# Patient Record
Sex: MALE | Race: WHITE | NOT HISPANIC OR LATINO | Employment: FULL TIME | URBAN - METROPOLITAN AREA
[De-identification: names, ages, dates, MRNs, and addresses within clinical notes are randomized per-mention and may not be internally consistent; named-entity substitution may affect disease eponyms.]

---

## 2017-11-09 ENCOUNTER — ALLSCRIPTS OFFICE VISIT (OUTPATIENT)
Dept: OTHER | Facility: OTHER | Age: 47
End: 2017-11-09

## 2017-11-09 DIAGNOSIS — E55.9 VITAMIN D DEFICIENCY: ICD-10-CM

## 2017-11-09 DIAGNOSIS — I10 ESSENTIAL (PRIMARY) HYPERTENSION: ICD-10-CM

## 2017-11-09 DIAGNOSIS — M79.672 PAIN OF LEFT FOOT: ICD-10-CM

## 2017-11-09 DIAGNOSIS — R53.83 OTHER FATIGUE: ICD-10-CM

## 2017-11-09 DIAGNOSIS — Z13.1 ENCOUNTER FOR SCREENING FOR DIABETES MELLITUS: ICD-10-CM

## 2017-11-09 DIAGNOSIS — M79.89 OTHER SPECIFIED SOFT TISSUE DISORDERS (CODE): ICD-10-CM

## 2017-11-09 DIAGNOSIS — Z13.220 ENCOUNTER FOR SCREENING FOR LIPOID DISORDERS: ICD-10-CM
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
## 2017-11-14 ENCOUNTER — HOSPITAL ENCOUNTER (OUTPATIENT)
Dept: ULTRASOUND IMAGING | Facility: HOSPITAL | Age: 47
Discharge: HOME | End: 2017-11-14
Attending: FAMILY MEDICINE
Payer: COMMERCIAL

## 2017-11-14 ENCOUNTER — HOSPITAL ENCOUNTER (OUTPATIENT)
Dept: RADIOLOGY | Facility: HOSPITAL | Age: 47
Discharge: HOME | End: 2017-11-14
Attending: FAMILY MEDICINE
Payer: COMMERCIAL

## 2017-11-14 DIAGNOSIS — E55.9 VITAMIN D DEFICIENCY: ICD-10-CM

## 2017-11-14 DIAGNOSIS — M79.89 OTHER SPECIFIED SOFT TISSUE DISORDERS (CODE): ICD-10-CM

## 2017-11-14 DIAGNOSIS — M79.672 PAIN OF LEFT FOOT: ICD-10-CM

## 2017-11-14 PROCEDURE — 73630 X-RAY EXAM OF FOOT: CPT

## 2017-11-14 PROCEDURE — 93971 EXTREMITY STUDY: CPT

## 2017-11-14 PROCEDURE — 73590 X-RAY EXAM OF LOWER LEG: CPT

## 2017-11-22 ENCOUNTER — APPOINTMENT (OUTPATIENT)
Dept: LAB | Facility: OTHER | Age: 47
End: 2017-11-22
Payer: COMMERCIAL

## 2017-11-22 ENCOUNTER — TRANSCRIBE ORDERS (OUTPATIENT)
Dept: LAB | Facility: OTHER | Age: 47
End: 2017-11-22

## 2017-11-22 DIAGNOSIS — Z13.220 ENCOUNTER FOR SCREENING FOR LIPOID DISORDERS: ICD-10-CM

## 2017-11-22 DIAGNOSIS — R53.83 OTHER FATIGUE: ICD-10-CM

## 2017-11-22 DIAGNOSIS — I10 ESSENTIAL (PRIMARY) HYPERTENSION: ICD-10-CM

## 2017-11-22 DIAGNOSIS — Z13.1 ENCOUNTER FOR SCREENING FOR DIABETES MELLITUS: ICD-10-CM

## 2017-11-22 DIAGNOSIS — E55.9 VITAMIN D DEFICIENCY: ICD-10-CM

## 2017-11-22 LAB
25(OH)D3 SERPL-MCNC: 14.3 NG/ML (ref 30–100)
ALBUMIN SERPL BCP-MCNC: 4.3 G/DL (ref 3.5–5)
ALP SERPL-CCNC: 103 U/L (ref 46–116)
ALT SERPL W P-5'-P-CCNC: 53 U/L (ref 12–78)
ANION GAP SERPL CALCULATED.3IONS-SCNC: 6 MMOL/L (ref 4–13)
AST SERPL W P-5'-P-CCNC: 26 U/L (ref 5–45)
BASOPHILS # BLD AUTO: 0.06 THOUSANDS/ΜL (ref 0–0.1)
BASOPHILS NFR BLD AUTO: 1 % (ref 0–1)
BILIRUB SERPL-MCNC: 0.7 MG/DL (ref 0.2–1)
BUN SERPL-MCNC: 13 MG/DL (ref 5–25)
CALCIUM SERPL-MCNC: 9.1 MG/DL (ref 8.3–10.1)
CHLORIDE SERPL-SCNC: 100 MMOL/L (ref 100–108)
CHOLEST SERPL-MCNC: 222 MG/DL (ref 50–200)
CO2 SERPL-SCNC: 29 MMOL/L (ref 21–32)
CREAT SERPL-MCNC: 0.84 MG/DL (ref 0.6–1.3)
EOSINOPHIL # BLD AUTO: 0.33 THOUSAND/ΜL (ref 0–0.61)
EOSINOPHIL NFR BLD AUTO: 5 % (ref 0–6)
ERYTHROCYTE [DISTWIDTH] IN BLOOD BY AUTOMATED COUNT: 12.9 % (ref 11.6–15.1)
GFR SERPL CREATININE-BSD FRML MDRD: 104 ML/MIN/1.73SQ M
GLUCOSE P FAST SERPL-MCNC: 96 MG/DL (ref 65–99)
HCT VFR BLD AUTO: 45 % (ref 36.5–49.3)
HDLC SERPL-MCNC: 41 MG/DL (ref 40–60)
HGB BLD-MCNC: 16 G/DL (ref 12–17)
LDLC SERPL CALC-MCNC: 152 MG/DL (ref 0–100)
LYMPHOCYTES # BLD AUTO: 2.63 THOUSANDS/ΜL (ref 0.6–4.47)
LYMPHOCYTES NFR BLD AUTO: 41 % (ref 14–44)
MCH RBC QN AUTO: 32.5 PG (ref 26.8–34.3)
MCHC RBC AUTO-ENTMCNC: 35.6 G/DL (ref 31.4–37.4)
MCV RBC AUTO: 91 FL (ref 82–98)
MONOCYTES # BLD AUTO: 0.69 THOUSAND/ΜL (ref 0.17–1.22)
MONOCYTES NFR BLD AUTO: 11 % (ref 4–12)
NEUTROPHILS # BLD AUTO: 2.71 THOUSANDS/ΜL (ref 1.85–7.62)
NEUTS SEG NFR BLD AUTO: 42 % (ref 43–75)
NRBC BLD AUTO-RTO: 0 /100 WBCS
PLATELET # BLD AUTO: 317 THOUSANDS/UL (ref 149–390)
PMV BLD AUTO: 10.5 FL (ref 8.9–12.7)
POTASSIUM SERPL-SCNC: 4.7 MMOL/L (ref 3.5–5.3)
PROT SERPL-MCNC: 8.2 G/DL (ref 6.4–8.2)
RBC # BLD AUTO: 4.93 MILLION/UL (ref 3.88–5.62)
SODIUM SERPL-SCNC: 135 MMOL/L (ref 136–145)
TRIGL SERPL-MCNC: 145 MG/DL
TSH SERPL DL<=0.05 MIU/L-ACNC: 3.22 UIU/ML (ref 0.36–3.74)
WBC # BLD AUTO: 6.44 THOUSAND/UL (ref 4.31–10.16)

## 2017-11-22 PROCEDURE — 80053 COMPREHEN METABOLIC PANEL: CPT

## 2017-11-22 PROCEDURE — 84443 ASSAY THYROID STIM HORMONE: CPT

## 2017-11-22 PROCEDURE — 80061 LIPID PANEL: CPT

## 2017-11-22 PROCEDURE — 85025 COMPLETE CBC W/AUTO DIFF WBC: CPT

## 2017-11-22 PROCEDURE — 82306 VITAMIN D 25 HYDROXY: CPT

## 2017-11-30 ENCOUNTER — GENERIC CONVERSION - ENCOUNTER (OUTPATIENT)
Dept: OTHER | Facility: OTHER | Age: 47
End: 2017-11-30

## 2017-11-30 ENCOUNTER — GENERIC CONVERSION - ENCOUNTER (OUTPATIENT)
Dept: FAMILY MEDICINE CLINIC | Facility: CLINIC | Age: 47
End: 2017-11-30

## 2017-11-30 PROCEDURE — 88304 TISSUE EXAM BY PATHOLOGIST: CPT | Performed by: FAMILY MEDICINE

## 2017-12-01 ENCOUNTER — LAB REQUISITION (OUTPATIENT)
Dept: LAB | Facility: HOSPITAL | Age: 47
End: 2017-12-01
Payer: COMMERCIAL

## 2017-12-01 DIAGNOSIS — L91.8 OTHER HYPERTROPHIC DISORDERS OF THE SKIN: ICD-10-CM

## 2018-01-13 VITALS
OXYGEN SATURATION: 98 % | BODY MASS INDEX: 31.16 KG/M2 | HEART RATE: 66 BPM | SYSTOLIC BLOOD PRESSURE: 150 MMHG | DIASTOLIC BLOOD PRESSURE: 94 MMHG | HEIGHT: 69 IN | WEIGHT: 210.38 LBS | TEMPERATURE: 97.3 F

## 2018-01-22 VITALS — SYSTOLIC BLOOD PRESSURE: 134 MMHG | DIASTOLIC BLOOD PRESSURE: 90 MMHG

## 2018-01-22 VITALS
WEIGHT: 209.8 LBS | DIASTOLIC BLOOD PRESSURE: 94 MMHG | TEMPERATURE: 97.5 F | HEIGHT: 69 IN | BODY MASS INDEX: 31.07 KG/M2 | HEART RATE: 68 BPM | SYSTOLIC BLOOD PRESSURE: 156 MMHG | OXYGEN SATURATION: 96 %

## 2018-03-21 DIAGNOSIS — I10 ESSENTIAL HYPERTENSION: Primary | ICD-10-CM

## 2018-03-21 RX ORDER — HYDROCHLOROTHIAZIDE 12.5 MG/1
TABLET ORAL
Qty: 30 TABLET | Refills: 3 | Status: SHIPPED | OUTPATIENT
Start: 2018-03-21 | End: 2018-09-14 | Stop reason: SDUPTHER

## 2018-06-22 DIAGNOSIS — I10 ESSENTIAL HYPERTENSION: ICD-10-CM

## 2018-09-14 ENCOUNTER — OFFICE VISIT (OUTPATIENT)
Dept: FAMILY MEDICINE CLINIC | Facility: CLINIC | Age: 48
End: 2018-09-14
Payer: COMMERCIAL

## 2018-09-14 VITALS
HEIGHT: 70 IN | HEART RATE: 72 BPM | OXYGEN SATURATION: 98 % | DIASTOLIC BLOOD PRESSURE: 82 MMHG | BODY MASS INDEX: 29.81 KG/M2 | SYSTOLIC BLOOD PRESSURE: 124 MMHG | WEIGHT: 208.2 LBS | TEMPERATURE: 98.1 F

## 2018-09-14 DIAGNOSIS — I10 ESSENTIAL HYPERTENSION: Primary | ICD-10-CM

## 2018-09-14 DIAGNOSIS — R68.82 LOW LIBIDO: ICD-10-CM

## 2018-09-14 DIAGNOSIS — E78.2 MIXED HYPERLIPIDEMIA: ICD-10-CM

## 2018-09-14 DIAGNOSIS — E55.9 VITAMIN D DEFICIENCY: ICD-10-CM

## 2018-09-14 DIAGNOSIS — Z13.1 DIABETES MELLITUS SCREENING: ICD-10-CM

## 2018-09-14 PROCEDURE — 99214 OFFICE O/P EST MOD 30 MIN: CPT | Performed by: FAMILY MEDICINE

## 2018-09-14 PROCEDURE — 3008F BODY MASS INDEX DOCD: CPT | Performed by: FAMILY MEDICINE

## 2018-09-14 PROCEDURE — 3074F SYST BP LT 130 MM HG: CPT | Performed by: FAMILY MEDICINE

## 2018-09-14 PROCEDURE — 3079F DIAST BP 80-89 MM HG: CPT | Performed by: FAMILY MEDICINE

## 2018-09-14 RX ORDER — HYDROCHLOROTHIAZIDE 12.5 MG/1
12.5 TABLET ORAL DAILY
Qty: 90 TABLET | Refills: 2 | Status: SHIPPED | OUTPATIENT
Start: 2018-09-14 | End: 2018-10-05 | Stop reason: SDUPTHER

## 2018-09-14 NOTE — PROGRESS NOTES
Assessment/Plan:    No problem-specific Assessment & Plan notes found for this encounter  Diagnoses and all orders for this visit:    Essential hypertension  Stable continue same medications and dose at this time  Diabetes mellitus screening  -     Comprehensive metabolic panel; Future  -     Hemoglobin A1C; Future    Mixed hyperlipidemia  -     Lipid panel; Future    Vitamin D deficiency  Improved advise to continue Vit D daily will repeat levels  -     Vitamin D 25 hydroxy; Future    Low libido  The recommended medications for erectile dysfunction at this time however he is not interested in them and would like to try something over-the-counter for such as and supplements  Follow up in 6 months or as needed    Subjective:      Patient ID: Magdaleno Medina is a 50 y o  male  Patient is here to follow-up for hypertension  He says that his blood pressure has been controlled at home  He is currently taking his medications daily  He denies any side effects from his medications  He has a history of vitamin-D deficiency used to feel tired  However he has been feeling better since taking his vitamin D supplements  He also has a history of hypercholesteremia has found out per last blood work done a year ago  He is not currently taking any statin medications for this  Also his interested in increasing his libido he says has some problems sometimes with erectile dysfunction  He is interested in getting testosterone supplements such as new Neugenix over-the-counter  The following portions of the patient's history were reviewed and updated as appropriate:   He  has a past medical history of Known health problems: none    He   Patient Active Problem List    Diagnosis Date Noted    Essential hypertension 09/14/2018    Diabetes mellitus screening 09/14/2018    Mixed hyperlipidemia 09/14/2018    Vitamin D deficiency 09/14/2018    Low libido 09/14/2018     He  has a past surgical history that includes [place] tooth extraction  His family history includes Breast cancer in his sister; Hypertension in his father  He  reports that he has quit smoking  He has never used smokeless tobacco  He reports that he drinks alcohol  His drug history is not on file  Current Outpatient Prescriptions   Medication Sig Dispense Refill    hydrochlorothiazide (HYDRODIURIL) 12 5 mg tablet TAKE ONE TABLET BY MOUTH EVERY DAY 30 tablet 3    metoprolol tartrate (LOPRESSOR) 25 mg tablet TAKE ONE TABLET BY MOUTH TWICE A DAY 60 tablet 1     No current facility-administered medications for this visit  Current Outpatient Prescriptions on File Prior to Visit   Medication Sig    hydrochlorothiazide (HYDRODIURIL) 12 5 mg tablet TAKE ONE TABLET BY MOUTH EVERY DAY    metoprolol tartrate (LOPRESSOR) 25 mg tablet TAKE ONE TABLET BY MOUTH TWICE A DAY     No current facility-administered medications on file prior to visit  He has No Known Allergies       Review of Systems   Constitutional: Positive for fatigue  Negative for activity change, appetite change and fever  HENT: Negative for congestion and ear discharge  Respiratory: Negative for cough and shortness of breath  Cardiovascular: Negative for chest pain and palpitations  Gastrointestinal: Negative for diarrhea and nausea  Musculoskeletal: Negative for arthralgias and back pain  Skin: Negative for color change and rash  Neurological: Negative for dizziness and headaches  Psychiatric/Behavioral: Negative for agitation and behavioral problems  Objective:      /82 (Cuff Size: Large)   Pulse 72   Temp 98 1 °F (36 7 °C) (Tympanic)   Ht 5' 10" (1 778 m)   Wt 94 4 kg (208 lb 3 2 oz)   SpO2 98%   BMI 29 87 kg/m²          Physical Exam   Constitutional: He is oriented to person, place, and time  He appears well-developed and well-nourished  No distress  Eyes: Pupils are equal, round, and reactive to light  No scleral icterus     Cardiovascular: Normal rate, regular rhythm and normal heart sounds  No murmur heard  Pulmonary/Chest: Effort normal and breath sounds normal  No respiratory distress  He has no wheezes  Abdominal: Soft  Bowel sounds are normal  He exhibits no distension  There is no tenderness  Neurological: He is alert and oriented to person, place, and time  Skin: Skin is warm and dry  No rash noted  He is not diaphoretic  Psychiatric: He has a normal mood and affect

## 2018-10-04 ENCOUNTER — TELEPHONE (OUTPATIENT)
Dept: FAMILY MEDICINE CLINIC | Facility: CLINIC | Age: 48
End: 2018-10-04

## 2018-10-04 NOTE — TELEPHONE ENCOUNTER
David Nguyen got a phone call stating that his hydrochlorothiazide has been recalled  So he needs another script called into [facility]

## 2018-10-05 DIAGNOSIS — I10 ESSENTIAL HYPERTENSION: ICD-10-CM

## 2018-10-05 RX ORDER — HYDROCHLOROTHIAZIDE 12.5 MG/1
12.5 TABLET ORAL DAILY
Qty: 90 TABLET | Refills: 1 | Status: SHIPPED | OUTPATIENT
Start: 2018-10-05 | End: 2019-05-19 | Stop reason: SDUPTHER

## 2019-04-12 DIAGNOSIS — I10 ESSENTIAL HYPERTENSION: ICD-10-CM

## 2019-05-03 ENCOUNTER — OFFICE VISIT (OUTPATIENT)
Dept: FAMILY MEDICINE CLINIC | Facility: CLINIC | Age: 49
End: 2019-05-03
Payer: COMMERCIAL

## 2019-05-03 VITALS
WEIGHT: 206.8 LBS | HEART RATE: 74 BPM | DIASTOLIC BLOOD PRESSURE: 74 MMHG | OXYGEN SATURATION: 98 % | BODY MASS INDEX: 29.6 KG/M2 | SYSTOLIC BLOOD PRESSURE: 122 MMHG | HEIGHT: 70 IN | TEMPERATURE: 98.1 F

## 2019-05-03 DIAGNOSIS — H00.014 HORDEOLUM EXTERNUM OF LEFT UPPER EYELID: Primary | ICD-10-CM

## 2019-05-03 PROCEDURE — 3008F BODY MASS INDEX DOCD: CPT | Performed by: NURSE PRACTITIONER

## 2019-05-03 PROCEDURE — 99213 OFFICE O/P EST LOW 20 MIN: CPT | Performed by: NURSE PRACTITIONER

## 2019-05-03 RX ORDER — METHYLPREDNISOLONE 4 MG/1
TABLET ORAL
Qty: 21 EACH | Refills: 0 | Status: SHIPPED | OUTPATIENT
Start: 2019-05-03 | End: 2019-08-13 | Stop reason: ALTCHOICE

## 2019-05-03 RX ORDER — CEPHALEXIN 500 MG/1
500 CAPSULE ORAL EVERY 8 HOURS SCHEDULED
Qty: 21 CAPSULE | Refills: 0 | Status: SHIPPED | OUTPATIENT
Start: 2019-05-03 | End: 2019-05-10

## 2019-05-19 DIAGNOSIS — I10 ESSENTIAL HYPERTENSION: ICD-10-CM

## 2019-05-19 RX ORDER — HYDROCHLOROTHIAZIDE 12.5 MG/1
TABLET ORAL
Qty: 90 TABLET | Refills: 1 | Status: SHIPPED | OUTPATIENT
Start: 2019-05-19 | End: 2019-12-30 | Stop reason: SDUPTHER

## 2019-08-13 ENCOUNTER — OFFICE VISIT (OUTPATIENT)
Dept: FAMILY MEDICINE CLINIC | Facility: CLINIC | Age: 49
End: 2019-08-13
Payer: COMMERCIAL

## 2019-08-13 VITALS
SYSTOLIC BLOOD PRESSURE: 142 MMHG | OXYGEN SATURATION: 97 % | TEMPERATURE: 98.7 F | HEIGHT: 70 IN | DIASTOLIC BLOOD PRESSURE: 84 MMHG | HEART RATE: 61 BPM | BODY MASS INDEX: 29.67 KG/M2

## 2019-08-13 DIAGNOSIS — I10 ESSENTIAL HYPERTENSION: Primary | ICD-10-CM

## 2019-08-13 DIAGNOSIS — E78.2 MIXED HYPERLIPIDEMIA: ICD-10-CM

## 2019-08-13 DIAGNOSIS — M79.661 RIGHT CALF PAIN: ICD-10-CM

## 2019-08-13 DIAGNOSIS — M67.88 ACHILLES TENDINOSIS OF RIGHT LOWER EXTREMITY: Primary | ICD-10-CM

## 2019-08-13 DIAGNOSIS — E55.9 VITAMIN D DEFICIENCY: ICD-10-CM

## 2019-08-13 DIAGNOSIS — L91.8 CUTANEOUS SKIN TAGS: ICD-10-CM

## 2019-08-13 PROBLEM — H00.014 HORDEOLUM EXTERNUM OF LEFT UPPER EYELID: Status: RESOLVED | Noted: 2019-05-03 | Resolved: 2019-08-13

## 2019-08-13 PROBLEM — Z13.1 DIABETES MELLITUS SCREENING: Status: RESOLVED | Noted: 2018-09-14 | Resolved: 2019-08-13

## 2019-08-13 PROCEDURE — 99213 OFFICE O/P EST LOW 20 MIN: CPT | Performed by: NURSE PRACTITIONER
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
## 2019-08-22 ENCOUNTER — APPOINTMENT (OUTPATIENT)
Dept: LAB | Facility: CLINIC | Age: 49
End: 2019-08-22
Payer: COMMERCIAL

## 2019-08-22 DIAGNOSIS — I10 ESSENTIAL HYPERTENSION: ICD-10-CM

## 2019-08-22 DIAGNOSIS — E78.2 MIXED HYPERLIPIDEMIA: ICD-10-CM

## 2019-08-22 DIAGNOSIS — E55.9 VITAMIN D DEFICIENCY: ICD-10-CM

## 2019-08-22 LAB
25(OH)D3 SERPL-MCNC: 25 NG/ML (ref 30–100)
ALBUMIN SERPL BCP-MCNC: 4.5 G/DL (ref 3.5–5)
ALP SERPL-CCNC: 98 U/L (ref 46–116)
ALT SERPL W P-5'-P-CCNC: 45 U/L (ref 12–78)
ANION GAP SERPL CALCULATED.3IONS-SCNC: 9 MMOL/L (ref 4–13)
AST SERPL W P-5'-P-CCNC: 28 U/L (ref 5–45)
BASOPHILS # BLD AUTO: 0.06 THOUSANDS/ΜL (ref 0–0.1)
BASOPHILS NFR BLD AUTO: 1 % (ref 0–1)
BILIRUB SERPL-MCNC: 0.76 MG/DL (ref 0.2–1)
BUN SERPL-MCNC: 12 MG/DL (ref 5–25)
CALCIUM SERPL-MCNC: 9 MG/DL (ref 8.3–10.1)
CHLORIDE SERPL-SCNC: 106 MMOL/L (ref 100–108)
CHOLEST SERPL-MCNC: 204 MG/DL (ref 50–200)
CO2 SERPL-SCNC: 26 MMOL/L (ref 21–32)
CREAT SERPL-MCNC: 0.68 MG/DL (ref 0.6–1.3)
EOSINOPHIL # BLD AUTO: 0.25 THOUSAND/ΜL (ref 0–0.61)
EOSINOPHIL NFR BLD AUTO: 4 % (ref 0–6)
ERYTHROCYTE [DISTWIDTH] IN BLOOD BY AUTOMATED COUNT: 12.6 % (ref 11.6–15.1)
GFR SERPL CREATININE-BSD FRML MDRD: 113 ML/MIN/1.73SQ M
GLUCOSE P FAST SERPL-MCNC: 90 MG/DL (ref 65–99)
HCT VFR BLD AUTO: 43.8 % (ref 36.5–49.3)
HDLC SERPL-MCNC: 37 MG/DL (ref 40–60)
HGB BLD-MCNC: 14.8 G/DL (ref 12–17)
IMM GRANULOCYTES # BLD AUTO: 0.02 THOUSAND/UL (ref 0–0.2)
IMM GRANULOCYTES NFR BLD AUTO: 0 % (ref 0–2)
LDLC SERPL CALC-MCNC: 137 MG/DL (ref 0–100)
LYMPHOCYTES # BLD AUTO: 1.87 THOUSANDS/ΜL (ref 0.6–4.47)
LYMPHOCYTES NFR BLD AUTO: 33 % (ref 14–44)
MCH RBC QN AUTO: 31.6 PG (ref 26.8–34.3)
MCHC RBC AUTO-ENTMCNC: 33.8 G/DL (ref 31.4–37.4)
MCV RBC AUTO: 93 FL (ref 82–98)
MONOCYTES # BLD AUTO: 0.58 THOUSAND/ΜL (ref 0.17–1.22)
MONOCYTES NFR BLD AUTO: 10 % (ref 4–12)
NEUTROPHILS # BLD AUTO: 2.85 THOUSANDS/ΜL (ref 1.85–7.62)
NEUTS SEG NFR BLD AUTO: 52 % (ref 43–75)
NRBC BLD AUTO-RTO: 0 /100 WBCS
PLATELET # BLD AUTO: 258 THOUSANDS/UL (ref 149–390)
PMV BLD AUTO: 10.9 FL (ref 8.9–12.7)
POTASSIUM SERPL-SCNC: 3.7 MMOL/L (ref 3.5–5.3)
PROT SERPL-MCNC: 8 G/DL (ref 6.4–8.2)
RBC # BLD AUTO: 4.69 MILLION/UL (ref 3.88–5.62)
SODIUM SERPL-SCNC: 141 MMOL/L (ref 136–145)
TRIGL SERPL-MCNC: 151 MG/DL
WBC # BLD AUTO: 5.63 THOUSAND/UL (ref 4.31–10.16)

## 2019-08-22 PROCEDURE — 82306 VITAMIN D 25 HYDROXY: CPT

## 2019-08-22 PROCEDURE — 85025 COMPLETE CBC W/AUTO DIFF WBC: CPT

## 2019-08-22 PROCEDURE — 36415 COLL VENOUS BLD VENIPUNCTURE: CPT

## 2019-08-22 PROCEDURE — 80053 COMPREHEN METABOLIC PANEL: CPT

## 2019-08-22 PROCEDURE — 80061 LIPID PANEL: CPT

## 2019-08-29 ENCOUNTER — EVALUATION (OUTPATIENT)
Dept: PHYSICAL THERAPY | Facility: CLINIC | Age: 49
End: 2019-08-29
Payer: COMMERCIAL

## 2019-08-29 DIAGNOSIS — M79.661 RIGHT CALF PAIN: Primary | ICD-10-CM

## 2019-08-29 DIAGNOSIS — M67.88 ACHILLES TENDINOSIS OF RIGHT LOWER EXTREMITY: ICD-10-CM

## 2019-08-29 PROCEDURE — 97161 PT EVAL LOW COMPLEX 20 MIN: CPT | Performed by: PHYSICAL THERAPIST

## 2019-08-29 PROCEDURE — 97110 THERAPEUTIC EXERCISES: CPT | Performed by: PHYSICAL THERAPIST
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
## 2019-09-04 ENCOUNTER — OFFICE VISIT (OUTPATIENT)
Dept: PHYSICAL THERAPY | Facility: CLINIC | Age: 49
End: 2019-09-04
Payer: COMMERCIAL

## 2019-09-04 DIAGNOSIS — M67.88 ACHILLES TENDINOSIS OF RIGHT LOWER EXTREMITY: ICD-10-CM

## 2019-09-04 DIAGNOSIS — M79.661 RIGHT CALF PAIN: Primary | ICD-10-CM

## 2019-09-04 PROCEDURE — 97110 THERAPEUTIC EXERCISES: CPT

## 2019-09-04 PROCEDURE — 97530 THERAPEUTIC ACTIVITIES: CPT

## 2019-09-04 PROCEDURE — 97112 NEUROMUSCULAR REEDUCATION: CPT

## 2019-09-04 NOTE — PROGRESS NOTES
Daily Note     Today's date: 2019  Patient name: Kary Soares  : 1970  MRN: [id number]  Referring provider: FANNY Hunt  Dx:   Encounter Diagnosis     ICD-10-CM    1  Right calf pain M79 661    2  Achilles tendinosis of right lower extremity M76 61        Start Time: 1744  Stop Time: 08  Total time in clinic (min): 46 minutes    Subjective: patient reported that after his evaluation he has been performing the stretches and feeling great no pain at all in R calf, "which is amazing since I have not felt this good in 3 years"  Patient reported the last 3 days at work having no pain, "just wear and tear"  Objective: See treatment diary below      Assessment:  Initiated treatment plan to increase ROM in R ankle along with increase balance/ coordination and strength  Overall patient challenged with rockerboard exercises, no ankle pain throughout  Reviewed therapy goals with patient,  Tolerated treatment well  Patient exhibited good technique with therapeutic exercises and would benefit from continued PT, patient educated to continued performing HEP at home, educated with muscle soreness is normal post treatment sessions  Plan: Continue per plan of care  , patient reported he will call tomorrow to add more appointments, he does not know his work schedule at this point in time  Precautions: none      Manual   (IE)            Green massage stick to gastroc muscle belly on R NV NV           Consider graston to medial gastroc restrictions NV NV                                                      Exercise Diary   (IE)            Upright bike 5 mins NV 8 min L1                         Standing:             - Rockerboard gastroc stre 4 x 30" NV 4x 30"            - Rockerboard soleus str 4 x 30" NV 4x 30"            - HS str  2 x 30" Ea NV 2x 30" ea  - SLS 2 x 30" ea NV NV           - Rockerboard A/P, M/L 20 taps, 30" balance NV 20x ea  , 30" balance  Seated:             - BAPs (CW/CCW, A/P, M/L) 20 taps NV 20 taps all            - ankle 4 ways 2 x 10 GTB NV 2x 10 with 2" hold GTB                                                                                                                        Modalities  8-29 (IE) 9/4           Cryo as needed  declined

## 2019-10-02 NOTE — PROGRESS NOTES
Addendum: Resolved episode of care due to inactivity  Pt never returned phone calls to schedule out and has not attended a session since 9-4-19

## 2019-10-15 NOTE — PROGRESS NOTES
Assessment/Plan:       Diagnoses and all orders for this visit:    Overweight (BMI 25 0-29 9)  -     Lipid panel; Future  -     Vitamin D 25 hydroxy; Future  -     Comprehensive metabolic panel; Future    Disseminated herpes zoster  -     famciclovir (FAMVIR) 500 mg tablet; Take 1 tablet (500 mg total) by mouth 3 (three) times a day for 10 days    Hyperlipidemia, mixed  -     Lipid panel; Future  -     Comprehensive metabolic panel; Future    Vitamin D deficiency  -     ergocalciferol (VITAMIN D2) 50,000 units; Take 1 capsule (50,000 Units total) by mouth once a week  -     Vitamin D 25 hydroxy; Future        No problem-specific Assessment & Plan notes found for this encounter  Subjective:      Patient ID: Nichol Vargas is a 52 y o  male  Patient is here to report a rash on his back and skin sensitivity  He had some skin sensitivity right arm for at least a week  He developed a rash about two days ago  The rash is under the right arm and on the back  He described sharp pains intermittently throughout the day        Results for Alfred London (MRN [id number]) as of 10/16/2019 11:26    2019 08:28  Sodium: 141  Potassium: 3 7  Chloride: 106  CO2: 26  Anion Gap: 9  BUN: 12  Creatinine: 0 68  GLUCOSE FASTIN  Calcium: 9 0  AST: 28  ALT: 45  Alkaline Phosphatase: 98  Total Protein: 8 0  Albumin: 4 5  TOTAL BILIRUBIN: 0 76  eGFR: 113  Cholesterol: 204 (H)  Triglycerides: 151 (H)  HDL: 37 (L)  LDL Direct: 137 (H)  Vit D, 25-Hydroxy: 25 0 (L)  WBC: 5 63  Red Blood Cell Count: 4 69  Hemoglobin: 14 8  HCT: 43 8  MCV: 93  MCH: 31 6  MCHC: 33 8  RDW: 12 6  Platelet Count: 414  MPV: 10 9  nRBC: 0  Neutrophils %: 52  Immat GRANS %: 0  Lymphocytes Relative: 33  Monocytes Relative: 10  Eosinophils: 4  Basophils Relative: 1  Immature Grans Absolute: 0 02  Absolute Neutrophils: 2 85  Lymphocytes Absolute: 1 87  Absolute Monocytes: 0 58  Absolute Eosinophils: 0 25  Basophils Absolute: 0 06        The following portions of the patient's history were reviewed and updated as appropriate:   He has a past medical history of Hypertension and Known health problems: none  ,  does not have any pertinent problems on file  ,   has a past surgical history that includes [place] tooth extraction  ,  family history includes Breast cancer in his sister; Hypertension in his father  ,   reports that he has quit smoking  He has never used smokeless tobacco  He reports that he drinks alcohol  His drug history is not on file  ,  is allergic to codeine     Current Outpatient Medications   Medication Sig Dispense Refill    ergocalciferol (VITAMIN D2) 50,000 units Take 1 capsule (50,000 Units total) by mouth once a week 17 capsule 3    famciclovir (FAMVIR) 500 mg tablet Take 1 tablet (500 mg total) by mouth 3 (three) times a day for 10 days 30 tablet 0    hydrochlorothiazide (HYDRODIURIL) 12 5 mg tablet TAKE ONE TABLET BY MOUTH EVERY DAY 90 tablet 1    metoprolol tartrate (LOPRESSOR) 25 mg tablet TAKE ONE TABLET BY MOUTH TWICE A  tablet 1     No current facility-administered medications for this visit  Review of Systems   Constitutional: Negative for fatigue and fever  HENT: Negative for congestion  Eyes: Negative for visual disturbance  Respiratory: Negative for cough and shortness of breath  Cardiovascular: Negative for chest pain, palpitations and leg swelling  Gastrointestinal: Negative for abdominal distention and abdominal pain  Endocrine: Negative for cold intolerance, polydipsia and polyuria  Genitourinary: Negative for difficulty urinating  Musculoskeletal: Negative for back pain and joint swelling  Skin: Positive for rash  Negative for color change  Allergic/Immunologic: Negative for immunocompromised state  Neurological: Negative for dizziness and headaches  Hematological: Negative for adenopathy  Psychiatric/Behavioral: Negative for behavioral problems and sleep disturbance     All other systems reviewed and are negative  Objective:  Vitals:    10/16/19 1113   BP: 134/88   BP Location: Left arm   Patient Position: Sitting   Pulse: 71   Resp: 18   Temp: 98 3 °F (36 8 °C)   SpO2: 98%   Weight: 94 3 kg (208 lb)   Height: 5' 10" (1 778 m)     Body mass index is 29 84 kg/m²  Physical Exam   Constitutional: He is oriented to person, place, and time  He appears well-developed and well-nourished  No distress  HENT:   Head: Normocephalic and atraumatic  Right Ear: External ear normal    Left Ear: External ear normal    Nose: Nose normal    Mouth/Throat: Oropharynx is clear and moist  No oropharyngeal exudate  Eyes: Pupils are equal, round, and reactive to light  Conjunctivae and EOM are normal  Right eye exhibits no discharge  Left eye exhibits no discharge  No scleral icterus  Neck: Normal range of motion  Neck supple  No JVD present  No thyromegaly present  Cardiovascular: Normal rate, regular rhythm, normal heart sounds and intact distal pulses  Exam reveals no gallop and no friction rub  No murmur heard  Pulmonary/Chest: Effort normal and breath sounds normal  No respiratory distress  Abdominal: Soft  Bowel sounds are normal  He exhibits no distension  There is no tenderness  Musculoskeletal: Normal range of motion  He exhibits no edema or tenderness  Lymphadenopathy:     He has no cervical adenopathy  Neurological: He is alert and oriented to person, place, and time  He has normal reflexes  No cranial nerve deficit  Coordination normal    Skin: Skin is warm and dry  Rash noted  He is not diaphoretic  Raised erythmatous vesicular rash on posterior scapula region and under left axilla  Skin sensitivity throughout area   Psychiatric: He has a normal mood and affect  His behavior is normal  Judgment and thought content normal    Nursing note and vitals reviewed  BMI Counseling: Body mass index is 29 84 kg/m²   The BMI is above normal  Nutrition recommendations include reducing portion sizes, decreasing overall calorie intake, 3-5 servings of fruits/vegetables daily, reducing fast food intake, consuming healthier snacks, decreasing soda and/or juice intake, moderation in carbohydrate intake, increasing intake of lean protein, reducing intake of saturated fat and trans fat and reducing intake of cholesterol  Exercise recommendations include exercising 3-5 times per week and strength training exercises

## 2019-10-16 ENCOUNTER — OFFICE VISIT (OUTPATIENT)
Dept: FAMILY MEDICINE CLINIC | Facility: CLINIC | Age: 49
End: 2019-10-16
Payer: COMMERCIAL

## 2019-10-16 VITALS
HEART RATE: 71 BPM | RESPIRATION RATE: 18 BRPM | TEMPERATURE: 98.3 F | HEIGHT: 70 IN | BODY MASS INDEX: 29.78 KG/M2 | WEIGHT: 208 LBS | OXYGEN SATURATION: 98 % | SYSTOLIC BLOOD PRESSURE: 134 MMHG | DIASTOLIC BLOOD PRESSURE: 88 MMHG

## 2019-10-16 DIAGNOSIS — E66.3 OVERWEIGHT (BMI 25.0-29.9): Primary | ICD-10-CM

## 2019-10-16 DIAGNOSIS — E78.2 HYPERLIPIDEMIA, MIXED: ICD-10-CM

## 2019-10-16 DIAGNOSIS — B02.7 DISSEMINATED HERPES ZOSTER: ICD-10-CM

## 2019-10-16 DIAGNOSIS — E55.9 VITAMIN D DEFICIENCY: ICD-10-CM

## 2019-10-16 PROCEDURE — 99214 OFFICE O/P EST MOD 30 MIN: CPT | Performed by: NURSE PRACTITIONER

## 2019-10-16 PROCEDURE — 3008F BODY MASS INDEX DOCD: CPT | Performed by: NURSE PRACTITIONER

## 2019-10-16 RX ORDER — ERGOCALCIFEROL 1.25 MG/1
50000 CAPSULE ORAL WEEKLY
Qty: 17 CAPSULE | Refills: 3 | Status: SHIPPED | OUTPATIENT
Start: 2019-10-16 | End: 2021-09-29 | Stop reason: ALTCHOICE

## 2019-10-16 RX ORDER — FAMCICLOVIR 500 MG/1
500 TABLET, FILM COATED ORAL 3 TIMES DAILY
Qty: 30 TABLET | Refills: 0 | Status: SHIPPED | OUTPATIENT
Start: 2019-10-16 | End: 2021-09-29 | Stop reason: ALTCHOICE

## 2019-10-16 NOTE — PATIENT INSTRUCTIONS
Shingles- start antiviral medication  Contagious until lesions crust over  Contact precautions  Vit d def- start D2  Take capsule once a week with the largest meal of the day  Hyperlipidemia- strive for 5 servings of fruits and veggies daily  Obesity- goal is for weight loss through diet and exercise  Decrease portion sizes and carbohydrates  Follow up in February as scheduled  Obtain labs one week prior to visit    Shingles   WHAT YOU NEED TO KNOW:   Shingles is a painful infection caused by the same virus that causes chickenpox (varicella-zoster virus)  After you get chickenpox, the virus stays in your body for several years without causing any symptoms  Shingles occurs when the virus becomes active again  Once active, the virus will travel along a nerve to your skin and cause a rash  DISCHARGE INSTRUCTIONS:   Return to the emergency department if:   · You have painful, red, warm skin around the blisters, or the blisters drain pus  · Your neck is stiff or you have trouble moving it  · You have trouble moving your arms, legs, or face  · You have a seizure  · You have weakness in an arm or leg  · You become confused, or have difficulty speaking  · You have dizziness, a severe headache, hearing or vision loss  Contact your healthcare provider if:   · You feel weak or have a headache  · You have a cough, chills, or a fever  · You have abdominal pain, nausea, or vomiting  · Your rash becomes more itchy or painful  · Your rash spreads to other parts of your body  · Your pain worsens and does not go away even after taking medicine  · You have questions or concerns about your condition or care  Medicines:   · Antiviral medicine  helps decrease symptoms and healing time  They may also decrease your risk of developing nerve pain  You will need to start taking them within 3 days of the start of symptoms to prevent nerve pain      · Pain medicine  may be prescribed or suggested by your healthcare provider  You may need NSAIDs, acetaminophen, or opioid medicine depending on how much pain you are in  Do not wait until the pain is severe before you take more pain medicine  · Topical anesthetics  are used to numb the skin and decrease pain  They can be a cream, gel, spray, or patch  · Anticonvulsants  decrease nerve pain and may help you sleep at night  · Antidepressants  may be used to decrease nerve pain  · Take your medicine as directed  Contact your healthcare provider if you think your medicine is not helping or if you have side effects  Tell him of her if you are allergic to any medicine  Keep a list of the medicines, vitamins, and herbs you take  Include the amounts, and when and why you take them  Bring the list or the pill bottles to follow-up visits  Carry your medicine list with you in case of an emergency  Follow up with your healthcare provider as directed:  Write down your questions so you remember to ask them during your visits  Self-care:  Keep your rash clean and dry  Cover your rash with a bandage or clothing  Do not use bandages that stick to your skin  The sticky part may irritate your skin and make your rash last longer  Prevent the spread of shingles: The virus can be passed to a person who has never had chickenpox  This person may get chickenpox, but not shingles  You may pass the virus to others as long as you have a rash  The virus is spread by direct contact with the fluid from the blisters  Usually, you cannot spread the virus once the blisters dry up  Prevent shingles or another shingles outbreak:  A vaccine may be given to help prevent shingles  Ask for more information about this vaccine  © 2017 [employer] [employer] Information is for End User's use only and may not be sold, redistributed or otherwise used for commercial purposes   All illustrations and images included in CareNotes® are the copyrighted property of [employer]  or Garry Leiva  The above information is an  only  It is not intended as medical advice for individual conditions or treatments  Talk to your doctor, nurse or pharmacist before following any medical regimen to see if it is safe and effective for you  Results for Nancy Souza (MRN [id number]) as of 10/16/2019 11:26   Ref   Range 8/22/2019 08:28   Sodium Latest Ref Range: 136 - 145 mmol/L 141   Potassium Latest Ref Range: 3 5 - 5 3 mmol/L 3 7   Chloride Latest Ref Range: 100 - 108 mmol/L 106   CO2 Latest Ref Range: 21 - 32 mmol/L 26   Anion Gap Latest Ref Range: 4 - 13 mmol/L 9   BUN Latest Ref Range: 5 - 25 mg/dL 12   Creatinine Latest Ref Range: 0 60 - 1 30 mg/dL 0 68   GLUCOSE FASTING Latest Ref Range: 65 - 99 mg/dL 90   Calcium Latest Ref Range: 8 3 - 10 1 mg/dL 9 0   AST Latest Ref Range: 5 - 45 U/L 28   ALT Latest Ref Range: 12 - 78 U/L 45   Alkaline Phosphatase Latest Ref Range: 46 - 116 U/L 98   Total Protein Latest Ref Range: 6 4 - 8 2 g/dL 8 0   Albumin Latest Ref Range: 3 5 - 5 0 g/dL 4 5   TOTAL BILIRUBIN Latest Ref Range: 0 20 - 1 00 mg/dL 0 76   eGFR Latest Units: ml/min/1 73sq m 113   Cholesterol Latest Ref Range: 50 - 200 mg/dL 204 (H)   Triglycerides Latest Ref Range: <=150 mg/dL 151 (H)   HDL Latest Ref Range: 40 - 60 mg/dL 37 (L)   LDL Direct Latest Ref Range: 0 - 100 mg/dL 137 (H)   Vit D, 25-Hydroxy Latest Ref Range: 30 0 - 100 0 ng/mL 25 0 (L)   WBC Latest Ref Range: 4 31 - 10 16 Thousand/uL 5 63   Red Blood Cell Count Latest Ref Range: 3 88 - 5 62 Million/uL 4 69   Hemoglobin Latest Ref Range: 12 0 - 17 0 g/dL 14 8   HCT Latest Ref Range: 36 5 - 49 3 % 43 8   MCV Latest Ref Range: 82 - 98 fL 93   MCH Latest Ref Range: 26 8 - 34 3 pg 31 6   MCHC Latest Ref Range: 31 4 - 37 4 g/dL 33 8   RDW Latest Ref Range: 11 6 - 15 1 % 12 6   Platelet Count Latest Ref Range: 149 - 390 Thousands/uL 258   MPV Latest Ref Range: 8 9 - 12 7 fL 10 9   nRBC Latest Units: /100 WBCs 0   Neutrophils % Latest Ref Range: 43 - 75 % 52   Immat GRANS % Latest Ref Range: 0 - 2 % 0   Lymphocytes Relative Latest Ref Range: 14 - 44 % 33   Monocytes Relative Latest Ref Range: 4 - 12 % 10   Eosinophils Latest Ref Range: 0 - 6 % 4   Basophils Relative Latest Ref Range: 0 - 1 % 1   Immature Grans Absolute Latest Ref Range: 0 00 - 0 20 Thousand/uL 0 02   Absolute Neutrophils Latest Ref Range: 1 85 - 7 62 Thousands/µL 2 85   Lymphocytes Absolute Latest Ref Range: 0 60 - 4 47 Thousands/µL 1 87   Absolute Monocytes Latest Ref Range: 0 17 - 1 22 Thousand/µL 0 58   Absolute Eosinophils Latest Ref Range: 0 00 - 0 61 Thousand/µL 0 25   Basophils Absolute Latest Ref Range: 0 00 - 0 10 Thousands/µL 0 06     Weight Management   AMBULATORY CARE:   Why it is important to manage your weight:  Being overweight increases your risk of health conditions such as heart disease, high blood pressure, type 2 diabetes, and certain types of cancer  It can also increase your risk for osteoarthritis, sleep apnea, and other respiratory problems  Aim for a slow, steady weight loss  Even a small amount of weight loss can lower your risk of health problems  How to lose weight safely:  A safe and healthy way to lose weight is to eat fewer calories and get regular exercise  You can lose up about 1 pound a week by decreasing the number of calories you eat by 500 calories each day  You can decrease calories by eating smaller portion sizes or by cutting out high-calorie foods  Read labels to find out how many calories are in the foods you eat  You can also burn calories with exercise such as walking, swimming, or biking  You will be more likely to keep weight off if you make these changes part of your lifestyle  Healthy meal plan for weight management:  A healthy meal plan includes a variety of foods, contains fewer calories, and helps you stay healthy   A healthy meal plan includes the following:  · Eat whole-grain foods more often  A healthy meal plan should contain fiber  Fiber is the part of grains, fruits, and vegetables that is not broken down by your body  Whole-grain foods are healthy and provide extra fiber in your diet  Some examples of whole-grain foods are whole-wheat breads and pastas, oatmeal, brown rice, and bulgur  · Eat a variety of vegetables every day  Include dark, leafy greens such as spinach, kale, gab greens, and mustard greens  Eat yellow and orange vegetables such as carrots, sweet potatoes, and winter squash  · Eat a variety of fruits every day  Choose fresh or canned fruit (canned in its own juice or light syrup) instead of juice  Fruit juice has very little or no fiber  · Eat low-fat dairy foods  Drink fat-free (skim) milk or 1% milk  Eat fat-free yogurt and low-fat cottage cheese  Try low-fat cheeses such as mozzarella and other reduced-fat cheeses  · Choose meat and other protein foods that are low in fat  Choose beans or other legumes such as split peas or lentils  Choose fish, skinless poultry (chicken or turkey), or lean cuts of red meat (beef or pork)  Before you cook meat or poultry, cut off any visible fat  · Use less fat and oil  Try baking foods instead of frying them  Add less fat, such as margarine, sour cream, regular salad dressing and mayonnaise to foods  Eat fewer high-fat foods  Some examples of high-fat foods include french fries, doughnuts, ice cream, and cakes  · Eat fewer sweets  Limit foods and drinks that are high in sugar  This includes candy, cookies, regular soda, and sweetened drinks  Ways to decrease calories:   · Eat smaller portions  ¨ Use a small plate with smaller servings  ¨ Do not eat second helpings  ¨ When you eat at a restaurant, ask for a box and place half of your meal in the box before you eat  ¨ Share an entrée with someone else  · Replace high-calorie snacks with healthy, low-calorie snacks       ¨ Choose fresh fruit, vegetables, fat-free rice cakes, or air-popped popcorn instead of potato chips, nuts, or chocolate  ¨ Choose water or calorie-free drinks instead of soda or sweetened drinks  · Eat regular meals  Skipping meals can lead to overeating later in the day  Eat a healthy snack in place of a meal if you do not have time to eat a regular meal      · Do not shop for groceries when you are hungry  You may be more likely to make unhealthy food choices  Take a grocery list of healthy foods and shop after you have eaten  Exercise:  Exercise at least 30 minutes per day on most days of the week  Some examples of exercise include walking, biking, dancing, and swimming  You can also fit in more physical activity by taking the stairs instead of the elevator or parking farther away from stores  Ask your healthcare provider about the best exercise plan for you  Other things to consider as you try to lose weight:   · Be aware of situations that may give you the urge to overeat, such as eating while watching television  Find ways to avoid these situations  For example, read a book, go for a walk, or do crafts  · Meet with a weight loss support group or friends who are also trying to lose weight  This may help you stay motivated to continue working on your weight loss goals  © 2017 [address] [employer] Information is for End User's use only and may not be sold, redistributed or otherwise used for commercial purposes  All illustrations and images included in CareNotes® are the copyrighted property of [employer]  or Garry Leiva  The above information is an  only  It is not intended as medical advice for individual conditions or treatments  Talk to your doctor, nurse or pharmacist before following any medical regimen to see if it is safe and effective for you  Low Fat Diet   AMBULATORY CARE:   A low-fat diet  is an eating plan that is low in total fat, unhealthy fat, and cholesterol   You may need to follow a low-fat diet if you have trouble digesting or absorbing fat  You may also need to follow this diet if you have high cholesterol  You can also lower your cholesterol by increasing the amount of fiber in your diet  Soluble fiber is a type of fiber that helps to decrease cholesterol levels  Different types of fat in food:   · Limit unhealthy fats  A diet that is high in cholesterol, saturated fat, and trans fat may cause unhealthy cholesterol levels  Unhealthy cholesterol levels increase your risk of heart disease  ¨ Cholesterol:  Limit intake of cholesterol to less than 200 mg per day  Cholesterol is found in meat, eggs, and dairy  ¨ Saturated fat:  Limit saturated fat to less than 7% of your total daily calories  Ask your dietitian how many calories you need each day  Saturated fat is found in butter, cheese, ice cream, whole milk, and palm oil  Saturated fat is also found in meat, such as beef, pork, chicken skin, and processed meats  Processed meats include sausage, hot dogs, and bologna  ¨ Trans fat:  Avoid trans fat as much as possible  Trans fat is used in fried and baked foods  Foods that say trans fat free on the label may still have up to 0 5 grams of trans fat per serving  · Include healthy fats  Replace foods that are high in saturated and trans fat with foods high in healthy fats  This may help to decrease high cholesterol levels  ¨ Monounsaturated fats: These are found in avocados, nuts, and vegetable oils, such as olive, canola, and sunflower oil  ¨ Polyunsaturated fats: These can be found in vegetable oils, such as soybean or corn oil  Omega-3 fats can help to decrease the risk of heart disease  Omega-3 fats are found in fish, such as salmon, herring, trout, and tuna  Omega-3 fats can also be found in plant foods, such as walnuts, flaxseed, soybeans, and canola oil    Foods to limit or avoid:   · Grains:      ¨ Snacks that are made with partially hydrogenated oils, such as chips, regular crackers, and butter-flavored popcorn    ¨ High-fat baked goods, such as biscuits, croissants, doughnuts, pies, cookies, and pastries    · Dairy:      ¨ Whole milk, 2% milk, and yogurt and ice cream made with whole milk    ¨ Half and half creamer, heavy cream, and whipping cream    ¨ Cheese, cream cheese, and sour cream    · Meats and proteins:      ¨ High-fat cuts of meat (T-bone steak, regular hamburger, and ribs)    ¨ Fried meat, poultry (turkey and chicken), and fish    ¨ Poultry (chicken and turkey) with skin    ¨ Cold cuts (salami or bologna), hot dogs, williamson, and sausage    ¨ Whole eggs and egg yolks    · Vegetables and fruits with added fat:      ¨ Fried vegetables or vegetables in butter or high-fat sauces, such as cream or cheese sauces    ¨ Fried fruit or fruit served with butter or cream    · Fats:      ¨ Butter, stick margarine, and shortening    ¨ Coconut, palm oil, and palm kernel oil  Foods to include:   · Grains:      ¨ Whole-grain breads, cereals, pasta, and brown rice    ¨ Low-fat crackers and pretzels    · Vegetables and fruits:      ¨ Fresh, frozen, or canned vegetables (no salt or low-sodium)    ¨ Fresh, frozen, dried, or canned fruit (canned in light syrup or fruit juice)    ¨ Avocado    · Low-fat dairy products:      ¨ Nonfat (skim) or 1% milk    ¨ Nonfat or low-fat cheese, yogurt, and cottage cheese    · Meats and proteins:      ¨ Chicken or turkey with no skin    ¨ Baked or broiled fish    ¨ Lean beef and pork (loin, round, extra lean hamburger)    ¨ Beans and peas, unsalted nuts, soy products    ¨ Egg whites and substitutes    ¨ Seeds and nuts    · Fats:      ¨ Unsaturated oil, such as canola, olive, peanut, soybean, or sunflower oil    ¨ Soft or liquid margarine and vegetable oil spread    ¨ Low-fat salad dressing  Other ways to decrease fat:   · Read food labels before you buy foods  Choose foods that have less than 30% of calories from fat  Choose low-fat or fat-free dairy products  Remember that fat free does not mean calorie free  These foods still contain calories, and too many calories can lead to weight gain  · Trim fat from meat and avoid fried food  Trim all visible fat from meat before you cook it  Remove the skin from poultry  Do not hansen meat, fish, or poultry  Bake, roast, boil, or broil these foods instead  Avoid fried foods  Eat a baked potato instead of [place] fries  Steam vegetables instead of sautéing them in butter  · Add less fat to foods  Use imitation williamson bits on salads and baked potatoes instead of regular williamson bits  Use fat-free or low-fat salad dressings instead of regular dressings  Use low-fat or nonfat butter-flavored topping instead of regular butter or margarine on popcorn and other foods  Ways to decrease fat in recipes:  Replace high-fat ingredients with low-fat or nonfat ones  This may cause baked goods to be drier than usual  You may need to use nonfat cooking spray on pans to prevent food from sticking  You also may need to change the amount of other ingredients, such as water, in the recipe  Try the following:  · Use low-fat or light margarine instead of regular margarine or shortening  · Use lean ground turkey breast or chicken, or lean ground beef (less than 5% fat) instead of hamburger  · Add 1 teaspoon of canola oil to 8 ounces of skim milk instead of using cream or half and half  · Use grated zucchini, carrots, or apples in breads instead of coconut  · Use blenderized, low-fat cottage cheese, plain tofu, or low-fat ricotta cheese instead of cream cheese  · Use 1 egg white and 1 teaspoon of canola oil, or use ¼ cup (2 ounces) of fat-free egg substitute instead of a whole egg  · Replace half of the oil that is called for in a recipe with applesauce when you bake  Use 3 tablespoons of cocoa powder and 1 tablespoon of canola oil instead of a square of baking chocolate    How to increase fiber:  Eat enough high-fiber foods to get 20 to 30 grams of fiber every day  Slowly increase your fiber intake to avoid stomach cramps, gas, and other problems  · Eat 3 ounces of whole-grain foods each day  An ounce is about 1 slice of bread  Eat whole-grain breads, such as whole-wheat bread  Whole wheat, whole-wheat flour, or other whole grains should be listed as the first ingredient on the food label  Replace white flour with whole-grain flour or use half of each in recipes  Whole-grain flour is heavier than white flour, so you may have to add more yeast or baking powder  · Eat a high-fiber cereal for breakfast   Oatmeal is a good source of soluble fiber  Look for cereals that have bran or fiber in the name  Choose whole-grain products, such as brown rice, barley, and whole-wheat pasta  · Eat more beans, peas, and lentils  For example, add beans to soups or salads  Eat at least 5 cups of fruits and vegetables each day  Eat fruits and vegetables with the peel because the peel is high in fiber  © 2017 [address] [facility] Information is for End User's use only and may not be sold, redistributed or otherwise used for commercial purposes  All illustrations and images included in CareNotes® are the copyrighted property of [employer]  or Garry Leiva  The above information is an  only  It is not intended as medical advice for individual conditions or treatments  Talk to your doctor, nurse or pharmacist before following any medical regimen to see if it is safe and effective for you  Heart Healthy Diet   AMBULATORY CARE:   A heart healthy diet  is an eating plan low in total fat, unhealthy fats, and sodium (salt)  A heart healthy diet helps decrease your risk for heart disease and stroke  Limit the amount of fat you eat to 25% to 35% of your total daily calories  Limit sodium to less than 2,300 mg each day     Healthy fats:  Healthy fats can help improve cholesterol levels  The risk for heart disease is decreased when cholesterol levels are normal  Choose healthy fats, such as the following:  · Unsaturated fat  is found in foods such as soybean, canola, olive, corn, and safflower oils  It is also found in soft tub margarine that is made with liquid vegetable oil  · Omega-3 fat  is found in certain fish, such as salmon, tuna, and trout, and in walnuts and flaxseed  Unhealthy fats:  Unhealthy fats can cause unhealthy cholesterol levels in your blood and increase your risk of heart disease  Limit unhealthy fats, such as the following:  · Cholesterol  is found in animal foods, such as eggs and lobster, and in dairy products made from whole milk  Limit cholesterol to less than 300 milligrams (mg) each day  You may need to limit cholesterol to 200 mg each day if you have heart disease  · Saturated fat  is found in meats, such as williamson and hamburger  It is also found in chicken or turkey skin, whole milk, and butter  Limit saturated fat to less than 7% of your total daily calories  Limit saturated fat to less than 6% if you have heart disease or are at increased risk for it  · Trans fat  is found in packaged foods, such as potato chips and cookies  It is also in hard margarine, some fried foods, and shortening  Avoid trans fats as much as possible    Heart healthy foods and drinks to include:  Ask your dietitian or healthcare provider how many servings to have from each of the following food groups:  · Grains:      ¨ Whole-wheat breads, cereals, and pastas, and brown rice    ¨ Low-fat, low-sodium crackers and chips    · Vegetables:      ¨ Broccoli, green beans, green peas, and spinach    ¨ Collards, kale, and lima beans    ¨ Carrots, sweet potatoes, tomatoes, and peppers    ¨ Canned vegetables with no salt added    · Fruits:      ¨ Bananas, peaches, pears, and pineapple    ¨ Grapes, raisins, and dates    ¨ Oranges, tangerines, grapefruit, orange juice, and grapefruit juice    ¨ Apricots, mangoes, melons, and papaya    ¨ Raspberries and strawberries    ¨ Canned fruit with no added sugar    · Low-fat dairy products:      ¨ Nonfat (skim) milk, 1% milk, and low-fat almond, cashew, or soy milks fortified with calcium    ¨ Low-fat cheese, regular or frozen yogurt, and cottage cheese    · Meats and proteins , such as lean cuts of beef and pork (loin, leg, round), skinless chicken and turkey, legumes, soy products, egg whites, and nuts  Foods and drinks to limit or avoid:  Ask your dietitian or healthcare provider about these and other foods that are high in unhealthy fat, sodium, and sugar:  · Snack or packaged foods , such as frozen dinners, cookies, macaroni and cheese, and cereals with more than 300 mg of sodium per serving    · Canned or dry mixes  for cakes, soups, sauces, or gravies    · Vegetables with added sodium , such as instant potatoes, vegetables with added sauces, or regular canned vegetables    · Other foods high in sodium , such as ketchup, barbecue sauce, salad dressing, pickles, olives, soy sauce, and miso    · High-fat dairy foods  such as whole or 2% milk, cream cheese, or sour cream, and cheeses     · High-fat protein foods  such as high-fat cuts of beef (T-bone steaks, ribs), chicken or turkey with skin, and organ meats, such as liver    · Cured or smoked meats , such as hot dogs, williamson, and sausage    · Unhealthy fats and oils , such as butter, stick margarine, shortening, and cooking oils such as coconut or palm oil    · Food and drinks high in sugar , such as soft drinks (soda), sports drinks, sweetened tea, candy, cake, cookies, pies, and doughnuts  Other diet guidelines to follow:   · Eat more foods containing omega-3 fats  Eat fish high in omega-3 fats at least 2 times a week  · Limit alcohol  Too much alcohol can damage your heart and raise your blood pressure  Women should limit alcohol to 1 drink a day   Men should limit alcohol to 2 drinks a day  A drink of alcohol is 12 ounces of beer, 5 ounces of wine, or 1½ ounces of liquor  · Choose low-sodium foods  High-sodium foods can lead to high blood pressure  Add little or no salt to food you prepare  Use herbs and spices in place of salt  · Eat more fiber  to help lower cholesterol levels  Eat at least 5 servings of fruits and vegetables each day  Eat 3 ounces of whole-grain foods each day  Legumes (beans) are also a good source of fiber  Lifestyle guidelines:   · Do not smoke  Nicotine and other chemicals in cigarettes and cigars can cause lung and heart damage  Ask your healthcare provider for information if you currently smoke and need help to quit  E-cigarettes or smokeless tobacco still contain nicotine  Talk to your healthcare provider before you use these products  · Exercise regularly  to help you maintain a healthy weight and improve your blood pressure and cholesterol levels  Ask your healthcare provider about the best exercise plan for you  Do not start an exercise program without asking your healthcare provider  Follow up with your healthcare provider as directed:  Write down your questions so you remember to ask them during your visits  © 2017 [facility] Information is for End User's use only and may not be sold, redistributed or otherwise used for commercial purposes  All illustrations and images included in CareNotes® are the copyrighted property of A D A M [employer]  or Garry Leiva  The above information is an  only  It is not intended as medical advice for individual conditions or treatments  Talk to your doctor, nurse or pharmacist before following any medical regimen to see if it is safe and effective for you  Calorie Counting Diet   WHAT YOU NEED TO KNOW:   What is a calorie counting diet? It is a meal plan based on counting calories each day to reach a healthy body weight   You will need to eat fewer calories if you are trying to lose weight  Weight loss may decrease your risk for certain health problems or improve your health if you have health problems  Some of these health problems include heart disease, high blood pressure, and diabetes  What foods should I avoid? Your dietitian will tell you if you need to avoid certain foods based on your body weight and health condition  You may need to avoid high-fat foods if you are at risk for or have heart disease  You may need to eat fewer foods from the breads and starches food group if you have diabetes  How many calories are in foods? The following is a list of foods and drinks with the approximate number of calories in each  Check the food label to find the exact number of calories  A dietitian can tell you how many calories you should have from each food group each day    · Carbohydrate:      ¨ ½ of a 3-inch bagel, 1 slice of bread, or ½ of a hamburger bun or hot dog bun (80)    ¨ 1 (8-inch) flour tortilla or ½ cup of cooked rice (100)    ¨ 1 (6-inch) corn tortilla (80)    ¨ 1 (6-inch) pancake or 1 cup of bran flakes cereal (110)    ¨ ½ cup of cooked cereal (80)    ¨ ½ cup of cooked pasta (85)    ¨ 1 ounce of pretzels (100)    ¨ 3 cups of air-popped popcorn without butter or oil (80)    · Dairy:      ¨ 1 cup of skim or 1% milk (90)    ¨ 1 cup of 2% milk (120)    ¨ 1 cup of whole milk (160)    ¨ 1 cup of 2% chocolate milk (220)    ¨ 1 ounce of low-fat cheese with 3 grams of fat per ounce (70)    ¨ 1 ounce of cheddar cheese (114)    ¨ ½ cup of 1% fat cottage cheese (80)    ¨ 1 cup of plain or sugar-free, fat-free yogurt (90)    · Protein foods:      ¨ 3 ounces of fish (not breaded or fried) (95)    ¨ 3 ounces of breaded, fried fish (195)    ¨ ¾ cup of tuna canned in water (105)    ¨ 3 ounces of chicken breast without skin (105)    ¨ 1 fried chicken breast with skin (350)    ¨ ¼ cup of fat free egg substitute (40)    ¨ 1 large egg (75)    ¨ 3 ounces of lean beef or pork (165)    ¨ 3 ounces of fried pork chop or ham (185)    ¨ ½ cup of cooked dried beans, such as kidney, adair, lentils, or navy (115)    ¨ 3 ounces of bologna or lunch meat (225)    ¨ 2 links of breakfast sausage (140)    · Vegetables:      ¨ ½ cup of sliced mushrooms (10)    ¨ 1 cup of salad greens, such as lettuce, spinach, or mann (15)    ¨ ½ cup of steamed asparagus (20)    ¨ ½ cup of cooked summer squash, zucchini squash, or green or wax beans (25)    ¨ 1 cup of broccoli or cauliflower florets, or 1 medium tomato (25)    ¨ 1 large raw carrot or ½ cup of cooked carrots (40)    ¨ ? of a medium cucumber or 1 stalk of celery (5)    ¨ 1 small baked potato (160)    ¨ 1 cup of breaded, fried vegetables (230)    · Fruit:      ¨ 1 (6-inch) banana (55)     ¨ ½ of a 4-inch grapefruit (55)    ¨ 15 grapes (60)    ¨ 1 medium orange or apple (70)    ¨ 1 large peach (65)    ¨ 1 cup of fresh pineapple chunks (75)    ¨ 1 cup of melon cubes (50)    ¨ 1¼ cups of whole strawberries (45)    ¨ ½ cup of fruit canned in juice (55)    ¨ ½ cup of fruit canned in heavy syrup (110)    ¨ ?  cup of raisins (130)    ¨ ½ cup of unsweetened fruit juice (60)    ¨ ½ cup of grape, cranberry, or prune juice (90)    · Fat:      ¨ 10 peanuts or 2 teaspoons of peanut butter (55)    ¨ 2 tablespoons of avocado or 1 tablespoon of regular salad dressing (45)    ¨ 2 slices of williamson (90)    ¨ 1 teaspoon of oil, such as safflower, canola, corn, or olive oil (45)    ¨ 2 teaspoons of low-fat margarine, or 1 tablespoon of low-fat mayonnaise (50)    ¨ 1 teaspoon of regular margarine (40)    ¨ 1 tablespoon of regular mayonnaise (135)    ¨ 1 tablespoon of cream cheese or 2 tablespoons of low-fat cream cheese (45)    ¨ 2 tablespoons of vegetable shortening (215)    · Dessert and sweets:      ¨ 8 animal crackers or 5 vanilla wafers (80)    ¨ 1 frozen fruit juice bar (80)    ¨ ½ cup of ice milk or low-fat frozen yogurt (90)    ¨ ½ cup of sherbet or sorbet (125)    ¨ ½ cup of sugar-free pudding or custard (60)    ¨ ½ cup of ice cream (140)    ¨ ½ cup of pudding or custard (175)    ¨ 1 (2-inch) square chocolate brownie (185)    · Combination foods:      ¨ Bean burrito made with an 8-inch tortilla, without cheese (275)    ¨ Chicken breast sandwich with lettuce and tomato (325)    ¨ 1 cup of chicken noodle soup (60)    ¨ 1 beef taco (175)    ¨ Regular hamburger with lettuce and tomato (310)    ¨ Regular cheeseburger with lettuce and tomato (410)     ¨ ¼ of a 12-inch cheese pizza (280)    ¨ Fried fish sandwich with lettuce and tomato (425)    ¨ Hot dog and bun (275)    ¨ 1½ cups of macaroni and cheese (310)    ¨ Taco salad with a fried tortilla shell (870)    · Low-calorie foods:      ¨ 1 tablespoon of ketchup or 1 tablespoon of fat free sour cream (15)    ¨ 1 teaspoon of mustard (5)    ¨ ¼ cup of salsa (20)    ¨ 1 large dill pickle (15)    ¨ 1 tablespoon of fat free salad dressing (10)    ¨ 2 teaspoons of low-sugar, light jam or jelly, or 1 tablespoon of sugar-free syrup (15)    ¨ 1 sugar-free popsicle (15)    ¨ 1 cup of club soda, seltzer water, or diet soda (0)  CARE AGREEMENT:   You have the right to help plan your care  Discuss treatment options with your caregivers to decide what care you want to receive  You always have the right to refuse treatment  The above information is an  only  It is not intended as medical advice for individual conditions or treatments  Talk to your doctor, nurse or pharmacist before following any medical regimen to see if it is safe and effective for you  © 2017 [address] Information is for End User's use only and may not be sold, redistributed or otherwise used for commercial purposes  All illustrations and images included in CareNotes® are the copyrighted property of [employer]  or Garry Leiva

## 2019-12-30 DIAGNOSIS — I10 ESSENTIAL HYPERTENSION: ICD-10-CM

## 2019-12-30 RX ORDER — HYDROCHLOROTHIAZIDE 12.5 MG/1
TABLET ORAL
Qty: 90 TABLET | Refills: 1 | Status: SHIPPED | OUTPATIENT
Start: 2019-12-30 | End: 2020-07-27

## 2020-06-02 ENCOUNTER — TELEMEDICINE (OUTPATIENT)
Dept: FAMILY MEDICINE CLINIC | Facility: CLINIC | Age: 50
End: 2020-06-02
Payer: COMMERCIAL

## 2020-06-02 VITALS — HEIGHT: 70 IN | BODY MASS INDEX: 29.78 KG/M2 | TEMPERATURE: 99.9 F | WEIGHT: 208 LBS

## 2020-06-02 DIAGNOSIS — Z20.828 EXPOSURE TO SARS-ASSOCIATED CORONAVIRUS: Primary | ICD-10-CM

## 2020-06-02 DIAGNOSIS — Z20.828 EXPOSURE TO SARS-ASSOCIATED CORONAVIRUS: ICD-10-CM

## 2020-06-02 PROCEDURE — 99214 OFFICE O/P EST MOD 30 MIN: CPT | Performed by: PHYSICIAN ASSISTANT

## 2020-06-02 PROCEDURE — U0003 INFECTIOUS AGENT DETECTION BY NUCLEIC ACID (DNA OR RNA); SEVERE ACUTE RESPIRATORY SYNDROME CORONAVIRUS 2 (SARS-COV-2) (CORONAVIRUS DISEASE [COVID-19]), AMPLIFIED PROBE TECHNIQUE, MAKING USE OF HIGH THROUGHPUT TECHNOLOGIES AS DESCRIBED BY CMS-2020-01-R: HCPCS | Performed by: NURSE PRACTITIONER

## 2020-06-02 RX ORDER — MULTIVITAMIN
1 CAPSULE ORAL DAILY
COMMUNITY

## 2020-06-02 RX ORDER — ASCORBIC ACID 500 MG
500 TABLET ORAL DAILY
COMMUNITY

## 2020-06-04 LAB — SARS-COV-2 RNA SPEC QL NAA+PROBE: NOT DETECTED

## 2020-06-05 ENCOUNTER — OFFICE VISIT (OUTPATIENT)
Dept: FAMILY MEDICINE CLINIC | Facility: CLINIC | Age: 50
End: 2020-06-05
Payer: COMMERCIAL

## 2020-06-05 VITALS
WEIGHT: 202 LBS | HEART RATE: 74 BPM | BODY MASS INDEX: 28.92 KG/M2 | SYSTOLIC BLOOD PRESSURE: 142 MMHG | DIASTOLIC BLOOD PRESSURE: 92 MMHG | OXYGEN SATURATION: 98 % | TEMPERATURE: 97.1 F | HEIGHT: 70 IN

## 2020-06-05 DIAGNOSIS — E55.9 VITAMIN D DEFICIENCY: ICD-10-CM

## 2020-06-05 DIAGNOSIS — I10 ESSENTIAL HYPERTENSION: ICD-10-CM

## 2020-06-05 DIAGNOSIS — I73.9 CLAUDICATION OF BOTH LOWER EXTREMITIES (HCC): Primary | ICD-10-CM

## 2020-06-05 DIAGNOSIS — M79.605 BILATERAL LEG PAIN: ICD-10-CM

## 2020-06-05 DIAGNOSIS — E78.2 HYPERLIPIDEMIA, MIXED: ICD-10-CM

## 2020-06-05 DIAGNOSIS — G47.19 EXCESSIVE DAYTIME SLEEPINESS: ICD-10-CM

## 2020-06-05 DIAGNOSIS — M79.10 MYALGIA: ICD-10-CM

## 2020-06-05 DIAGNOSIS — R20.0 BILATERAL HAND NUMBNESS: ICD-10-CM

## 2020-06-05 DIAGNOSIS — Z13.1 DIABETES MELLITUS SCREENING: ICD-10-CM

## 2020-06-05 DIAGNOSIS — M79.604 BILATERAL LEG PAIN: ICD-10-CM

## 2020-06-05 DIAGNOSIS — I83.813 VARICOSE VEINS OF BOTH LOWER EXTREMITIES WITH PAIN: ICD-10-CM

## 2020-06-05 PROCEDURE — 3008F BODY MASS INDEX DOCD: CPT | Performed by: FAMILY MEDICINE

## 2020-06-05 PROCEDURE — 99214 OFFICE O/P EST MOD 30 MIN: CPT | Performed by: FAMILY MEDICINE

## 2020-06-05 PROCEDURE — 1036F TOBACCO NON-USER: CPT | Performed by: FAMILY MEDICINE

## 2020-06-05 PROCEDURE — 3080F DIAST BP >= 90 MM HG: CPT | Performed by: FAMILY MEDICINE

## 2020-06-05 PROCEDURE — 3077F SYST BP >= 140 MM HG: CPT | Performed by: FAMILY MEDICINE

## 2020-07-25 DIAGNOSIS — I10 ESSENTIAL HYPERTENSION: ICD-10-CM

## 2020-07-27 RX ORDER — HYDROCHLOROTHIAZIDE 12.5 MG/1
TABLET ORAL
Qty: 90 TABLET | Refills: 1 | Status: SHIPPED | OUTPATIENT
Start: 2020-07-27 | End: 2020-11-12 | Stop reason: SDUPTHER

## 2020-08-21 DIAGNOSIS — I10 ESSENTIAL HYPERTENSION: ICD-10-CM

## 2020-11-12 DIAGNOSIS — I10 ESSENTIAL HYPERTENSION: ICD-10-CM

## 2020-11-12 RX ORDER — HYDROCHLOROTHIAZIDE 12.5 MG/1
12.5 TABLET ORAL DAILY
Qty: 7 TABLET | Refills: 0 | Status: SHIPPED | OUTPATIENT
Start: 2020-11-12 | End: 2021-02-26

## 2020-11-18 ENCOUNTER — TELEMEDICINE (OUTPATIENT)
Dept: FAMILY MEDICINE CLINIC | Facility: CLINIC | Age: 50
End: 2020-11-18
Payer: COMMERCIAL

## 2020-11-18 VITALS — BODY MASS INDEX: 28.92 KG/M2 | HEIGHT: 70 IN | TEMPERATURE: 96 F | WEIGHT: 202 LBS

## 2020-11-18 DIAGNOSIS — Z11.9 ENCOUNTER FOR SCREENING FOR INFECTIOUS AND PARASITIC DISEASES, UNSPECIFIED: ICD-10-CM

## 2020-11-18 DIAGNOSIS — Z03.818 ENCOUNTER FOR OBSERVATION FOR SUSPECTED EXPOSURE TO OTHER BIOLOGICAL AGENTS RULED OUT: ICD-10-CM

## 2020-11-18 PROCEDURE — 99213 OFFICE O/P EST LOW 20 MIN: CPT | Performed by: PHYSICIAN ASSISTANT

## 2020-11-18 PROCEDURE — U0003 INFECTIOUS AGENT DETECTION BY NUCLEIC ACID (DNA OR RNA); SEVERE ACUTE RESPIRATORY SYNDROME CORONAVIRUS 2 (SARS-COV-2) (CORONAVIRUS DISEASE [COVID-19]), AMPLIFIED PROBE TECHNIQUE, MAKING USE OF HIGH THROUGHPUT TECHNOLOGIES AS DESCRIBED BY CMS-2020-01-R: HCPCS | Performed by: PHYSICIAN ASSISTANT

## 2020-11-20 LAB — SARS-COV-2 RNA SPEC QL NAA+PROBE: NOT DETECTED

## 2020-12-04 ENCOUNTER — TELEMEDICINE (OUTPATIENT)
Dept: FAMILY MEDICINE CLINIC | Facility: CLINIC | Age: 50
End: 2020-12-04
Payer: COMMERCIAL

## 2020-12-04 DIAGNOSIS — B34.9 VIRAL INFECTION, UNSPECIFIED: Primary | ICD-10-CM

## 2020-12-04 PROBLEM — Z13.1 DIABETES MELLITUS SCREENING: Status: RESOLVED | Noted: 2020-06-05 | Resolved: 2020-12-04

## 2020-12-04 PROBLEM — M79.661 RIGHT CALF PAIN: Status: RESOLVED | Noted: 2019-08-13 | Resolved: 2020-12-04

## 2020-12-04 PROBLEM — B02.7 DISSEMINATED HERPES ZOSTER: Status: RESOLVED | Noted: 2019-10-16 | Resolved: 2020-12-04

## 2020-12-04 PROBLEM — M79.10 MYALGIA: Status: RESOLVED | Noted: 2020-06-05 | Resolved: 2020-12-04

## 2020-12-04 PROCEDURE — 99214 OFFICE O/P EST MOD 30 MIN: CPT | Performed by: FAMILY MEDICINE

## 2020-12-05 DIAGNOSIS — B34.9 VIRAL INFECTION, UNSPECIFIED: ICD-10-CM

## 2020-12-05 PROCEDURE — U0003 INFECTIOUS AGENT DETECTION BY NUCLEIC ACID (DNA OR RNA); SEVERE ACUTE RESPIRATORY SYNDROME CORONAVIRUS 2 (SARS-COV-2) (CORONAVIRUS DISEASE [COVID-19]), AMPLIFIED PROBE TECHNIQUE, MAKING USE OF HIGH THROUGHPUT TECHNOLOGIES AS DESCRIBED BY CMS-2020-01-R: HCPCS | Performed by: FAMILY MEDICINE

## 2020-12-07 ENCOUNTER — TELEMEDICINE (OUTPATIENT)
Dept: FAMILY MEDICINE CLINIC | Facility: CLINIC | Age: 50
End: 2020-12-07
Payer: COMMERCIAL

## 2020-12-07 VITALS — TEMPERATURE: 96.8 F

## 2020-12-07 DIAGNOSIS — U07.1 COVID-19: Primary | ICD-10-CM

## 2020-12-07 LAB — SARS-COV-2 RNA SPEC QL NAA+PROBE: DETECTED

## 2020-12-07 PROCEDURE — 99213 OFFICE O/P EST LOW 20 MIN: CPT | Performed by: FAMILY MEDICINE

## 2020-12-09 ENCOUNTER — TELEMEDICINE (OUTPATIENT)
Dept: FAMILY MEDICINE CLINIC | Facility: CLINIC | Age: 50
End: 2020-12-09
Payer: COMMERCIAL

## 2020-12-09 VITALS — BODY MASS INDEX: 28.92 KG/M2 | HEIGHT: 70 IN | WEIGHT: 202 LBS

## 2020-12-09 DIAGNOSIS — U07.1 COVID-19: Primary | ICD-10-CM

## 2020-12-09 PROCEDURE — 99213 OFFICE O/P EST LOW 20 MIN: CPT | Performed by: FAMILY MEDICINE

## 2020-12-11 ENCOUNTER — TELEMEDICINE (OUTPATIENT)
Dept: FAMILY MEDICINE CLINIC | Facility: CLINIC | Age: 50
End: 2020-12-11
Payer: COMMERCIAL

## 2020-12-11 DIAGNOSIS — U07.1 COVID-19: Primary | ICD-10-CM

## 2020-12-11 PROCEDURE — 99213 OFFICE O/P EST LOW 20 MIN: CPT | Performed by: FAMILY MEDICINE

## 2021-02-26 DIAGNOSIS — I10 ESSENTIAL HYPERTENSION: ICD-10-CM

## 2021-02-26 RX ORDER — HYDROCHLOROTHIAZIDE 12.5 MG/1
TABLET ORAL
Qty: 90 TABLET | Refills: 1 | Status: SHIPPED | OUTPATIENT
Start: 2021-02-26 | End: 2021-07-22

## 2021-07-22 ENCOUNTER — OFFICE VISIT (OUTPATIENT)
Dept: FAMILY MEDICINE CLINIC | Facility: CLINIC | Age: 51
End: 2021-07-22
Payer: COMMERCIAL

## 2021-07-22 VITALS
DIASTOLIC BLOOD PRESSURE: 100 MMHG | HEIGHT: 70 IN | WEIGHT: 203 LBS | BODY MASS INDEX: 29.06 KG/M2 | HEART RATE: 63 BPM | TEMPERATURE: 97.9 F | SYSTOLIC BLOOD PRESSURE: 151 MMHG | OXYGEN SATURATION: 97 %

## 2021-07-22 DIAGNOSIS — Z12.11 SCREENING FOR COLON CANCER: Primary | ICD-10-CM

## 2021-07-22 DIAGNOSIS — Z13.1 SCREENING FOR DIABETES MELLITUS (DM): ICD-10-CM

## 2021-07-22 DIAGNOSIS — I73.9 CLAUDICATION OF BOTH LOWER EXTREMITIES (HCC): ICD-10-CM

## 2021-07-22 DIAGNOSIS — M25.552 LEFT HIP PAIN: ICD-10-CM

## 2021-07-22 DIAGNOSIS — Z13.220 SCREENING FOR LIPID DISORDERS: ICD-10-CM

## 2021-07-22 DIAGNOSIS — I10 ESSENTIAL HYPERTENSION: ICD-10-CM

## 2021-07-22 DIAGNOSIS — R22.1 MASS OF LEFT SIDE OF NECK: ICD-10-CM

## 2021-07-22 DIAGNOSIS — E55.9 VITAMIN D DEFICIENCY: ICD-10-CM

## 2021-07-22 DIAGNOSIS — Z12.5 SCREENING FOR PROSTATE CANCER: ICD-10-CM

## 2021-07-22 PROCEDURE — 99214 OFFICE O/P EST MOD 30 MIN: CPT | Performed by: PHYSICIAN ASSISTANT

## 2021-07-22 RX ORDER — HYDROCHLOROTHIAZIDE 25 MG/1
25 TABLET ORAL DAILY
Qty: 30 TABLET | Refills: 2 | Status: SHIPPED | OUTPATIENT
Start: 2021-07-22 | End: 2021-09-07 | Stop reason: SDUPTHER

## 2021-07-22 NOTE — PROGRESS NOTES
Assessment/Plan:    No problem-specific Assessment & Plan notes found for this encounter  Problem List Items Addressed This Visit        Cardiovascular and Mediastinum    Essential hypertension    Relevant Medications    hydrochlorothiazide (HYDRODIURIL) 25 mg tablet       Other    Vitamin D deficiency    Relevant Orders    Vitamin D 25 hydroxy    Claudication of both lower extremities ([address] )    Relevant Orders    Ambulatory referral to Vascular Surgery      Other Visit Diagnoses     Screening for colon cancer    -  Primary    Relevant Orders    Cologuard    Left hip pain        Relevant Orders    Ambulatory referral to Sports Medicine    XR hip/pelv 2-3 vws left if performed    Mass of left side of neck        Relevant Orders    TSH, 3rd generation with Free T4 reflex    US head neck soft tissue    Screening for lipid disorders        Relevant Orders    Lipid Panel with Direct LDL reflex    Screening for prostate cancer        Relevant Orders    PSA, total and free    CBC and differential    Screening for diabetes mellitus (DM)        Relevant Orders    Comprehensive metabolic panel          Increase HCTZ from 12 5mg to 25mg daily - BP here 151/100    Subjective:      Patient ID: Sangita Rush is a 48 y o  male  70-year-old male presents for follow-up  Patient on metoprolol and HCTZ for blood pressure  Does seem a controlled as he has elevated BP today of 151/100  States that he does often forgets to take his medication  Patient complaining of right leg discoloration and swelling of varicose veins has worsened over time  He did have a ultrasound of the right lower extremity that was negative  Previous smoker about 30 years ago  Patient also complains of a squishy like mass on the left side of his neck has been there for several years  Complains of left hip pain worse at night that has been increasing in intensity  Describes the pain as deep and sharp at times  No injury        The following portions of the patient's history were reviewed and updated as appropriate: allergies, past family history, past medical history, past social history, past surgical history and problem list     Review of Systems   Constitutional: Negative for chills, fatigue and fever  HENT: Negative for congestion, ear pain, sinus pain, sore throat and trouble swallowing  Eyes: Negative for pain, discharge and redness  Respiratory: Negative for cough, chest tightness, shortness of breath and wheezing  Cardiovascular: Negative for chest pain, palpitations and leg swelling  Gastrointestinal: Negative for abdominal pain, diarrhea, nausea and vomiting  Musculoskeletal: Negative for arthralgias, joint swelling and myalgias  L hip pain   Skin: Negative for rash  Mass on L neck  R lower leg varicosities   Neurological: Negative for dizziness, weakness, numbness and headaches  Objective:      /100   Pulse 63   Temp 97 9 °F (36 6 °C)   Ht 5' 10" (1 778 m)   Wt 92 1 kg (203 lb)   SpO2 97%   BMI 29 13 kg/m²          Physical Exam  Vitals and nursing note reviewed  Constitutional:       General: He is not in acute distress  Appearance: Normal appearance  He is well-developed  Cardiovascular:      Rate and Rhythm: Normal rate and regular rhythm  Pulmonary:      Effort: Pulmonary effort is normal       Breath sounds: Normal breath sounds  Abdominal:      General: Bowel sounds are normal       Palpations: Abdomen is soft  Abdomen is not rigid  Tenderness: There is no abdominal tenderness  There is no guarding or rebound  Negative signs include Kong's sign and McBurney's sign  Hernia: No hernia is present  Musculoskeletal:      Right hip: Normal       Left hip: Normal    Skin:     General: Skin is warm and dry

## 2021-07-26 ENCOUNTER — APPOINTMENT (OUTPATIENT)
Dept: RADIOLOGY | Facility: CLINIC | Age: 51
End: 2021-07-26
Payer: COMMERCIAL

## 2021-07-26 ENCOUNTER — APPOINTMENT (OUTPATIENT)
Dept: LAB | Facility: CLINIC | Age: 51
End: 2021-07-26
Payer: COMMERCIAL

## 2021-07-26 DIAGNOSIS — R22.1 MASS OF LEFT SIDE OF NECK: ICD-10-CM

## 2021-07-26 DIAGNOSIS — Z13.1 SCREENING FOR DIABETES MELLITUS (DM): ICD-10-CM

## 2021-07-26 DIAGNOSIS — Z12.5 SCREENING FOR PROSTATE CANCER: ICD-10-CM

## 2021-07-26 DIAGNOSIS — E55.9 VITAMIN D DEFICIENCY: ICD-10-CM

## 2021-07-26 DIAGNOSIS — Z13.220 SCREENING FOR LIPID DISORDERS: ICD-10-CM

## 2021-07-26 LAB
25(OH)D3 SERPL-MCNC: 25.3 NG/ML (ref 30–100)
ALBUMIN SERPL BCP-MCNC: 4.2 G/DL (ref 3.5–5)
ALP SERPL-CCNC: 99 U/L (ref 46–116)
ALT SERPL W P-5'-P-CCNC: 42 U/L (ref 12–78)
ANION GAP SERPL CALCULATED.3IONS-SCNC: 4 MMOL/L (ref 4–13)
AST SERPL W P-5'-P-CCNC: 24 U/L (ref 5–45)
BASOPHILS # BLD AUTO: 0.06 THOUSANDS/ΜL (ref 0–0.1)
BASOPHILS NFR BLD AUTO: 1 % (ref 0–1)
BILIRUB SERPL-MCNC: 0.83 MG/DL (ref 0.2–1)
BUN SERPL-MCNC: 14 MG/DL (ref 5–25)
CALCIUM SERPL-MCNC: 9 MG/DL (ref 8.3–10.1)
CHLORIDE SERPL-SCNC: 103 MMOL/L (ref 100–108)
CHOLEST SERPL-MCNC: 203 MG/DL (ref 50–200)
CO2 SERPL-SCNC: 29 MMOL/L (ref 21–32)
CREAT SERPL-MCNC: 0.81 MG/DL (ref 0.6–1.3)
EOSINOPHIL # BLD AUTO: 0.22 THOUSAND/ΜL (ref 0–0.61)
EOSINOPHIL NFR BLD AUTO: 4 % (ref 0–6)
ERYTHROCYTE [DISTWIDTH] IN BLOOD BY AUTOMATED COUNT: 11.9 % (ref 11.6–15.1)
GFR SERPL CREATININE-BSD FRML MDRD: 104 ML/MIN/1.73SQ M
GLUCOSE P FAST SERPL-MCNC: 107 MG/DL (ref 65–99)
HCT VFR BLD AUTO: 45.1 % (ref 36.5–49.3)
HDLC SERPL-MCNC: 41 MG/DL
HGB BLD-MCNC: 15.4 G/DL (ref 12–17)
IMM GRANULOCYTES # BLD AUTO: 0.03 THOUSAND/UL (ref 0–0.2)
IMM GRANULOCYTES NFR BLD AUTO: 1 % (ref 0–2)
LDLC SERPL CALC-MCNC: 133 MG/DL (ref 0–100)
LYMPHOCYTES # BLD AUTO: 1.61 THOUSANDS/ΜL (ref 0.6–4.47)
LYMPHOCYTES NFR BLD AUTO: 30 % (ref 14–44)
MCH RBC QN AUTO: 32.2 PG (ref 26.8–34.3)
MCHC RBC AUTO-ENTMCNC: 34.1 G/DL (ref 31.4–37.4)
MCV RBC AUTO: 94 FL (ref 82–98)
MONOCYTES # BLD AUTO: 0.6 THOUSAND/ΜL (ref 0.17–1.22)
MONOCYTES NFR BLD AUTO: 11 % (ref 4–12)
NEUTROPHILS # BLD AUTO: 2.78 THOUSANDS/ΜL (ref 1.85–7.62)
NEUTS SEG NFR BLD AUTO: 53 % (ref 43–75)
NRBC BLD AUTO-RTO: 0 /100 WBCS
PLATELET # BLD AUTO: 271 THOUSANDS/UL (ref 149–390)
PMV BLD AUTO: 10.2 FL (ref 8.9–12.7)
POTASSIUM SERPL-SCNC: 4.4 MMOL/L (ref 3.5–5.3)
PROT SERPL-MCNC: 7.9 G/DL (ref 6.4–8.2)
RBC # BLD AUTO: 4.78 MILLION/UL (ref 3.88–5.62)
SODIUM SERPL-SCNC: 136 MMOL/L (ref 136–145)
TRIGL SERPL-MCNC: 143 MG/DL
TSH SERPL DL<=0.05 MIU/L-ACNC: 3.3 UIU/ML (ref 0.36–3.74)
WBC # BLD AUTO: 5.3 THOUSAND/UL (ref 4.31–10.16)

## 2021-07-26 PROCEDURE — 80061 LIPID PANEL: CPT

## 2021-07-26 PROCEDURE — 80053 COMPREHEN METABOLIC PANEL: CPT

## 2021-07-26 PROCEDURE — 84153 ASSAY OF PSA TOTAL: CPT

## 2021-07-26 PROCEDURE — 84443 ASSAY THYROID STIM HORMONE: CPT

## 2021-07-26 PROCEDURE — 82306 VITAMIN D 25 HYDROXY: CPT

## 2021-07-26 PROCEDURE — 36415 COLL VENOUS BLD VENIPUNCTURE: CPT

## 2021-07-26 PROCEDURE — 84154 ASSAY OF PSA FREE: CPT

## 2021-07-26 PROCEDURE — 85025 COMPLETE CBC W/AUTO DIFF WBC: CPT

## 2021-07-26 PROCEDURE — 73502 X-RAY EXAM HIP UNI 2-3 VIEWS: CPT

## 2021-07-28 LAB
PSA FREE MFR SERPL: 35 %
PSA FREE SERPL-MCNC: 0.21 NG/ML
PSA SERPL-MCNC: 0.6 NG/ML (ref 0–4)

## 2021-08-04 ENCOUNTER — CONSULT (OUTPATIENT)
Dept: OBGYN CLINIC | Facility: CLINIC | Age: 51
End: 2021-08-04
Payer: COMMERCIAL

## 2021-08-04 VITALS
HEART RATE: 50 BPM | WEIGHT: 200 LBS | BODY MASS INDEX: 28.63 KG/M2 | HEIGHT: 70 IN | SYSTOLIC BLOOD PRESSURE: 143 MMHG | DIASTOLIC BLOOD PRESSURE: 90 MMHG | RESPIRATION RATE: 18 BRPM

## 2021-08-04 DIAGNOSIS — M62.9 HAMSTRING TIGHTNESS OF BOTH LOWER EXTREMITIES: ICD-10-CM

## 2021-08-04 DIAGNOSIS — R29.898 WEAKNESS OF BOTH HIPS: ICD-10-CM

## 2021-08-04 DIAGNOSIS — M16.0 ARTHRITIS OF BOTH HIPS: Primary | ICD-10-CM

## 2021-08-04 DIAGNOSIS — M25.852 FEMOROACETABULAR IMPINGEMENT OF LEFT HIP: ICD-10-CM

## 2021-08-04 PROCEDURE — 99244 OFF/OP CNSLTJ NEW/EST MOD 40: CPT | Performed by: FAMILY MEDICINE

## 2021-08-04 RX ORDER — MELOXICAM 15 MG/1
15 TABLET ORAL DAILY
Qty: 30 TABLET | Refills: 1 | Status: SHIPPED | OUTPATIENT
Start: 2021-08-04 | End: 2022-07-27

## 2021-08-04 NOTE — LETTER
August 4, 2021     [address] [place] [address]    Patient: Magdaleno Medina   YOB: 1970   Date of Visit: 8/4/2021       Dear Dr Marcial Ley: Thank you for referring Magdaleno Medina to me for evaluation  Below are my notes for this consultation  If you have questions, please do not hesitate to call me  I look forward to following your patient along with you  Sincerely,        [employer], DO        CC: No Recipients  [employer], DO  8/4/2021 10:09 AM  Sign when Signing Visit  Assessment/Plan:  Assessment/Plan   Diagnoses and all orders for this visit:    Arthritis of both hips  -     Ambulatory referral to Sports Medicine  -     meloxicam (MOBIC) 15 mg tablet; Take 1 tablet (15 mg total) by mouth daily    Femoroacetabular impingement of left hip  -     meloxicam (MOBIC) 15 mg tablet; Take 1 tablet (15 mg total) by mouth daily    Hamstring tightness of both lower extremities    Weakness of both hips    Other orders  -     Ergocalciferol (VITAMIN D2 PO); Take by mouth      51-year-old active male with left hip pain many years duration  Discussed with patient physical exam, radiographs, impression and plan  X-rays of the hip/ pelvis noted for moderate degenerative changes of both hips, with left superior acetabular rim protrusion suggestive of pincer type femoroacetabular impingement  Physical exam of lumbar spine is unremarkable for midline or paraspinal tenderness  He has intact range of motion lumbar spine  He has mild tenderness of the greater trochanter of the left hip  There is reproduction of groin pain on the left with FADDIR maneuver as well as limited range of motion with internal rotation  He has normal sensation in both lower extremities  He has weakness both hips with abduction  He has decreased patellar reflex bilaterally  He has hamstring tightness both lower extremities    Clinical impression is that he is symptomatic from combination of joint degenerative changes and femoroacetabular impingement  I discussed treatment regimen of supplements, anti-inflammatory, and corticosteroid injection  He is to start taking tumeric 500 mg twice daily, tart cherry at least 1000 mg daily, and glucosamine-chondroitin 2 to 3 times a day  He is to start taking meloxicam 15 mg once daily with food consistently for 30 days and during that time not to take any ibuprofen or Aleve, but may take Tylenol  I will refer him to my sports medicine colleague for ultrasound-guided left hip intra-articular corticosteroid injection  He will follow up with me afterward  Subjective:   Patient ID: Araceli Collins is a 48 y o  male  Chief Complaint   Patient presents with    Left Hip - Pain, Clicking       [id number] active male presents for evaluation of left hip pain of many years duration  He denies particular trauma or inciting event  He has been experiencing pain described as localized to the left groin area, achy and sore and sometimes sharp, non radiating, occurring intermittently and sometimes severe in intensity, not related to activity or time of day  He experiences pain that starts gradually at the left hip as achy and sore and over the course of 2 days becomes sharp and severe intensity  He rests takes ibuprofen, and the next day pain subsides  After pain subsides he can go for any range of time from days to weeks to months before they recur  He states that typically he would have worsening pain and symptoms when he gets up in the morning, and throughout the day as he ambulates the hip feels more loose  He states that during his most recent flare of left hip/groin pain he had difficulty at work with ambulating  He denies any numbness in lower extremities, or any bowel or bladder incontinence  Hip Pain  This is a chronic problem  The current episode started more than 1 year ago   The problem occurs intermittently  The problem has been waxing and waning  Associated symptoms include arthralgias  Pertinent negatives include no abdominal pain, chest pain, chills, fever, joint swelling, numbness, rash, sore throat or weakness  He has tried rest, NSAIDs and position changes for the symptoms  The treatment provided mild relief  The following portions of the patient's history were reviewed and updated as appropriate: He  has a past medical history of Disseminated herpes zoster (10/16/2019), Hypertension, and Known health problems: none  He  has a past surgical history that includes [place] tooth extraction  His family history includes Breast cancer in his sister; Hypertension in his father  He  reports that he has quit smoking  He has a 1 50 pack-year smoking history  He has never used smokeless tobacco  He reports current alcohol use  He reports previous drug use  He is allergic to codeine       Review of Systems   Constitutional: Negative for chills and fever  HENT: Negative for sore throat  Eyes: Negative for visual disturbance  Respiratory: Negative for shortness of breath  Cardiovascular: Negative for chest pain  Gastrointestinal: Negative for abdominal pain  Genitourinary: Negative for flank pain  Musculoskeletal: Positive for arthralgias  Negative for joint swelling  Skin: Negative for rash and wound  Neurological: Negative for weakness and numbness  Hematological: Does not bruise/bleed easily  Psychiatric/Behavioral: Negative for self-injury         Objective:  Vitals:    08/04/21 0918   BP: 143/90   Pulse: (!) 50   Resp: 18   Weight: 90 7 kg (200 lb)   Height: 5' 10" (1 778 m)     Right Ankle Exam     Muscle Strength   Dorsiflexion:  5/5  Plantar flexion:  5/5      Left Ankle Exam     Muscle Strength   Dorsiflexion:  5/5   Plantar flexion:  5/5       Right Hip Exam     Range of Motion   Internal rotation: 10     Muscle Strength   Abduction: 4/5   Flexion: 5/5     Tests RAMSES: negative    Comments:  Negative FADDIR  Hamstring tightness      Left Hip Exam     Tenderness   The patient is experiencing tenderness in the greater trochanter  Range of Motion   Internal rotation: 5     Muscle Strength   Abduction: 4/5   Flexion: 5/5     Tests   RAMSES: negative    Comments:  Positive FADDIR  Hamstring tightness      Back Exam     Tenderness   The patient is experiencing no tenderness  Range of Motion   The patient has normal back ROM  Muscle Strength   Right Quadriceps:  5/5   Left Quadriceps:  5/5     Tests   Straight leg raise right: negative  Straight leg raise left: negative    Reflexes   Patellar: Hyporeflexic    Other   Sensation: normal  Gait: normal           Strength/Myotome Testing     Left Ankle/Foot   Dorsiflexion: 5  Plantar flexion: 5    Right Ankle/Foot   Dorsiflexion: 5  Plantar flexion: 5      Physical Exam  Vitals and nursing note reviewed  Constitutional:       General: He is not in acute distress  Appearance: He is well-developed  He is not ill-appearing or diaphoretic  HENT:      Head: Normocephalic and atraumatic  Right Ear: External ear normal       Left Ear: External ear normal    Eyes:      Conjunctiva/sclera: Conjunctivae normal    Neck:      Trachea: No tracheal deviation  Cardiovascular:      Comments: Bradycardic  Pulmonary:      Effort: Pulmonary effort is normal  No respiratory distress  Abdominal:      General: There is no distension  Musculoskeletal:         General: Swelling and tenderness present  No deformity or signs of injury  Lumbar back: Negative right straight leg raise test and negative left straight leg raise test    Skin:     General: Skin is warm and dry  Coloration: Skin is not jaundiced or pale  Neurological:      Mental Status: He is alert and oriented to person, place, and time  Psychiatric:         Mood and Affect: Mood normal          Behavior: Behavior normal          Thought Content:  Thought content normal          Judgment: Judgment normal          I have personally reviewed pertinent films in PACS and my interpretation is Osteoarthritis of both hips

## 2021-08-04 NOTE — PROGRESS NOTES
Assessment/Plan:  Assessment/Plan   Diagnoses and all orders for this visit:    Arthritis of both hips  -     Ambulatory referral to Sports Medicine  -     meloxicam (MOBIC) 15 mg tablet; Take 1 tablet (15 mg total) by mouth daily    Femoroacetabular impingement of left hip  -     meloxicam (MOBIC) 15 mg tablet; Take 1 tablet (15 mg total) by mouth daily    Hamstring tightness of both lower extremities    Weakness of both hips    Other orders  -     Ergocalciferol (VITAMIN D2 PO); Take by mouth      59-year-old active male with left hip pain many years duration  Discussed with patient physical exam, radiographs, impression and plan  X-rays of the hip/ pelvis noted for moderate degenerative changes of both hips, with left superior acetabular rim protrusion suggestive of pincer type femoroacetabular impingement  Physical exam of lumbar spine is unremarkable for midline or paraspinal tenderness  He has intact range of motion lumbar spine  He has mild tenderness of the greater trochanter of the left hip  There is reproduction of groin pain on the left with FADDIR maneuver as well as limited range of motion with internal rotation  He has normal sensation in both lower extremities  He has weakness both hips with abduction  He has decreased patellar reflex bilaterally  He has hamstring tightness both lower extremities  Clinical impression is that he is symptomatic from combination of joint degenerative changes and femoroacetabular impingement  I discussed treatment regimen of supplements, anti-inflammatory, and corticosteroid injection  He is to start taking tumeric 500 mg twice daily, tart cherry at least 1000 mg daily, and glucosamine-chondroitin 2 to 3 times a day  He is to start taking meloxicam 15 mg once daily with food consistently for 30 days and during that time not to take any ibuprofen or Aleve, but may take Tylenol    I will refer him to my sports medicine colleague for ultrasound-guided left hip intra-articular corticosteroid injection  He will follow up with me afterward  Subjective:   Patient ID: Jefferson Hussein is a 48 y o  male  Chief Complaint   Patient presents with    Left Hip - Pain, Clicking       [id number] active male presents for evaluation of left hip pain of many years duration  He denies particular trauma or inciting event  He has been experiencing pain described as localized to the left groin area, achy and sore and sometimes sharp, non radiating, occurring intermittently and sometimes severe in intensity, not related to activity or time of day  He experiences pain that starts gradually at the left hip as achy and sore and over the course of 2 days becomes sharp and severe intensity  He rests takes ibuprofen, and the next day pain subsides  After pain subsides he can go for any range of time from days to weeks to months before they recur  He states that typically he would have worsening pain and symptoms when he gets up in the morning, and throughout the day as he ambulates the hip feels more loose  He states that during his most recent flare of left hip/groin pain he had difficulty at work with ambulating  He denies any numbness in lower extremities, or any bowel or bladder incontinence  Hip Pain  This is a chronic problem  The current episode started more than 1 year ago  The problem occurs intermittently  The problem has been waxing and waning  Associated symptoms include arthralgias  Pertinent negatives include no abdominal pain, chest pain, chills, fever, joint swelling, numbness, rash, sore throat or weakness  He has tried rest, NSAIDs and position changes for the symptoms  The treatment provided mild relief  The following portions of the patient's history were reviewed and updated as appropriate: He  has a past medical history of Disseminated herpes zoster (10/16/2019), Hypertension, and Known health problems: none    He  has a past surgical history that includes Chestnut tooth extraction  His family history includes Breast cancer in his sister; Hypertension in his father  He  reports that he has quit smoking  He has a 1 50 pack-year smoking history  He has never used smokeless tobacco  He reports current alcohol use  He reports previous drug use  He is allergic to codeine       Review of Systems   Constitutional: Negative for chills and fever  HENT: Negative for sore throat  Eyes: Negative for visual disturbance  Respiratory: Negative for shortness of breath  Cardiovascular: Negative for chest pain  Gastrointestinal: Negative for abdominal pain  Genitourinary: Negative for flank pain  Musculoskeletal: Positive for arthralgias  Negative for joint swelling  Skin: Negative for rash and wound  Neurological: Negative for weakness and numbness  Hematological: Does not bruise/bleed easily  Psychiatric/Behavioral: Negative for self-injury  Objective:  Vitals:    08/04/21 0918   BP: 143/90   Pulse: (!) 50   Resp: 18   Weight: 90 7 kg (200 lb)   Height: 5' 10" (1 778 m)     Right Ankle Exam     Muscle Strength   Dorsiflexion:  5/5  Plantar flexion:  5/5      Left Ankle Exam     Muscle Strength   Dorsiflexion:  5/5   Plantar flexion:  5/5       Right Hip Exam     Range of Motion   Internal rotation: 10     Muscle Strength   Abduction: 4/5   Flexion: 5/5     Tests   RAMSES: negative    Comments:  Negative FADDIR  Hamstring tightness      Left Hip Exam     Tenderness   The patient is experiencing tenderness in the greater trochanter  Range of Motion   Internal rotation: 5     Muscle Strength   Abduction: 4/5   Flexion: 5/5     Tests   RAMSES: negative    Comments:  Positive FADDIR  Hamstring tightness      Back Exam     Tenderness   The patient is experiencing no tenderness  Range of Motion   The patient has normal back ROM      Muscle Strength   Right Quadriceps:  5/5   Left Quadriceps:  5/5     Tests   Straight leg raise right: negative  Straight leg raise left: negative    Reflexes   Patellar: Hyporeflexic    Other   Sensation: normal  Gait: normal           Strength/Myotome Testing     Left Ankle/Foot   Dorsiflexion: 5  Plantar flexion: 5    Right Ankle/Foot   Dorsiflexion: 5  Plantar flexion: 5      Physical Exam  Vitals and nursing note reviewed  Constitutional:       General: He is not in acute distress  Appearance: He is well-developed  He is not ill-appearing or diaphoretic  HENT:      Head: Normocephalic and atraumatic  Right Ear: External ear normal       Left Ear: External ear normal    Eyes:      Conjunctiva/sclera: Conjunctivae normal    Neck:      Trachea: No tracheal deviation  Cardiovascular:      Comments: Bradycardic  Pulmonary:      Effort: Pulmonary effort is normal  No respiratory distress  Abdominal:      General: There is no distension  Musculoskeletal:         General: Swelling and tenderness present  No deformity or signs of injury  Lumbar back: Negative right straight leg raise test and negative left straight leg raise test    Skin:     General: Skin is warm and dry  Coloration: Skin is not jaundiced or pale  Neurological:      Mental Status: He is alert and oriented to person, place, and time  Psychiatric:         Mood and Affect: Mood normal          Behavior: Behavior normal          Thought Content: Thought content normal          Judgment: Judgment normal          I have personally reviewed pertinent films in PACS and my interpretation is Osteoarthritis of both hips

## 2021-08-05 ENCOUNTER — TELEPHONE (OUTPATIENT)
Dept: OBGYN CLINIC | Facility: CLINIC | Age: 51
End: 2021-08-05

## 2021-08-24 ENCOUNTER — CONSULT (OUTPATIENT)
Dept: VASCULAR SURGERY | Facility: CLINIC | Age: 51
End: 2021-08-24
Payer: COMMERCIAL

## 2021-08-24 VITALS
SYSTOLIC BLOOD PRESSURE: 152 MMHG | HEART RATE: 52 BPM | BODY MASS INDEX: 28.63 KG/M2 | WEIGHT: 200 LBS | TEMPERATURE: 98.7 F | HEIGHT: 70 IN | DIASTOLIC BLOOD PRESSURE: 90 MMHG

## 2021-08-24 DIAGNOSIS — I73.9 CLAUDICATION OF BOTH LOWER EXTREMITIES (HCC): ICD-10-CM

## 2021-08-24 DIAGNOSIS — I83.813 VARICOSE VEINS OF BOTH LOWER EXTREMITIES WITH PAIN: Primary | ICD-10-CM

## 2021-08-24 PROCEDURE — 99244 OFF/OP CNSLTJ NEW/EST MOD 40: CPT | Performed by: PHYSICIAN ASSISTANT

## 2021-08-24 RX ORDER — PROPRANOLOL/HYDROCHLOROTHIAZID 40 MG-25MG
TABLET ORAL
COMMUNITY
End: 2022-07-27

## 2021-08-24 NOTE — PATIENT INSTRUCTIONS
Varicose veins of both lower extremities with pain  Venous insufficiency    -right lower extremity bulging veins with a mild lower extremity discoloration  -works in [employer] and on feet 15-16 hours a day develops tired and heavy legs  -no edema    -right lower extremity with mild venous stasis changes and a few bulging varicosities  Skin warm and intact  -no history of deep vein thrombosis, phlebitis or bleeding veins      Venous Insufficiency    We had a detailed discussion regarding varicose veins and the treatment of venous disease  We discussed the role of compression and other conservative measures for relief of symptoms related to varicose veins      - Order for prescription graded compression stockings of 20-30 mm Hg  - Wear stocking EVERY day to help control swelling in legs and remove at night  - Elevate legs while at rest  - Continue healthy life-style changes; heart-healthy, low sodium diet; regular exercise for weight loss  - Patient education for venous insufficiency  - Follow up as needed for any worsening of symptoms - swelling, pain, fatigue, aching, heaviness  Varicose Veins, Ambulatory Care   GENERAL INFORMATION:   Varicose veins  are veins that become large, twisted, and swollen  They are common on the back of your calves, knees, and thighs     Common symptoms include the following:   · Blue, purple, or bulging veins in your legs     · Pain, swelling, or muscle cramps in your legs    · Feeling of heaviness in your legs  Seek immediate care for the following symptoms:   · A wound that does not heal or is infected    · An injury that has broken your skin and caused your varicose veins to bleed    · Swollen and hard leg    · Pain in your leg that does not go away or gets worse    · Legs or feet turn blue or black    · Warmth, tenderness, and pain in your leg (may also look swollen and red)  Treatment of varicose veins  aims to decrease symptoms, improve appearance, and prevent further problems  Treatment will depend on which veins are affected and how severe your condition is  Prescription pain medicine may be given  Ask how to take this medicine safely  Procedures may be done to remove your varicose veins  Your healthcare provider may inject a solution or use a laser to close the varicose veins  Surgery to remove long veins may also be done  Ask your healthcare provider for more information about procedures used in treating varicose veins  Manage varicose veins:   · Wear pressure stockings  The stockings are tight and put pressure on your legs  They improve blood flow and help prevent clots  · Elevate  your legs above the level of your heart for 15 to 30 minutes several times a day  This will help blood to flow back to your heart  · Avoid sitting or standing for long periods of time  This can cause the blood to collect in your legs and make your symptoms worse  Walk around for a few minutes every hour to get blood moving in your legs  · Avoid wearing tight clothing and shoes  Avoid wearing high-heeled shoes  Do not wear clothes that are tight around the waist     · Get plenty of exercise  Talk to your healthcare provider about the best exercise plan for you  Exercise can decrease your blood pressure and improve your health  Bend or rotate your ankles several times every hour  This will help blood to flow back to the heart  · Maintain a healthy weight  Your heart works harder when you are overweight and this can make varicose vein worse  Ask how much you should weigh  Ask him to help you create a weight loss plan if you are overweight  · Do not smoke  If you smoke, it is never too late to quit  Ask for information if you need help quitting  Follow up with your healthcare provider as directed:  Write down your questions so you remember to ask them during your visits  CARE AGREEMENT:   You have the right to help plan your care   Learn about your health condition and how it may be treated  Discuss treatment options with your caregivers to decide what care you want to receive  You always have the right to refuse treatment  The above information is an  only  It is not intended as medical advice for individual conditions or treatments  Talk to your doctor, nurse or pharmacist before following any medical regimen to see if it is safe and effective for you  © 2014 [address] is for End User's use only and may not be sold, redistributed or otherwise used for commercial purposes  All illustrations and images included in CareNotes® are the copyrighted property of A D [employer] M [employer]  or Garry Levia
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
## 2021-09-07 DIAGNOSIS — I10 ESSENTIAL HYPERTENSION: ICD-10-CM

## 2021-09-07 RX ORDER — HYDROCHLOROTHIAZIDE 25 MG/1
25 TABLET ORAL DAILY
Qty: 30 TABLET | Refills: 0 | Status: SHIPPED | OUTPATIENT
Start: 2021-09-07 | End: 2021-10-13 | Stop reason: SDUPTHER

## 2021-09-20 ENCOUNTER — HOSPITAL ENCOUNTER (OUTPATIENT)
Dept: RADIOLOGY | Facility: MEDICAL CENTER | Age: 51
Discharge: HOME | End: 2021-09-20
Payer: COMMERCIAL

## 2021-09-20 DIAGNOSIS — R22.1 MASS OF LEFT SIDE OF NECK: ICD-10-CM

## 2021-09-20 PROCEDURE — 76536 US EXAM OF HEAD AND NECK: CPT

## 2021-09-29 ENCOUNTER — OFFICE VISIT (OUTPATIENT)
Dept: OBGYN CLINIC | Facility: CLINIC | Age: 51
End: 2021-09-29
Payer: COMMERCIAL

## 2021-09-29 VITALS
BODY MASS INDEX: 28.92 KG/M2 | WEIGHT: 202 LBS | HEIGHT: 70 IN | RESPIRATION RATE: 18 BRPM | SYSTOLIC BLOOD PRESSURE: 170 MMHG | DIASTOLIC BLOOD PRESSURE: 103 MMHG

## 2021-09-29 DIAGNOSIS — M62.9 HAMSTRING TIGHTNESS OF BOTH LOWER EXTREMITIES: ICD-10-CM

## 2021-09-29 DIAGNOSIS — R29.898 WEAKNESS OF BOTH HIPS: ICD-10-CM

## 2021-09-29 DIAGNOSIS — M16.0 ARTHRITIS OF BOTH HIPS: Primary | ICD-10-CM

## 2021-09-29 DIAGNOSIS — M25.852 FEMOROACETABULAR IMPINGEMENT OF LEFT HIP: ICD-10-CM

## 2021-09-29 PROCEDURE — 99213 OFFICE O/P EST LOW 20 MIN: CPT | Performed by: FAMILY MEDICINE

## 2021-09-29 NOTE — PROGRESS NOTES
Assessment/Plan:  Assessment/Plan   Diagnoses and all orders for this visit:    Arthritis of both hips  -     Ambulatory referral to Physical Therapy; Future    Femoroacetabular impingement of left hip  -     Ambulatory referral to Physical Therapy; Future    Hamstring tightness of both lower extremities  -     Ambulatory referral to Physical Therapy; Future    Weakness of both hips  -     Ambulatory referral to Physical Therapy; Future          55-year-old active male with left hip arthritis and left hip pain many years duration  Discussed with patient physical exam, impression and plan  On physical exam there is mild groin pain of left hip with FADDIR maneuver  He has normal strength with flexion both hips  Clinical impression is that he is symptomatic from arthritis and impingement in the hip  I discussed regimen of prescription NSAID and formal therapy  Since he has not given meloxicam a trial recommend he take meloxicam 15 mg once daily with food consistently for 30 days  I will also refer him to physical therapy which he is to start as soon as possible and do home exercises as directed  He will follow up as needed  Subjective:   Patient ID: Owen Johnson is a 46 y o  male  Chief Complaint   Patient presents with    Left Hip - Follow-up, Clicking, Pain       [id number] male with history arthritis left hip following up for left hip pain of many years duration  He was last seen by me more than 8 weeks ago which point he was prescribed meloxicam 15 mg once daily advised on taking supplements  He was referred for ultrasound-guided left hip corticosteroid injection  Patient states that about 2 weeks into the regiment of taking supplements his symptoms dramatically improved  He filled the prescription for meloxicam however has not yet taken it  He was able to sleep through the night without any pain    He also went down to [place] and states that while he was down there he was also feeling pretty good  After returning back to [place] with the change in temperature left hip pain returned  He has been having pain described as localized to the left hip/groin area, nonradiating, worse at nighttime when trying to sleep, and improved with changing position  He states that he has tried sleep with pillow between the legs and also tried sleeping on the recliner  He does not have much issue during the day when he is up and about being physically active  Hip Pain  This is a chronic problem  The current episode started more than 1 year ago  The problem occurs daily  The problem has been waxing and waning  Associated symptoms include arthralgias  Pertinent negatives include no joint swelling, numbness or weakness  Exacerbated by: Recumbent position  He has tried rest and position changes (Supplements) for the symptoms  The treatment provided moderate relief  Review of Systems   Musculoskeletal: Positive for arthralgias  Negative for joint swelling  Neurological: Negative for weakness and numbness  Objective:  Vitals:    09/29/21 0810   BP: (!) 170/103   Resp: 18   Weight: 91 6 kg (202 lb)   Height: 5' 10" (1 778 m)     Right Hip Exam     Muscle Strength   Flexion: 5/5     Comments:  Negative FADDIR      Left Hip Exam     Muscle Strength   Flexion: 5/5     Comments:  Positive FADDIR            Physical Exam  Vitals and nursing note reviewed  Constitutional:       General: He is not in acute distress  Appearance: He is well-developed  He is not ill-appearing or diaphoretic  HENT:      Head: Normocephalic and atraumatic  Right Ear: External ear normal       Left Ear: External ear normal    Eyes:      Conjunctiva/sclera: Conjunctivae normal    Neck:      Trachea: No tracheal deviation  Cardiovascular:      Rate and Rhythm: Normal rate  Pulmonary:      Effort: Pulmonary effort is normal  No respiratory distress  Abdominal:      General: There is no distension  Skin:     General: Skin is warm and dry  Coloration: Skin is not jaundiced or pale  Neurological:      Mental Status: He is alert and oriented to person, place, and time  Psychiatric:         Mood and Affect: Mood normal          Behavior: Behavior normal          Thought Content:  Thought content normal          Judgment: Judgment normal

## 2021-10-11 ENCOUNTER — EVALUATION (OUTPATIENT)
Dept: PHYSICAL THERAPY | Facility: CLINIC | Age: 51
End: 2021-10-11
Payer: COMMERCIAL

## 2021-10-11 DIAGNOSIS — M62.9 HAMSTRING TIGHTNESS OF BOTH LOWER EXTREMITIES: ICD-10-CM

## 2021-10-11 DIAGNOSIS — M16.0 ARTHRITIS OF BOTH HIPS: Primary | ICD-10-CM

## 2021-10-11 DIAGNOSIS — M25.852 FEMOROACETABULAR IMPINGEMENT OF LEFT HIP: ICD-10-CM

## 2021-10-11 DIAGNOSIS — R29.898 WEAKNESS OF BOTH HIPS: ICD-10-CM

## 2021-10-11 PROCEDURE — 97162 PT EVAL MOD COMPLEX 30 MIN: CPT

## 2021-10-11 PROCEDURE — 97110 THERAPEUTIC EXERCISES: CPT

## 2021-10-13 ENCOUNTER — OFFICE VISIT (OUTPATIENT)
Dept: PHYSICAL THERAPY | Facility: CLINIC | Age: 51
End: 2021-10-13
Payer: COMMERCIAL

## 2021-10-13 DIAGNOSIS — R29.898 WEAKNESS OF BOTH HIPS: ICD-10-CM

## 2021-10-13 DIAGNOSIS — M25.852 FEMOROACETABULAR IMPINGEMENT OF LEFT HIP: ICD-10-CM

## 2021-10-13 DIAGNOSIS — M62.9 HAMSTRING TIGHTNESS OF BOTH LOWER EXTREMITIES: ICD-10-CM

## 2021-10-13 DIAGNOSIS — M16.0 ARTHRITIS OF BOTH HIPS: Primary | ICD-10-CM

## 2021-10-13 DIAGNOSIS — I10 ESSENTIAL HYPERTENSION: ICD-10-CM

## 2021-10-13 PROCEDURE — 97530 THERAPEUTIC ACTIVITIES: CPT

## 2021-10-13 PROCEDURE — 97110 THERAPEUTIC EXERCISES: CPT

## 2021-10-13 PROCEDURE — 97140 MANUAL THERAPY 1/> REGIONS: CPT

## 2021-10-13 RX ORDER — HYDROCHLOROTHIAZIDE 25 MG/1
25 TABLET ORAL DAILY
Qty: 30 TABLET | Refills: 2 | Status: SHIPPED | OUTPATIENT
Start: 2021-10-13 | End: 2022-01-14

## 2021-10-15 ENCOUNTER — APPOINTMENT (OUTPATIENT)
Dept: PHYSICAL THERAPY | Facility: CLINIC | Age: 51
End: 2021-10-15
Payer: COMMERCIAL

## 2021-10-18 ENCOUNTER — TELEPHONE (OUTPATIENT)
Dept: PHYSICAL THERAPY | Facility: CLINIC | Age: 51
End: 2021-10-18

## 2021-10-22 ENCOUNTER — APPOINTMENT (OUTPATIENT)
Dept: PHYSICAL THERAPY | Facility: CLINIC | Age: 51
End: 2021-10-22
Payer: COMMERCIAL

## 2021-10-25 ENCOUNTER — APPOINTMENT (OUTPATIENT)
Dept: PHYSICAL THERAPY | Facility: CLINIC | Age: 51
End: 2021-10-25
Payer: COMMERCIAL

## 2021-10-29 ENCOUNTER — APPOINTMENT (OUTPATIENT)
Dept: PHYSICAL THERAPY | Facility: CLINIC | Age: 51
End: 2021-10-29
Payer: COMMERCIAL

## 2021-11-12 DIAGNOSIS — I10 ESSENTIAL HYPERTENSION: ICD-10-CM

## 2022-01-14 DIAGNOSIS — I10 ESSENTIAL HYPERTENSION: ICD-10-CM

## 2022-01-14 RX ORDER — HYDROCHLOROTHIAZIDE 25 MG/1
TABLET ORAL
Qty: 30 TABLET | Refills: 2 | Status: SHIPPED | OUTPATIENT
Start: 2022-01-14 | End: 2022-04-18

## 2022-03-11 DIAGNOSIS — I10 ESSENTIAL HYPERTENSION: ICD-10-CM

## 2022-04-18 DIAGNOSIS — I10 ESSENTIAL HYPERTENSION: ICD-10-CM

## 2022-04-18 RX ORDER — HYDROCHLOROTHIAZIDE 25 MG/1
TABLET ORAL
Qty: 30 TABLET | Refills: 2 | Status: SHIPPED | OUTPATIENT
Start: 2022-04-18 | End: 2022-07-27 | Stop reason: ALTCHOICE

## 2022-07-25 DIAGNOSIS — I10 ESSENTIAL HYPERTENSION: ICD-10-CM

## 2022-07-27 ENCOUNTER — OFFICE VISIT (OUTPATIENT)
Dept: FAMILY MEDICINE CLINIC | Facility: CLINIC | Age: 52
End: 2022-07-27

## 2022-07-27 VITALS
WEIGHT: 203 LBS | BODY MASS INDEX: 29.06 KG/M2 | DIASTOLIC BLOOD PRESSURE: 96 MMHG | SYSTOLIC BLOOD PRESSURE: 146 MMHG | HEIGHT: 70 IN | OXYGEN SATURATION: 97 % | TEMPERATURE: 98.6 F | HEART RATE: 64 BPM

## 2022-07-27 DIAGNOSIS — Z12.11 ENCOUNTER FOR SCREENING COLONOSCOPY: ICD-10-CM

## 2022-07-27 DIAGNOSIS — E78.2 HYPERLIPIDEMIA, MIXED: ICD-10-CM

## 2022-07-27 DIAGNOSIS — Z12.5 SCREENING FOR PROSTATE CANCER: ICD-10-CM

## 2022-07-27 DIAGNOSIS — I10 ESSENTIAL HYPERTENSION: Primary | ICD-10-CM

## 2022-07-27 PROCEDURE — 99214 OFFICE O/P EST MOD 30 MIN: CPT | Performed by: PHYSICIAN ASSISTANT

## 2022-07-27 RX ORDER — LISINOPRIL AND HYDROCHLOROTHIAZIDE 20; 12.5 MG/1; MG/1
1 TABLET ORAL DAILY
Qty: 90 TABLET | Refills: 3 | Status: SHIPPED | OUTPATIENT
Start: 2022-07-27

## 2022-07-27 NOTE — PROGRESS NOTES
Assessment/Plan:       Problem List Items Addressed This Visit        Cardiovascular and Mediastinum    Essential hypertension - Primary    Relevant Medications    lisinopril-hydrochlorothiazide (PRINZIDE,ZESTORETIC) 20-12 5 MG per tablet    Other Relevant Orders    Comprehensive metabolic panel       Other    Hyperlipidemia, mixed    Relevant Orders    Lipid Panel with Direct LDL reflex      Other Visit Diagnoses     Encounter for screening colonoscopy        Relevant Orders    Ambulatory referral for colonoscopy    Screening for prostate cancer        Relevant Orders    PSA, Total Screen        BP not at goal, elevated in the 140s/90s at home as well  Add lisinopril  Check labs in 1 week  Monitor BP BID at home, 2 week follow up for BP check  Otherwise unremarkable exam  Complete colonoscopy  Subjective:      Patient ID: Shantelle Hilliard is a 46 y o  male  BP med refill    BP runs 140s/90s at home with pulse frequently in the the 50s-60s  Ran out of BP med and missed dose this morning  /96 today in office  Currently on Lopresser 25 mg BID and hydrochlorothiazide 25 mg daily  Denies chest pain, SOB, headaches, dizziness      Never completed cologuard  Does have a family history of colon cancer, is agreeable for colonoscopy versus Cologuard now  HLD not on statin, due for FLP    No interval health changes      The following portions of the patient's history were reviewed and updated as appropriate:   He  has a past medical history of Disseminated herpes zoster (10/16/2019), Hypertension, and Known health problems: none    He   Patient Active Problem List    Diagnosis Date Noted    Arthritis of both hips 08/04/2021    Femoroacetabular impingement of left hip 08/04/2021    Hamstring tightness of both lower extremities 08/04/2021    Weakness of both hips 08/04/2021    COVID-19 12/07/2020    Claudication of both lower extremities ([facility] [address] ) 06/05/2020    Varicose veins of both lower extremities with pain 06/05/2020    Bilateral leg pain 06/05/2020    Bilateral hand numbness 06/05/2020    Excessive daytime sleepiness 06/05/2020    Overweight (BMI 25 0-29 9) 10/16/2019    Achilles tendinosis of right lower extremity 08/13/2019    Cutaneous skin tags 08/13/2019    Essential hypertension 09/14/2018    Hyperlipidemia, mixed 09/14/2018    Vitamin D deficiency 09/14/2018    Low libido 09/14/2018     He  has a past surgical history that includes [place] tooth extraction  His family history includes Breast cancer in his sister; Hypertension in his father  He  reports that he has quit smoking  He has a 1 50 pack-year smoking history  He has never used smokeless tobacco  He reports current alcohol use  He reports previous drug use  Current Outpatient Medications   Medication Sig Dispense Refill    lisinopril-hydrochlorothiazide (PRINZIDE,ZESTORETIC) 20-12 5 MG per tablet Take 1 tablet by mouth daily 90 tablet 3    ascorbic acid (VITAMIN C) 500 mg tablet Take 500 mg by mouth daily      Ergocalciferol (VITAMIN D2 PO) Take by mouth      Lysine 1000 MG TABS Take by mouth      metoprolol tartrate (LOPRESSOR) 25 mg tablet TAKE ONE TABLET BY MOUTH TWICE A  tablet 0    Misc Natural Products (OSTEO BI-FLEX ADV DOUBLE ST PO) Take by mouth      Multiple Vitamin (MULTIVITAMIN) capsule Take 1 capsule by mouth daily       No current facility-administered medications for this visit       Current Outpatient Medications on File Prior to Visit   Medication Sig    ascorbic acid (VITAMIN C) 500 mg tablet Take 500 mg by mouth daily    Ergocalciferol (VITAMIN D2 PO) Take by mouth    Lysine 1000 MG TABS Take by mouth    metoprolol tartrate (LOPRESSOR) 25 mg tablet TAKE ONE TABLET BY MOUTH TWICE A DAY    Misc Natural Products (OSTEO BI-FLEX ADV DOUBLE ST PO) Take by mouth    Multiple Vitamin (MULTIVITAMIN) capsule Take 1 capsule by mouth daily    [DISCONTINUED] hydrochlorothiazide (HYDRODIURIL) 25 mg tablet TAKE ONE TABLET BY MOUTH EVERY DAY    [DISCONTINUED] meloxicam (MOBIC) 15 mg tablet Take 1 tablet (15 mg total) by mouth daily (Patient not taking: Reported on 8/24/2021)    [DISCONTINUED] Tart Kaufman 1200 MG CAPS Take by mouth    [DISCONTINUED] Turmeric 500 MG CAPS Take by mouth     No current facility-administered medications on file prior to visit  He is allergic to codeine       Review of Systems   Constitutional: Negative for chills, fatigue and fever  HENT: Negative for congestion, ear pain, hearing loss, nosebleeds, postnasal drip, rhinorrhea, sinus pressure, sinus pain, sneezing and sore throat  Eyes: Negative for pain, discharge, itching and visual disturbance  Respiratory: Negative for cough, chest tightness, shortness of breath and wheezing  Cardiovascular: Negative for chest pain, palpitations and leg swelling  Gastrointestinal: Negative for abdominal pain, blood in stool, constipation, diarrhea, nausea and vomiting  Genitourinary: Negative for frequency and urgency  Neurological: Negative for dizziness, light-headedness and numbness  Objective:      /96   Pulse 64   Temp 98 6 °F (37 °C)   Ht 5' 10" (1 778 m)   Wt 92 1 kg (203 lb)   SpO2 97%   BMI 29 13 kg/m²          Physical Exam  Vitals and nursing note reviewed  Constitutional:       General: He is not in acute distress  Appearance: Normal appearance  HENT:      Head: Normocephalic and atraumatic  Nose: Nose normal       Mouth/Throat:      Mouth: Mucous membranes are moist       Pharynx: Oropharynx is clear  No oropharyngeal exudate or posterior oropharyngeal erythema  Eyes:      Pupils: Pupils are equal, round, and reactive to light  Cardiovascular:      Rate and Rhythm: Normal rate and regular rhythm  Heart sounds: Normal heart sounds  No murmur heard  Pulmonary:      Effort: Pulmonary effort is normal  No respiratory distress  Breath sounds: Normal breath sounds  No stridor   No wheezing, rhonchi or rales  Chest:      Chest wall: No tenderness  Abdominal:      General: Bowel sounds are normal       Palpations: Abdomen is soft  Musculoskeletal:         General: Normal range of motion  Cervical back: Normal range of motion and neck supple  Right lower leg: No edema  Left lower leg: No edema  Skin:     General: Skin is warm and dry  Neurological:      Mental Status: He is alert and oriented to person, place, and time     Psychiatric:         Mood and Affect: Mood and affect normal

## 2022-08-05 ENCOUNTER — APPOINTMENT (OUTPATIENT)
Dept: LAB | Facility: CLINIC | Age: 52
End: 2022-08-05
Payer: COMMERCIAL

## 2022-08-05 DIAGNOSIS — E78.2 HYPERLIPIDEMIA, MIXED: ICD-10-CM

## 2022-08-05 DIAGNOSIS — I10 ESSENTIAL HYPERTENSION: ICD-10-CM

## 2022-08-05 DIAGNOSIS — Z12.5 SCREENING FOR PROSTATE CANCER: ICD-10-CM

## 2022-08-05 LAB
ALBUMIN SERPL BCP-MCNC: 4.3 G/DL (ref 3.5–5)
ALP SERPL-CCNC: 91 U/L (ref 46–116)
ALT SERPL W P-5'-P-CCNC: 59 U/L (ref 12–78)
ANION GAP SERPL CALCULATED.3IONS-SCNC: 4 MMOL/L (ref 4–13)
AST SERPL W P-5'-P-CCNC: 34 U/L (ref 5–45)
BILIRUB SERPL-MCNC: 0.74 MG/DL (ref 0.2–1)
BUN SERPL-MCNC: 15 MG/DL (ref 5–25)
CALCIUM SERPL-MCNC: 9.8 MG/DL (ref 8.3–10.1)
CHLORIDE SERPL-SCNC: 103 MMOL/L (ref 96–108)
CHOLEST SERPL-MCNC: 203 MG/DL
CO2 SERPL-SCNC: 29 MMOL/L (ref 21–32)
CREAT SERPL-MCNC: 0.91 MG/DL (ref 0.6–1.3)
GFR SERPL CREATININE-BSD FRML MDRD: 97 ML/MIN/1.73SQ M
GLUCOSE P FAST SERPL-MCNC: 100 MG/DL (ref 65–99)
HDLC SERPL-MCNC: 37 MG/DL
LDLC SERPL CALC-MCNC: 147 MG/DL (ref 0–100)
POTASSIUM SERPL-SCNC: 4.7 MMOL/L (ref 3.5–5.3)
PROT SERPL-MCNC: 8.3 G/DL (ref 6.4–8.4)
PSA SERPL-MCNC: 0.7 NG/ML (ref 0–4)
SODIUM SERPL-SCNC: 136 MMOL/L (ref 135–147)
TRIGL SERPL-MCNC: 96 MG/DL

## 2022-08-05 PROCEDURE — 80053 COMPREHEN METABOLIC PANEL: CPT

## 2022-08-05 PROCEDURE — 80061 LIPID PANEL: CPT

## 2022-08-05 PROCEDURE — 36415 COLL VENOUS BLD VENIPUNCTURE: CPT

## 2022-08-05 PROCEDURE — G0103 PSA SCREENING: HCPCS

## 2022-08-10 ENCOUNTER — OFFICE VISIT (OUTPATIENT)
Dept: FAMILY MEDICINE CLINIC | Facility: CLINIC | Age: 52
End: 2022-08-10

## 2022-08-10 VITALS
HEIGHT: 70 IN | OXYGEN SATURATION: 98 % | SYSTOLIC BLOOD PRESSURE: 130 MMHG | HEART RATE: 56 BPM | TEMPERATURE: 97.8 F | BODY MASS INDEX: 28.77 KG/M2 | WEIGHT: 201 LBS | DIASTOLIC BLOOD PRESSURE: 86 MMHG

## 2022-08-10 DIAGNOSIS — Z12.11 SCREEN FOR COLON CANCER: ICD-10-CM

## 2022-08-10 DIAGNOSIS — E55.9 VITAMIN D DEFICIENCY: ICD-10-CM

## 2022-08-10 DIAGNOSIS — E78.2 HYPERLIPIDEMIA, MIXED: ICD-10-CM

## 2022-08-10 DIAGNOSIS — I10 ESSENTIAL HYPERTENSION: Primary | ICD-10-CM

## 2022-08-10 PROCEDURE — 99212 OFFICE O/P EST SF 10 MIN: CPT | Performed by: PHYSICIAN ASSISTANT

## 2022-08-10 NOTE — PROGRESS NOTES
Assessment/Plan:       Problem List Items Addressed This Visit        Cardiovascular and Mediastinum    Essential hypertension - Primary       Other    Hyperlipidemia, mixed    Relevant Orders    Lipid Panel with Direct LDL reflex    Vitamin D deficiency    Relevant Orders    Vitamin D 25 hydroxy      Other Visit Diagnoses     Screen for colon cancer        Relevant Orders    Ambulatory referral for colonoscopy        BP much better controlled, continue lisinopril hctz  Reviewed lipids  Discussed lifestyle modifications and weight loss goals for lipid control  Check labs and follow up in 6 months  Earlier prn  Subjective:      Patient ID: Ricci Montanez is a 46 y o  male  Pt presents for 2 week follow up and lab review  Labs as below    HTN: added lisinopril to hctz, BP now much better controlled  No end organ complaints  Tolerating well  BP 120s/80s at home  130/86 today       Recent Results (from the past 672 hour(s))  -Comprehensive metabolic panel:   Collection Time: 08/05/22  9:20 AM       Result                      Value             Ref Range           Sodium                      136               135 - 147 mm*       Potassium                   4 7               3 5 - 5 3 mm*       Chloride                    103               96 - 108 mmo*       CO2                         29                21 - 32 mmol*       ANION GAP                   4                 4 - 13 mmol/L       BUN                         15                5 - 25 mg/dL        Creatinine                  0 91              0 60 - 1 30 *       Glucose, Fasting            100 (H)           65 - 99 mg/dL       Calcium                     9 8               8 3 - 10 1 m*       AST                         34                5 - 45 U/L          ALT                         59                12 - 78 U/L         Alkaline Phosphatase        91                46 - 116 U/L        Total Protein               8 3               6 4 - 8 4 g/*       Albumin 4 3               3 5 - 5 0 g/*       Total Bilirubin             0 74              0 20 - 1 00 *       eGFR                        97                ml/min/1 73s*  -PSA, Total Screen:   Collection Time: 08/05/22  9:20 AM       Result                      Value             Ref Range           PSA                         0 7               0 0 - 4 0 ng*  -Lipid Panel with Direct LDL reflex:   Collection Time: 08/05/22  9:20 AM       Result                      Value             Ref Range           Cholesterol                 203 (H)           See Comment *       Triglycerides               96                See Comment *       HDL, Direct                 37 (L)            >=40 mg/dL          LDL Calculated              147 (H)           0 - 100 mg/dL        The following portions of the patient's history were reviewed and updated as appropriate:   He  has a past medical history of Disseminated herpes zoster (10/16/2019), Hypertension, and Known health problems: none  He   Patient Active Problem List    Diagnosis Date Noted    Arthritis of both hips 08/04/2021    Femoroacetabular impingement of left hip 08/04/2021    Hamstring tightness of both lower extremities 08/04/2021    Weakness of both hips 08/04/2021    COVID-19 12/07/2020    Claudication of both lower extremities ([address] ) 06/05/2020    Varicose veins of both lower extremities with pain 06/05/2020    Bilateral leg pain 06/05/2020    Bilateral hand numbness 06/05/2020    Excessive daytime sleepiness 06/05/2020    Overweight (BMI 25 0-29 9) 10/16/2019    Achilles tendinosis of right lower extremity 08/13/2019    Cutaneous skin tags 08/13/2019    Essential hypertension 09/14/2018    Hyperlipidemia, mixed 09/14/2018    Vitamin D deficiency 09/14/2018    Low libido 09/14/2018     He  has a past surgical history that includes [place] tooth extraction  His family history includes Breast cancer in his sister;  Hypertension in his father  He  reports that he has quit smoking  He has a 1 50 pack-year smoking history  He has never used smokeless tobacco  He reports current alcohol use  He reports previous drug use  Current Outpatient Medications   Medication Sig Dispense Refill    ascorbic acid (VITAMIN C) 500 mg tablet Take 500 mg by mouth daily      Ergocalciferol (VITAMIN D2 PO) Take by mouth      lisinopril-hydrochlorothiazide (PRINZIDE,ZESTORETIC) 20-12 5 MG per tablet Take 1 tablet by mouth daily 90 tablet 3    Lysine 1000 MG TABS Take by mouth      metoprolol tartrate (LOPRESSOR) 25 mg tablet TAKE ONE TABLET BY MOUTH TWICE A  tablet 0    Misc Natural Products (OSTEO BI-FLEX ADV DOUBLE ST PO) Take by mouth      Multiple Vitamin (MULTIVITAMIN) capsule Take 1 capsule by mouth daily       No current facility-administered medications for this visit  Current Outpatient Medications on File Prior to Visit   Medication Sig    ascorbic acid (VITAMIN C) 500 mg tablet Take 500 mg by mouth daily    Ergocalciferol (VITAMIN D2 PO) Take by mouth    lisinopril-hydrochlorothiazide (PRINZIDE,ZESTORETIC) 20-12 5 MG per tablet Take 1 tablet by mouth daily    Lysine 1000 MG TABS Take by mouth    metoprolol tartrate (LOPRESSOR) 25 mg tablet TAKE ONE TABLET BY MOUTH TWICE A DAY    Misc Natural Products (OSTEO BI-FLEX ADV DOUBLE ST PO) Take by mouth    Multiple Vitamin (MULTIVITAMIN) capsule Take 1 capsule by mouth daily     No current facility-administered medications on file prior to visit  He is allergic to codeine       Review of Systems   Constitutional: Negative for chills, fatigue and fever  HENT: Negative for congestion, ear pain, hearing loss, nosebleeds, postnasal drip, rhinorrhea, sinus pressure, sinus pain, sneezing and sore throat  Eyes: Negative for pain, discharge, itching and visual disturbance  Respiratory: Negative for cough, chest tightness, shortness of breath and wheezing      Cardiovascular: Negative for chest pain, palpitations and leg swelling  Gastrointestinal: Negative for abdominal pain, blood in stool, constipation, diarrhea, nausea and vomiting  Genitourinary: Negative for frequency and urgency  Neurological: Negative for dizziness, light-headedness and numbness  Objective:      /86   Pulse 56   Temp 97 8 °F (36 6 °C)   Ht 5' 10" (1 778 m)   Wt 91 2 kg (201 lb)   SpO2 98%   BMI 28 84 kg/m²          Physical Exam  Vitals and nursing note reviewed  Constitutional:       General: He is not in acute distress  Appearance: Normal appearance  HENT:      Head: Normocephalic and atraumatic  Nose: Nose normal    Eyes:      Pupils: Pupils are equal, round, and reactive to light  Cardiovascular:      Rate and Rhythm: Normal rate and regular rhythm  Pulses: Normal pulses  Heart sounds: Normal heart sounds  No murmur heard  Pulmonary:      Effort: Pulmonary effort is normal  No respiratory distress  Breath sounds: Normal breath sounds  No wheezing, rhonchi or rales  Musculoskeletal:         General: Normal range of motion  Cervical back: Normal range of motion and neck supple  Right lower leg: No edema  Left lower leg: No edema  Skin:     General: Skin is warm and dry  Neurological:      Mental Status: He is alert and oriented to person, place, and time     Psychiatric:         Mood and Affect: Mood and affect normal

## 2022-11-05 DIAGNOSIS — I10 ESSENTIAL HYPERTENSION: ICD-10-CM

## 2023-02-10 ENCOUNTER — OFFICE VISIT (OUTPATIENT)
Dept: FAMILY MEDICINE CLINIC | Facility: CLINIC | Age: 53
End: 2023-02-10

## 2023-02-10 VITALS
OXYGEN SATURATION: 98 % | SYSTOLIC BLOOD PRESSURE: 110 MMHG | HEART RATE: 56 BPM | DIASTOLIC BLOOD PRESSURE: 74 MMHG | HEIGHT: 70 IN | BODY MASS INDEX: 29.63 KG/M2 | WEIGHT: 207 LBS

## 2023-02-10 DIAGNOSIS — E78.2 HYPERLIPIDEMIA, MIXED: ICD-10-CM

## 2023-02-10 DIAGNOSIS — Z00.00 HEALTH MAINTENANCE EXAMINATION: ICD-10-CM

## 2023-02-10 DIAGNOSIS — R07.89 CHEST HEAVINESS: ICD-10-CM

## 2023-02-10 DIAGNOSIS — I10 ESSENTIAL HYPERTENSION: Primary | ICD-10-CM

## 2023-02-10 DIAGNOSIS — Z12.11 SCREEN FOR COLON CANCER: ICD-10-CM

## 2023-02-10 NOTE — PROGRESS NOTES
Name: Lawrence Bergman      : 1970      MRN: [id number]  Encounter Provider: Jon Gaming PA-C  Encounter Date: 2/10/2023   Encounter department: [address] [place] [address]     1  Essential hypertension  -     Stress test only, exercise; Future; Expected date: 02/10/2023    2  Hyperlipidemia, mixed  -     Stress test only, exercise; Future; Expected date: 02/10/2023    3  BMI 29 0-29 9,adult    4  Chest heaviness  -     Stress test only, exercise; Future; Expected date: 02/10/2023    5  Screen for colon cancer  -     Ambulatory referral for colonoscopy; Future    6  Health maintenance examination  hx reviewed and updated  BP well controlled, continue current regimen  Check lipids  Exertional chest heaviness with several risks  EKG today shows sinus bradycardia, normal EKG  Recommend exercise stress testing  Return precautions discussed  Update HM  Normal exam today  Subjective     Pt presents for routine checkup, annual physical    No interval health changes  HTN: has been very well controlled, on metoprolol, linsiopril-hctz  He does note today he has been getting exertional chest heaviness/tightness  Seems to be linked to cold pt shares  He notes if he is working outside or outside walking his dog/shoveling snow, he will get a heaviness/tightness sensation across the chest, resolves with rest  No SOB, lightheadedness  No known hx of heart disease  No FH of CAD  HLD: due for FLP, not on statin   Due for CRC screening  Non smoker     Review of Systems   Constitutional: Negative for chills, fatigue and fever  HENT: Negative for congestion, ear pain, hearing loss, nosebleeds, postnasal drip, rhinorrhea, sinus pressure, sinus pain, sneezing and sore throat  Eyes: Negative for pain, discharge, itching and visual disturbance  Respiratory: Negative for cough, chest tightness, shortness of breath and wheezing  Cardiovascular: Positive for chest pain   Negative for palpitations and leg swelling  Gastrointestinal: Negative for abdominal pain, blood in stool, constipation, diarrhea, nausea and vomiting  Genitourinary: Negative for frequency and urgency  Musculoskeletal: Negative  Neurological: Negative for dizziness, light-headedness and numbness  Psychiatric/Behavioral: Negative          Past Medical History:   Diagnosis Date   • Disseminated herpes zoster 10/16/2019   • Hypertension    • Known health problems: none      Past Surgical History:   Procedure Laterality Date   • WISDOM TOOTH EXTRACTION       Family History   Problem Relation Age of Onset   • Hypertension Father    • Breast cancer Sister      Social History     Socioeconomic History   • Marital status: /Civil Union     Spouse name: None   • Number of children: None   • Years of education: None   • Highest education level: None   Occupational History   • None   Tobacco Use   • Smoking status: Former     Packs/day: 0 25     Years: 6 00     Pack years: 1 50     Types: Cigarettes   • Smokeless tobacco: Never   Vaping Use   • Vaping Use: Never used   Substance and Sexual Activity   • Alcohol use: Yes     Comment: Social   • Drug use: Not Currently   • Sexual activity: None   Other Topics Concern   • None   Social History Narrative   • None     Social Determinants of Health     Financial Resource Strain: Not on file   Food Insecurity: Not on file   Transportation Needs: Not on file   Physical Activity: Not on file   Stress: Not on file   Social Connections: Not on file   Intimate Partner Violence: Not on file   Housing Stability: Not on file     Current Outpatient Medications on File Prior to Visit   Medication Sig   • ascorbic acid (VITAMIN C) 500 mg tablet Take 500 mg by mouth daily   • Ergocalciferol (VITAMIN D2 PO) Take by mouth   • lisinopril-hydrochlorothiazide (PRINZIDE,ZESTORETIC) 20-12 5 MG per tablet Take 1 tablet by mouth daily   • Lysine 1000 MG TABS Take by mouth   • metoprolol tartrate (LOPRESSOR) 25 mg tablet TAKE ONE TABLET BY MOUTH TWICE A DAY   • Misc Natural Products (OSTEO BI-FLEX ADV DOUBLE ST PO) Take by mouth   • Multiple Vitamin (MULTIVITAMIN) capsule Take 1 capsule by mouth daily     Allergies   Allergen Reactions   • Codeine Drowsiness     incapacitates     Immunization History   Administered Date(s) Administered   • Td (adult), Unspecified 07/14/2015   • Tdap 07/14/2015       Objective     /74   Pulse 56   Ht 5' 10" (1 778 m)   Wt 93 9 kg (207 lb)   SpO2 98%   BMI 29 70 kg/m²     Physical Exam  Vitals and nursing note reviewed  Constitutional:       General: He is not in acute distress  Appearance: Normal appearance  HENT:      Head: Normocephalic and atraumatic  Nose: Nose normal       Mouth/Throat:      Mouth: Mucous membranes are moist       Pharynx: Oropharynx is clear  No oropharyngeal exudate or posterior oropharyngeal erythema  Eyes:      Pupils: Pupils are equal, round, and reactive to light  Cardiovascular:      Rate and Rhythm: Normal rate and regular rhythm  Heart sounds: Normal heart sounds  No murmur heard  Pulmonary:      Effort: Pulmonary effort is normal  No respiratory distress  Breath sounds: Normal breath sounds  No wheezing, rhonchi or rales  Abdominal:      General: Bowel sounds are normal       Palpations: Abdomen is soft  Musculoskeletal:         General: Normal range of motion  Cervical back: Normal range of motion and neck supple  Right lower leg: No edema  Left lower leg: No edema  Skin:     General: Skin is warm and dry  Neurological:      Mental Status: He is alert and oriented to person, place, and time     Psychiatric:         Mood and Affect: Mood and affect normal        Stefani Aviles PA-C

## 2023-02-22 ENCOUNTER — PREP FOR PROCEDURE (OUTPATIENT)
Dept: GASTROENTEROLOGY | Facility: CLINIC | Age: 53
End: 2023-02-22

## 2023-02-22 ENCOUNTER — TELEPHONE (OUTPATIENT)
Dept: GASTROENTEROLOGY | Facility: CLINIC | Age: 53
End: 2023-02-22

## 2023-02-22 DIAGNOSIS — Z12.11 SCREENING FOR COLON CANCER: Primary | ICD-10-CM

## 2023-02-22 NOTE — TELEPHONE ENCOUNTER
Scheduled date of colonoscopy (as of today):4/3/23    Physician performing colonoscopy:Dr Walton    Location of colonoscopy: [facility]    Clearances: N/A

## 2023-02-28 DIAGNOSIS — I10 ESSENTIAL HYPERTENSION: ICD-10-CM

## 2023-03-01 ENCOUNTER — HOSPITAL ENCOUNTER (OUTPATIENT)
Facility: HOSPITAL | Age: 53
Setting detail: OBSERVATION
Discharge: ANOTHER HOSPITAL | End: 2023-03-02
Attending: EMERGENCY MEDICINE | Admitting: INTERNAL MEDICINE

## 2023-03-01 ENCOUNTER — APPOINTMENT (EMERGENCY)
Dept: NON INVASIVE DIAGNOSTICS | Facility: HOSPITAL | Age: 53
End: 2023-03-01

## 2023-03-01 ENCOUNTER — HOSPITAL ENCOUNTER (OUTPATIENT)
Dept: NON INVASIVE DIAGNOSTICS | Facility: HOSPITAL | Age: 53
Discharge: HOME | End: 2023-03-01

## 2023-03-01 VITALS — HEIGHT: 70 IN | WEIGHT: 207 LBS | BODY MASS INDEX: 29.63 KG/M2

## 2023-03-01 DIAGNOSIS — R94.39 POSITIVE CARDIAC STRESS TEST: ICD-10-CM

## 2023-03-01 DIAGNOSIS — R94.31 EKG ABNORMALITIES: ICD-10-CM

## 2023-03-01 DIAGNOSIS — R07.9 CHEST PAIN: Primary | ICD-10-CM

## 2023-03-01 DIAGNOSIS — E78.2 HYPERLIPIDEMIA, MIXED: ICD-10-CM

## 2023-03-01 DIAGNOSIS — R07.89 CHEST PRESSURE: ICD-10-CM

## 2023-03-01 DIAGNOSIS — R07.89 CHEST HEAVINESS: ICD-10-CM

## 2023-03-01 DIAGNOSIS — I10 ESSENTIAL HYPERTENSION: ICD-10-CM

## 2023-03-01 LAB
2HR DELTA HS TROPONIN: 23 NG/L
4HR DELTA HS TROPONIN: 16 NG/L
ANION GAP SERPL CALCULATED.3IONS-SCNC: 9 MMOL/L (ref 4–13)
AORTIC ROOT: 3.8 CM
APICAL FOUR CHAMBER EJECTION FRACTION: 57 %
APTT PPP: 38 SECONDS (ref 23–37)
ARRHY DURING EX: NORMAL
ASCENDING AORTA: 3.4 CM
ATRIAL RATE: 66 BPM
BASOPHILS # BLD AUTO: 0.06 THOUSANDS/ÂΜL (ref 0–0.1)
BASOPHILS NFR BLD AUTO: 1 % (ref 0–1)
BUN SERPL-MCNC: 13 MG/DL (ref 5–25)
CALCIUM SERPL-MCNC: 10.3 MG/DL (ref 8.4–10.2)
CARDIAC TROPONIN I PNL SERPL HS: 19 NG/L
CARDIAC TROPONIN I PNL SERPL HS: 26 NG/L
CARDIAC TROPONIN I PNL SERPL HS: 3 NG/L
CHEST PAIN STATEMENT: NORMAL
CHLORIDE SERPL-SCNC: 101 MMOL/L (ref 96–108)
CO2 SERPL-SCNC: 27 MMOL/L (ref 21–32)
CREAT SERPL-MCNC: 0.74 MG/DL (ref 0.6–1.3)
E WAVE DECELERATION TIME: 313 MS
EOSINOPHIL # BLD AUTO: 0.21 THOUSAND/ÂΜL (ref 0–0.61)
EOSINOPHIL NFR BLD AUTO: 4 % (ref 0–6)
ERYTHROCYTE [DISTWIDTH] IN BLOOD BY AUTOMATED COUNT: 11.8 % (ref 11.6–15.1)
FRACTIONAL SHORTENING: 37 (ref 28–44)
GFR SERPL CREATININE-BSD FRML MDRD: 106 ML/MIN/1.73SQ M
GLUCOSE SERPL-MCNC: 88 MG/DL (ref 65–140)
HCT VFR BLD AUTO: 45.8 % (ref 36.5–49.3)
HGB BLD-MCNC: 16 G/DL (ref 12–17)
IMM GRANULOCYTES # BLD AUTO: 0.02 THOUSAND/UL (ref 0–0.2)
IMM GRANULOCYTES NFR BLD AUTO: 0 % (ref 0–2)
INR PPP: 0.93 (ref 0.84–1.19)
INTERVENTRICULAR SEPTUM IN DIASTOLE (PARASTERNAL SHORT AXIS VIEW): 1 CM
INTERVENTRICULAR SEPTUM: 1 CM (ref 0.6–1.1)
LA/AORTA RATIO 2D: 0.87
LAAS-AP2: 13.6 CM2
LAAS-AP4: 13.2 CM2
LEFT ATRIUM SIZE: 3.3 CM
LEFT INTERNAL DIMENSION IN SYSTOLE: 2.9 CM (ref 2.1–4)
LEFT VENTRICULAR INTERNAL DIMENSION IN DIASTOLE: 4.6 CM (ref 3.5–6)
LEFT VENTRICULAR POSTERIOR WALL IN END DIASTOLE: 1 CM
LEFT VENTRICULAR STROKE VOLUME: 65 ML
LVSV (TEICH): 65 ML
LYMPHOCYTES # BLD AUTO: 2.17 THOUSANDS/ÂΜL (ref 0.6–4.47)
LYMPHOCYTES NFR BLD AUTO: 37 % (ref 14–44)
MAX DIASTOLIC BP: 100 MMHG
MAX HEART RATE: 133 BPM
MAX HR PERCENT: 79 %
MAX HR: 133 BPM
MAX PREDICTED HEART RATE: 168 BPM
MAX. SYSTOLIC BP: 180 MMHG
MCH RBC QN AUTO: 32.3 PG (ref 26.8–34.3)
MCHC RBC AUTO-ENTMCNC: 34.9 G/DL (ref 31.4–37.4)
MCV RBC AUTO: 92 FL (ref 82–98)
MONOCYTES # BLD AUTO: 0.64 THOUSAND/ÂΜL (ref 0.17–1.22)
MONOCYTES NFR BLD AUTO: 11 % (ref 4–12)
MV E'TISSUE VEL-SEP: 7 CM/S
MV PEAK A VEL: 0.67 M/S
MV PEAK E VEL: 58 CM/S
MV STENOSIS PRESSURE HALF TIME: 92 MS
MV VALVE AREA P 1/2 METHOD: 2.39
NEUTROPHILS # BLD AUTO: 2.75 THOUSANDS/ÂΜL (ref 1.85–7.62)
NEUTS SEG NFR BLD AUTO: 47 % (ref 43–75)
NRBC BLD AUTO-RTO: 0 /100 WBCS
P AXIS: 58 DEGREES
PLATELET # BLD AUTO: 303 THOUSANDS/UL (ref 149–390)
PMV BLD AUTO: 9.5 FL (ref 8.9–12.7)
POTASSIUM SERPL-SCNC: 4 MMOL/L (ref 3.5–5.3)
PR INTERVAL: 148 MS
PROTHROMBIN TIME: 12.3 SECONDS (ref 11.6–14.5)
PROTOCOL NAME: NORMAL
QRS AXIS: 61 DEGREES
QRSD INTERVAL: 98 MS
QT INTERVAL: 388 MS
QTC INTERVAL: 406 MS
RATE PRESSURE PRODUCT: NORMAL
RBC # BLD AUTO: 4.96 MILLION/UL (ref 3.88–5.62)
REASON FOR TERMINATION: NORMAL
SL CV LV EF: 55
SL CV PED ECHO LEFT VENTRICLE DIASTOLIC VOLUME (MOD BIPLANE) 2D: 97 ML
SL CV PED ECHO LEFT VENTRICLE SYSTOLIC VOLUME (MOD BIPLANE) 2D: 32 ML
SL CV STRESS RECOVERY BP: NORMAL MMHG
SL CV STRESS RECOVERY HR: 95 BPM
SL CV STRESS STAGE REACHED: 2
SODIUM SERPL-SCNC: 137 MMOL/L (ref 135–147)
STRESS BASELINE BP: NORMAL MMHG
STRESS BASELINE HR: 60 BPM
STRESS O2 SAT REST: 97 %
STRESS PEAK HR: 133 BPM
STRESS POST ESTIMATED WORKLOAD: 7 METS
STRESS POST EXERCISE DUR MIN: 5 MIN
STRESS POST EXERCISE DUR SEC: 30 SEC
STRESS POST O2 SAT PEAK: 98 %
STRESS POST PEAK BP: 180 MMHG
T WAVE AXIS: 71 DEGREES
TARGET HR FORMULA: NORMAL
TEST INDICATION: NORMAL
TIME IN EXERCISE PHASE: NORMAL
TRICUSPID ANNULAR PLANE SYSTOLIC EXCURSION: 2.4 CM
VENTRICULAR RATE: 66 BPM
WBC # BLD AUTO: 5.85 THOUSAND/UL (ref 4.31–10.16)

## 2023-03-01 RX ORDER — HEPARIN SODIUM 10000 [USP'U]/100ML
3-20 INJECTION, SOLUTION INTRAVENOUS
Status: DISCONTINUED | OUTPATIENT
Start: 2023-03-01 | End: 2023-03-02 | Stop reason: HOSPADM

## 2023-03-01 RX ORDER — ENOXAPARIN SODIUM 100 MG/ML
40 INJECTION SUBCUTANEOUS DAILY
Status: DISCONTINUED | OUTPATIENT
Start: 2023-03-01 | End: 2023-03-01

## 2023-03-01 RX ORDER — ACETAMINOPHEN 325 MG/1
650 TABLET ORAL EVERY 6 HOURS PRN
Status: DISCONTINUED | OUTPATIENT
Start: 2023-03-01 | End: 2023-03-02 | Stop reason: HOSPADM

## 2023-03-01 RX ORDER — ASPIRIN 81 MG/1
81 TABLET ORAL DAILY
Status: DISCONTINUED | OUTPATIENT
Start: 2023-03-01 | End: 2023-03-02 | Stop reason: HOSPADM

## 2023-03-01 RX ORDER — LISINOPRIL 20 MG/1
20 TABLET ORAL DAILY
Status: DISCONTINUED | OUTPATIENT
Start: 2023-03-01 | End: 2023-03-02 | Stop reason: HOSPADM

## 2023-03-01 RX ORDER — NITROGLYCERIN 0.4 MG/1
0.4 TABLET SUBLINGUAL
Status: DISCONTINUED | OUTPATIENT
Start: 2023-03-01 | End: 2023-03-02 | Stop reason: HOSPADM

## 2023-03-01 RX ORDER — ONDANSETRON 2 MG/ML
4 INJECTION INTRAMUSCULAR; INTRAVENOUS EVERY 6 HOURS PRN
Status: DISCONTINUED | OUTPATIENT
Start: 2023-03-01 | End: 2023-03-02 | Stop reason: HOSPADM

## 2023-03-01 RX ORDER — ATORVASTATIN CALCIUM 40 MG/1
40 TABLET, FILM COATED ORAL
Status: DISCONTINUED | OUTPATIENT
Start: 2023-03-01 | End: 2023-03-02 | Stop reason: HOSPADM

## 2023-03-01 RX ORDER — HEPARIN SODIUM 1000 [USP'U]/ML
4000 INJECTION, SOLUTION INTRAVENOUS; SUBCUTANEOUS ONCE
Status: COMPLETED | OUTPATIENT
Start: 2023-03-01 | End: 2023-03-01

## 2023-03-01 RX ADMIN — HEPARIN SODIUM 11.1 UNITS/KG/HR: 10000 INJECTION, SOLUTION INTRAVENOUS at 16:54

## 2023-03-01 RX ADMIN — ASPIRIN 81 MG: 81 TABLET, COATED ORAL at 14:17

## 2023-03-01 RX ADMIN — ATORVASTATIN CALCIUM 40 MG: 40 TABLET, FILM COATED ORAL at 16:54

## 2023-03-01 RX ADMIN — METOPROLOL TARTRATE 25 MG: 25 TABLET, FILM COATED ORAL at 14:17

## 2023-03-01 RX ADMIN — HEPARIN SODIUM 4000 UNITS: 1000 INJECTION INTRAVENOUS; SUBCUTANEOUS at 16:56

## 2023-03-01 NOTE — ED PROVIDER NOTES
History  Chief Complaint   Patient presents with   • Evaluation of Abnormal Diagnostic Test     Pt with positive exercise stress test   Sent over from stress lab, pt with c/o 5-8/10 chest pressure; worse with exertion  26-year-old male history of hypertension presenting from cardiac stress test with positive stress test   Patient reports he has been having ongoing anginal type symptoms with exertional chest pressure and discomfort with associated shortness of breath resolved with rest   Patient had similar symptoms during cardiac stress test today with inferior lateral ST depressions and sent over to the emergency department  Asymptomatic at this time  Denies any current chest pain or shortness of breath  Denies any abdominal pain nausea vomiting diarrhea  Denies any systemic symptoms such as fevers or chills  Denies any previous cardiac catheterizations or stents  Denies any other complaints  Chart reviewed  Past Medical History:  10/16/2019: Disseminated herpes zoster  No date: Hypertension  No date: Known health problems: none  Family History: non-contributory  Social History            Prior to Admission Medications   Prescriptions Last Dose Informant Patient Reported? Taking?    Ergocalciferol (VITAMIN D2 PO)   Yes No   Sig: Take by mouth   Lysine 1000 MG TABS   Yes No   Sig: Take by mouth   Misc Natural Products (OSTEO BI-FLEX ADV DOUBLE ST PO)   Yes No   Sig: Take by mouth   Multiple Vitamin (MULTIVITAMIN) capsule   Yes No   Sig: Take 1 capsule by mouth daily   ascorbic acid (VITAMIN C) 500 mg tablet   Yes No   Sig: Take 500 mg by mouth daily   lisinopril-hydrochlorothiazide (PRINZIDE,ZESTORETIC) 20-12 5 MG per tablet   No No   Sig: Take 1 tablet by mouth daily   metoprolol tartrate (LOPRESSOR) 25 mg tablet   No No   Sig: TAKE ONE TABLET BY MOUTH TWICE A DAY      Facility-Administered Medications: None       Past Medical History:   Diagnosis Date   • Disseminated herpes zoster 10/16/2019   • Hypertension    • Known health problems: none        Past Surgical History:   Procedure Laterality Date   • WISDOM TOOTH EXTRACTION         Family History   Problem Relation Age of Onset   • Hypertension Father    • Breast cancer Sister      I have reviewed and agree with the history as documented  E-Cigarette/Vaping   • E-Cigarette Use Never User      E-Cigarette/Vaping Substances     Social History     Tobacco Use   • Smoking status: Former     Packs/day: 0 25     Years: 6 00     Pack years: 1 50     Types: Cigarettes   • Smokeless tobacco: Never   Vaping Use   • Vaping Use: Never used   Substance Use Topics   • Alcohol use: Yes     Comment: Social   • Drug use: Not Currently       Review of Systems   Constitutional: Negative for appetite change, chills, diaphoresis, fever and unexpected weight change  HENT: Negative for congestion and rhinorrhea  Eyes: Negative for photophobia and visual disturbance  Respiratory: Negative for cough, chest tightness and shortness of breath  Cardiovascular: Positive for chest pain  Negative for palpitations and leg swelling  Gastrointestinal: Negative for abdominal distention, abdominal pain, blood in stool, constipation, diarrhea, nausea and vomiting  Genitourinary: Negative for dysuria and hematuria  Musculoskeletal: Negative for back pain, joint swelling, neck pain and neck stiffness  Skin: Negative for color change, pallor, rash and wound  Neurological: Negative for dizziness, syncope, weakness, light-headedness and headaches  Psychiatric/Behavioral: Negative for agitation  All other systems reviewed and are negative  Physical Exam  Physical Exam  Vitals and nursing note reviewed  Constitutional:       General: He is not in acute distress  Appearance: Normal appearance  He is well-developed  He is not ill-appearing, toxic-appearing or diaphoretic  HENT:      Head: Normocephalic and atraumatic        Nose: Nose normal  No congestion or rhinorrhea  Mouth/Throat:      Mouth: Mucous membranes are moist       Pharynx: Oropharynx is clear  No oropharyngeal exudate or posterior oropharyngeal erythema  Eyes:      General: No scleral icterus  Right eye: No discharge  Left eye: No discharge  Extraocular Movements: Extraocular movements intact  Conjunctiva/sclera: Conjunctivae normal       Pupils: Pupils are equal, round, and reactive to light  Neck:      Vascular: No JVD  Trachea: No tracheal deviation  Comments: Supple  Normal range of motion  Cardiovascular:      Rate and Rhythm: Normal rate and regular rhythm  Heart sounds: Normal heart sounds  No murmur heard  No friction rub  No gallop  Comments: Normal rate and regular rhythm  Pulmonary:      Effort: Pulmonary effort is normal  No respiratory distress  Breath sounds: Normal breath sounds  No stridor  No wheezing or rales  Comments: Clear to auscultation bilaterally  Chest:      Chest wall: No tenderness  Abdominal:      General: Bowel sounds are normal  There is no distension  Palpations: Abdomen is soft  Tenderness: There is no abdominal tenderness  There is no right CVA tenderness, left CVA tenderness, guarding or rebound  Comments: Soft, nontender, nondistended  Normal bowel sounds throughout   Musculoskeletal:         General: No swelling, tenderness, deformity or signs of injury  Normal range of motion  Cervical back: Normal range of motion and neck supple  No rigidity  No muscular tenderness  Right lower leg: No edema  Left lower leg: No edema  Lymphadenopathy:      Cervical: No cervical adenopathy  Skin:     General: Skin is warm and dry  Coloration: Skin is not pale  Findings: No erythema or rash  Neurological:      General: No focal deficit present  Mental Status: He is alert  Mental status is at baseline  Sensory: No sensory deficit        Motor: No weakness or abnormal muscle tone  Coordination: Coordination normal       Gait: Gait normal       Comments: Alert  Strength and sensation grossly intact  Ambulatory without difficulty at baseline  Psychiatric:         Behavior: Behavior normal          Thought Content:  Thought content normal          Vital Signs  ED Triage Vitals   Temperature Pulse Respirations Blood Pressure SpO2   03/01/23 1343 03/01/23 1341 03/01/23 1341 03/01/23 1341 03/01/23 1341   98 3 °F (36 8 °C) 67 16 150/98 98 %      Temp src Heart Rate Source Patient Position - Orthostatic VS BP Location FiO2 (%)   -- 03/01/23 1430 -- -- --    Monitor         Pain Score       03/01/23 1611       No Pain           Vitals:    03/01/23 1430 03/01/23 1448 03/01/23 1510 03/01/23 1621   BP: 131/61 110/74 153/82 125/84   Pulse: 60 67 58          Visual Acuity      ED Medications  Medications   lisinopril (ZESTRIL) tablet 20 mg (20 mg Oral Not Given 3/1/23 1421)   atorvastatin (LIPITOR) tablet 40 mg (has no administration in time range)   aspirin (ECOTRIN LOW STRENGTH) EC tablet 81 mg (81 mg Oral Given 3/1/23 1417)   metoprolol tartrate (LOPRESSOR) tablet 25 mg (25 mg Oral Given 3/1/23 1417)   nitroglycerin (NITROSTAT) SL tablet 0 4 mg (has no administration in time range)   acetaminophen (TYLENOL) tablet 650 mg (has no administration in time range)   ondansetron (ZOFRAN) injection 4 mg (has no administration in time range)   heparin (porcine) injection 4,000 Units (has no administration in time range)   heparin (porcine) 25,000 units in 0 45% NaCl 250 mL infusion (premix) (has no administration in time range)       Diagnostic Studies  Results Reviewed     Procedure Component Value Units Date/Time    HS Troponin 0hr (reflex protocol) [[id number]]  (Normal) Collected: 03/01/23 1345    Lab Status: Final result Specimen: Blood from Arm, Left Updated: 03/01/23 1550     hs TnI 0hr 3 ng/L     HS Troponin I 4hr [[id number]]     Lab Status: No result Specimen: Blood     HS Troponin I 2hr [[id number]]     Lab Status: No result Specimen: Blood     Basic metabolic panel [[id number]]  (Abnormal) Collected: 03/01/23 1345    Lab Status: Final result Specimen: Blood from Arm, Left Updated: 03/01/23 1416     Sodium 137 mmol/L      Potassium 4 0 mmol/L      Chloride 101 mmol/L      CO2 27 mmol/L      ANION GAP 9 mmol/L      BUN 13 mg/dL      Creatinine 0 74 mg/dL      Glucose 88 mg/dL      Calcium 10 3 mg/dL      eGFR 106 ml/min/1 73sq m     Narrative:      [place] guidelines for Chronic Kidney Disease (CKD):   •  Stage 1 with normal or high GFR (GFR > 90 mL/min/1 73 square meters)  •  Stage 2 Mild CKD (GFR = 60-89 mL/min/1 73 square meters)  •  Stage 3A Moderate CKD (GFR = 45-59 mL/min/1 73 square meters)  •  Stage 3B Moderate CKD (GFR = 30-44 mL/min/1 73 square meters)  •  Stage 4 Severe CKD (GFR = 15-29 mL/min/1 73 square meters)  •  Stage 5 End Stage CKD (GFR <15 mL/min/1 73 square meters)  Note: GFR calculation is accurate only with a steady state creatinine    CBC and differential [[id number]] Collected: 03/01/23 1345    Lab Status: Final result Specimen: Blood from Arm, Left Updated: 03/01/23 1409     WBC 5 85 Thousand/uL      RBC 4 96 Million/uL      Hemoglobin 16 0 g/dL      Hematocrit 45 8 %      MCV 92 fL      MCH 32 3 pg      MCHC 34 9 g/dL      RDW 11 8 %      MPV 9 5 fL      Platelets 154 Thousands/uL      nRBC 0 /100 WBCs      Neutrophils Relative 47 %      Immat GRANS % 0 %      Lymphocytes Relative 37 %      Monocytes Relative 11 %      Eosinophils Relative 4 %      Basophils Relative 1 %      Neutrophils Absolute 2 75 Thousands/µL      Immature Grans Absolute 0 02 Thousand/uL      Lymphocytes Absolute 2 17 Thousands/µL      Monocytes Absolute 0 64 Thousand/µL      Eosinophils Absolute 0 21 Thousand/µL      Basophils Absolute 0 06 Thousands/µL                  No orders to display              Procedures  Procedures         ED Course             HEART Risk Score    Flowsheet Row Most Recent Value   Heart Score Risk Calculator    History 0 Filed at: 03/01/2023 1636   ECG 0 Filed at: 03/01/2023 1636   Age 1 Filed at: 03/01/2023 1636   Risk Factors 1 Filed at: 03/01/2023 1636   Troponin 0 Filed at: 03/01/2023 1636   HEART Score 2 Filed at: 03/01/2023 1636                        SBIRT 22yo+    Flowsheet Row Most Recent Value   SBIRT (25 yo +)    In order to provide better care to our patients, we are screening all of our patients for alcohol and drug use  Would it be okay to ask you these screening questions? Yes Filed at: 03/01/2023 1344   Initial Alcohol Screen: US AUDIT-C     1  How often do you have a drink containing alcohol? 0 Filed at: 03/01/2023 1344   2  How many drinks containing alcohol do you have on a typical day you are drinking? 0 Filed at: 03/01/2023 1344   3a  Male UNDER 65: How often do you have five or more drinks on one occasion? 0 Filed at: 03/01/2023 1344   3b  FEMALE Any Age, or MALE 65+: How often do you have 4 or more drinks on one occassion? 0 Filed at: 03/01/2023 1344   Audit-C Score 0 Filed at: 03/01/2023 1344   CALVIN: How many times in the past year have you    Used an illegal drug or used a prescription medication for non-medical reasons?  Never Filed at: 03/01/2023 1344        TALIA Risk Score    Flowsheet Row Most Recent Value   Age >= 72 0 Filed at: 03/01/2023 1459   Known CAD (stenosis >= 50%) 0 Filed at: 03/01/2023 1459   Recent (<=24 hrs) Service Angina 0 Filed at: 03/01/2023 1459   ST Deviation >= 0 5 mm 0 Filed at: 03/01/2023 1459   3+ CAD Risk Factors (FHx, HTN, HLP, DM, Smoker) 1 Filed at: 03/01/2023 1459   Aspirin Use Past 7 Days 0 Filed at: 03/01/2023 1459   Elevated Cardiac Markers 0 Filed at: 03/01/2023 1459   TALIA Risk Score (Calculated) 1 Filed at: 03/01/2023 1457                  Medical Decision Making  55-year-old male history of hypertension presenting from cardiac stress test with positive stress test   Patient with recurrent anginal type symptoms with positive stress test   Plan per cardiology for cardiac catheterization  Plan for cardiac evaluation including EKG and troponin plus basic labs  Reassess  Baseline asymptomatic EKG normal sinus rhythm without acute ST abnormality  Labs interpreted and no significant acute process  Cardiology consulted  Plan for cardiac catheterization  Admitted  Chest pain: complicated acute illness or injury  Chest pressure: complicated acute illness or injury  EKG abnormalities: complicated acute illness or injury  Positive cardiac stress test: complicated acute illness or injury  Amount and/or Complexity of Data Reviewed  Labs: ordered  Risk  Decision regarding hospitalization  Disposition  Final diagnoses:   Positive cardiac stress test   EKG abnormalities   Chest pressure   Chest pain     Time reflects when diagnosis was documented in both MDM as applicable and the Disposition within this note     Time User Action Codes Description Comment    3/1/2023  1:47 PM [place] [facility] [R94 39] Positive cardiac stress test     3/1/2023  1:47 PM Debby Ficks Add [R94 31] EKG abnormalities     3/1/2023  1:47 PM [place] [facility] [R07 89] Chest pressure     3/1/2023  1:47 PM Debby Ficks Add [R07 9] Chest pain     3/1/2023  1:47 PM Debby Ficks Modify [R94 39] Positive cardiac stress test     3/1/2023  1:47 PM Debby Ficks Modify [R07 9] Chest pain     3/1/2023  2:35 PM Belkis Bacca Modify [R94 39] Positive cardiac stress test     3/1/2023  2:35 PM Kelby Mage [R07 9] Chest pain       ED Disposition     ED Disposition   Admit    Condition   Stable    Date/Time   Wed Mar 1, 2023  1:47 PM    Comment   Case was discussed with DUSTY and the patient's admission status was agreed to be Admission Status: observation status to the service of Dr Ortega Schmidt              Follow-up Information    None         Current Discharge Medication List      CONTINUE these medications which have NOT CHANGED    Details   ascorbic acid (VITAMIN C) 500 mg tablet Take 500 mg by mouth daily      Ergocalciferol (VITAMIN D2 PO) Take by mouth      lisinopril-hydrochlorothiazide (PRINZIDE,ZESTORETIC) 20-12 5 MG per tablet Take 1 tablet by mouth daily  Qty: 90 tablet, Refills: 3    Associated Diagnoses: Essential hypertension      Lysine 1000 MG TABS Take by mouth      metoprolol tartrate (LOPRESSOR) 25 mg tablet TAKE ONE TABLET BY MOUTH TWICE A DAY  Qty: 180 tablet, Refills: 0    Associated Diagnoses: Essential hypertension      Misc Natural Products (OSTEO BI-FLEX ADV DOUBLE ST PO) Take by mouth      Multiple Vitamin (MULTIVITAMIN) capsule Take 1 capsule by mouth daily             No discharge procedures on file      PDMP Review     None          ED Provider  Electronically Signed by           Rohit Blakely MD  03/01/23 9828

## 2023-03-01 NOTE — H&P
[address] [facility] [place]  H&P- Juancarlos Villafana 1970, 46 y o  male MRN: [id number]  Unit/Bed#: FT 03 Encounter: [id number]  Primary Care Provider: Lenda Goldmann, PA-C   Date and time admitted to hospital: 3/1/2023  1:37 PM    * Abnormal stress test  Assessment & Plan  · With noted abnormal stress test done today  · Has been experiencing on and off chest pressure/heaviness for the last 3 to 4 months  · Currently chest pain-free  · Appreciate cardiology recommendations  · Follow-up TTE  · Plan for cardiac catheterization tentatively scheduled tomorrow    Chest pain  Assessment & Plan  · Reports chest pressure/heaviness over the last 3 to 4 months  · Was ordered for stress test by PCP, scheduled today with noted abnormal findings  · Appreciate cardiology recommendations  · Plan for cardiac catheterization tomorrow    Hyperlipidemia, mixed  Assessment & Plan  · Initiated on Lipitor 40 mg daily    Essential hypertension  Assessment & Plan  · On Lopressor 25 mg twice daily  · Lisinopril 20 mg daily  · Monitor BP    VTE Pharmacologic Prophylaxis: VTE Score: 3 Moderate Risk (Score 3-4) - Pharmacological DVT Prophylaxis Ordered: enoxaparin (Lovenox)  Code Status: full code  Discussion with family: Updated  (wife) at bedside  Anticipated Length of Stay: Patient will be admitted on an observation basis with an anticipated length of stay of less than 2 midnights secondary to cardiac work up  Total Time Spent on Date of Encounter in care of patient: 65 minutes This time was spent on one or more of the following: performing physical exam; counseling and coordination of care; obtaining or reviewing history; documenting in the medical record; reviewing/ordering tests, medications or procedures; communicating with other healthcare professionals and discussing with patient's family/caregivers  Chief Complaint: Abnormal stress test    History of Present Illness:  Juancarlos Villafana is a 46 y o  male with a PMH of hypertension, hyperlipidemia who presents with abnormal stress test   Patient reports on and off chest pressure/heaviness that has been ongoing for the past 3 to 4 months  Mostly noted during exertion, relieved by rest   No nausea or vomiting  No dizziness or lightheadedness  Denies significant palpitations  Reports family history of heart disease in the family (heart attack grandparents both side, N/A parents or siblings)  Was scheduled for stress testing today to evaluate symptoms, noted to have abnormal results  Was seen and evaluated by cardiology and recommending admission for cardiac catheterization tentatively scheduled for tomorrow  Review of Systems:  Review of Systems   Constitutional: Negative for chills and fever  HENT: Negative for tinnitus and trouble swallowing  Eyes: Negative for pain and visual disturbance  Respiratory: Positive for chest tightness  Negative for cough and shortness of breath  Cardiovascular: Positive for chest pain  Negative for leg swelling  Gastrointestinal: Negative for abdominal pain, nausea and vomiting  Genitourinary: Negative for dysuria and hematuria  Musculoskeletal: Negative for back pain and myalgias  Skin: Negative for color change and pallor  Neurological: Negative for dizziness and light-headedness  Hematological: Negative for adenopathy  Psychiatric/Behavioral: Negative for confusion and hallucinations  All other systems reviewed and are negative  Past Medical and Surgical History:   Past Medical History:   Diagnosis Date   • Disseminated herpes zoster 10/16/2019   • Hypertension    • Known health problems: none        Past Surgical History:   Procedure Laterality Date   • WISDOM TOOTH EXTRACTION         Meds/Allergies:  Prior to Admission medications    Medication Sig Start Date End Date Taking?  Authorizing Provider   ascorbic acid (VITAMIN C) 500 mg tablet Take 500 mg by mouth daily    Historical Provider, MD   Ergocalciferol (VITAMIN D2 PO) Take by mouth    Historical Provider, MD   lisinopril-hydrochlorothiazide (PRINZIDE,ZESTORETIC) 20-12 5 MG per tablet Take 1 tablet by mouth daily 7/27/22   Fred Espino PA-C   Lysine 1000 MG TABS Take by mouth    Historical Provider, MD   metoprolol tartrate (LOPRESSOR) 25 mg tablet TAKE ONE TABLET BY MOUTH TWICE A DAY 2/28/23   Jed Rucker MD   Misc Natural Products (OSTEO BI-FLEX ADV DOUBLE ST PO) Take by mouth    Historical Provider, MD   Multiple Vitamin (MULTIVITAMIN) capsule Take 1 capsule by mouth daily    Historical Provider, MD     I have reviewed home medications with patient personally  Allergies: Allergies   Allergen Reactions   • Codeine Drowsiness     incapacitates       Social History:  Marital Status: /Civil Union   Occupation: NA  Patient Pre-hospital Living Situation: Home  Patient Pre-hospital Level of Mobility: walks  Patient Pre-hospital Diet Restrictions: NA  Substance Use History:   Social History     Substance and Sexual Activity   Alcohol Use Yes    Comment: Social     Social History     Tobacco Use   Smoking Status Former   • Packs/day: 0 25   • Years: 6 00   • Pack years: 1 50   • Types: Cigarettes   Smokeless Tobacco Never     Social History     Substance and Sexual Activity   Drug Use Not Currently       Family History:  Family History   Problem Relation Age of Onset   • Hypertension Father    • Breast cancer Sister        Physical Exam:     Vitals:   Blood Pressure: 153/82 (03/01/23 1510)  Pulse: 58 (03/01/23 1510)  Temperature: 98 3 °F (36 8 °C) (03/01/23 1343)  Respirations: 14 (03/01/23 1510)  Height: 5' 10" (177 8 cm) (03/01/23 1448)  Weight - Scale: 93 9 kg (207 lb) (03/01/23 1448)  SpO2: 96 % (03/01/23 1510)    Physical Exam  Vitals and nursing note reviewed  Constitutional:       General: He is not in acute distress  Appearance: He is well-developed  He is obese  HENT:      Head: Normocephalic and atraumatic        Nose: Nose normal       Mouth/Throat:      Mouth: Mucous membranes are moist       Pharynx: Oropharynx is clear  Eyes:      Conjunctiva/sclera: Conjunctivae normal       Pupils: Pupils are equal, round, and reactive to light  Cardiovascular:      Rate and Rhythm: Normal rate and regular rhythm  Pulses: Normal pulses  Heart sounds: No murmur heard  Pulmonary:      Effort: Pulmonary effort is normal  No respiratory distress  Breath sounds: Normal breath sounds  Abdominal:      Palpations: Abdomen is soft  Tenderness: There is no abdominal tenderness  There is no guarding  Musculoskeletal:         General: No swelling or tenderness  Normal range of motion  Cervical back: Normal range of motion and neck supple  Skin:     General: Skin is warm and dry  Findings: No erythema  Neurological:      General: No focal deficit present  Mental Status: He is alert and oriented to person, place, and time  Mental status is at baseline  Psychiatric:         Mood and Affect: Mood normal          Thought Content:  Thought content normal          Additional Data:     Lab Results:  Results from last 7 days   Lab Units 03/01/23  1345   WBC Thousand/uL 5 85   HEMOGLOBIN g/dL 16 0   HEMATOCRIT % 45 8   PLATELETS Thousands/uL 303   NEUTROS PCT % 47   LYMPHS PCT % 37   MONOS PCT % 11   EOS PCT % 4     Results from last 7 days   Lab Units 03/01/23  1345   SODIUM mmol/L 137   POTASSIUM mmol/L 4 0   CHLORIDE mmol/L 101   CO2 mmol/L 27   BUN mg/dL 13   CREATININE mg/dL 0 74   ANION GAP mmol/L 9   CALCIUM mg/dL 10 3*   GLUCOSE RANDOM mg/dL 88                       Lines/Drains:  Invasive Devices     Peripheral Intravenous Line  Duration           Peripheral IV 03/01/23 Left Antecubital <1 day                    Imaging: Reviewed radiology reports from this admission including: stress test  No orders to display       EKG and Other Studies Reviewed on Admission:   · EKG: NA     ** Please Note: This note has been constructed using a voice recognition system   **

## 2023-03-01 NOTE — PLAN OF CARE
Problem: PAIN - ADULT  Goal: Verbalizes/displays adequate comfort level or baseline comfort level  Description: Interventions:  - Encourage patient to monitor pain and request assistance  - Assess pain using appropriate pain scale  - Administer analgesics based on type and severity of pain and evaluate response  - Implement non-pharmacological measures as appropriate and evaluate response  - Consider cultural and social influences on pain and pain management  - Notify physician/advanced practitioner if interventions unsuccessful or patient reports new pain  Outcome: Progressing     Problem: INFECTION - ADULT  Goal: Absence or prevention of progression during hospitalization  Description: INTERVENTIONS:  - Assess and monitor for signs and symptoms of infection  - Monitor lab/diagnostic results  - Monitor all insertion sites, i e  indwelling lines, tubes, and drains  - Monitor endotracheal if appropriate and nasal secretions for changes in amount and color  - [place] appropriate cooling/warming therapies per order  - Administer medications as ordered  - Instruct and encourage patient and family to use good hand hygiene technique  - Identify and instruct in appropriate isolation precautions for identified infection/condition  Outcome: Progressing  Goal: Absence of fever/infection during neutropenic period  Description: INTERVENTIONS:  - Monitor WBC    Outcome: [place]

## 2023-03-01 NOTE — ASSESSMENT & PLAN NOTE
· With noted abnormal stress test done today  · Has been experiencing on and off chest pressure/heaviness for the last 3 to 4 months  · Currently chest pain-free  · Appreciate cardiology recommendations  · Follow-up TTE  · Plan for cardiac catheterization tentatively scheduled tomorrow

## 2023-03-01 NOTE — ASSESSMENT & PLAN NOTE
· Reports chest pressure/heaviness over the last 3 to 4 months  · Was ordered for stress test by PCP, scheduled today with noted abnormal findings  · Appreciate cardiology recommendations  · Plan for cardiac catheterization tomorrow

## 2023-03-02 ENCOUNTER — HOSPITAL ENCOUNTER (INPATIENT)
Facility: HOSPITAL | Age: 53
LOS: 9 days | Discharge: HOME HEALTH | End: 2023-03-11
Attending: INTERNAL MEDICINE | Admitting: THORACIC SURGERY (CARDIOTHORACIC VASCULAR SURGERY)

## 2023-03-02 ENCOUNTER — APPOINTMENT (OUTPATIENT)
Dept: VASCULAR ULTRASOUND | Facility: HOSPITAL | Age: 53
End: 2023-03-02

## 2023-03-02 VITALS
RESPIRATION RATE: 18 BRPM | HEART RATE: 60 BPM | OXYGEN SATURATION: 94 % | SYSTOLIC BLOOD PRESSURE: 129 MMHG | DIASTOLIC BLOOD PRESSURE: 85 MMHG | HEIGHT: 70 IN | BODY MASS INDEX: 29.63 KG/M2 | WEIGHT: 207 LBS | TEMPERATURE: 97.1 F

## 2023-03-02 DIAGNOSIS — I25.10 CAD IN NATIVE ARTERY: ICD-10-CM

## 2023-03-02 DIAGNOSIS — I24.9 ACS (ACUTE CORONARY SYNDROME) (HCC): Primary | ICD-10-CM

## 2023-03-02 DIAGNOSIS — I10 ESSENTIAL HYPERTENSION: ICD-10-CM

## 2023-03-02 DIAGNOSIS — Z95.1 S/P CABG (CORONARY ARTERY BYPASS GRAFT): ICD-10-CM

## 2023-03-02 DIAGNOSIS — E78.2 HYPERLIPIDEMIA, MIXED: ICD-10-CM

## 2023-03-02 DIAGNOSIS — Z01.818 PRE-OP EVALUATION: ICD-10-CM

## 2023-03-02 LAB
ANION GAP SERPL CALCULATED.3IONS-SCNC: 7 MMOL/L (ref 4–13)
ANION GAP SERPL CALCULATED.3IONS-SCNC: 8 MMOL/L (ref 4–13)
APTT PPP: 63 SECONDS (ref 23–37)
BUN SERPL-MCNC: 13 MG/DL (ref 5–25)
BUN SERPL-MCNC: 14 MG/DL (ref 5–25)
CALCIUM SERPL-MCNC: 9.7 MG/DL (ref 8.4–10.2)
CALCIUM SERPL-MCNC: 9.8 MG/DL (ref 8.4–10.2)
CHLORIDE SERPL-SCNC: 103 MMOL/L (ref 96–108)
CHLORIDE SERPL-SCNC: 104 MMOL/L (ref 96–108)
CHOLEST SERPL-MCNC: 254 MG/DL
CO2 SERPL-SCNC: 25 MMOL/L (ref 21–32)
CO2 SERPL-SCNC: 27 MMOL/L (ref 21–32)
CREAT SERPL-MCNC: 0.66 MG/DL (ref 0.6–1.3)
CREAT SERPL-MCNC: 0.66 MG/DL (ref 0.6–1.3)
ERYTHROCYTE [DISTWIDTH] IN BLOOD BY AUTOMATED COUNT: 11.9 % (ref 11.6–15.1)
GFR SERPL CREATININE-BSD FRML MDRD: 111 ML/MIN/1.73SQ M
GFR SERPL CREATININE-BSD FRML MDRD: 111 ML/MIN/1.73SQ M
GLUCOSE P FAST SERPL-MCNC: 110 MG/DL (ref 65–99)
GLUCOSE P FAST SERPL-MCNC: 137 MG/DL (ref 65–99)
GLUCOSE SERPL-MCNC: 110 MG/DL (ref 65–140)
GLUCOSE SERPL-MCNC: 137 MG/DL (ref 65–140)
HCT VFR BLD AUTO: 44.2 % (ref 36.5–49.3)
HDLC SERPL-MCNC: 42 MG/DL
HGB BLD-MCNC: 15.8 G/DL (ref 12–17)
LDLC SERPL CALC-MCNC: 176 MG/DL (ref 0–100)
MAGNESIUM SERPL-MCNC: 2.1 MG/DL (ref 1.9–2.7)
MCH RBC QN AUTO: 32.8 PG (ref 26.8–34.3)
MCHC RBC AUTO-ENTMCNC: 35.7 G/DL (ref 31.4–37.4)
MCV RBC AUTO: 92 FL (ref 82–98)
PLATELET # BLD AUTO: 270 THOUSANDS/UL (ref 149–390)
PMV BLD AUTO: 9.3 FL (ref 8.9–12.7)
POTASSIUM SERPL-SCNC: 4 MMOL/L (ref 3.5–5.3)
POTASSIUM SERPL-SCNC: 4 MMOL/L (ref 3.5–5.3)
RBC # BLD AUTO: 4.82 MILLION/UL (ref 3.88–5.62)
SODIUM SERPL-SCNC: 137 MMOL/L (ref 135–147)
SODIUM SERPL-SCNC: 137 MMOL/L (ref 135–147)
TRIGL SERPL-MCNC: 180 MG/DL
WBC # BLD AUTO: 6.04 THOUSAND/UL (ref 4.31–10.16)

## 2023-03-02 RX ORDER — ASPIRIN 81 MG/1
81 TABLET ORAL DAILY
Status: CANCELLED | OUTPATIENT
Start: 2023-03-02

## 2023-03-02 RX ORDER — NITROGLYCERIN 0.4 MG/1
0.4 TABLET SUBLINGUAL
Status: CANCELLED | OUTPATIENT
Start: 2023-03-02

## 2023-03-02 RX ORDER — MIDAZOLAM HYDROCHLORIDE 2 MG/2ML
INJECTION, SOLUTION INTRAMUSCULAR; INTRAVENOUS CODE/TRAUMA/SEDATION MEDICATION
Status: DISCONTINUED | OUTPATIENT
Start: 2023-03-02 | End: 2023-03-02 | Stop reason: HOSPADM

## 2023-03-02 RX ORDER — SODIUM CHLORIDE 9 MG/ML
75 INJECTION, SOLUTION INTRAVENOUS CONTINUOUS
Status: CANCELLED | OUTPATIENT
Start: 2023-03-02 | End: 2023-03-02

## 2023-03-02 RX ORDER — ACETAMINOPHEN 325 MG/1
650 TABLET ORAL EVERY 6 HOURS PRN
Status: DISCONTINUED | OUTPATIENT
Start: 2023-03-02 | End: 2023-03-08

## 2023-03-02 RX ORDER — ONDANSETRON 2 MG/ML
4 INJECTION INTRAMUSCULAR; INTRAVENOUS EVERY 6 HOURS PRN
Status: CANCELLED | OUTPATIENT
Start: 2023-03-02

## 2023-03-02 RX ORDER — NITROGLYCERIN 20 MG/100ML
INJECTION INTRAVENOUS CODE/TRAUMA/SEDATION MEDICATION
Status: DISCONTINUED | OUTPATIENT
Start: 2023-03-02 | End: 2023-03-02 | Stop reason: HOSPADM

## 2023-03-02 RX ORDER — NITROGLYCERIN 0.4 MG/1
0.4 TABLET SUBLINGUAL
Status: DISCONTINUED | OUTPATIENT
Start: 2023-03-02 | End: 2023-03-08

## 2023-03-02 RX ORDER — HEPARIN SODIUM 10000 [USP'U]/100ML
3-20 INJECTION, SOLUTION INTRAVENOUS
Status: CANCELLED | OUTPATIENT
Start: 2023-03-02

## 2023-03-02 RX ORDER — HEPARIN SODIUM 1000 [USP'U]/ML
INJECTION, SOLUTION INTRAVENOUS; SUBCUTANEOUS CODE/TRAUMA/SEDATION MEDICATION
Status: DISCONTINUED | OUTPATIENT
Start: 2023-03-02 | End: 2023-03-02 | Stop reason: HOSPADM

## 2023-03-02 RX ORDER — LISINOPRIL 20 MG/1
20 TABLET ORAL DAILY
Status: DISCONTINUED | OUTPATIENT
Start: 2023-03-03 | End: 2023-03-03

## 2023-03-02 RX ORDER — LISINOPRIL 20 MG/1
20 TABLET ORAL DAILY
Status: CANCELLED | OUTPATIENT
Start: 2023-03-02

## 2023-03-02 RX ORDER — ATORVASTATIN CALCIUM 40 MG/1
40 TABLET, FILM COATED ORAL
Status: DISCONTINUED | OUTPATIENT
Start: 2023-03-03 | End: 2023-03-03

## 2023-03-02 RX ORDER — ATORVASTATIN CALCIUM 40 MG/1
40 TABLET, FILM COATED ORAL
Status: CANCELLED | OUTPATIENT
Start: 2023-03-02

## 2023-03-02 RX ORDER — SODIUM CHLORIDE 9 MG/ML
75 INJECTION, SOLUTION INTRAVENOUS CONTINUOUS
Status: DISCONTINUED | OUTPATIENT
Start: 2023-03-02 | End: 2023-03-02

## 2023-03-02 RX ORDER — LIDOCAINE HYDROCHLORIDE 10 MG/ML
INJECTION, SOLUTION EPIDURAL; INFILTRATION; INTRACAUDAL; PERINEURAL CODE/TRAUMA/SEDATION MEDICATION
Status: DISCONTINUED | OUTPATIENT
Start: 2023-03-02 | End: 2023-03-02 | Stop reason: HOSPADM

## 2023-03-02 RX ORDER — ACETAMINOPHEN 325 MG/1
650 TABLET ORAL EVERY 6 HOURS PRN
Status: CANCELLED | OUTPATIENT
Start: 2023-03-02

## 2023-03-02 RX ORDER — SODIUM CHLORIDE 9 MG/ML
75 INJECTION, SOLUTION INTRAVENOUS CONTINUOUS
Status: DISCONTINUED | OUTPATIENT
Start: 2023-03-03 | End: 2023-03-03

## 2023-03-02 RX ORDER — FENTANYL CITRATE 50 UG/ML
INJECTION, SOLUTION INTRAMUSCULAR; INTRAVENOUS CODE/TRAUMA/SEDATION MEDICATION
Status: DISCONTINUED | OUTPATIENT
Start: 2023-03-02 | End: 2023-03-02 | Stop reason: HOSPADM

## 2023-03-02 RX ORDER — HEPARIN SODIUM 10000 [USP'U]/100ML
3-20 INJECTION, SOLUTION INTRAVENOUS
Status: DISCONTINUED | OUTPATIENT
Start: 2023-03-03 | End: 2023-03-08

## 2023-03-02 RX ORDER — ONDANSETRON 2 MG/ML
4 INJECTION INTRAMUSCULAR; INTRAVENOUS EVERY 6 HOURS PRN
Status: DISCONTINUED | OUTPATIENT
Start: 2023-03-02 | End: 2023-03-08

## 2023-03-02 RX ORDER — ASPIRIN 81 MG/1
81 TABLET ORAL DAILY
Status: DISCONTINUED | OUTPATIENT
Start: 2023-03-03 | End: 2023-03-08

## 2023-03-02 RX ORDER — SODIUM CHLORIDE 9 MG/ML
100 INJECTION, SOLUTION INTRAVENOUS CONTINUOUS
Status: DISPENSED | OUTPATIENT
Start: 2023-03-02 | End: 2023-03-02

## 2023-03-02 RX ORDER — ASPIRIN 81 MG/1
TABLET, CHEWABLE ORAL CODE/TRAUMA/SEDATION MEDICATION
Status: DISCONTINUED | OUTPATIENT
Start: 2023-03-02 | End: 2023-03-02 | Stop reason: HOSPADM

## 2023-03-02 RX ADMIN — SODIUM CHLORIDE 100 ML/HR: 0.9 INJECTION, SOLUTION INTRAVENOUS at 09:35

## 2023-03-02 RX ADMIN — ASPIRIN 81 MG: 81 TABLET, COATED ORAL at 09:23

## 2023-03-02 RX ADMIN — METOPROLOL TARTRATE 25 MG: 25 TABLET, FILM COATED ORAL at 04:39

## 2023-03-02 RX ADMIN — LISINOPRIL 20 MG: 20 TABLET ORAL at 09:23

## 2023-03-02 RX ADMIN — HEPARIN SODIUM 11.1 UNITS/KG/HR: 10000 INJECTION, SOLUTION INTRAVENOUS at 20:36

## 2023-03-02 RX ADMIN — ATORVASTATIN CALCIUM 40 MG: 40 TABLET, FILM COATED ORAL at 19:30

## 2023-03-02 RX ADMIN — SODIUM CHLORIDE 75 ML/HR: 0.9 INJECTION, SOLUTION INTRAVENOUS at 09:23

## 2023-03-02 RX ADMIN — METOPROLOL TARTRATE 25 MG: 25 TABLET, FILM COATED ORAL at 19:31

## 2023-03-02 NOTE — UTILIZATION REVIEW
Initial Clinical Review    Admission: Date/Time/Statement:   Admission Orders (From admission, onward)     Ordered        03/01/23 1443  Place in Observation  Once                      Orders Placed This Encounter   Procedures   • Place in Observation     Standing Status:   Standing     Number of Occurrences:   1     Order Specific Question:   Level of Care     Answer:   Med Surg [16]     ED Arrival Information     Expected   -    Arrival   3/1/2023 13:37    Acuity   Emergent            Means of arrival   Wheelchair    Escorted by   Family Member    Service   Hospitalist    Admission type   Emergency            Arrival complaint   abnormal stress test            Chief Complaint   Patient presents with   • Evaluation of Abnormal Diagnostic Test     Pt with positive exercise stress test   Sent over from stress lab, pt with c/o 5-8/10 chest pressure; worse with exertion  Initial Presentation: 46 y o  male to the ED from cardiac stress testing area reporting chest pain during stress test and shortness of breath  Admitted under observation for abnormal stress test, chest pain  ARrives pain free  Check ECHO, Cardiology consult  Plan for card cath  H/O htn, HLD  Started on IV heparin drip  Cardiology consult:  Major concern that he had significant EKG changes with 2mm horizontal ST depression into recovery  Start ASA, atorvastatin  Plan for stress test   Continue lopressor        Date:     Day 2:      ED Triage Vitals   Temperature Pulse Respirations Blood Pressure SpO2   03/01/23 1343 03/01/23 1341 03/01/23 1341 03/01/23 1341 03/01/23 1341   98 3 °F (36 8 °C) 67 16 150/98 98 %      Temp src Heart Rate Source Patient Position - Orthostatic VS BP Location FiO2 (%)   -- 03/01/23 1430 -- -- --    Monitor         Pain Score       03/01/23 1611       No Pain          Wt Readings from Last 1 Encounters:   03/01/23 93 9 kg (207 lb)     Additional Vital Signs:   /Time Temp Pulse Resp BP MAP (mmHg) SpO2 Calculated FIO2 (%) - Nasal Cannula Nasal Cannula O2 Flow Rate (L/min) O2 Device   23 07:57:08 -- -- -- -- -- -- 28 2 L/min Nasal cannula   23 07:22:13 97 1 °F (36 2 °C) Abnormal  52 Abnormal  18 133/83 100 97 % -- -- --   23 04:39:30 -- 59 -- 136/90 105 97 % -- -- --   23 0439 -- 60 -- 136/90 -- -- -- -- --   23 02:39:14 -- 54 Abnormal  20 124/80 95 98 % -- -- --   23 0100 -- -- -- -- -- 95 % -- -- None (Room air)   23 22:59:10 97 8 °F (36 6 °C) 56 18 125/80 95 96 % -- -- --   23 19:26:20 -- 61 18 123/82 96 97 % -- -- --   23 16:21:50 97 7 °F (36 5 °C) -- -- 125/84 98 -- -- -- --   23 1510 -- 58 14 153/82 109 96 % -- -- --   23 1448 -- 67 -- 110/74 -- -- -- -- --   23 1430 -- 60 17 131/61 88 96 % -- -- None (Room air)   23 1343 98 3 °F (36 8 °C) -- -- -- -- -- -- -- --   23 1341 -- 67 16 150/98 -- 98 % -- -- --       Pertinent Labs/Diagnostic Test Results:   3/1 ECHO: Left Ventricle: Left ventricular cavity size is normal  Wall thickness is normal  The left ventricular ejection fraction is 55%  Systolic function is normal  Wall motion is normal  Diastolic function is mildly abnormal, consistent with grade I (abnormal) relaxation    3/1 STress test: Stress EC mm horizontal ST depression in the inferolateral leads is noted  These changes persisted close to 8 minutes into recovery  The ECG was positive for ischemia  The stress test changes present high risk  Patient had chest discomfort 8 out of 10 in severity during exercise  In addition patient had significant EKG changes which persisted close to 8 minutes into recovery  Patient was given sublingual nitroglycerin  Given the nature of presentation and significant abnormalities on stress test, patient and family were made aware of the results of stress test and patient was sent to the emergency room          Results from last 7 days   Lab Units 23  0601 23  1345   WBC Thousand/uL 6  04 5 85   HEMOGLOBIN g/dL 15 8 16 0   HEMATOCRIT % 44 2 45 8   PLATELETS Thousands/uL 270 303   NEUTROS ABS Thousands/µL  --  2 75         Results from last 7 days   Lab Units 03/02/23  0601 03/01/23  1345   SODIUM mmol/L 137 137   POTASSIUM mmol/L 4 0 4 0   CHLORIDE mmol/L 104 101   CO2 mmol/L 25 27   ANION GAP mmol/L 8 9   BUN mg/dL 14 13   CREATININE mg/dL 0 66 0 74   EGFR ml/min/1 73sq m 111 106   CALCIUM mg/dL 9 7 10 3*   MAGNESIUM mg/dL 2 1  --              Results from last 7 days   Lab Units 03/02/23  0601 03/01/23  1345   GLUCOSE RANDOM mg/dL 110 88       Results from last 7 days   Lab Units 03/01/23  1958 03/01/23  1639 03/01/23  1345   HS TNI 0HR ng/L  --   --  3   HS TNI 2HR ng/L  --  26  --    HSTNI D2 ng/L  --  23*  --    HS TNI 4HR ng/L 19  --   --    HSTNI D4 ng/L 16  --   --          Results from last 7 days   Lab Units 03/02/23  0554 03/01/23  2318   PROTIME seconds  --  12 3   INR   --  0 93   PTT seconds 63* 38*       ED Treatment:   Medication Administration from 03/01/2023 1337 to 03/01/2023 1557       Date/Time Order Dose Route Action Comments     03/01/2023 1417 EST aspirin (ECOTRIN LOW STRENGTH) EC tablet 81 mg 81 mg Oral Given --     03/01/2023 1417 EST metoprolol tartrate (LOPRESSOR) tablet 25 mg 25 mg Oral Given --        Past Medical History:   Diagnosis Date   • Disseminated herpes zoster 10/16/2019   • Hypertension    • Known health problems: none      Admitting Diagnosis: Chest pain [R07 9]  Chest pressure [R07 89]  EKG abnormalities [R94 31]  Abnormal stress test [R94 39]  Positive cardiac stress test [R94 39]  Age/Sex: 46 y o  male  Admission Orders:  Scheduled Medications:  aspirin, 81 mg, Oral, Daily  atorvastatin, 40 mg, Oral, Daily With Dinner  lisinopril, 20 mg, Oral, Daily  metoprolol tartrate, 25 mg, Oral, Q12H [address]      Continuous IV Infusions:  heparin (porcine), 3-20 Units/kg/hr (Order-Specific), Intravenous, Titrated      PRN Meds:  acetaminophen, 650 mg, Oral, Q6H PRN  nitroglycerin, 0 4 mg, Sublingual, Q5 Min PRN  ondansetron, 4 mg, Intravenous, Q6H PRN        IP CONSULT TO CARDIOLOGY    Network Utilization Review Department  ATTENTION: Please call with any questions or concerns to [phone] and carefully listen to the prompts so that you are directed to the right person  All voicemails are confidential   Femi Oar all requests for admission clinical reviews, approved or denied determinations and any other requests to dedicated fax number below belonging to the campus where the patient is receiving treatment   List of dedicated fax numbers for the Facilities:  [address] DENIALS (Administrative/Medical Necessity) [phone]   [address] (Maternity/NICU/Pediatrics) [phone]   4 Lanette Huffman [phone]   Lise Christy 77 [phone]   [address] [address] [facility] [phone]   [facility] 134 815 [facility] [phone]

## 2023-03-02 NOTE — DISCHARGE SUMMARY
[address] [facility]  Discharge- Lynette Whitehead 1970, 46 y o  male MRN: [id number]  Unit/Bed#: -01 Encounter: [id number]  Primary Care Provider: Stefani Aviles PA-C   Date and time admitted to hospital: 3/1/2023  1:37 PM    * ACS (acute coronary syndrome) [facility])  Assessment & Plan  · EKG NSR on admission, mild trop elevation, but significant family cardiac history  · TALIA 1  · With noted abnormal stress test done as outpatient on day of admission, prompting admission for catheterization  · Cardiology brought on board for catheterization  · Catheterization showed triple-vessel disease, requiring txfr to [facility] for CABG eval  · Cardiology team has discussed with CT surgery  · Plan for transfer to [facility] discussed w PACS  · Continue heparin drip, tele monitoring    Chest pain  Assessment & Plan  Lab Results   Component Value Date    HSTNI0 3 03/01/2023    HSTNI2 26 03/01/2023    HSTNID2 23 (H) 03/01/2023    HSTNI4 19 03/01/2023    HSTNID4 16 03/01/2023     · Reports chest pressure/heaviness over the last 3 to 4 months, now intermittently present  · Stress test abnormal, cath positive for triple vessel prompting txfr to [facility] for CABG    Overweight (BMI 25 0-29  9)  Assessment & Plan  Body mass index is 29 7 kg/m²      • Recommend incorporating a more whole foods plant-predominant diet along with decreasing consumption of red meats and processed foods  • Per AHA guidelines, recommend moderate-vigorous intensity exercise for 30 minutes a day for 5 days a week or a total of 150 min/week      Hyperlipidemia, mixed  Assessment & Plan  Lab Results   Component Value Date    CHOLESTEROL 203 (H) 08/05/2022    TRIG 96 08/05/2022    HDL 37 (L) 08/05/2022    LDLCALC 147 (H) 08/05/2022     · Initiated on Lipitor 40 mg daily  · Recheck Lipid panel    Essential hypertension  Assessment & Plan  Blood Pressure: 133/83    · On Lopressor 25 mg twice daily  · Lisinopril 20 mg daily  · Monitor BP    Medical Problems Resolved Problems  Date Reviewed: 3/2/2023   None       Discharging Physician / Practitioner: Ernst Millan DO  PCP: Jon Gaming PA-C  Admission Date:   Admission Orders (From admission, onward)     Ordered        03/01/23 1443  Place in Observation  Once                      Discharge Date: 03/02/23    Disposition:    [address] Transfer to [facility]    Reason for Admission: Abnormal Stress test    Discharge Diagnoses:   Please see assessment and plan section above for further details regarding discharge diagnoses  Consultations During Hospital Stay:  · Cardiology    Procedures Performed:   · Cath     Significant Findings / Test Results:     · Catheterization showed triple vessel CAD    Results for orders placed during the hospital encounter of 03/01/23    Echo complete w/ contrast if indicated    Interpretation Summary  •  Left Ventricle: Left ventricular cavity size is normal  Wall thickness is normal  The left ventricular ejection fraction is 55%  Systolic function is normal  Wall motion is normal  Diastolic function is mildly abnormal, consistent with grade I (abnormal) relaxation  Incidental Findings:   · none     Test Results Pending at Discharge (will require follow up):   · none     Outpatient Tests Requested:  · none    Complications:  Triple vessel CAD, needing TXFR to [facility] for CABG eval    Hospital Course:      Lawrence Bergman is a 46 y o  male patient who originally presented to the hospital on 3/1/2023 due to 59-year-old gentleman with family history significant for heart disease in multiple members had been having intermittent chest pain for 3-4 months  Presented due to an abnormal stress test as was measured by an outpatient cardiologist and was encouraged to come to [facility] for further evaluation  Performed catheterization today which showed triple-vessel disease needing CABG evaluation prompting transfer to [facility]      Condition at Discharge: stable    Discharge Day Visit / Exam: Subjective:  Offers no complaints at this time  Understanding of plan  All questions answered  Vitals: Blood Pressure: 133/83 (03/02/23 0722)  Pulse: (!) 52 (03/02/23 0722)  Temperature: (!) 97 1 °F (36 2 °C) (03/02/23 0722)  Respirations: 18 (03/02/23 0722)  Height: 5' 10" (177 8 cm) (03/01/23 1448)  Weight - Scale: 93 9 kg (207 lb) (03/01/23 1448)  SpO2: 97 % (03/02/23 0722)  Exam:   Physical Exam  Vitals and nursing note reviewed  Constitutional:       General: He is not in acute distress  Appearance: He is well-developed  HENT:      Head: Normocephalic and atraumatic  Eyes:      Conjunctiva/sclera: Conjunctivae normal    Cardiovascular:      Rate and Rhythm: Normal rate and regular rhythm  Heart sounds: No murmur heard  Pulmonary:      Effort: Pulmonary effort is normal  No respiratory distress  Breath sounds: Normal breath sounds  Abdominal:      Palpations: Abdomen is soft  Tenderness: There is no abdominal tenderness  Musculoskeletal:         General: No swelling  Cervical back: Neck supple  Skin:     General: Skin is warm and dry  Capillary Refill: Capillary refill takes less than 2 seconds  Neurological:      Mental Status: He is alert  Psychiatric:         Mood and Affect: Mood normal         Discussion with Family: Will contact    Medication Adjustments and Discharge Medications:  · Discharge Medication List: See after visit summary for reconciled discharge medications  · Medication Dosing Tapers - Please refer to Discharge Medication List for details on any medication dosing tapers (if applicable to patient)  · Summary of Medication Adjustments made as a result of this hospitalization: see med rec  · Medications being temporarily held (include recommended restart time): see med rec    Wound Care Recommendations:  When applicable, please see wound care section of After Visit Summary      Instructions for any Catheters / Lines Present at Discharge (including removal date, if applicable): continue IV Heparin, monitoring on Tele    Diet Recommendations at Discharge:  Diet -        Diet Orders   (From admission, onward)             Start     Ordered    03/02/23 0834  Diet Cardiovascular; Cardiac  Diet effective now        References:    Nutrtion Support Algorithm Enteral vs  Parenteral   Question Answer Comment   Diet Type Cardiovascular    Cardiac Cardiac    RD to adjust diet per protocol? Yes        03/02/23 0833                Goals of Care Discussions:  · Code Status at Discharge: Level 1 - Full Code  · Goals of care were not discussed during this admission  Discharge instructions/Information to patient and family:   See after visit summary section titled Discharge Instructions for information provided to patient and family  Planned Readmission: [facility] Txfr      Discharge Statement:  I spent 37 minutes discharging the patient  This time was spent on the day of discharge  I had direct contact with the patient on the day of discharge  Greater than 50% of the total time was spent examining patient, answering all patient questions, arranging and discussing plan of care with patient as well as directly providing post-discharge instructions  Additional time then spent on discharge activities  **Please Note: This note may have been constructed using a voice recognition system  **

## 2023-03-02 NOTE — ASSESSMENT & PLAN NOTE
Lab Results   Component Value Date    HSTNI0 3 03/01/2023    HSTNI2 26 03/01/2023    HSTNID2 23 (H) 03/01/2023    HSTNI4 19 03/01/2023    HSTNID4 16 03/01/2023     · Reports chest pressure/heaviness over the last 3 to 4 months, now intermittently present  · Stress test abnormal, cath positive for triple vessel prompting txfr to [facility] for CABG

## 2023-03-02 NOTE — CASE MANAGEMENT
Case Management Discharge Planning Note    Patient name [place] Bur  Location Jacinta Foster 87 202/-61 MRN [id number]  : 1970 Date 3/2/2023       Current Admission Date: 3/1/2023  Current Admission Diagnosis:ACS (acute coronary syndrome) [facility])   Patient Active Problem List    Diagnosis Date Noted   • ACS (acute coronary syndrome) ([facility] 75 ) 2023   • Chest pain 2023   • Arthritis of both hips 2021   • Femoroacetabular impingement of left hip 2021   • Hamstring tightness of both lower extremities 2021   • Weakness of both hips 2021   • COVID-19 2020   • Claudication of both lower extremities ([facility] ) 2020   • Varicose veins of both lower extremities with pain 2020   • Bilateral leg pain 2020   • Bilateral hand numbness 2020   • Excessive daytime sleepiness 2020   • Overweight (BMI 25 0-29 9) 10/16/2019   • Achilles tendinosis of right lower extremity 2019   • Cutaneous skin tags 2019   • Essential hypertension 2018   • Hyperlipidemia, mixed 2018   • Vitamin D deficiency 2018   • Low libido 2018      LOS (days): 0  Geometric Mean LOS (GMLOS) (days):   Days to GMLOS:     OBJECTIVE:      Current admission status: Observation   Preferred Pharmacy:   [facility] [address], [address] [place] [address]  Phone: [phone] Fax: [place] [address], [place] [address] [address] [facility]  [facility] [address]  Phone: [phone] Fax: [phone]    [place] [address]  Phone: [phone] Fax: [phone]    Primary Care Provider: Lenda Goldmann, PA-C    Primary Insurance: Lory Anne  Secondary Insurance:     DISCHARGE DETAILS:  CM contacted Yoan Reynoso (PACS) for update on transfer status  Per Monique Jasso, no bed availability at [facility] at this time  Detail Level: Zone Render In Strict Bullet Format?: No Initiate Treatment: doxycycline hyclate 100 mg capsule \\nQuantity: 28.0 Capsule  Days Supply: 14\\nSig: Take 1 cap BID x 2 weeks, with a meal and a full glass of water\\n\\nmupirocin 2 % topical ointment \\nQuantity: 22.0 g  Days Supply: 30\\nSig: Apply thin layer to affected spot on right leg BID-TID x7 days\\n\\nfluorouracil 5 % topical cream Bid\\nQuantity: 40.0 g  Days Supply: 30\\nSig: Apply to spot on right leg BID x4-6 weeks, wait to start until healed Initiate Treatment: fluorouracil 5 % topical cream Bid\\nQuantity: 40.0 g  Days Supply: 30\\nSig: Apply to lower legs BID for 2 weeks\\nAvoid biopsy sites until healed. Patient has prescription at home Initiate Treatment: fluorouracil 5 % topical cream Bid\\nQuantity: 40.0 g  Days Supply: 30\\nSig: Apply to spot on left leg BID x2 weeks, wait to start until healed.

## 2023-03-02 NOTE — ASSESSMENT & PLAN NOTE
Body mass index is 29 7 kg/m²      • Recommend incorporating a more whole foods plant-predominant diet along with decreasing consumption of red meats and processed foods  • Per AHA guidelines, recommend moderate-vigorous intensity exercise for 30 minutes a day for 5 days a week or a total of 150 min/week

## 2023-03-02 NOTE — PLAN OF CARE
Problem: PAIN - ADULT  Goal: Verbalizes/displays adequate comfort level or baseline comfort level  Description: Interventions:  - Encourage patient to monitor pain and request assistance  - Assess pain using appropriate pain scale  - Administer analgesics based on type and severity of pain and evaluate response  - Implement non-pharmacological measures as appropriate and evaluate response  - Consider cultural and social influences on pain and pain management  - Notify physician/advanced practitioner if interventions unsuccessful or patient reports new pain  Outcome: Progressing     Problem: INFECTION - ADULT  Goal: Absence or prevention of progression during hospitalization  Description: INTERVENTIONS:  - Assess and monitor for signs and symptoms of infection  - Monitor lab/diagnostic results  - Monitor all insertion sites, i e  indwelling lines, tubes, and drains  - Monitor endotracheal if appropriate and nasal secretions for changes in amount and color  - [place] appropriate cooling/warming therapies per order  - Administer medications as ordered  - Instruct and encourage patient and family to use good hand hygiene technique  - Identify and instruct in appropriate isolation precautions for identified infection/condition  Outcome: Progressing  Goal: Absence of fever/infection during neutropenic period  Description: INTERVENTIONS:  - Monitor WBC    Outcome: Progressing     Problem: SAFETY ADULT  Goal: Patient will remain free of falls  Description: INTERVENTIONS:  - Educate patient/family on patient safety including physical limitations  - Instruct patient to call for assistance with activity   - Consult OT/PT to assist with strengthening/mobility   - Keep Call bell within reach  - Keep bed low and locked with side rails adjusted as appropriate  - Keep care items and personal belongings within reach  - Initiate and maintain comfort rounds  - Make Fall Risk Sign visible to staff  - Offer Toileting every 2 Hours, in advance of need  - Initiate/Maintain bed alarm  - Obtain necessary fall risk management equipment  - Apply yellow socks and bracelet for high fall risk patients  - Consider moving patient to room near nurses station  Outcome: Progressing  Goal: Maintain or return to baseline ADL function  Description: INTERVENTIONS:  -  Assess patient's ability to carry out ADLs; assess patient's baseline for ADL function and identify physical deficits which impact ability to perform ADLs (bathing, care of mouth/teeth, toileting, grooming, dressing, etc )  - Assess/evaluate cause of self-care deficits   - Assess range of motion  - Assess patient's mobility; develop plan if impaired  - Assess patient's need for assistive devices and provide as appropriate  - Encourage maximum independence but intervene and supervise when necessary  - Involve family in performance of ADLs  - Assess for home care needs following discharge   - Consider OT consult to assist with ADL evaluation and planning for discharge  - Provide patient education as appropriate  Outcome: Progressing  Goal: Maintains/Returns to pre admission functional level  Description: INTERVENTIONS:  - Perform BMAT or MOVE assessment daily    - Set and communicate daily mobility goal to care team and patient/family/caregiver  - Collaborate with rehabilitation services on mobility goals if consulted  - Perform Range of Motion 3 times a day  - Reposition patient every 2 hours    - Dangle patient 3 times a day  - Stand patient 3 times a day  - Ambulate patient 3 times a day  - Out of bed to chair 3 times a day   - Out of bed for meals 3 times a day  - Out of bed for toileting  - Record patient progress and toleration of activity level   Outcome: Progressing     Problem: DISCHARGE PLANNING  Goal: Discharge to home or other facility with appropriate resources  Description: INTERVENTIONS:  - Identify barriers to discharge w/patient and caregiver  - Arrange for needed discharge resources and transportation as appropriate  - Identify discharge learning needs (meds, wound care, etc )  - Arrange for interpretive services to assist at discharge as needed  - Refer to Case Management Department for coordinating discharge planning if the patient needs post-hospital services based on physician/advanced practitioner order or complex needs related to functional status, cognitive ability, or social support system  Outcome: Progressing     Problem: Knowledge Deficit  Goal: Patient/family/caregiver demonstrates understanding of disease process, treatment plan, medications, and discharge instructions  Description: Complete learning assessment and assess knowledge base    Interventions:  - Provide teaching at level of understanding  - Provide teaching via preferred learning methods  Outcome: Progressing

## 2023-03-02 NOTE — PROGRESS NOTES
Cardiology Progress Note - Óscar Monterroso 46 y o  male MRN: [id number]    Unit/Bed#: -01 Encounter: [id number]      Assessment/Plan:  1  Multivessel CAD  • Endorses exertional chest pain  • Echo reveals EF 55%  • Cardiac catheterization reveals 99% stenosis of mid circumflex, 70% stenosis of first marginal, 85% stenosis of mid LAD, 70% stenosis of first diagonal, 50% stenosis of distal RCA, 75% stenosis of proximal RCA, 80% stenosis of RPAV,and  50% stenosis of RPDA  • Continue ASA, atorvastatin 40 mg, Lopressor and as needed SL nitroglycerin  • Plan for transfer to [facility] for CABG evaluation     2  Abnormal exercise stress test  • Experienced 8 out of 10 chest pain on exercise stress test with ischemic changes on EKG persisting 8 minutes into rest period highly suggestive of cardiac ischemia  • See plan above     3  Hypertension  • Currently 118/73; continue to monitor  • Continue Lopressor  • Start lisinopril 20 mg     4  Hyperlipidemia  • Start atorvastatin 40 mg       Subjective:   Patient seen and examined  No significant events overnight  Denies new complaints at this time  Objective:     Vitals: Blood pressure 118/73, pulse 62, temperature (!) 97 1 °F (36 2 °C), resp  rate 18, height 5' 10" (1 778 m), weight 93 9 kg (207 lb), SpO2 94 %  , Body mass index is 29 7 kg/m² ,   Orthostatic Blood Pressures    Flowsheet Row Most Recent Value   Blood Pressure 118/73 filed at 03/02/2023 1045            Intake/Output Summary (Last 24 hours) at 3/2/2023 1127  Last data filed at 3/1/2023 2259  Gross per 24 hour   Intake 240 ml   Output --   Net 240 ml         Physical Exam:  Physical Exam  Vitals and nursing note reviewed  Constitutional:       General: He is not in acute distress  Appearance: He is well-developed  He is not diaphoretic  HENT:      Head: Normocephalic and atraumatic        Nose: Nose normal    Eyes:      Conjunctiva/sclera: Conjunctivae normal    Cardiovascular: Rate and Rhythm: Normal rate and regular rhythm  Pulses: Normal pulses  Heart sounds: Normal heart sounds  No murmur heard  No friction rub  Pulmonary:      Effort: Pulmonary effort is normal  No respiratory distress  Breath sounds: Normal breath sounds  No wheezing or rales  Chest:      Chest wall: No tenderness  Abdominal:      Palpations: Abdomen is soft  Tenderness: There is no abdominal tenderness  Musculoskeletal:         General: No swelling  Cervical back: Neck supple  Right lower leg: No edema  Left lower leg: No edema  Skin:     General: Skin is warm and dry  Capillary Refill: Capillary refill takes less than 2 seconds  Neurological:      Mental Status: He is alert     Psychiatric:         Mood and Affect: Mood normal               Medications:      Current Facility-Administered Medications:   •  acetaminophen (TYLENOL) tablet 650 mg, 650 mg, Oral, Q6H PRN, Cheryle Box MD  •  aspirin (ECOTRIN LOW STRENGTH) EC tablet 81 mg, 81 mg, Oral, Daily, Cheryle Box MD, 81 mg at 03/02/23 4736  •  atorvastatin (LIPITOR) tablet 40 mg, 40 mg, Oral, Daily With Uzma Quick MD, 40 mg at 03/01/23 1654  •  heparin (porcine) 25,000 units in 0 45% NaCl 250 mL infusion (premix), 3-20 Units/kg/hr (Order-Specific), Intravenous, Titrated, Jett Joya PA-C, Last Rate: 13 6 mL/hr at 03/02/23 0701, 15 1 Units/kg/hr at 03/02/23 0701  •  lisinopril (ZESTRIL) tablet 20 mg, 20 mg, Oral, Daily, Cheryle Box MD, 20 mg at 03/02/23 0757  •  metoprolol tartrate (LOPRESSOR) tablet 25 mg, 25 mg, Oral, Q12H [address], Cheryle Box MD, 25 mg at 03/02/23 0439  •  nitroglycerin (NITROSTAT) SL tablet 0 4 mg, 0 4 mg, Sublingual, Q5 Min PRN, Cheryle Box MD  •  ondansetron [facility]) injection 4 mg, 4 mg, Intravenous, Q6H PRN, Cheryle Box MD  •  sodium chloride 0 9 % infusion, 100 mL/hr, Intravenous, Continuous, OBED Walden-C, Last Rate: 100 mL/hr at 23, 100 mL/hr at 23  •  sodium chloride 0 9 % infusion, 75 mL/hr, Intravenous, Continuous, FANNY Raymundo, Last Rate: 75 mL/hr at 23, 75 mL/hr at 23     Labs & Results:     Results from last 7 days   Lab Units 23  1958 23  1639 23  1345   HS TNI 0HR ng/L  --   --  3   HS TNI 2HR ng/L  --  26  --    HSTNI D2 ng/L  --  23*  --    HS TNI 4HR ng/L 19  --   --    HSTNI D4 ng/L 16  --   --      Results from last 7 days   Lab Units 23  0601 23  1345   WBC Thousand/uL 6 04 5 85   HEMOGLOBIN g/dL 15 8 16 0   HEMATOCRIT % 44 2 45 8   PLATELETS Thousands/uL 270 303         Results from last 7 days   Lab Units 23  1041 23  0601 23  1345   POTASSIUM mmol/L 4 0 4 0 4 0   CHLORIDE mmol/L 103 104 101   CO2 mmol/L 27 25 27   BUN mg/dL 13 14 13   CREATININE mg/dL 0 66 0 66 0 74   CALCIUM mg/dL 9 8 9 7 10 3*     Results from last 7 days   Lab Units 23  0554 23  2318   INR   --  0 93   PTT seconds 63* 38*     Results from last 7 days   Lab Units 23  0601   MAGNESIUM mg/dL 2 1       Vitals: Blood pressure 118/73, pulse 62, temperature (!) 97 1 °F (36 2 °C), resp  rate 18, height 5' 10" (1 778 m), weight 93 9 kg (207 lb), SpO2 94 %  , Body mass index is 29 7 kg/m² ,   Orthostatic Blood Pressures    Flowsheet Row Most Recent Value   Blood Pressure 118/73 filed at 2023 6440          Systolic (79XZU), ZBP:934 , Min:110 , FE     Diastolic (18AEG), CTQ:83, Min:61, Max:98        Intake/Output Summary (Last 24 hours) at 3/2/2023 1127  Last data filed at 3/1/2023 2259  Gross per 24 hour   Intake 240 ml   Output --   Net 240 ml       Invasive Devices     Peripheral Intravenous Line  Duration           Peripheral IV 23 Left Antecubital <1 day                  EKG: Normal sinus rhythm    Telemetry:  Telemetry Orders (From admission, onward)             48 Hour Telemetry Monitoring  Continuous x 48 hours        References:    Telemetry Guidelines   Question:  Reason for 48 Hour Telemetry  Answer:  Acute MI, chest pain - R/O MI, or unstable angina                 Telemetry Reviewed: Normal Sinus Rhythm and Sinus Bradycardia  Indication for Continued Telemetry Use: Awaiting PCI/EP Study/CABG    BP Readings from Last 3 Encounters:   03/02/23 118/73   02/10/23 110/74   08/10/22 130/86      Wt Readings from Last 3 Encounters:   03/01/23 93 9 kg (207 lb)   03/01/23 93 9 kg (207 lb)   02/10/23 93 9 kg (207 lb)

## 2023-03-02 NOTE — ASSESSMENT & PLAN NOTE
· EKG NSR on admission, mild trop elevation, but significant family cardiac history  · TALIA 1  · With noted abnormal stress test done as outpatient on day of admission, prompting admission for catheterization  · Cardiology brought on board for catheterization  · Catheterization showed triple-vessel disease, requiring txfr to [facility] for CABG eval  · Cardiology team has discussed with CT surgery  · Plan for transfer to [facility] discussed w PACS  · Continue heparin drip, tele monitoring

## 2023-03-02 NOTE — PLAN OF CARE
Problem: PAIN - ADULT  Goal: Verbalizes/displays adequate comfort level or baseline comfort level  Description: Interventions:  - Encourage patient to monitor pain and request assistance  - Assess pain using appropriate pain scale  - Administer analgesics based on type and severity of pain and evaluate response  - Implement non-pharmacological measures as appropriate and evaluate response  - Consider cultural and social influences on pain and pain management  - Notify physician/advanced practitioner if interventions unsuccessful or patient reports new pain  Outcome: Progressing     Problem: INFECTION - ADULT  Goal: Absence or prevention of progression during hospitalization  Description: INTERVENTIONS:  - Assess and monitor for signs and symptoms of infection  - Monitor lab/diagnostic results  - Monitor all insertion sites, i e  indwelling lines, tubes, and drains  - Monitor endotracheal if appropriate and nasal secretions for changes in amount and color  - [place] appropriate cooling/warming therapies per order  - Administer medications as ordered  - Instruct and encourage patient and family to use good hand hygiene technique  - Identify and instruct in appropriate isolation precautions for identified infection/condition  Outcome: Progressing  Goal: Absence of fever/infection during neutropenic period  Description: INTERVENTIONS:  - Monitor   Outcome: Progressing

## 2023-03-02 NOTE — ASSESSMENT & PLAN NOTE
Lab Results   Component Value Date    CHOLESTEROL 203 (H) 08/05/2022    TRIG 96 08/05/2022    HDL 37 (L) 08/05/2022    LDLCALC 147 (H) 08/05/2022     · Initiated on Lipitor 40 mg daily  · Recheck Lipid panel

## 2023-03-03 ENCOUNTER — APPOINTMENT (INPATIENT)
Dept: RADIOLOGY | Facility: HOSPITAL | Age: 53
End: 2023-03-03

## 2023-03-03 ENCOUNTER — APPOINTMENT (INPATIENT)
Dept: NON INVASIVE DIAGNOSTICS | Facility: HOSPITAL | Age: 53
End: 2023-03-03

## 2023-03-03 LAB
ABO GROUP BLD: NORMAL
ABO GROUP BLD: NORMAL
ANION GAP SERPL CALCULATED.3IONS-SCNC: 7 MMOL/L (ref 4–13)
APTT PPP: 38 SECONDS (ref 23–37)
APTT PPP: 52 SECONDS (ref 23–37)
APTT PPP: 65 SECONDS (ref 23–37)
BLD GP AB SCN SERPL QL: NEGATIVE
BUN SERPL-MCNC: 14 MG/DL (ref 5–25)
CALCIUM SERPL-MCNC: 9.2 MG/DL (ref 8.3–10.1)
CHLORIDE SERPL-SCNC: 106 MMOL/L (ref 96–108)
CHOLEST SERPL-MCNC: 226 MG/DL
CO2 SERPL-SCNC: 24 MMOL/L (ref 21–32)
CREAT SERPL-MCNC: 0.6 MG/DL (ref 0.6–1.3)
ERYTHROCYTE [DISTWIDTH] IN BLOOD BY AUTOMATED COUNT: 12 % (ref 11.6–15.1)
EST. AVERAGE GLUCOSE BLD GHB EST-MCNC: 114 MG/DL
GFR SERPL CREATININE-BSD FRML MDRD: 115 ML/MIN/1.73SQ M
GLUCOSE SERPL-MCNC: 104 MG/DL (ref 65–140)
HBA1C MFR BLD: 5.6 %
HCT VFR BLD AUTO: 43.2 % (ref 36.5–49.3)
HDLC SERPL-MCNC: 41 MG/DL
HGB BLD-MCNC: 15 G/DL (ref 12–17)
LDLC SERPL CALC-MCNC: 161 MG/DL (ref 0–100)
MCH RBC QN AUTO: 32.1 PG (ref 26.8–34.3)
MCHC RBC AUTO-ENTMCNC: 34.7 G/DL (ref 31.4–37.4)
MCV RBC AUTO: 93 FL (ref 82–98)
PLATELET # BLD AUTO: 283 THOUSANDS/UL (ref 149–390)
PMV BLD AUTO: 9.5 FL (ref 8.9–12.7)
POTASSIUM SERPL-SCNC: 3.8 MMOL/L (ref 3.5–5.3)
RBC # BLD AUTO: 4.67 MILLION/UL (ref 3.88–5.62)
RH BLD: POSITIVE
RH BLD: POSITIVE
SODIUM SERPL-SCNC: 137 MMOL/L (ref 135–147)
SPECIMEN EXPIRATION DATE: NORMAL
TRIGL SERPL-MCNC: 120 MG/DL
WBC # BLD AUTO: 6.49 THOUSAND/UL (ref 4.31–10.16)

## 2023-03-03 RX ORDER — CHLORHEXIDINE GLUCONATE 0.12 MG/ML
15 RINSE ORAL EVERY 12 HOURS SCHEDULED
Status: DISCONTINUED | OUTPATIENT
Start: 2023-03-03 | End: 2023-03-07 | Stop reason: SDUPTHER

## 2023-03-03 RX ORDER — ATORVASTATIN CALCIUM 80 MG/1
80 TABLET, FILM COATED ORAL
Status: DISCONTINUED | OUTPATIENT
Start: 2023-03-03 | End: 2023-03-08

## 2023-03-03 RX ADMIN — ATORVASTATIN CALCIUM 80 MG: 80 TABLET, FILM COATED ORAL at 17:37

## 2023-03-03 RX ADMIN — MUPIROCIN 1 APPLICATION.: 20 OINTMENT TOPICAL at 21:18

## 2023-03-03 RX ADMIN — METOPROLOL TARTRATE 25 MG: 25 TABLET, FILM COATED ORAL at 08:44

## 2023-03-03 RX ADMIN — CHLORHEXIDINE GLUCONATE 15 ML: 1.2 SOLUTION ORAL at 13:52

## 2023-03-03 RX ADMIN — LISINOPRIL 20 MG: 20 TABLET ORAL at 08:44

## 2023-03-03 RX ADMIN — ASPIRIN 81 MG: 81 TABLET, COATED ORAL at 08:44

## 2023-03-03 RX ADMIN — METOPROLOL TARTRATE 25 MG: 25 TABLET, FILM COATED ORAL at 21:16

## 2023-03-03 RX ADMIN — MUPIROCIN 1 APPLICATION.: 20 OINTMENT TOPICAL at 13:52

## 2023-03-03 RX ADMIN — HEPARIN SODIUM 15.1 UNITS/KG/HR: 10000 INJECTION, SOLUTION INTRAVENOUS at 13:54

## 2023-03-03 RX ADMIN — CHLORHEXIDINE GLUCONATE 15 ML: 1.2 SOLUTION ORAL at 21:16

## 2023-03-03 NOTE — ASSESSMENT & PLAN NOTE
· Patient originally by PCP for complaint of dyspnea with exertion  Patient denies any true chest pain  He was then recommended to undergo stress test   Patient was found to have abnormal stress test as an outpatient with 8 out of 10 chest pain and ST depressions in inferolateral leads   · Mild troponin elevation on presentation to [facility]  3-26-19 over 6 hours  · Current EKG NSR  Patient denies any symptoms at this time  · Patient underwent cardiac cath at [facility] showing evidence of triple-vessel disease    99% stenosis of mid circumflex, 70% stenosis of first marginal, 85% stenosis of mid LAD, 70% stenosis of first diagonal, 50% stenosis of distal RCA, 75% stenosis of proximal RCA, 80% stenosis of RPAV,and  50% stenosis of RPDA  · Patient transferred from [facility] to [facility] for CT surgery evaluation for possible CABG  · Continue ASA 81 mg oral daily  · Continue metoprolol tartrate 25 mg oral twice daily  · Continue nitroglycerin sublingual every 5 minutes as needed for chest pain  · Heparin gtt per ACS protocol  · CT surgery consult  · Cardiology consult  · 48-hour telemetry  · will maintain n p o  tonight pending CT surgery eval

## 2023-03-03 NOTE — PROGRESS NOTES
Cardiology Progress Note - Lawrence Bergman 46 y o  male MRN: [id number]    Unit/Bed#: [facility] 404-01 Encounter: [id number]      Assessment:  1  Multivessel CAD with unstable angina  2  Benign essential hypertension   3  Dyslipidemia   4  Preserved biventricular function   5  Bilateral carotid plaque     Plan:  1  Cath with multivessel CAD - awaiting CT surgery evaluation   2  Remains on IV heparin  3  Continue aspirin and beta-blocker along with statin therapy  4  Lipids noted - transition to atorvastatin 80 mg  5  Preoperative studies including carotid duplex and vein mapping complete  6  Hold ACE-I pre-procedure     Subjective:   Patient seen and examined  No significant events overnight  Objective:     Vitals: Blood pressure 153/91, pulse 63, temperature (!) 97 4 °F (36 3 °C), temperature source Oral, resp  rate 17, height 5' 10" (1 778 m), weight 90 7 kg (199 lb 15 3 oz), SpO2 95 %  , Body mass index is 28 69 kg/m² ,   Orthostatic Blood Pressures    Flowsheet Row Most Recent Value   Blood Pressure 153/91 filed at 03/03/2023 1766   Patient Position - Orthostatic VS Sitting filed at 03/03/2023 2070            Intake/Output Summary (Last 24 hours) at 3/3/2023 0813  Last data filed at 3/3/2023 0801  Gross per 24 hour   Intake 120 ml   Output 350 ml   Net -230 ml         Physical Exam:    GEN: Lawrence Bergman appears well, alert and oriented x 3, pleasant and cooperative   HEENT: pupils equal, round, and reactive to light; extraocular muscles intact  NECK: supple, no carotid bruits   HEART: regular rhythm, normal S1 and S2, no murmurs, clicks, gallops or rubs   LUNGS: clear to auscultation bilaterally; no wheezes, rales, or rhonchi   ABDOMEN: normal bowel sounds, soft, no tenderness, no distention  EXTREMITIES: peripheral pulses normal; no clubbing, cyanosis, or edema  NEURO: no focal findings   SKIN: normal without suspicious lesions on exposed skin    Medications:      Current Facility-Administered Medications:   •  acetaminophen (TYLENOL) tablet 650 mg, 650 mg, Oral, Q6H PRN, Uyen Parks PA-C  •  aspirin (ECOTRIN LOW STRENGTH) EC tablet 81 mg, 81 mg, Oral, Daily, Uyen Parks PA-C  •  atorvastatin (LIPITOR) tablet 40 mg, 40 mg, Oral, Daily With Dinner, Uyen Parks PA-C  •  heparin (porcine) 25,000 units in 0 45% NaCl 250 mL infusion (premix), 3-20 Units/kg/hr (Order-Specific), Intravenous, Titrated, Uyen Parks PA-C, Last Rate: 13 6 mL/hr at 03/03/23 0354, 15 1 Units/kg/hr at 03/03/23 0354  •  lisinopril (ZESTRIL) tablet 20 mg, 20 mg, Oral, Daily, Uyen Parks PA-C  •  metoprolol tartrate (LOPRESSOR) tablet 25 mg, 25 mg, Oral, Q12H [facility] & assisted, Claude Sung PA-C  •  nitroglycerin (NITROSTAT) SL tablet 0 4 mg, 0 4 mg, Sublingual, Q5 Min PRN, Uyen Parks PA-C  •  ondansetron (ZOFRAN) injection 4 mg, 4 mg, Intravenous, Q6H PRN, Uyen Parks PA-C     Labs & Results:        Results from last 7 days   Lab Units 03/03/23 0254 03/02/23  0601 03/01/23  1345   WBC Thousand/uL 6 49 6 04 5 85   HEMOGLOBIN g/dL 15 0 15 8 16 0   HEMATOCRIT % 43 2 44 2 45 8   PLATELETS Thousands/uL 283 270 303     Results from last 7 days   Lab Units 03/03/23  0254 03/02/23  0601   TRIGLYCERIDES mg/dL 120 180*   HDL mg/dL 41 42     Results from last 7 days   Lab Units 03/03/23  0254 03/02/23  1041 03/02/23  0601   POTASSIUM mmol/L 3 8 4 0 4 0   CHLORIDE mmol/L 106 103 104   CO2 mmol/L 24 27 25   BUN mg/dL 14 13 14   CREATININE mg/dL 0 60 0 66 0 66   CALCIUM mg/dL 9 2 9 8 9 7     Results from last 7 days   Lab Units 03/03/23  0254 03/02/23  0554 03/01/23  2318   INR   --   --  0 93   PTT seconds 38* 63* 38*     Results from last 7 days   Lab Units 03/02/23  0601   MAGNESIUM mg/dL 2 1       Counseling / Coordination of Care  Total floor / unit time spent today 25 minutes    Greater than 50% of total time was spent with the patient and / or family counseling and / or coordination of care

## 2023-03-03 NOTE — H&P
[address] [facility]  H&P- Stafford Frankel 1970, 46 y o  male MRN: [id number]  Unit/Bed#: [facility] 404-01 Encounter: [id number]  Primary Care Provider: Claudell Ferries, PA-C   Date and time admitted to hospital: 3/2/2023 11:38 PM    * ACS (acute coronary syndrome) [facility])  Assessment & Plan  · Patient originally by PCP for complaint of dyspnea with exertion  Patient denies any true chest pain  He was then recommended to undergo stress test   Patient was found to have abnormal stress test as an outpatient with 8 out of 10 chest pain and ST depressions in inferolateral leads   · Mild troponin elevation on presentation to [facility]  3-26-19 over 6 hours  · Current EKG NSR  Patient denies any symptoms at this time  · Patient underwent cardiac cath at [facility] showing evidence of triple-vessel disease  99% stenosis of mid circumflex, 70% stenosis of first marginal, 85% stenosis of mid LAD, 70% stenosis of first diagonal, 50% stenosis of distal RCA, 75% stenosis of proximal RCA, 80% stenosis of RPAV,and  50% stenosis of RPDA  · Patient transferred from [facility] to [facility] for CT surgery evaluation for possible CABG  · Continue ASA 81 mg oral daily  · Continue metoprolol tartrate 25 mg oral twice daily  · Continue nitroglycerin sublingual every 5 minutes as needed for chest pain  · Heparin gtt per ACS protocol  · CT surgery consult  · Cardiology consult  · 48-hour telemetry  · will maintain n p o  tonight pending CT surgery eval     Hyperlipidemia, mixed  Assessment & Plan  · Continue Lipitor 40 mg oral daily  · Check lipid panel with a m  labs    Essential hypertension  Assessment & Plan  · Current blood pressure 125/83  · Continue lisinopril 20 mg oral daily  · Continue metoprolol tartrate 25 mg oral twice daily    VTE Pharmacologic Prophylaxis: VTE Score: 5 High Risk (Score >/= 5) - Pharmacological DVT Prophylaxis Ordered: heparin drip   Sequential Compression Devices Ordered  Code Status: Level 1 - Full Code   Discussion with family: Patient declined call to   Anticipated Length of Stay: Patient will be admitted on an inpatient basis with an anticipated length of stay of greater than 2 midnights secondary to ACS, CABG evaluation   Total Time Spent on Date of Encounter in care of patient: 65 minutes This time was spent on one or more of the following: performing physical exam; counseling and coordination of care; obtaining or reviewing history; documenting in the medical record; reviewing/ordering tests, medications or procedures; communicating with other healthcare professionals and discussing with patient's family/caregivers  Chief Complaint: Dyspnea with exertion/ Abnormal stress test     History of Present Illness:  Óscar Monterroso is a 46 y o  male with a PMH of hypertension, hyperlipidemia who presents with occasional dyspnea with exertion as well as an abnormal stress test result  The patient was originally seen by his PCP as an outpatient  He was seen due to complaints of increased dyspnea with exertion at home  At that time, an outpatient exercise stress test was ordered  During his stress test, the patient developed 8 out of 10 chest pain  As well, he was found to have ST depressions in the inferolateral leads twelve-lead EKG  The patient was then immediately sent to the ED at [facility] for evaluation  While in the ED, the patient did have a mild troponin elevation peak and decrease over the 6-hour period  He underwent a cardiac catheterization at [facility] showing evidence of multivessel CAD  At that time, the patient was recommended to be evaluated for possible CABG  At this time, the patient denies any complaints  He states that he feels the way he normally feels  He denies any chest pain, shortness of breath at rest   No diaphoresis, nausea, vomiting  No palpitations    No headaches, lightheadedness, dizziness  He only states that with significant exertion he will feel short of breath  Review of Systems:  Review of Systems   Constitutional: Negative for chills, fatigue and fever  HENT: Negative for ear pain and sore throat  Eyes: Negative for pain and visual disturbance  Respiratory: Positive for shortness of breath (Dyspnea with exertion)  Negative for cough and wheezing  Cardiovascular: Negative for chest pain, palpitations and leg swelling  Gastrointestinal: Negative for abdominal distention, abdominal pain, constipation, diarrhea, nausea and vomiting  Endocrine: Negative for polydipsia and polyuria  Genitourinary: Negative for dysuria and hematuria  Musculoskeletal: Negative for arthralgias and back pain  Skin: Negative for color change and rash  Neurological: Negative for dizziness, seizures, syncope, facial asymmetry, weakness, light-headedness and headaches  Psychiatric/Behavioral: Negative for agitation and confusion  All other systems reviewed and are negative  Past Medical and Surgical History:   Past Medical History:   Diagnosis Date   • Disseminated herpes zoster 10/16/2019   • Hypertension    • Known health problems: none        Past Surgical History:   Procedure Laterality Date   • WISDOM TOOTH EXTRACTION         Meds/Allergies:  Prior to Admission medications    Medication Sig Start Date End Date Taking?  Authorizing Provider   ascorbic acid (VITAMIN C) 500 mg tablet Take 500 mg by mouth daily   Yes Historical Provider, MD   Ergocalciferol (VITAMIN D2 PO) Take by mouth   Yes Historical Provider, MD   lisinopril-hydrochlorothiazide (PRINZIDE,ZESTORETIC) 20-12 5 MG per tablet Take 1 tablet by mouth daily 7/27/22  Yes Kimberli Mccann PA-C   Lysine 1000 MG TABS Take by mouth   Yes Historical Provider, MD   metoprolol tartrate (LOPRESSOR) 25 mg tablet TAKE ONE TABLET BY MOUTH TWICE A DAY 2/28/23  Yes Phoenix Velazquez MD   [facility] (OSTEO BI-FLEX ADV DOUBLE ST PO) Take by mouth   Yes Historical Provider, MD   Multiple Vitamin (MULTIVITAMIN) capsule Take 1 capsule by mouth daily   Yes Historical Provider, MD     I have reviewed home medications with patient personally  Allergies: Allergies   Allergen Reactions   • Codeine Drowsiness     incapacitates       Social History:  Marital Status: /Civil Union   Occupation:  for Veterinary office   Patient Pre-hospital Living Situation: Home with Wife   Patient Pre-hospital Level of Mobility: walks  Patient Pre-hospital Diet Restrictions: none   Substance Use History:   Social History     Substance and Sexual Activity   Alcohol Use Yes    Comment: Social     Social History     Tobacco Use   Smoking Status Former   • Packs/day: 0 25   • Years: 6 00   • Pack years: 1 50   • Types: Cigarettes   Smokeless Tobacco Never     Social History     Substance and Sexual Activity   Drug Use Not Currently       Family History:  Family History   Problem Relation Age of Onset   • Hypertension Father    • Breast cancer Sister        Physical Exam:     Vitals:   Blood Pressure: 125/83 (03/02/23 2300)  Pulse: 56 (03/02/23 2300)  Temperature: 97 8 °F (36 6 °C) (03/02/23 2300)  Temp Source: Oral (03/02/23 2300)  Respirations: 17 (03/02/23 2300)  SpO2: 95 % (03/02/23 2300)    Physical Exam  Vitals and nursing note reviewed  Constitutional:       General: He is not in acute distress  Appearance: He is well-developed  He is obese  He is not ill-appearing  HENT:      Head: Normocephalic and atraumatic  Nose: No congestion or rhinorrhea  Mouth/Throat:      Mouth: Mucous membranes are moist       Pharynx: Oropharynx is clear  No oropharyngeal exudate or posterior oropharyngeal erythema  Eyes:      General: No scleral icterus  Right eye: No discharge  Left eye: No discharge  Extraocular Movements: Extraocular movements intact        Conjunctiva/sclera: Conjunctivae normal       Pupils: Pupils are equal, round, and reactive to light  Cardiovascular:      Rate and Rhythm: Normal rate and regular rhythm  Pulses: Normal pulses  Heart sounds: Normal heart sounds  No murmur heard  No friction rub  No gallop  Pulmonary:      Effort: Pulmonary effort is normal  No respiratory distress  Breath sounds: Normal breath sounds  No wheezing, rhonchi or rales  Abdominal:      General: Abdomen is flat  Bowel sounds are normal  There is no distension  Palpations: Abdomen is soft  Tenderness: There is no abdominal tenderness  There is no guarding or rebound  Musculoskeletal:         General: No swelling  Cervical back: Neck supple  Right lower leg: No edema  Left lower leg: No edema  Skin:     General: Skin is warm and dry  Capillary Refill: Capillary refill takes less than 2 seconds  Neurological:      General: No focal deficit present  Mental Status: He is alert and oriented to person, place, and time  Mental status is at baseline  Cranial Nerves: No cranial nerve deficit  Sensory: No sensory deficit  Motor: No weakness        Coordination: Coordination normal    Psychiatric:         Mood and Affect: Mood normal          Behavior: Behavior normal           Additional Data:     Lab Results:  Results from last 7 days   Lab Units 03/02/23  0601 03/01/23  1345   WBC Thousand/uL 6 04 5 85   HEMOGLOBIN g/dL 15 8 16 0   HEMATOCRIT % 44 2 45 8   PLATELETS Thousands/uL 270 303   NEUTROS PCT %  --  47   LYMPHS PCT %  --  37   MONOS PCT %  --  11   EOS PCT %  --  4     Results from last 7 days   Lab Units 03/02/23  1041   SODIUM mmol/L 137   POTASSIUM mmol/L 4 0   CHLORIDE mmol/L 103   CO2 mmol/L 27   BUN mg/dL 13   CREATININE mg/dL 0 66   ANION GAP mmol/L 7   CALCIUM mg/dL 9 8   GLUCOSE RANDOM mg/dL 137     Results from last 7 days   Lab Units 03/01/23  2318   INR  0 93                   Lines/Drains:  Invasive Devices     Peripheral Intravenous Line  Duration           Peripheral IV 03/01/23 Left Antecubital 1 day                    Imaging: No pertinent imaging reviewed  VAS lower limb vein mapping bypass graft    (Results Pending)   VAS carotid complete study    (Results Pending)       EKG and Other Studies Reviewed on Admission:   · EKG: NSR  HR 62     ** Please Note: This note has been constructed using a voice recognition system   **

## 2023-03-03 NOTE — NURSING NOTE
Report given to nurse Josef Nuñez RN   Pt left on cardiac monitor and Heparin gtt at 11 1units/kg/hr

## 2023-03-03 NOTE — UTILIZATION REVIEW
Initial Clinical Review    Admission: Date/Time/Statement:   Admission Orders (From admission, onward)     Ordered        03/03/23 0104  Inpatient Admission  Once                      Orders Placed This Encounter   Procedures   • Inpatient Admission     Standing Status:   Standing     Number of Occurrences:   1     Order Specific Question:   Level of Care     Answer:   Med Surg [16]     Order Specific Question:   Estimated length of stay     Answer:   More than 2 Midnights     Order Specific Question:   Certification     Answer:   I certify that inpatient services are medically necessary for this patient for a duration of greater than two midnights  See H&P and MD Progress Notes for additional information about the patient's course of treatment  Initial Presentation: 46 y o  male , presented to the ED @ [facility], Admitted, Transferred to [facility], via EMS  Admitted as Inpatient due to  Multivessel CAD w Unstable Angina  Date: 03/02/2023   Presented due to an abnormal stress test as was measured by an outpatient cardiologist and was encouraged to come to [facility] for further evaluation  Performed catheterization today which showed triple-vessel disease needing CABG evaluation  Patient transferred from [facility] to [address] [place] [address] for CT surgery evaluation for possible CABG  Cardio-Pulmonary monitoring  Continue ASA, Metoprolol Tartrate, Statin & SL NTG PRN  Continue IV Heparin gtt  Day 2: 03/03/20/23  Monitor on telemetry  Continue ASA, Metoprolol Tartrate, Statin & SL NTG PRN  Continue IV Heparin gtt  Preoperative studies including carotid duplex and vein mapping complete  03/03/2023  Consult CT Surgery: They understand and wish to proceed with further workup and ultimately surgical intervention  We have ordered routine preoperative laboratory and vascular studies  Pending the results of these tests, they will be scheduled for surgery  Triage Vitals   Temperature Pulse Respirations Blood Pressure SpO2   03/02/23 2300 03/02/23 2300 03/02/23 2300 03/02/23 2300 03/02/23 2300   97 8 °F (36 6 °C) 56 17 125/83 95 %      Temp Source Heart Rate Source Patient Position - Orthostatic VS BP Location FiO2 (%)   03/02/23 2300 -- 03/02/23 2300 03/02/23 2300 --   Oral  Sitting Left arm       Pain Score       03/03/23 0000       No Pain          Wt Readings from Last 1 Encounters:   03/03/23 90 7 kg (199 lb 15 3 oz)     Additional Vital Signs:   Date/Time Temp Pulse Resp BP MAP (mmHg) SpO2 O2 Device Patient Position - Orthostatic VS   03/03/23 11:26:26 97 7 °F (36 5 °C) 56 -- 123/82 96 97 % -- --   03/03/23 11:25:55 97 7 °F (36 5 °C) 56 -- 123/82 96 97 % -- --   03/03/23 08:44:08 -- 75 -- 135/87 103 95 % -- --   03/03/23 0844 -- 75 -- 135/87 -- -- -- --   03/03/23 0723 97 4 °F (36 3 °C) Abnormal  63 17 153/91 112 95 % None (Room air) Sitting   03/03/23 0255 98 °F (36 7 °C) 57 17 127/83 59 Abnormal  96 % None (Room air) Lying         Pertinent Labs/Diagnostic Test Results:   CT chest wo contrast    (Results Pending)     Results from last 7 days   Lab Units 03/03/23  0254 03/02/23  0601 03/01/23  1345   WBC Thousand/uL 6 49 6 04 5 85   HEMOGLOBIN g/dL 15 0 15 8 16 0   HEMATOCRIT % 43 2 44 2 45 8   PLATELETS Thousands/uL 283 270 303   NEUTROS ABS Thousands/µL  --   --  2 75     Results from last 7 days   Lab Units 03/03/23  0254 03/02/23  1041 03/02/23  0601 03/01/23  1345   SODIUM mmol/L 137 137 137 137   POTASSIUM mmol/L 3 8 4 0 4 0 4 0   CHLORIDE mmol/L 106 103 104 101   CO2 mmol/L 24 27 25 27   ANION GAP mmol/L 7 7 8 9   BUN mg/dL 14 13 14 13   CREATININE mg/dL 0 60 0 66 0 66 0 74   EGFR ml/min/1 73sq m 115 111 111 106   CALCIUM mg/dL 9 2 9 8 9 7 10 3*   MAGNESIUM mg/dL  --   --  2 1  --      Results from last 7 days   Lab Units 03/03/23  0254 03/02/23  1041 03/02/23  0601 03/01/23  1345   GLUCOSE RANDOM mg/dL 104 137 110 88     Results from last 7 days   Lab Units 03/03/23  1017   HEMOGLOBIN A1C % 5 6   EAG mg/dl 114     Results from last 7 days   Lab Units 03/01/23  1958 03/01/23  1639 03/01/23  1345   HS TNI 0HR ng/L  --   --  3   HS TNI 2HR ng/L  --  26  --    HSTNI D2 ng/L  --  23*  --    HS TNI 4HR ng/L 19  --   --    HSTNI D4 ng/L 16  --   --      Results from last 7 days   Lab Units 03/03/23  0254 03/02/23  0554 03/01/23  2318   PROTIME seconds  --   --  12 3   INR   --   --  0 93   PTT seconds 38* 63* 38*     Past Medical History:   Diagnosis Date   • Disseminated herpes zoster 10/16/2019   • HLD (hyperlipidemia)    • Hypertension    • Known health problems: none    • Vitamin D deficiency      Present on Admission:  • ACS (acute coronary syndrome) (HCC)  • Hyperlipidemia, mixed  • Essential hypertension      Admitting Diagnosis: Abnormal stress test [R94 39]  Age/Sex: 46 y o  male  Admission Orders:  CV Diet  Telemetry  I&O  Obtain CT chest  Betito SCDs    Scheduled Medications:  aspirin, 81 mg, Oral, Daily  atorvastatin, 80 mg, Oral, Daily With Dinner  chlorhexidine, 15 mL, Mouth/Throat, Q12H [facility]  metoprolol tartrate, 25 mg, Oral, Q12H SUZAN  mupirocin, 1 application  , Nasal, Q12H [facility]      Continuous IV Infusions:  heparin (porcine), 3-20 Units/kg/hr (Order-Specific), Intravenous, Titrated      PRN Meds:  acetaminophen, 650 mg, Oral, Q6H PRN  nitroglycerin, 0 4 mg, Sublingual, Q5 Min PRN  ondansetron, 4 mg, Intravenous, Q6H PRN        IP CONSULT TO CARDIOTHORACIC SURGERY    Network Utilization Review Department  ATTENTION: Please call with any questions or concerns to [phone] and carefully listen to the prompts so that you are directed to the right person  All voicemails are confidential   Catalina Cuello all requests for admission clinical reviews, approved or denied determinations and any other requests to dedicated fax number below belonging to the campus where the patient is receiving treatment   List of dedicated fax numbers for the Facilities:  Dennise Shankar NUMBER   ADMISSION DENIALS (Administrative/Medical Necessity) [phone]   [address] (Maternity/NICU/Pediatrics) Gilma Dean [address] [place] Christy  [phone]   [address] [place] [place] [address] Gretel [facility] [address] Olav [facility] [place] 134 815 [facility] [phone]

## 2023-03-03 NOTE — H&P (VIEW-ONLY)
Consultation - Cardiothoracic Surgery   Leno Gregg 46 y o  male MRN: [id number]  Unit/Bed#: [facility] 404-01 Encounter: [id number]    Physician Requesting Consult: Dar Peres DO    Reason for Consult / Principal Problem: MV CAD    Inpatient consult to Cardiothoracic Surgery  Consult performed by: Chang Tran PA-C  Consult ordered by: Ashley Mahajan PA-C        History of Present Illness: Leno Gregg is a 46y o  year old male with no prior cardiac history who reports a 3 month history of exertional chest pressure and GRUBER  His PCP ordered an exercise stress test, which was performed on 3/1/23  His stress was positive for ischemia, with 2mm horizontal ST depressions in the inferolateral leads  He was sent to [facility] ER for evaluation  His troponins were negative  He underwent cardiac catheterization yesterday, which revealed MV CAD  He was transferred to [facility] for cardiac surgical evaluation  The patient denies symptoms at rest  He denies numbness, weakness, LE edema, PND, orthopnea, syncope, palpitations or fevers  He does report increased fatigue  He quit smoking approximately 30 years ago  He drinks alcohol socially and smokes marijuana on rare occasions  He is  and active in his daily life  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, but does not have to engage in routine physical activity for his occupation  He is right handed  He reports a family history of cardiac disease with 1 uncle having cardiac stents, another uncle with cardiac disease, his paternal grandfather having several MI's and underwent CABG x 3 and maternal grandfather with an MI  His mother had no cardiac problems and his father passed away and had HTN      Past Medical History:  Past Medical History:   Diagnosis Date   • Disseminated herpes zoster 10/16/2019   • HLD (hyperlipidemia)    • Hypertension    • Known health problems: none    • Vitamin D deficiency          Past Surgical History: Past Surgical History:   Procedure Laterality Date   • CARDIAC CATHETERIZATION N/A 3/2/2023    Procedure: Cardiac catheterization;  Surgeon: Rayna Handy MD;  Location: [facility] CATH LAB; Service: Cardiology   • CARDIAC CATHETERIZATION N/A 3/2/2023    Procedure: Cardiac Coronary Angiogram;  Surgeon: Rayna Handy MD;  Location: [facility] CATH LAB; Service: Cardiology   • WISDOM TOOTH EXTRACTION           Family History:  Family History   Problem Relation Age of Onset   • Hypertension Father    • Breast cancer Sister    • Heart attack Maternal Grandfather    • Heart attack Paternal Grandfather         s/p CABG   • Coronary artery disease Paternal Uncle         s/p cardiac stent   • Coronary artery disease Paternal Uncle          Social History:  Social History     Substance and Sexual Activity   Alcohol Use Yes    Comment: Social     Social History     Substance and Sexual Activity   Drug Use Not Currently   • Types: Marijuana    Comment: rare marijuana use     Social History     Tobacco Use   Smoking Status Former   • Packs/day: 0 25   • Years: 6 00   • Pack years: 1 50   • Types: Cigarettes   • Quit date:    • Years since quittin 1   Smokeless Tobacco Never     Marital Status: /Civil Union      Home Medications:   Prior to Admission medications    Medication Sig Start Date End Date Taking?  Authorizing Provider   ascorbic acid (VITAMIN C) 500 mg tablet Take 500 mg by mouth daily   Yes Historical Provider, MD   Ergocalciferol (VITAMIN D2 PO) Take by mouth   Yes Historical Provider, MD   lisinopril-hydrochlorothiazide (PRINZIDE,ZESTORETIC) 20-12 5 MG per tablet Take 1 tablet by mouth daily 22  Yes Leidy Davenport PA-C   Lysine 1000 MG TABS Take by mouth   Yes Historical Provider, MD   metoprolol tartrate (LOPRESSOR) 25 mg tablet TAKE ONE TABLET BY MOUTH TWICE A DAY 23  Yes Chaparrita Shearer MD   [facility] (OSTEO BI-FLEX ADV DOUBLE ST PO) Take by mouth   Yes Historical Provider, MD   Multiple Vitamin (MULTIVITAMIN) capsule Take 1 capsule by mouth daily   Yes Historical Provider, MD       Inpatient Medications:  Scheduled Meds:   Current Facility-Administered Medications   Medication Dose Route Frequency Provider Last Rate   • acetaminophen  650 mg Oral Q6H PRN Katrin Mcfarlane PA-C     • aspirin  81 mg Oral Daily Katrin Mcfarlane PA-C     • atorvastatin  80 mg Oral Daily With [employer], DO     • heparin (porcine)  3-20 Units/kg/hr (Order-Specific) Intravenous Titrated Katrin Mcfarlane PA-C 15 1 Units/kg/hr (03/03/23 0354)   • lisinopril  20 mg Oral Daily Katrin Mcfarlane PA-C     • metoprolol tartrate  25 mg Oral Q12H [facility] Claude Sung PA-C     • nitroglycerin  0 4 mg Sublingual Q5 Min PRN Katrin Mcfarlane PA-C     • ondansetron  4 mg Intravenous Q6H PRN Claude Sung PA-C       Continuous Infusions: heparin (porcine), 3-20 Units/kg/hr (Order-Specific), Last Rate: 15 1 Units/kg/hr (03/03/23 0354)      PRN Meds:  acetaminophen, 650 mg, Q6H PRN  nitroglycerin, 0 4 mg, Q5 Min PRN  ondansetron, 4 mg, Q6H PRN        Allergies: Allergies   Allergen Reactions   • Codeine Drowsiness     incapacitates       Review of Systems:  Review of Systems   Constitutional: Negative  Negative for chills, diaphoresis, fatigue and fever  HENT: Negative  Eyes: Negative  Respiratory: Positive for chest tightness  GRUBER   Cardiovascular: Negative  Negative for chest pain and leg swelling  Gastrointestinal: Negative  Endocrine: Negative  Genitourinary: Negative  Musculoskeletal: Negative  Skin: Negative  Allergic/Immunologic: Negative  Neurological: Negative  Negative for syncope  Hematological: Negative for adenopathy  Does not bruise/bleed easily  Psychiatric/Behavioral: Negative  All other systems reviewed and are negative        Vital Signs:     Vitals:    03/03/23 9468 03/03/23 0988 03/03/23 7863 03/03/23 2236 BP:  153/91 135/87 135/87   BP Location:  Right arm     Pulse:  63 75 75   Resp:  17     Temp:  (!) 97 4 °F (36 3 °C)     TempSrc:  Oral     SpO2:  95%  95%   Weight: 90 7 kg (199 lb 15 3 oz)      Height: 5' 10" (1 778 m)        Invasive Devices     Peripheral Intravenous Line  Duration           Peripheral IV 03/01/23 Left Antecubital 1 day                Physical Exam:  Physical Exam  Vitals and nursing note reviewed  Constitutional:       General: He is not in acute distress  Appearance: Normal appearance  He is well-developed  He is not diaphoretic  HENT:      Head: Normocephalic and atraumatic  Right Ear: External ear normal       Left Ear: External ear normal       Nose: Nose normal       Mouth/Throat:      Pharynx: No oropharyngeal exudate  Eyes:      General: No scleral icterus  Right eye: No discharge  Left eye: No discharge  Conjunctiva/sclera: Conjunctivae normal       Pupils: Pupils are equal, round, and reactive to light  Neck:      Vascular: No JVD  Trachea: No tracheal deviation  Cardiovascular:      Rate and Rhythm: Normal rate and regular rhythm  Heart sounds: Normal heart sounds  No murmur heard  No friction rub  Pulmonary:      Effort: Pulmonary effort is normal  No respiratory distress  Breath sounds: Normal breath sounds  No stridor  No wheezing or rales  Abdominal:      General: Bowel sounds are normal  There is no distension  Palpations: Abdomen is soft  There is no mass  Tenderness: There is no abdominal tenderness  There is no guarding  Musculoskeletal:         General: No tenderness or deformity  Normal range of motion  Cervical back: Normal range of motion and neck supple  Comments: Venous varicosities of posterior right lower leg   Skin:     General: Skin is warm and dry  Coloration: Skin is not pale  Findings: No erythema or rash     Neurological:      Mental Status: He is alert and oriented to person, place, and time  Cranial Nerves: No cranial nerve deficit  Sensory: No sensory deficit  Motor: No abnormal muscle tone  Coordination: Coordination normal       Deep Tendon Reflexes: Reflexes normal    Psychiatric:         Behavior: Behavior normal          Thought Content: Thought content normal          Judgment: Judgment normal          Lab Results:     Results from last 7 days   Lab Units 03/03/23  0254 03/02/23  0601 03/01/23  1345   WBC Thousand/uL 6 49 6 04 5 85   HEMOGLOBIN g/dL 15 0 15 8 16 0   HEMATOCRIT % 43 2 44 2 45 8   PLATELETS Thousands/uL 283 270 303     Results from last 7 days   Lab Units 03/03/23  0254 03/02/23  1041 03/02/23  0601   POTASSIUM mmol/L 3 8 4 0 4 0   CHLORIDE mmol/L 106 103 104   CO2 mmol/L 24 27 25   BUN mg/dL 14 13 14   CREATININE mg/dL 0 60 0 66 0 66   CALCIUM mg/dL 9 2 9 8 9 7     Results from last 7 days   Lab Units 03/03/23  0254 03/02/23  0554 03/01/23  2318   INR   --   --  0 93   PTT seconds 38* 63* 38*     No results found for: HGBA1C  No results found for: CKTOTAL, CKMB, CKMBINDEX, TROPONINI    Imaging Studies:     Cardiac Catheterization:   Left Main   The vessel was visualized by angiography, is large and is angiographically normal       Left Anterior Descending   The vessel was visualized by angiography and is large  There is mild diffuse disease throughout the vessel  Mid LAD lesion is 85% stenosed  TALIA flow is 3  First Diagonal Branch   1st Diag lesion is 70% stenosed  Left Circumflex   The vessel was visualized by angiography and is moderate in size  Mid Cx lesion is 99% stenosed  TALIA flow is 3  First Obtuse Marginal Branch   The vessel is large  This is a high OM   1st Mrg lesion is 70% stenosed  The lesion is eccentric  Right Coronary Artery   The vessel was visualized by angiography, is moderate in size, is large and dominant  Prox RCA lesion is 75% stenosed  TALIA flow is 3  Dist RCA lesion is 50% stenosed  Right Posterior Descending Artery   RPDA lesion is 50% stenosed  Right Posterior Atrioventricular Artery   RPAV lesion is 80% stenosed  Echocardiogram:   Left Ventricle Left ventricular cavity size is normal  Wall thickness is normal  The left ventricular ejection fraction is 55%  Systolic function is normal   Wall motion is normal  Diastolic function is mildly abnormal, consistent with grade I (abnormal) relaxation  Right Ventricle Right ventricular cavity size is normal  Systolic function is normal  Wall thickness is normal    Left Atrium The atrium is normal in size  Right Atrium The atrium is normal in size  Aortic Valve The aortic valve was not well visualized  The aortic valve is trileaflet  There is no evidence of regurgitation  The aortic valve has no significant stenosis  Mitral Valve Mitral valve structure is normal  There is trace regurgitation  There is no evidence of stenosis  Tricuspid Valve Tricuspid valve structure is normal  There is trace regurgitation  There is no evidence of stenosis  Pulmonic Valve Pulmonic valve structure is normal  There is trace regurgitation  There is no evidence of stenosis  Ascending Aorta The aortic root is upper normal in size  IVC/SVC The inferior vena cava is normal in size  Pericardium There is no pericardial effusion  The pericardium is normal in appearance       Left Ventricle Measurements    Function/Volumes   A4C EF 57 %         Dimensions   LVIDd 4 6 cm         LVIDS 2 9 cm         IVSd 1 cm         LVPWd 1 cm         FS 37          Diastolic Filling   MV E' Tissue Velocity Septal 7 cm/s         E wave deceleration time 313 ms         MV Peak E Joey 58 cm/s         MV Peak A Joey 0 67 m/s               Right Ventricle Measurements    Dimensions   Tricuspid annular plane systolic excursion 2 4 cm               Left Atrium Measurements    Dimensions   LA size 3 3 cm         LA/Ao Ratio 2D 0 87                Mitral Valve Measurements    Stenosis   MV stenosis pressure 1/2 time 92 ms         MV valve area p 1/2 method 2 39                Aorta Measurements    Aortic Dimensions   Ao root 3 8 cm         Asc Ao 3 4 cm             Exercise Stress: Stress EC mm horizontal ST depression in the inferolateral leads is noted  These changes persisted close to 8 minutes into recovery  The ECG was positive for ischemia  The stress test changes present high risk  Patient had chest discomfort 8 out of 10 in severity during exercise  In addition patient had significant EKG changes which persisted close to 8 minutes into recovery  Patient was given sublingual nitroglycerin  Given the nature of presentation and significant abnormalities on stress test, patient and family were made aware of the results of stress test and patient was sent to the emergency room  Carotid US: <50% ICA stenosis bilaterally  Antegrade vertebral artery flow and no significant SCA disease bilaterally  Vein mapping: R GSV 9 1 - 1 5mm  L GSV 6 9 - 1 2mm    I have personally reviewed pertinent reports  and I have personally reviewed pertinent films in PACS    Assessment:  Principal Problem:    ACS (acute coronary syndrome) ([facility] [place] [address] )  Active Problems:    Essential hypertension    Hyperlipidemia, mixed    Severe coronary artery disease; Ongoing CABG workup    Plan:  Risks and benefits of coronary artery bypass grafting were discussed in detail today with the patient  They understand and wish to proceed with further workup and ultimately surgical intervention  We have ordered routine preoperative laboratory and vascular studies  Pending the results of these tests, they will be scheduled for surgery with Dr Silva Harry, Melvin Koyanagi was comfortable with our recommendations, and their questions were answered to their satisfaction  We will continue to evaluate the patient daily with further recommendations as work up is completed    Thank you for allowing us to participate in the care of this patient  SIGNATURE: Tahira SchraderRodrigo munguia  DATE: March 3, 2023  TIME: 9:23 AM    * This note was completed in part utilizing m-[employer] fluency direct voice recognition software  Grammatical errors, random word insertion, spelling mistakes, and incomplete sentences may be an occasional consequence of the system secondary to software limitations, ambient noise and hardware issues  At the time of dictation, efforts were made to edit, clarify and /or correct errors  Please read the chart carefully and recognize, using context, where substitutions have occurred  If you have any questions or concerns about the context, text or information contained within the body of this dictation, please contact myself, the provider, for further clarification

## 2023-03-03 NOTE — ASSESSMENT & PLAN NOTE
· Current blood pressure 125/83  · Continue lisinopril 20 mg oral daily  · Continue metoprolol tartrate 25 mg oral twice daily

## 2023-03-03 NOTE — UTILIZATION REVIEW
NOTIFICATION OF INPATIENT ADMISSION   AUTHORIZATION REQUEST   SERVICING FACILITY:   [facility]  Address: [address] [place] [address], [address] [place] [address]  Tax ID: [id number]  NPI: [id number] ATTENDING PROVIDER:  Attending Name and NPI#: Destin Aguileradereck [[id number]]  Address: [address] [place] [address], [address] [place] [address]  Phone: [phone]   ADMISSION INFORMATION:  Place of Service: Inpatient [address] [facility]  [address]  Place of Service Code: 21  Inpatient Admission Date/Time: 3/2/23 11:38 PM  Discharge Date/Time: No discharge date for patient encounter  Admitting Diagnosis Code/Description:  Abnormal stress test [R94 39]     UTILIZATION REVIEW CONTACT:  Marguerite Ovalles Utilization   Network Utilization Review Department  Phone: [phone]  Fax: [phone]  Email: [email or web address]  Contact for approvals/pending authorizations, clinical reviews, and discharge  PHYSICIAN ADVISORY SERVICES:  Medical Necessity Denial & [place] Review  Phone: [phone]  Fax: [phone]  Email: [email or web address]

## 2023-03-03 NOTE — CONSULTS
Consultation - Cardiothoracic Surgery   Leeann Velez 46 y o  male MRN: [id number]  Unit/Bed#: [facility] 404-01 Encounter: [id number]    Physician Requesting Consult: Kinjal Bales DO    Reason for Consult / Principal Problem: MV CAD    Inpatient consult to Cardiothoracic Surgery  Consult performed by: Tino Moreau PA-C  Consult ordered by: Bala Nuñez PA-C        History of Present Illness: Leeann Velez is a 46y o  year old male with no prior cardiac history who reports a 3 month history of exertional chest pressure and GRUBER  His PCP ordered an exercise stress test, which was performed on 3/1/23  His stress was positive for ischemia, with 2mm horizontal ST depressions in the inferolateral leads  He was sent to [facility] ER for evaluation  His troponins were negative  He underwent cardiac catheterization yesterday, which revealed MV CAD  He was transferred to [facility] for cardiac surgical evaluation  The patient denies symptoms at rest  He denies numbness, weakness, LE edema, PND, orthopnea, syncope, palpitations or fevers  He does report increased fatigue  He quit smoking approximately 30 years ago  He drinks alcohol socially and smokes marijuana on rare occasions  He is  and active in his daily life  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, but does not have to engage in routine physical activity for his occupation  He is right handed  He reports a family history of cardiac disease with 1 uncle having cardiac stents, another uncle with cardiac disease, his paternal grandfather having several MI's and underwent CABG x 3 and maternal grandfather with an MI  His mother had no cardiac problems and his father passed away and had HTN      Past Medical History:  Past Medical History:   Diagnosis Date   • Disseminated herpes zoster 10/16/2019   • HLD (hyperlipidemia)    • Hypertension    • Known health problems: none    • Vitamin D deficiency          Past Surgical History: Past Surgical History:   Procedure Laterality Date   • CARDIAC CATHETERIZATION N/A 3/2/2023    Procedure: Cardiac catheterization;  Surgeon: Afia Moran MD;  Location: [facility] CATH LAB; Service: Cardiology   • CARDIAC CATHETERIZATION N/A 3/2/2023    Procedure: Cardiac Coronary Angiogram;  Surgeon: Afia Moran MD;  Location: [facility] CATH LAB; Service: Cardiology   • WISDOM TOOTH EXTRACTION           Family History:  Family History   Problem Relation Age of Onset   • Hypertension Father    • Breast cancer Sister    • Heart attack Maternal Grandfather    • Heart attack Paternal Grandfather         s/p CABG   • Coronary artery disease Paternal Uncle         s/p cardiac stent   • Coronary artery disease Paternal Uncle          Social History:  Social History     Substance and Sexual Activity   Alcohol Use Yes    Comment: Social     Social History     Substance and Sexual Activity   Drug Use Not Currently   • Types: Marijuana    Comment: rare marijuana use     Social History     Tobacco Use   Smoking Status Former   • Packs/day: 0 25   • Years: 6 00   • Pack years: 1 50   • Types: Cigarettes   • Quit date:    • Years since quittin 1   Smokeless Tobacco Never     Marital Status: /Civil Union      Home Medications:   Prior to Admission medications    Medication Sig Start Date End Date Taking?  Authorizing Provider   ascorbic acid (VITAMIN C) 500 mg tablet Take 500 mg by mouth daily   Yes Historical Provider, MD   Ergocalciferol (VITAMIN D2 PO) Take by mouth   Yes Historical Provider, MD   lisinopril-hydrochlorothiazide (PRINZIDE,ZESTORETIC) 20-12 5 MG per tablet Take 1 tablet by mouth daily 22  Yes Kenneth Pierson PA-C   Lysine 1000 MG TABS Take by mouth   Yes Historical Provider, MD   metoprolol tartrate (LOPRESSOR) 25 mg tablet TAKE ONE TABLET BY MOUTH TWICE A DAY 23  Yes Elias Hahn MD   [facility] Natural Products (OSTEO BI-FLEX ADV DOUBLE ST PO) Take by mouth   Yes Historical Provider, MD   Multiple Vitamin (MULTIVITAMIN) capsule Take 1 capsule by mouth daily   Yes Historical Provider, MD       Inpatient Medications:  Scheduled Meds:   Current Facility-Administered Medications   Medication Dose Route Frequency Provider Last Rate   • acetaminophen  650 mg Oral Q6H PRN Gavino Fink PA-C     • aspirin  81 mg Oral Daily Gavino Fink PA-C     • atorvastatin  80 mg Oral Daily With [employer], DO     • heparin (porcine)  3-20 Units/kg/hr (Order-Specific) Intravenous Titrated Gavino Fink PA-C 15 1 Units/kg/hr (03/03/23 0354)   • lisinopril  20 mg Oral Daily Gavino Fink PA-C     • metoprolol tartrate  25 mg Oral Q12H [facility] Claude Sung PA-C     • nitroglycerin  0 4 mg Sublingual Q5 Min PRN Gavino Fink PA-C     • ondansetron  4 mg Intravenous Q6H PRN Claude Sung PA-C       Continuous Infusions: heparin (porcine), 3-20 Units/kg/hr (Order-Specific), Last Rate: 15 1 Units/kg/hr (03/03/23 0354)      PRN Meds:  acetaminophen, 650 mg, Q6H PRN  nitroglycerin, 0 4 mg, Q5 Min PRN  ondansetron, 4 mg, Q6H PRN        Allergies: Allergies   Allergen Reactions   • Codeine Drowsiness     incapacitates       Review of Systems:  Review of Systems   Constitutional: Negative  Negative for chills, diaphoresis, fatigue and fever  HENT: Negative  Eyes: Negative  Respiratory: Positive for chest tightness  GRUBER   Cardiovascular: Negative  Negative for chest pain and leg swelling  Gastrointestinal: Negative  Endocrine: Negative  Genitourinary: Negative  Musculoskeletal: Negative  Skin: Negative  Allergic/Immunologic: Negative  Neurological: Negative  Negative for syncope  Hematological: Negative for adenopathy  Does not bruise/bleed easily  Psychiatric/Behavioral: Negative  All other systems reviewed and are negative        Vital Signs:     Vitals:    03/03/23 7657 03/03/23 5933 03/03/23 5413 03/03/23 2174 BP:  153/91 135/87 135/87   BP Location:  Right arm     Pulse:  63 75 75   Resp:  17     Temp:  (!) 97 4 °F (36 3 °C)     TempSrc:  Oral     SpO2:  95%  95%   Weight: 90 7 kg (199 lb 15 3 oz)      Height: 5' 10" (1 778 m)        Invasive Devices     Peripheral Intravenous Line  Duration           Peripheral IV 03/01/23 Left Antecubital 1 day                Physical Exam:  Physical Exam  Vitals and nursing note reviewed  Constitutional:       General: He is not in acute distress  Appearance: Normal appearance  He is well-developed  He is not diaphoretic  HENT:      Head: Normocephalic and atraumatic  Right Ear: External ear normal       Left Ear: External ear normal       Nose: Nose normal       Mouth/Throat:      Pharynx: No oropharyngeal exudate  Eyes:      General: No scleral icterus  Right eye: No discharge  Left eye: No discharge  Conjunctiva/sclera: Conjunctivae normal       Pupils: Pupils are equal, round, and reactive to light  Neck:      Vascular: No JVD  Trachea: No tracheal deviation  Cardiovascular:      Rate and Rhythm: Normal rate and regular rhythm  Heart sounds: Normal heart sounds  No murmur heard  No friction rub  Pulmonary:      Effort: Pulmonary effort is normal  No respiratory distress  Breath sounds: Normal breath sounds  No stridor  No wheezing or rales  Abdominal:      General: Bowel sounds are normal  There is no distension  Palpations: Abdomen is soft  There is no mass  Tenderness: There is no abdominal tenderness  There is no guarding  Musculoskeletal:         General: No tenderness or deformity  Normal range of motion  Cervical back: Normal range of motion and neck supple  Comments: Venous varicosities of posterior right lower leg   Skin:     General: Skin is warm and dry  Coloration: Skin is not pale  Findings: No erythema or rash     Neurological:      Mental Status: He is alert and oriented to person, place, and time  Cranial Nerves: No cranial nerve deficit  Sensory: No sensory deficit  Motor: No abnormal muscle tone  Coordination: Coordination normal       Deep Tendon Reflexes: Reflexes normal    Psychiatric:         Behavior: Behavior normal          Thought Content: Thought content normal          Judgment: Judgment normal          Lab Results:     Results from last 7 days   Lab Units 03/03/23  0254 03/02/23  0601 03/01/23  1345   WBC Thousand/uL 6 49 6 04 5 85   HEMOGLOBIN g/dL 15 0 15 8 16 0   HEMATOCRIT % 43 2 44 2 45 8   PLATELETS Thousands/uL 283 270 303     Results from last 7 days   Lab Units 03/03/23  0254 03/02/23  1041 03/02/23  0601   POTASSIUM mmol/L 3 8 4 0 4 0   CHLORIDE mmol/L 106 103 104   CO2 mmol/L 24 27 25   BUN mg/dL 14 13 14   CREATININE mg/dL 0 60 0 66 0 66   CALCIUM mg/dL 9 2 9 8 9 7     Results from last 7 days   Lab Units 03/03/23  0254 03/02/23  0554 03/01/23  2318   INR   --   --  0 93   PTT seconds 38* 63* 38*     No results found for: HGBA1C  No results found for: CKTOTAL, CKMB, CKMBINDEX, TROPONINI    Imaging Studies:     Cardiac Catheterization:   Left Main   The vessel was visualized by angiography, is large and is angiographically normal       Left Anterior Descending   The vessel was visualized by angiography and is large  There is mild diffuse disease throughout the vessel  Mid LAD lesion is 85% stenosed  TALIA flow is 3  First Diagonal Branch   1st Diag lesion is 70% stenosed  Left Circumflex   The vessel was visualized by angiography and is moderate in size  Mid Cx lesion is 99% stenosed  TALIA flow is 3  First Obtuse Marginal Branch   The vessel is large  This is a high OM   1st Mrg lesion is 70% stenosed  The lesion is eccentric  Right Coronary Artery   The vessel was visualized by angiography, is moderate in size, is large and dominant  Prox RCA lesion is 75% stenosed  TALIA flow is 3  Dist RCA lesion is 50% stenosed  Right Posterior Descending Artery   RPDA lesion is 50% stenosed  Right Posterior Atrioventricular Artery   RPAV lesion is 80% stenosed  Echocardiogram:   Left Ventricle Left ventricular cavity size is normal  Wall thickness is normal  The left ventricular ejection fraction is 55%  Systolic function is normal   Wall motion is normal  Diastolic function is mildly abnormal, consistent with grade I (abnormal) relaxation  Right Ventricle Right ventricular cavity size is normal  Systolic function is normal  Wall thickness is normal    Left Atrium The atrium is normal in size  Right Atrium The atrium is normal in size  Aortic Valve The aortic valve was not well visualized  The aortic valve is trileaflet  There is no evidence of regurgitation  The aortic valve has no significant stenosis  Mitral Valve Mitral valve structure is normal  There is trace regurgitation  There is no evidence of stenosis  Tricuspid Valve Tricuspid valve structure is normal  There is trace regurgitation  There is no evidence of stenosis  Pulmonic Valve Pulmonic valve structure is normal  There is trace regurgitation  There is no evidence of stenosis  Ascending Aorta The aortic root is upper normal in size  IVC/SVC The inferior vena cava is normal in size  Pericardium There is no pericardial effusion  The pericardium is normal in appearance       Left Ventricle Measurements    Function/Volumes   A4C EF 57 %         Dimensions   LVIDd 4 6 cm         LVIDS 2 9 cm         IVSd 1 cm         LVPWd 1 cm         FS 37          Diastolic Filling   MV E' Tissue Velocity Septal 7 cm/s         E wave deceleration time 313 ms         MV Peak E Joey 58 cm/s         MV Peak A Joey 0 67 m/s               Right Ventricle Measurements    Dimensions   Tricuspid annular plane systolic excursion 2 4 cm               Left Atrium Measurements    Dimensions   LA size 3 3 cm         LA/Ao Ratio 2D 0 87                Mitral Valve Measurements    Stenosis   MV stenosis pressure 1/2 time 92 ms         MV valve area p 1/2 method 2 39                Aorta Measurements    Aortic Dimensions   Ao root 3 8 cm         Asc Ao 3 4 cm             Exercise Stress: Stress EC mm horizontal ST depression in the inferolateral leads is noted  These changes persisted close to 8 minutes into recovery  The ECG was positive for ischemia  The stress test changes present high risk  Patient had chest discomfort 8 out of 10 in severity during exercise  In addition patient had significant EKG changes which persisted close to 8 minutes into recovery  Patient was given sublingual nitroglycerin  Given the nature of presentation and significant abnormalities on stress test, patient and family were made aware of the results of stress test and patient was sent to the emergency room  Carotid US: <50% ICA stenosis bilaterally  Antegrade vertebral artery flow and no significant SCA disease bilaterally  Vein mapping: R GSV 9 1 - 1 5mm  L GSV 6 9 - 1 2mm    I have personally reviewed pertinent reports  and I have personally reviewed pertinent films in PACS    Assessment:  Principal Problem:    ACS (acute coronary syndrome) ([facility] [address] )  Active Problems:    Essential hypertension    Hyperlipidemia, mixed    Severe coronary artery disease; Ongoing CABG workup    Plan:  Risks and benefits of coronary artery bypass grafting were discussed in detail today with the patient  They understand and wish to proceed with further workup and ultimately surgical intervention  We have ordered routine preoperative laboratory and vascular studies  Pending the results of these tests, they will be scheduled for surgery with Dr Jin Snyder, Chucky Vargas was comfortable with our recommendations, and their questions were answered to their satisfaction  We will continue to evaluate the patient daily with further recommendations as work up is completed    Thank you for allowing us to participate in the care of this patient  SIGNATURE: RenettamaicoRodrigo Cassidy  DATE: March 3, 2023  TIME: 9:23 AM    * This note was completed in part utilizing [email or web address] fluency direct voice recognition software  Grammatical errors, random word insertion, spelling mistakes, and incomplete sentences may be an occasional consequence of the system secondary to software limitations, ambient noise and hardware issues  At the time of dictation, efforts were made to edit, clarify and /or correct errors  Please read the chart carefully and recognize, using context, where substitutions have occurred  If you have any questions or concerns about the context, text or information contained within the body of this dictation, please contact myself, the provider, for further clarification

## 2023-03-04 LAB
APTT PPP: 60 SECONDS (ref 23–37)
APTT PPP: 68 SECONDS (ref 23–37)
APTT PPP: 95 SECONDS (ref 23–37)

## 2023-03-04 RX ADMIN — MUPIROCIN 1 APPLICATION.: 20 OINTMENT TOPICAL at 09:25

## 2023-03-04 RX ADMIN — MUPIROCIN 1 APPLICATION.: 20 OINTMENT TOPICAL at 21:39

## 2023-03-04 RX ADMIN — ASPIRIN 81 MG: 81 TABLET, COATED ORAL at 09:25

## 2023-03-04 RX ADMIN — METOPROLOL TARTRATE 25 MG: 25 TABLET, FILM COATED ORAL at 21:40

## 2023-03-04 RX ADMIN — METOPROLOL TARTRATE 25 MG: 25 TABLET, FILM COATED ORAL at 09:25

## 2023-03-04 RX ADMIN — HEPARIN SODIUM 15.1 UNITS/KG/HR: 10000 INJECTION, SOLUTION INTRAVENOUS at 09:09

## 2023-03-04 RX ADMIN — CHLORHEXIDINE GLUCONATE 15 ML: 1.2 SOLUTION ORAL at 09:25

## 2023-03-04 RX ADMIN — CHLORHEXIDINE GLUCONATE 15 ML: 1.2 SOLUTION ORAL at 21:41

## 2023-03-04 RX ADMIN — ATORVASTATIN CALCIUM 80 MG: 80 TABLET, FILM COATED ORAL at 18:12

## 2023-03-04 NOTE — ASSESSMENT & PLAN NOTE
· Current blood pressure 125/83  · Continue metoprolol tartrate 25 mg oral twice daily  · Holding home lisinopril preprocedurally-as well as HCTZ

## 2023-03-04 NOTE — ASSESSMENT & PLAN NOTE
· Patient originally by PCP for complaint of dyspnea with exertion  Patient denies any true chest pain  He was then recommended to undergo stress test   Patient was found to have abnormal stress test as an outpatient with 8 out of 10 chest pain and ST depressions in inferolateral leads   · Mild troponin elevation on presentation to [facility]  3-26-19 over 6 hours  · Current EKG NSR  Patient denies any symptoms at this time  · Patient underwent cardiac cath at [facility] showing evidence of triple-vessel disease    99% stenosis of mid circumflex, 70% stenosis of first marginal, 85% stenosis of mid LAD, 70% stenosis of first diagonal, 50% stenosis of distal RCA, 75% stenosis of proximal RCA, 80% stenosis of RPAV,and  50% stenosis of RPDA  · Patient transferred from [facility] to [facility] for CT surgery evaluation for possible CABG  · Continue ASA 81 mg oral daily  · Continue metoprolol tartrate 25 mg oral twice daily  · Continue nitroglycerin sublingual every 5 minutes as needed for chest pain  · Heparin gtt per ACS protocol  · CT surgery consult  · Cardiology consult  ·  telemetry    Patient pending work-up for CABG

## 2023-03-04 NOTE — PROGRESS NOTES
Cardiology Progress Note - Renny Soto 46 y o  male MRN: [id number]    Unit/Bed#: [facility] 404-01 Encounter: [id number]      Assessment:  1  Multivessel CAD with unstable angina  2  Benign essential hypertension   3  Dyslipidemia   4  Preserved biventricular function   5  Bilateral carotid plaque     Plan:  1  Cath with multivessel CAD - for CABG next week  2  Remains on IV heparin  3  Continue aspirin and beta-blocker along with statin therapy  4  Lipids noted - transitioned to atorvastatin 80 mg  5  Preoperative studies complete  6  Hold ACE-I pre-procedure,  blood pressure acceptable    Subjective:   Patient seen and examined  No significant events overnight  Objective:     Vitals: Blood pressure 135/74, pulse (!) 49, temperature 97 8 °F (36 6 °C), resp  rate 16, height 5' 10" (1 778 m), weight 90 7 kg (199 lb 15 3 oz), SpO2 97 %  , Body mass index is 28 69 kg/m² ,   Orthostatic Blood Pressures    Flowsheet Row Most Recent Value   Blood Pressure 135/74 filed at 03/04/2023 0234   Patient Position - Orthostatic VS Sitting filed at 03/03/2023 5446            Intake/Output Summary (Last 24 hours) at 3/4/2023 0655  Last data filed at 3/4/2023 4725  Gross per 24 hour   Intake 449 95 ml   Output 1225 ml   Net -775 05 ml       Physical Exam:  General:  Alert and cooperative, appears stated age  HEENT:  PERRLA, EOMI, no scleral icterus, no conjunctival pallor  Neck:  No lymphadenopathy, no thyromegaly, no carotid bruits, no elevated JVP  Heart:  Regular rate and rhythm, normal S1/S2, no S3/S4, no murmur  Lungs:  Clear to auscultation bilaterally   Abdomen:  Soft, non-tender, positive bowel sounds, no rebound or guarding,   no organomegaly   Extremities:  No clubbing, cyanosis or edema   Vascular:  2+ pedal pulses  Skin:  No rashes or lesions on exposed skin  Neurologic:  Cranial nerves II-XII grossly intact without focal deficits    Medications:      Current Facility-Administered Medications:   • acetaminophen (TYLENOL) tablet 650 mg, 650 mg, Oral, Q6H PRN, Ralph Reis PA-C  •  aspirin (ECOTRIN LOW STRENGTH) EC tablet 81 mg, 81 mg, Oral, Daily, Ralph Reis PA-C, 81 mg at 03/03/23 0844  •  atorvastatin (LIPITOR) tablet 80 mg, 80 mg, Oral, Daily With Dinner, Yuni Chi DO, 80 mg at 03/03/23 1737  •  chlorhexidine (PERIDEX) 0 12 % oral rinse 15 mL, 15 mL, Mouth/Throat, Q12H [address], Chiqui Ruggiero PA-C, 15 mL at 03/03/23 2116  •  heparin (porcine) 25,000 units in 0 45% NaCl 250 mL infusion (premix), 3-20 Units/kg/hr (Order-Specific), Intravenous, Titrated, Ralph Reis PA-C, Last Rate: 13 6 mL/hr at 03/04/23 0424, 15 1 Units/kg/hr at 03/04/23 0424  •  metoprolol tartrate (LOPRESSOR) tablet 25 mg, 25 mg, Oral, Q12H [address], Claude Sung PA-C, 25 mg at 03/03/23 2116  •  mupirocin (BACTROBAN) 2 % nasal ointment 1 application  , 1 application  , Nasal, Q12H [address], Chiqui Ruggiero PA-C, 1 application   at 03/03/23 2118  •  nitroglycerin (NITROSTAT) SL tablet 0 4 mg, 0 4 mg, Sublingual, Q5 Min PRN, Ralph Reis PA-C  •  ondansetron (ZOFRAN) injection 4 mg, 4 mg, Intravenous, Q6H PRN, Ralph Reis PA-C     Labs & Results:        Results from last 7 days   Lab Units 03/03/23  0254 03/02/23  0601 03/01/23  1345   WBC Thousand/uL 6 49 6 04 5 85   HEMOGLOBIN g/dL 15 0 15 8 16 0   HEMATOCRIT % 43 2 44 2 45 8   PLATELETS Thousands/uL 283 270 303     Results from last 7 days   Lab Units 03/03/23  0254 03/02/23  0601   TRIGLYCERIDES mg/dL 120 180*   HDL mg/dL 41 42     Results from last 7 days   Lab Units 03/03/23  0254 03/02/23  1041 03/02/23  0601   POTASSIUM mmol/L 3 8 4 0 4 0   CHLORIDE mmol/L 106 103 104   CO2 mmol/L 24 27 25   BUN mg/dL 14 13 14   CREATININE mg/dL 0 60 0 66 0 66   CALCIUM mg/dL 9 2 9 8 9 7     Results from last 7 days   Lab Units 03/04/23  0314 03/03/23 2018 03/03/23  1206 03/02/23  0554 03/01/23  2318   INR   --   --   --   --  0 93   PTT seconds 95* 65* 52*   < > 38*    < > = values in this interval not displayed  Results from last 7 days   Lab Units 03/02/23  0601   MAGNESIUM mg/dL 2 1       Counseling / Coordination of Care  Total floor / unit time spent today 25 minutes  Greater than 50% of total time was spent with the patient and / or family counseling and / or coordination of care

## 2023-03-04 NOTE — PROGRESS NOTES
[address] [facility]  Progress Note Ryan Ax 1970, 46 y o  male MRN: [id number]  Unit/Bed#: [facility] 404-01 Encounter: [id number]  Primary Care Provider: Sandra Chou PA-C   Date and time admitted to hospital: 3/2/2023 11:38 PM    * ACS (acute coronary syndrome) [facility])  Assessment & Plan  · Patient originally by PCP for complaint of dyspnea with exertion  Patient denies any true chest pain  He was then recommended to undergo stress test   Patient was found to have abnormal stress test as an outpatient with 8 out of 10 chest pain and ST depressions in inferolateral leads   · Mild troponin elevation on presentation to [facility]  3-26-19 over 6 hours  · Current EKG NSR  Patient denies any symptoms at this time  · Patient underwent cardiac cath at [facility] showing evidence of triple-vessel disease    99% stenosis of mid circumflex, 70% stenosis of first marginal, 85% stenosis of mid LAD, 70% stenosis of first diagonal, 50% stenosis of distal RCA, 75% stenosis of proximal RCA, 80% stenosis of RPAV,and  50% stenosis of RPDA  · Patient transferred from [facility] to [facility] for CT surgery evaluation for possible CABG  · Continue ASA 81 mg oral daily  · Continue metoprolol tartrate 25 mg oral twice daily  · Continue nitroglycerin sublingual every 5 minutes as needed for chest pain  · Heparin gtt per ACS protocol  · CT surgery consult  · Cardiology consult  ·  telemetry    Patient pending work-up for CABG    Hyperlipidemia, mixed  Assessment & Plan  · Continue Lipitor 80 mg oral daily-increase from home dose  · Lipid panel reviewed    Essential hypertension  Assessment & Plan  · Current blood pressure 125/83  · Continue metoprolol tartrate 25 mg oral twice daily  · Holding home lisinopril preprocedurally-as well as HCTZ        VTE Pharmacologic Prophylaxis: VTE Score: 5 High Risk (Score >/= 5) - Pharmacological DVT Prophylaxis Ordered: heparin drip  Sequential Compression Devices Ordered  Patient Centered Rounds: I performed bedside rounds with nursing staff today  Discussions with Specialists or Other Care Team Provider: cards    Education and Discussions with Family / Patient: Patient declined call to   Total Time Spent on Date of Encounter in care of patient: 35 minutes This time was spent on one or more of the following: performing physical exam; counseling and coordination of care; obtaining or reviewing history; documenting in the medical record; reviewing/ordering tests, medications or procedures; communicating with other healthcare professionals and discussing with patient's family/caregivers  Current Length of Stay: 2 day(s)  Current Patient Status: Inpatient   Certification Statement: The patient will continue to require additional inpatient hospital stay due to Pending CABG  Discharge Plan: Anticipate discharge in >72 hrs to discharge location to be determined pending rehab evaluations  Code Status: Level 1 - Full Code    Subjective:   Patient denies any chest pain, pressure, shortness of breath, ambulatory without any difficulties    Objective:     Vitals:   Temp (24hrs), Av 9 °F (36 6 °C), Min:97 4 °F (36 3 °C), Max:98 4 °F (36 9 °C)    Temp:  [97 4 °F (36 3 °C)-98 4 °F (36 9 °C)] 97 4 °F (36 3 °C)  HR:  [49-66] 56  Resp:  [16-17] 17  BP: (125-147)/(74-86) 133/85  SpO2:  [94 %-97 %] 97 %  Body mass index is 28 69 kg/m²  Input and Output Summary (last 24 hours): Intake/Output Summary (Last 24 hours) at 3/4/2023 1318  Last data filed at 3/4/2023 0900  Gross per 24 hour   Intake 849 95 ml   Output 2650 ml   Net -1800 05 ml       Physical Exam:   Physical Exam  Vitals and nursing note reviewed  Constitutional:       General: He is not in acute distress  Appearance: He is well-developed  HENT:      Head: Normocephalic and atraumatic     Eyes:      Conjunctiva/sclera: Conjunctivae normal  Cardiovascular:      Rate and Rhythm: Normal rate and regular rhythm  Heart sounds: No murmur heard  Pulmonary:      Effort: Pulmonary effort is normal  No respiratory distress  Breath sounds: Normal breath sounds  Abdominal:      Palpations: Abdomen is soft  Tenderness: There is no abdominal tenderness  Musculoskeletal:         General: No swelling  Cervical back: Neck supple  Skin:     General: Skin is warm and dry  Capillary Refill: Capillary refill takes less than 2 seconds  Neurological:      Mental Status: He is alert  Psychiatric:         Mood and Affect: Mood normal           Additional Data:     Labs:  Results from last 7 days   Lab Units 03/03/23  0254 03/02/23  0601 03/01/23  1345   WBC Thousand/uL 6 49   < > 5 85   HEMOGLOBIN g/dL 15 0   < > 16 0   HEMATOCRIT % 43 2   < > 45 8   PLATELETS Thousands/uL 283   < > 303   NEUTROS PCT %  --   --  47   LYMPHS PCT %  --   --  37   MONOS PCT %  --   --  11   EOS PCT %  --   --  4    < > = values in this interval not displayed       Results from last 7 days   Lab Units 03/03/23  0254   SODIUM mmol/L 137   POTASSIUM mmol/L 3 8   CHLORIDE mmol/L 106   CO2 mmol/L 24   BUN mg/dL 14   CREATININE mg/dL 0 60   ANION GAP mmol/L 7   CALCIUM mg/dL 9 2   GLUCOSE RANDOM mg/dL 104     Results from last 7 days   Lab Units 03/01/23  2318   INR  0 93         Results from last 7 days   Lab Units 03/03/23  1017   HEMOGLOBIN A1C % 5 6           Lines/Drains:  Invasive Devices     Peripheral Intravenous Line  Duration           Peripheral IV 03/01/23 Left Antecubital 2 days                  Telemetry:  Telemetry Orders (From admission, onward)             48 Hour Telemetry Monitoring  Continuous x 48 hours        References:    Telemetry Guidelines   Question:  Reason for 48 Hour Telemetry  Answer:  Cardiac Surgery                 Telemetry Reviewed: Normal Sinus Rhythm  Indication for Continued Telemetry Use: Awaiting PCI/EP Study/CABG Imaging: No pertinent imaging reviewed  Recent Cultures (last 7 days):         Last 24 Hours Medication List:   Current Facility-Administered Medications   Medication Dose Route Frequency Provider Last Rate   • acetaminophen  650 mg Oral Q6H PRN Shantelle Oh PA-C     • aspirin  81 mg Oral Daily OBED Pierson-C     • atorvastatin  80 mg Oral Daily With [employer], DO     • chlorhexidine  15 mL Mouth/Throat Q12H [address]     • heparin (porcine)  3-20 Units/kg/hr (Order-Specific) Intravenous Titrated Shantelle Oh PA-C 15 1 Units/kg/hr (03/04/23 0909)   • metoprolol tartrate  25 mg Oral Q12H [facility] Claude Sung PA-C     • mupirocin  1 application  Nasal Q12H [address]     • nitroglycerin  0 4 mg Sublingual Q5 Min PRN Shantelle Oh PA-C     • ondansetron  4 mg Intravenous Q6H PRN TI PiersonC          Today, Patient Was Seen By: Eren Campos DO    **Please Note: This note may have been constructed using a voice recognition system  **

## 2023-03-04 NOTE — PLAN OF CARE
Problem: CARDIOVASCULAR - ADULT  Goal: Maintains optimal cardiac output and hemodynamic stability  Description: INTERVENTIONS:  - Monitor I/O, vital signs and rhythm  - Monitor for S/S and trends of decreased cardiac output  - Administer and titrate ordered vasoactive medications to optimize hemodynamic stability  - Assess quality of pulses, skin color and temperature  - Assess for signs of decreased coronary artery perfusion  - Instruct patient to report change in severity of symptoms  Outcome: Progressing  Goal: Absence of cardiac dysrhythmias or at baseline rhythm  Description: INTERVENTIONS:  - Continuous cardiac monitoring, vital signs, obtain 12 lead EKG if ordered  - Administer antiarrhythmic and heart rate control medications as ordered  - Monitor electrolytes and administer replacement therapy as ordered  Outcome: Progressing     Problem: RESPIRATORY - ADULT  Goal: Achieves optimal ventilation and oxygenation  Description: INTERVENTIONS:  - Assess for changes in respiratory status  - Assess for changes in mentation and behavior  - Position to facilitate oxygenation and minimize respiratory effort  - Oxygen administered by appropriate delivery if ordered  - Initiate smoking cessation education as indicated  - Encourage broncho-pulmonary hygiene including cough, deep breathe, Incentive Spirometry  - Assess the need for suctioning and aspirate as needed  - Assess and instruct to report SOB or any respiratory difficulty  - Respiratory Therapy support as indicated  Outcome: Progressing     Problem: METABOLIC, FLUID AND ELECTROLYTES - ADULT  Goal: Electrolytes maintained within normal limits  Description: INTERVENTIONS:  - Monitor labs and assess patient for signs and symptoms of electrolyte imbalances  - Administer electrolyte replacement as ordered  - Monitor response to electrolyte replacements, including repeat lab results as appropriate  - Instruct patient on fluid and nutrition as appropriate  Outcome: Progressing  Goal: Fluid balance maintained  Description: INTERVENTIONS:  - Monitor labs   - Monitor I/O and WT  - Instruct patient on fluid and nutrition as appropriate  - Assess for signs & symptoms of volume excess or deficit  Outcome: Progressing

## 2023-03-05 LAB
APTT PPP: 79 SECONDS (ref 23–37)
MRSA NOSE QL CULT: NORMAL

## 2023-03-05 RX ADMIN — METOPROLOL TARTRATE 25 MG: 25 TABLET, FILM COATED ORAL at 09:13

## 2023-03-05 RX ADMIN — HEPARIN SODIUM 15.1 UNITS/KG/HR: 10000 INJECTION, SOLUTION INTRAVENOUS at 01:00

## 2023-03-05 RX ADMIN — MUPIROCIN 1 APPLICATION.: 20 OINTMENT TOPICAL at 21:05

## 2023-03-05 RX ADMIN — ASPIRIN 81 MG: 81 TABLET, COATED ORAL at 09:13

## 2023-03-05 RX ADMIN — MUPIROCIN 1 APPLICATION.: 20 OINTMENT TOPICAL at 09:13

## 2023-03-05 RX ADMIN — HEPARIN SODIUM 15.1 UNITS/KG/HR: 10000 INJECTION, SOLUTION INTRAVENOUS at 21:05

## 2023-03-05 RX ADMIN — CHLORHEXIDINE GLUCONATE 15 ML: 1.2 SOLUTION ORAL at 21:05

## 2023-03-05 RX ADMIN — METOPROLOL TARTRATE 25 MG: 25 TABLET, FILM COATED ORAL at 21:05

## 2023-03-05 RX ADMIN — CHLORHEXIDINE GLUCONATE 15 ML: 1.2 SOLUTION ORAL at 09:13

## 2023-03-05 RX ADMIN — ATORVASTATIN CALCIUM 80 MG: 80 TABLET, FILM COATED ORAL at 17:03

## 2023-03-05 NOTE — NURSING NOTE
Educated Robson Garcia on the following:    Open-Heart Surgery - What to expect    How do I get ready for surgery? The following instructions are important for you to be fully prepared for your surgery    Six days before your surgery:  •  the nasal ointment prescribed by your surgeon at your pharmacy    Five days before your surgery:  • Apply the nasal ointment to both nostrils twice a day (morning and bedtime) until your surgery    Two nights before your surgery:  • Take a shower following the Pre-Operative Showering Instructions in this booklet  • Use a clean towel and washcloth  Wear clean pajamas  Sleep on clean sheets  • These precautions help prevent post-operative infections    One day before your surgery:  • The hospital will call you to confirm your arrival time and location  The call is usually made between 2:00 - 8:00 PM   • If your surgery is on a Monday, the hospital will call you the Friday before  • You may pack a small overnight bag for toiletries and other small items that you may want with you during your hospital stay  Avoid bringing valuables such as money, electronics, or jewelry  One night before your surgery:  • Take a shower following the Pre-Operative Showering Instructions in this booklet  • Use a clean towel and washcloth  Wear clean pajamas  Sleep on clean sheets  • These precautions help prevent post-operative infections  • DO NOT eat any junk foods  • DO NOT smoke  • DO NOT drink alcohol  • DO NOT drink or eat ANYTHING after midnight, including water, gum, or lozenges  Remember to take your regular medications until the day of surgery, unless your surgeon instructs you otherwise  Certain medications may be stopped prior to surgery  Your surgeon will tell you which medications to stop and when to stop them  If you develop a cold, sore throat, cough, fever, or other illness seven days before your surgery, call the surgeons office        What happens the day of surgery?  o If you have diabetes, DO NOT take any of your diabetes medication the morning of surgery, including insulin   o Bring a list of the medications you normally take   o Bring a list of your previous surgery and other medical history, including when you last had the pneumococcal vaccine and flu vaccine  o Bring your overnight bag   o Bring your insurance cards  o Bring a valid form of identification, such as a drivers license or passport   o Do not drive yourself to the hospital  Have someone drive you  What happens when I arrive at the hospital?  o You will be greeted and registered  o A nurse will ask you questions about your medical history   o You will bath with a special antiseptic soap and change into a clean hospital gown   o You will be taken by stretcher to the [address]  You will meet your Anesthesiologist  Kamryn Knock will be asked to confirm the surgery you are scheduled for  Your Consent form will be reviewed  A nurse will place an IV in your arm and you will be given medication to help you relax    o You will be taken by stretcher to the Operating Room when it is time for your surgery  What should I expect immediately after surgery?  o You will wake up in the ICU on PPHP 4   o You will have a breathing tube in place until you are fully awake  Once you are able to move your arms and legs, the breathing tube will be removed   o You will have chest tubes, IV's in your arms and neck, and a catheter to drain urine   o You will have a lot of equipment around your bed to monitor your heart and vital signs   o Your nurses will give you pain medication to help with discomfort from the surgery  o Your loved ones will be able to visit you as soon as you have been settled into your room    o The nursing staff will be providing you with care, including:  - Asking you about your pain level  - Helping you stay comfortable in bed  - Monitoring your vital signs, heart rhythm, and incisions  - Assisting you with bathing, toileting, and mouth care  - Obtaining lab specimens    Surgeon/Physician Assistant/Nurse Rounds  • Your surgeon, PA, and nurse will round on you together daily to discuss your progress and discuss your plan of care for the day  • They will review your weight, lab work, vital signs, incisions, and medications  • This is a good time to ask questions and be involved in your plan of care  Transition of Care  • Most patients stay in the ICU for 1 or 2 days  • You will be helped out of bed into a chair the morning after surgery  You may be moved out of the ICU to the stepdown unit later that day  • In the stepdown unit, your vital signs will still be monitored, but it will be less frequent and less invasive  This allows you to regain your independence and gives you more quiet time  • Most patients are discharged from the hospital in 3-5 days after surgery  Pain Management  • Pain medicine will be ordered to reduce your level of pain  There are different medications ordered for different levels of pain  The medications can be given through your IV or taken by mouth  • Too much pain medication at once can be dangerous  There is a limit on the amount of pain medication you can take at a time, so you might still experience some discomfort after taking the medication  • You will be given a splinting pillow to help brace your incision  This helps reduce pain caused by deep breathing or coughing  Incentive Spirometer (IS)  • Taking deep breaths after surgery can be uncomfortable, but it is important to do so  Your lungs need help recovering once the breathing tube is taken out after surgery  • You will be given a device for breathing exercises called an incentive spirometer (IS)  This helps to exercise your lungs  It also helps prevent pneumonia  • You must use the IS at least 10 times every hour  • Use the splinting pillow during breathing exercises to reduce discomfort    • The instructions sheet in this booklet reviews how to use the IS correctly  Activity/Mobility  • Short, frequent walks after surgery help you regain your strength, exercise your lungs, and move your bowels  • You will be helped out of bed and into a chair every morning  You will stay out of bed for most of the day  • You will walk with a staff member in the hallway 3 to 4 times a day  • Physical Therapists and Occupational Therapists will evaluate your functional abilities and teach you safe exercises to do in your free time  • Nurses will assist you with bathing and mouth care if needed  • You will be weighed early each morning  Diet and Fluid Intake  • A decreased appetite is normal for a couple days after surgery  It will likely return to normal after 3 days  • A diet high in protein and low in sodium, carbohydrates, and fat will speed up the healing process  • Information will be given on a Heart Healthy diet  You can ask your surgeon about specific food items  • After surgery, your body will retain fluid  This puts extra strain on your heart and causes it to work harder  Because of this, you will be given a limited amount of fluid each day  You will also be given medication that makes you urinate more frequently  Preparing for discharge  • It is normal to have questions and concerns when you are getting ready to leave the hospital   • Staff will spend every day of your hospital stay teaching what you need to know to care for yourself at home, and ways to recognize if there are complications  • On the day of discharge, your nurse will give you documents with discharge instructions  They will review it with you and you will be able to ask questions  • Teaching topics may include:  o New medications, what they are for, how to take them, and when to take them  Most patients will have new heart medications after surgery  o Insulin, if prescribed for diabetes   Many patients with diabetes may temporarily need a change to their diabetic medications for a short time after surgery  If you have not previously taken insulin, your nurse will teach you how to use it and how to check your blood sugars  o How to care for your incision and recognize signs of infection  o Heart Healthy Diet, including limiting your sodium and monitoring your fluid intake  o Physical activity, including frequent short walks, rest periods, and slowly increasing your activity level  Care for Chest tube puncture sites and incision  These instructions will aide in your recovery  Please follow them carefully  Ask your surgeon or physician assistant if you have any questions  • On your day of discharge, your nurse will give you a special antiseptic soap called chlorhexidine (CHG) to use once you get home  • Shower daily, using the CHG soap for 5 days  • Use a clean washcloth each day  It is ok to use disposable washcloths  • During your shower, gently wash all puncture sites and incisions  Do not scrub vigorously  • Rinse thoroughly with clean water  • Use a clean towel to dry off after each shower  Pat the incision area dry  Do not rub with the towel  • Do not apply any lotions, powders, oils, ointments, or creams to the puncture sites or incision  • Leave them open to air  A small amount of drainage may come from your puncture sites  If this happens, you may apply a sterile gauze dressing to the puncture sites using tape to secure it  Remove the dressing before your next shower  Do not leave the same gauze on for more than a day  Stop using gauze once there is no more drainage  • Look at your puncture sites and incisions daily  Look for signs of infection  Call the surgeon's office immediately if you have any of the following: Increased pain, swelling, redness, chills or fever (temp over 100 5F), foul odor, or green/yellow drainage from puncture sites or incision  • Do not pick at or touch the puncture sites or incisions   Scabs will come off on their own  It is normal to have scabbing even after one month from surgery  Follow-up with your doctors   You will be seen in the surgeon's office one month after discharge from the hospital  At that time, they will make sure everything is healing appropriately  • At that time, you may be cleared to drive again  Do not drive before being seen by the surgeon  • You may also be cleared to attend cardiac rehab  A prescription will be given to you in the office  • You should see your PCP and Cardiologist before seeing the surgeon  The cardiologist will take over management of your heart medications  • This will likely be the last time you need to be seen by the surgeon  PRE-OP SHOWERING INSTRUCTIONS    Before your operation, you play an important role in decreasing your risk for infection  Washing your body thoroughly in order to reduce bacteria on the skin can help prevent infections at the surgical site  Please read the following directions THE WEEK BEFORE SURGERY so you are ready! WEEK BEFORE SURGERY  If your surgeon instructs you to use a special antiseptic soap containing chlorhexidine gluconate (CHG) prior to surgery, you should obtain this soap at least one week before your operation  • Common brand names include: Aplicare, Endure, and Hibiclens  • CHG soap is available from some doctor's offices, [address] [place] [address] or most retail pharmacies and the [employer]  • If you are allergic or sensitive to CHG, please let your doctor know so another anticeptic soap can be suggested  • If you are unable to purchase soap containing CHG, use regular soap as instructed below  Notify the nurse on arrival the day of surgery  DAY BEFORE SURGERY  You will need to shower the night before AND the morning of your surgery  You will need to prepare your bed and clothing so they are clean and ready after your showers    • Place clean linens (sheets) on your bed; you should sleep on clean sheets after your evening shower  • Get CLEAN towel and washcloths ready - you will need enough for two showers  • Set aside clean underwear, pajamas, and clothing to wear after your showers  EVENING BEFORE SURGERY  The evening before your operation, take your first shower  Shower 1:  • First, shampoo your hair with regular shampoo and rinse it completely before you wash your body  • Next, wash your body from head to toe with soap and a clean washcloth  • If you were instructed by your surgeon to wash with CHG soap:  o Use ½ of the bottle of soap for this shower (you will use the other ½ of the bottle for you shower in the morning)  • Do not get CHG in your eyes, ears, nose, mouth or vaginal area  • If you are using regular soap, try to use a new bar if possible  • Pay special attention to the area where your incision will be; lather this area well with the CHG soap for about 2 minutes  • DO NOT use any other soap or body rinse on your skin during or after the antiseptic soap  • Rinse yourself completely with running water  • Use a clean towel to dry off  • Put on clean underwear and clothing/pajamas  • Sleep on clean bed sheets/linens  REMINDERS:  ? DO NOT use lotion, powder, deodorant, or perfume/aftershave of any kind on your skin after your antiseptic shower  ? DO NOT shave any body parts in the 24hours/the day before your operation  DAY OF SURGERY  The morning of your operation take your second shower  Shower 2:  • Follow the same steps and Shower 1   • If you were instructed by your surgeon to wash with CHG soap, use the second ½ of the CHG soap  HOW TO USE AN INCENTIVE SPIROMETER    WHAT YOU NEED TO KNOW:  What is an incentive spirometer? An incentive spirometer is a device that measures how deeply you can inhale (breathe in)  It helps you take slow, deep breaths to expand and fill your lungs with air  This helps prevent lung problems, such as pneumonia   The incentive spirometer is made up of a breathing tube, an air chamber  The breathing tube is connected to the air chamber and has a mouth piece at the end  The indicator is found inside the device  Why do I need to use an incentive spirometer? An incentive spirometer is most commonly used after surgery  People who are at an increased risk of airway or breathing problems may also use one  These include people who smoke or have lung disease  This may also include people who are not active or cannot move well  How do I use an incentive spirometer? Sit up as straight as possible  Do not bend your head forward or backward  Hold the incentive spirometer in an upright position  Place the target pointer to the level that you need to reach  Exhale (breathe out) normally and then do the following:  • Put the mouthpiece in your mouth and close your lips tightly around it  Do not block the mouthpiece with your tongue  • Inhale slowly and deeply through the mouthpiece to raise the indicator  Try to make the indicator rise up to the level of the goal marker  • When you cannot inhale any longer, remove the mouthpiece and hold your breath for at least 3 seconds  • Exhale normally  • Repeat these steps 10-12 times every hour when you are awake, or as often as directed  • Clean the mouthpiece with soap and water after each use  Do not use a disposable mouthpiece for longer than 24 hours  • Keep a log of the highest level you are able to reach each time  This will help healthcare providers see If your lung function improves

## 2023-03-05 NOTE — PROGRESS NOTES
[address] [facility]  Progress Note Leanne Velez 1970, 46 y o  male MRN: [id number]  Unit/Bed#: [facility] 404-01 Encounter: [id number]  Primary Care Provider: Lenda Goldmann, PA-C   Date and time admitted to hospital: 3/2/2023 11:38 PM    * ACS (acute coronary syndrome) [facility])  Assessment & Plan  · Patient originally by PCP for complaint of dyspnea with exertion  Patient denies any true chest pain  He was then recommended to undergo stress test   Patient was found to have abnormal stress test as an outpatient with 8 out of 10 chest pain and ST depressions in inferolateral leads   · Mild troponin elevation on presentation to [facility]  3-26-19 over 6 hours  · Current EKG NSR  Patient denies any symptoms at this time  · Patient underwent cardiac cath at [facility] showing evidence of triple-vessel disease    99% stenosis of mid circumflex, 70% stenosis of first marginal, 85% stenosis of mid LAD, 70% stenosis of first diagonal, 50% stenosis of distal RCA, 75% stenosis of proximal RCA, 80% stenosis of RPAV,and  50% stenosis of RPDA  · Patient transferred from [facility] to [facility] for CT surgery evaluation for possible CABG  · Continue ASA 81 mg oral daily  · Continue metoprolol tartrate 25 mg oral twice daily  · Continue nitroglycerin sublingual every 5 minutes as needed for chest pain  · Heparin gtt per ACS protocol  · CT surgery consult  · Cardiology consult  ·  telemetry    Patient planned for CABG this upcoming Wednesday per CT surgery  Patient ambulating around the hallways with family without any difficulty denies any chest pressure, pain shortness of breath lightheadedness dizziness    Hyperlipidemia, mixed  Assessment & Plan  · Continue Lipitor 80 mg oral daily-increase from home dose  · Lipid panel reviewed    Essential hypertension  Assessment & Plan  · Pressure slightly elevated from 1 4883 systolic while holding home medications, will continue to monitor and use as needed agents if blood pressure goes above 160  · Continue metoprolol tartrate 25 mg oral twice daily  · Holding home lisinopril preprocedurally-as well as HCTZ          VTE Pharmacologic Prophylaxis: VTE Score: 5 High Risk (Score >/= 5) - Pharmacological DVT Prophylaxis Ordered: heparin drip  Sequential Compression Devices Ordered  Patient Centered Rounds: I performed bedside rounds with nursing staff today  Discussions with Specialists or Other Care Team Provider: cards    Education and Discussions with Family / Patient: Patient declined call to   Total Time Spent on Date of Encounter in care of patient: 35 minutes This time was spent on one or more of the following: performing physical exam; counseling and coordination of care; obtaining or reviewing history; documenting in the medical record; reviewing/ordering tests, medications or procedures; communicating with other healthcare professionals and discussing with patient's family/caregivers  Current Length of Stay: 3 day(s)  Current Patient Status: Inpatient   Certification Statement: The patient will continue to require additional inpatient hospital stay due to CABG Wednesday  Discharge Plan: Anticipate discharge in >72 hrs to home  Code Status: Level 1 - Full Code    Subjective:   Denies any acute complaints ambulating around the hallway with his family without any difficulty  Denies chest pressure, pain or shortness of breath    Objective:     Vitals:   Temp (24hrs), Av °F (36 7 °C), Min:97 6 °F (36 4 °C), Max:98 3 °F (36 8 °C)    Temp:  [97 6 °F (36 4 °C)-98 3 °F (36 8 °C)] 98 °F (36 7 °C)  HR:  [52-71] 58  Resp:  [17-20] 17  BP: (136-151)/(84-97) 143/91  SpO2:  [95 %-98 %] 95 %  Body mass index is 28 69 kg/m²  Input and Output Summary (last 24 hours):      Intake/Output Summary (Last 24 hours) at 3/5/2023 1407  Last data filed at 3/5/2023 0800  Gross per 24 hour   Intake 346 12 ml   Output 1600 ml   Net -1253 88 ml       Physical Exam:   Physical Exam  Vitals and nursing note reviewed  Constitutional:       General: He is not in acute distress  Appearance: He is well-developed  HENT:      Head: Normocephalic and atraumatic  Eyes:      Conjunctiva/sclera: Conjunctivae normal    Cardiovascular:      Rate and Rhythm: Normal rate and regular rhythm  Heart sounds: No murmur heard  Pulmonary:      Effort: Pulmonary effort is normal  No respiratory distress  Breath sounds: Normal breath sounds  Abdominal:      Palpations: Abdomen is soft  Tenderness: There is no abdominal tenderness  Musculoskeletal:         General: No swelling  Cervical back: Neck supple  Skin:     General: Skin is warm and dry  Capillary Refill: Capillary refill takes less than 2 seconds  Neurological:      Mental Status: He is alert  Psychiatric:         Mood and Affect: Mood normal           Additional Data:     Labs:  Results from last 7 days   Lab Units 03/03/23  0254 03/02/23  0601 03/01/23  1345   WBC Thousand/uL 6 49   < > 5 85   HEMOGLOBIN g/dL 15 0   < > 16 0   HEMATOCRIT % 43 2   < > 45 8   PLATELETS Thousands/uL 283   < > 303   NEUTROS PCT %  --   --  47   LYMPHS PCT %  --   --  37   MONOS PCT %  --   --  11   EOS PCT %  --   --  4    < > = values in this interval not displayed       Results from last 7 days   Lab Units 03/03/23  0254   SODIUM mmol/L 137   POTASSIUM mmol/L 3 8   CHLORIDE mmol/L 106   CO2 mmol/L 24   BUN mg/dL 14   CREATININE mg/dL 0 60   ANION GAP mmol/L 7   CALCIUM mg/dL 9 2   GLUCOSE RANDOM mg/dL 104     Results from last 7 days   Lab Units 03/01/23  2318   INR  0 93         Results from last 7 days   Lab Units 03/03/23  1017   HEMOGLOBIN A1C % 5 6           Lines/Drains:  Invasive Devices     Peripheral Intravenous Line  Duration           Peripheral IV 03/05/23 Proximal;Right;Ventral (anterior) Forearm <1 day                  Telemetry:  Telemetry Orders (From admission, onward)             48 Hour Telemetry Monitoring  Continuous x 48 hours        References:    Telemetry Guidelines   Question:  Reason for 48 Hour Telemetry  Answer:  Cardiac Surgery                 Telemetry Reviewed: Normal Sinus Rhythm  Indication for Continued Telemetry Use: Awaiting PCI/EP Study/CABG             Imaging: No pertinent imaging reviewed  Recent Cultures (last 7 days):         Last 24 Hours Medication List:   Current Facility-Administered Medications   Medication Dose Route Frequency Provider Last Rate   • acetaminophen  650 mg Oral Q6H PRN Corazon Razo PA-C     • aspirin  81 mg Oral Daily Corazon Razo PA-C     • atorvastatin  80 mg Oral Daily With Coca[employer],      • chlorhexidine  15 mL Mouth/Throat Q12H [address]     • heparin (porcine)  3-20 Units/kg/hr (Order-Specific) Intravenous Titrated Corazon Razo PA-C 15 1 Units/kg/hr (03/05/23 0800)   • metoprolol tartrate  25 mg Oral Q12H [address] Claude Sung PA-C     • mupirocin  1 application  Nasal Q12H [address]     • nitroglycerin  0 4 mg Sublingual Q5 Min PRN Corazon Razo PA-C     • ondansetron  4 mg Intravenous Q6H PRN Corazon Razo PA-C          Today, Patient Was Seen By: Irl Fothergill, DO    **Please Note: This note may have been constructed using a voice recognition system  **

## 2023-03-05 NOTE — PLAN OF CARE
Problem: CARDIOVASCULAR - ADULT  Goal: Maintains optimal cardiac output and hemodynamic stability  Description: INTERVENTIONS:  - Monitor I/O, vital signs and rhythm  - Monitor for S/S and trends of decreased cardiac output  - Administer and titrate ordered vasoactive medications to optimize hemodynamic stability  - Assess quality of pulses, skin color and temperature  - Assess for signs of decreased coronary artery perfusion  - Instruct patient to report change in severity of symptoms  Outcome: Progressing  Goal: Absence of cardiac dysrhythmias or at baseline rhythm  Description: INTERVENTIONS:  - Continuous cardiac monitoring, vital signs, obtain 12 lead EKG if ordered  - Administer antiarrhythmic and heart rate control medications as ordered  - Monitor electrolytes and administer replacement therapy as ordered  Outcome: Progressing     Problem: RESPIRATORY - ADULT  Goal: Achieves optimal ventilation and oxygenation  Description: INTERVENTIONS:  - Assess for changes in respiratory status  - Assess for changes in mentation and behavior  - Position to facilitate oxygenation and minimize respiratory effort  - Oxygen administered by appropriate delivery if ordered  - Initiate smoking cessation education as indicated  - Encourage broncho-pulmonary hygiene including cough, deep breathe, Incentive Spirometry  - Assess the need for suctioning and aspirate as needed  - Assess and instruct to report SOB or any respiratory difficulty  - Respiratory Therapy support as indicated  Outcome: Progressing     Problem: METABOLIC, FLUID AND ELECTROLYTES - ADULT  Goal: Electrolytes maintained within normal limits  Description: INTERVENTIONS:  - Monitor labs and assess patient for signs and symptoms of electrolyte imbalances  - Administer electrolyte replacement as ordered  - Monitor response to electrolyte replacements, including repeat lab results as appropriate  - Instruct patient on fluid and nutrition as appropriate  Outcome: Progressing  Goal: Fluid balance maintained  Description: INTERVENTIONS:  - Monitor labs   - Monitor I/O and WT  - Instruct patient on fluid and nutrition as appropriate  - Assess for signs & symptoms of volume excess or deficit  Outcome: Progressing     Problem: SKIN/TISSUE INTEGRITY - ADULT  Goal: Skin Integrity remains intact(Skin Breakdown Prevention)  Description: Assess:  -Perform Rolf assessment every   -Clean and moisturize skin every   -Inspect skin when repositioning, toileting, and assisting with ADLS  -Assess under medical devices such as  every   -Assess extremities for adequate circulation and sensation     Bed Management:  -Have minimal linens on bed & keep smooth, unwrinkled  -Change linens as needed when moist or perspiring  -Avoid sitting or lying in one position for more than  hours while in bed  -Keep HOB at degrees     Toileting:  -Offer bedside commode  -Assess for incontinence every   -Use incontinent care products after each incontinent episode such as     Activity:  -Mobilize patient  times a day  -Encourage activity and walks on unit  -Encourage or provide ROM exercises   -Turn and reposition patient every  Hours  -Use appropriate equipment to lift or move patient in bed  -Instruct/ Assist with weight shifting every  when out of bed in chair  -Consider limitation of chair time  hour intervals    Skin Care:  -Avoid use of baby powder, tape, friction and shearing, hot water or constrictive clothing  -Relieve pressure over bony prominences using   -Do not massage red bony areas    Next Steps:  -Teach patient strategies to minimize risks such as    -Consider consults to  interdisciplinary teams such as  Outcome: Progressing     Problem: HEMATOLOGIC - ADULT  Goal: Maintains hematologic stability  Description: INTERVENTIONS  - Assess for signs and symptoms of bleeding or hemorrhage  - Monitor labs  - Administer supportive blood products/factors as ordered and appropriate  Outcome: Progressing

## 2023-03-05 NOTE — PROGRESS NOTES
Progress Note - Cardiothoracic Surgery   Lobo Carranza 46 y o  male MRN: [id number]  Unit/Bed#: [facility] 404-01 Encounter: [id number]      24 Hour Events: No events or complaints  Medications:   Scheduled Meds:  Current Facility-Administered Medications   Medication Dose Route Frequency Provider Last Rate   • acetaminophen  650 mg Oral Q6H PRN Rosina Kraus PA-C     • aspirin  81 mg Oral Daily Rosina Kraus PA-C     • atorvastatin  80 mg Oral Daily With Jonah Medrano DO     • chlorhexidine  15 mL Mouth/Throat Q12H [address] [facility]     • heparin (porcine)  3-20 Units/kg/hr (Order-Specific) Intravenous Titrated Rosina Kraus PA-C 15 1 Units/kg/hr (03/05/23 0100)   • metoprolol tartrate  25 mg Oral Q12H [address] Claude Sung PA-C     • mupirocin  1 application  Nasal Q12H [address]     • nitroglycerin  0 4 mg Sublingual Q5 Min PRN Rosina Kraus PA-C     • ondansetron  4 mg Intravenous Q6H PRN Rosina Kraus PA-C       Continuous Infusions:heparin (porcine), 3-20 Units/kg/hr (Order-Specific), Last Rate: 15 1 Units/kg/hr (03/05/23 0100)        Results:   Results from last 7 days   Lab Units 03/03/23  0254 03/02/23  0601 03/01/23  1345   WBC Thousand/uL 6 49 6 04 5 85   HEMOGLOBIN g/dL 15 0 15 8 16 0   HEMATOCRIT % 43 2 44 2 45 8   PLATELETS Thousands/uL 283 270 303     Results from last 7 days   Lab Units 03/03/23  0254 03/02/23  1041 03/02/23  0601   POTASSIUM mmol/L 3 8 4 0 4 0   CHLORIDE mmol/L 106 103 104   CO2 mmol/L 24 27 25   BUN mg/dL 14 13 14   CREATININE mg/dL 0 60 0 66 0 66   CALCIUM mg/dL 9 2 9 8 9 7     Results from last 7 days   Lab Units 03/05/23  0440 03/04/23  1817 03/04/23  1039 03/02/23  0554 03/01/23  2318   INR   --   --   --   --  0 93   PTT seconds 79* 60* 68*   < > 38*    < > = values in this interval not displayed         Studies:     Cardiac Catheterization: Multivessel coronary artery disease involving 85% mid LAD, 70% proximal diagonal, 70% high OM, 99% mid circumflex and 75% proximal RCA  Echocardiogram: Interpretation Summary       •  Left Ventricle: Left ventricular cavity size is normal  Wall thickness is normal  The left ventricular ejection fraction is 55%  Systolic function is normal  Wall motion is normal  Diastolic function is mildly abnormal, consistent with grade I (abnormal) relaxation  Carotid artery duplex:  CONCLUSION:     Impression  RIGHT:  There is <50% stenosis noted in the internal carotid artery  Plaque is  heterogenous and irregular  Vertebral artery flow is antegrade  There is no significant subclavian artery  disease  LEFT:  There is <50% stenosis noted in the internal carotid artery  Plaque is  heterogenous and irregular  Vertebral artery flow is antegrade  There is no significant subclavian artery  Disease  palmar  CONCLUSION:     Impression     RIGHT UPPER LIMB:  ABNORMAL:  Evaluation shows an incomplete palmar arch, as interrogated with alternate  compression and release of the radial and ulnar arteries  The perfusion of the right hand is dependent upon the ulnar artery  LEFT UPPER LIMB:  Evaluation shows a complete palmar arch, as interrogated with alternate  compression and release of the radial and ulnar arteries  Greater saphenous vein mapping: CONCLUSION:     Impression:  RIGHT LOWER LIMB:  The great saphenous vein is patent  from the groin to the ankle  The intraluminal diameter measurements range from 9 1 mm to 1 5 mm throughout  LEFT LOWER LIMB:  The great saphenous vein is patent  from the groin to the ankle  The intraluminal diameter measurements range from 6 9 mm to 1 2 mm throughout      Vitals:   Vitals:    03/04/23 2140 03/04/23 2242 03/05/23 0351 03/05/23 0654   BP: 141/97 140/84 142/85 151/93   Pulse: 61 (!) 52 61 57   Resp:   20 20   Temp:  98 2 °F (36 8 °C) 97 8 °F (36 6 °C) 97 6 °F (36 4 °C)   TempSrc:       SpO2: 97% 97% 95% 98%   Weight: Height:           Physical Exam:    HEENT/NECK:  Normocephalic  Atraumatic  No jugular venous distention  Cardiac: Regular rate and rhythm  Pulmonary:  Breath sounds clear bilaterally  Abdomen:  Non-tender and Non-distended  Extremities: Extremities warm/dry  Neuro: Alert and oriented X 3  Skin: Warm/Dry, without rashes or lesions  Assessment: CAD ongoing CABG workup     Plan:  Patient agreeable to proceed with surgery; Informed consent signed    Carotid ultrasound completed, and acceptable    poor vein mapping for use in conduit for CABG, has a complete palmar arch on the left hand  ** Hand dominant     Continue Mupirocin 2% nasal ointment q 12 hrs    Continue topical chlorhexidine bath and mouth rise    coronary artery bypass grafting scheduled for wednesday with VIKAS Robles     SIGNATURE: Rodrigo Rios  DATE: March 5, 2023  TIME: 8:09 AM    * This note was completed in part utilizing m-[employer] fluency direct voice recognition software  Grammatical errors, random word insertion, spelling mistakes, and incomplete sentences may be an occasional consequence of the system secondary to software limitations, ambient noise and hardware issues  At the time of dictation, efforts were made to edit, clarify and /or correct errors  Please read the chart carefully and recognize, using context, where substitutions have occurred  If you have any questions or concerns about the context, text or information contained within the body of this dictation, please contact myself, the provider, for further clarification

## 2023-03-05 NOTE — CASE MANAGEMENT
Case Management Assessment & Discharge Planning Note    Patient name Fer Powers  Location [address] [place] [address] [facility] [id number]  : 1970 Date 3/5/2023       Current Admission Date: 3/2/2023  Current Admission Diagnosis:ACS (acute coronary syndrome) [facility])   Patient Active Problem List    Diagnosis Date Noted   • [facility] 2023   • Chest pain 2023   • Arthritis of both hips 2021   • Femoroacetabular impingement of left hip 2021   • Hamstring tightness of both lower extremities 2021   • Weakness of both hips 2021   • COVID-19 2020   • Claudication of both lower extremities ([facility] ) 2020   • Varicose veins of both lower extremities with pain 2020   • Bilateral leg pain 2020   • Bilateral hand numbness 2020   • Excessive daytime sleepiness 2020   • Overweight (BMI 25 0-29 9) 10/16/2019   • Achilles tendinosis of right lower extremity 2019   • Cutaneous skin tags 2019   • Essential hypertension 2018   • Hyperlipidemia, mixed 2018   • Vitamin D deficiency 2018   • Low libido 2018      LOS (days): 3  Geometric Mean LOS (GMLOS) (days):   Days to GMLOS:     OBJECTIVE:    Risk of Unplanned Readmission Score: 8 01         Current admission status: Inpatient       Preferred Pharmacy:   [facility] [address], [address]   [address] [address]  Phone: [phone] Fax: [address], [address] [facility] [address]  [address]  Phone: [phone] Fax: [phone]    [address] [address]  Phone: [phone] Fax: [phone]    Primary Care Provider: Norma Talbert PA-C    Primary Insurance: Ester Brewster  Secondary Insurance:     ASSESSMENT:  Christina 26 Proxies    There are no active Health Care Proxies on file  Patient Information  Admitted from[de-identified] Home  Mental Status: Alert  During Assessment patient was accompanied by: Spouse  Assessment information provided by[de-identified] Patient  Primary Caregiver: Self  Support Systems: Family members  What city do you live in?: [place]  Home entry access options   Select all that apply : Stairs  Number of steps to enter home : 3  Type of Current Residence: 2 story home  Upon entering residence, is there a bedroom on the main floor (no further steps)?: Yes  Upon entering residence, is there a bathroom on the main floor (no further steps)?: Yes  In the last 12 months, was there a time when you were not able to pay the mortgage or rent on time?: No  In the last 12 months, how many places have you lived?: 1  In the last 12 months, was there a time when you did not have a steady place to sleep or slept in a shelter (including now)?: No  Homeless/housing insecurity resource given?: N/A  Living Arrangements: Lives w/ Spouse/significant other    Activities of Daily Living Prior to Admission  Functional Status: Independent  Completes ADLs independently?: Yes  Ambulates independently?: Yes  Does patient use assisted devices?: No  Does patient currently own DME?: No  Does patient have a history of Outpatient Therapy (PT/OT)?: No  Does the patient have a history of Short-Term Rehab?: No  Does patient have a history of HHC?: No  Does patient currently have [place] [id number]?: No         Patient Information Continued  Income Source: Employed  Does patient have prescription coverage?: Yes  Within the past 12 months, you worried that your food would run out before you got the money to buy more : Never true  Within the past 12 months, the food you bought just didn't last and you didn't have money to get more : Never true  Food insecurity resource given?: N/A  Does patient receive dialysis treatments?: No         Means of Transportation  Means of Transport to Appts[de-identified] Drives Self  In the past 12 months, has lack of transportation kept you from medical appointments or from getting medications?: No  In the past 12 months, has lack of transportation kept you from meetings, work, or from getting things needed for daily living?: No  Was application for public transport provided?: N/A    Discharge planning discussed with[de-identified] Pt and spouse at bedside  Freedom of Choice: Yes     CM contacted family/caregiver?: Yes (Spouse in room)       Contacts  Patient Contacts: Vladimir Villa  Relationship to Patient[de-identified] Family  Contact Method: Phone, In Person  Phone Number: [phone]  Reason/Outcome: Emergency Contact

## 2023-03-05 NOTE — ASSESSMENT & PLAN NOTE
· Patient originally by PCP for complaint of dyspnea with exertion  Patient denies any true chest pain  He was then recommended to undergo stress test   Patient was found to have abnormal stress test as an outpatient with 8 out of 10 chest pain and ST depressions in inferolateral leads   · Mild troponin elevation on presentation to [facility]  3-26-19 over 6 hours  · Current EKG NSR  Patient denies any symptoms at this time  · Patient underwent cardiac cath at [facility] showing evidence of triple-vessel disease    99% stenosis of mid circumflex, 70% stenosis of first marginal, 85% stenosis of mid LAD, 70% stenosis of first diagonal, 50% stenosis of distal RCA, 75% stenosis of proximal RCA, 80% stenosis of RPAV,and  50% stenosis of RPDA  · Patient transferred from [facility] to [facility] for CT surgery evaluation for possible CABG  · Continue ASA 81 mg oral daily  · Continue metoprolol tartrate 25 mg oral twice daily  · Continue nitroglycerin sublingual every 5 minutes as needed for chest pain  · Heparin gtt per ACS protocol  · CT surgery consult  · Cardiology consult  ·  telemetry    Patient planned for CABG this upcoming Wednesday per CT surgery  Patient ambulating around the hallways with family without any difficulty denies any chest pressure, pain shortness of breath lightheadedness dizziness

## 2023-03-05 NOTE — PLAN OF CARE
Problem: CARDIOVASCULAR - ADULT  Goal: Maintains optimal cardiac output and hemodynamic stability  Description: INTERVENTIONS:  - Monitor I/O, vital signs and rhythm  - Monitor for S/S and trends of decreased cardiac output  - Administer and titrate ordered vasoactive medications to optimize hemodynamic stability  - Assess quality of pulses, skin color and temperature  - Assess for signs of decreased coronary artery perfusion  - Instruct patient to report change in severity of symptoms  Outcome: Progressing  Goal: Absence of cardiac dysrhythmias or at baseline rhythm  Description: INTERVENTIONS:  - Continuous cardiac monitoring, vital signs, obtain 12 lead EKG if ordered  - Administer antiarrhythmic and heart rate control medications as ordered  - Monitor electrolytes and administer replacement therapy as ordered  Outcome: Progressing     Problem: RESPIRATORY - ADULT  Goal: Achieves optimal ventilation and oxygenation  Description: INTERVENTIONS:  - Assess for changes in respiratory status  - Assess for changes in mentation and behavior  - Position to facilitate oxygenation and minimize respiratory effort  - Oxygen administered by appropriate delivery if ordered  - Initiate smoking cessation education as indicated  - Encourage broncho-pulmonary hygiene including cough, deep breathe, Incentive Spirometry  - Assess the need for suctioning and aspirate as needed  - Assess and instruct to report SOB or any respiratory difficulty  - Respiratory Therapy support as indicated  Outcome: Progressing     Problem: METABOLIC, FLUID AND ELECTROLYTES - ADULT  Goal: Electrolytes maintained within normal limits  Description: INTERVENTIONS:  - Monitor labs and assess patient for signs and symptoms of electrolyte imbalances  - Administer electrolyte replacement as ordered  - Monitor response to electrolyte replacements, including repeat lab results as appropriate  - Instruct patient on fluid and nutrition as appropriate  Outcome: Progressing  Goal: Fluid balance maintained  Description: INTERVENTIONS:  - Monitor labs   - Monitor I/O and WT  - Instruct patient on fluid and nutrition as appropriate  - Assess for signs & symptoms of volume excess or deficit  Outcome: Progressing     Problem: SKIN/TISSUE INTEGRITY - ADULT  Goal: Skin Integrity remains intact(Skin Breakdown Prevention)  Description: Assess:    -Assess extremities for adequate circulation and sensation     Bed Management:  -Have minimal linens on bed & keep smooth, unwrinkled  -Change linens as needed when moist or perspiring    Skin Care:  -Avoid use of baby powder, tape, friction and shearing, hot water or constrictive clothing

## 2023-03-05 NOTE — ASSESSMENT & PLAN NOTE
· Pressure slightly elevated from 1 2920 systolic while holding home medications, will continue to monitor and use as needed agents if blood pressure goes above 160  · Continue metoprolol tartrate 25 mg oral twice daily  · Holding home lisinopril preprocedurally-as well as HCTZ

## 2023-03-05 NOTE — PROGRESS NOTES
Cardiology Progress Note - Mikhail Shown 46 y o  male MRN: [id number]    Unit/Bed#: [facility] 404-01 Encounter: [id number]      Assessment:  1  Multivessel CAD with unstable angina  2  Benign essential hypertension   3  Dyslipidemia   4  Preserved biventricular function   5  Bilateral carotid plaque     Plan:  1  Cath with multivessel CAD - for CABG next week   2  Remains on IV heparin  3  Continue aspirin and beta-blocker along with statin therapy  4  Lipids noted - transitioned to atorvastatin 80 mg  5  Preoperative studies complete  6  Blood pressure slightly high - ACE-I on hold pre-operatively     Subjective:   Patient seen and examined  No significant events overnight  Objective:     Vitals: Blood pressure 142/85, pulse 61, temperature 97 8 °F (36 6 °C), resp  rate 20, height 5' 10" (1 778 m), weight 90 7 kg (199 lb 15 3 oz), SpO2 95 %  , Body mass index is 28 69 kg/m² ,   Orthostatic Blood Pressures    Flowsheet Row Most Recent Value   Blood Pressure 142/85 filed at 03/05/2023 0351   Patient Position - Orthostatic VS Sitting filed at 03/03/2023 0714            Intake/Output Summary (Last 24 hours) at 3/5/2023 0645  Last data filed at 3/5/2023 0245  Gross per 24 hour   Intake 520 ml   Output 3025 ml   Net -2505 ml       Physical Exam:  General:  Alert and cooperative, appears stated age  HEENT:  PERRLA, EOMI, no scleral icterus, no conjunctival pallor  Neck:  No lymphadenopathy, no thyromegaly, no carotid bruits, no elevated JVP  Heart:  Regular rate and rhythm, normal S1/S2, no S3/S4, no murmur  Lungs:  Clear to auscultation bilaterally   Abdomen:  Soft, non-tender, positive bowel sounds, no rebound or guarding,   no organomegaly   Extremities:  No clubbing, cyanosis or edema   Vascular:  2+ pedal pulses  Skin:  No rashes or lesions on exposed skin  Neurologic:  Cranial nerves II-XII grossly intact without focal deficits    Medications:      Current Facility-Administered Medications:   • acetaminophen (TYLENOL) tablet 650 mg, 650 mg, Oral, Q6H PRN, Galindo Cortes PA-C  •  aspirin (ECOTRIN LOW STRENGTH) EC tablet 81 mg, 81 mg, Oral, Daily, Galindo Cortes PA-C, 81 mg at 03/04/23 0606  •  atorvastatin (LIPITOR) tablet 80 mg, 80 mg, Oral, Daily With Gaye Lesches, DO, 80 mg at 03/04/23 1812  •  chlorhexidine (PERIDEX) 0 12 % oral rinse 15 mL, 15 mL, Mouth/Throat, Q12H [address], Chiqui Ruggiero PA-C, 15 mL at 03/04/23 2141  •  heparin (porcine) 25,000 units in 0 45% NaCl 250 mL infusion (premix), 3-20 Units/kg/hr (Order-Specific), Intravenous, Titrated, Galindo Cortes PA-C, Last Rate: 13 6 mL/hr at 03/05/23 0100, 15 1 Units/kg/hr at 03/05/23 0100  •  metoprolol tartrate (LOPRESSOR) tablet 25 mg, 25 mg, Oral, Q12H [address], Claude Sung PA-C, 25 mg at 03/04/23 2140  •  mupirocin (BACTROBAN) 2 % nasal ointment 1 application  , 1 application  , Nasal, Q12H [address], Chiqui Ruggiero PA-C, 1 application   at 03/04/23 2139  •  nitroglycerin (NITROSTAT) SL tablet 0 4 mg, 0 4 mg, Sublingual, Q5 Min PRN, Galindo Cortes PA-C  •  ondansetron (ZOFRAN) injection 4 mg, 4 mg, Intravenous, Q6H PRN, Galindo Cortes PA-C     Labs & Results:        Results from last 7 days   Lab Units 03/03/23  0254 03/02/23  0601 03/01/23  1345   WBC Thousand/uL 6 49 6 04 5 85   HEMOGLOBIN g/dL 15 0 15 8 16 0   HEMATOCRIT % 43 2 44 2 45 8   PLATELETS Thousands/uL 283 270 303     Results from last 7 days   Lab Units 03/03/23  0254 03/02/23  0601   TRIGLYCERIDES mg/dL 120 180*   HDL mg/dL 41 42     Results from last 7 days   Lab Units 03/03/23  0254 03/02/23  1041 03/02/23  0601   POTASSIUM mmol/L 3 8 4 0 4 0   CHLORIDE mmol/L 106 103 104   CO2 mmol/L 24 27 25   BUN mg/dL 14 13 14   CREATININE mg/dL 0 60 0 66 0 66   CALCIUM mg/dL 9 2 9 8 9 7     Results from last 7 days   Lab Units 03/05/23  0440 03/04/23  1817 03/04/23  1039 03/02/23  0554 03/01/23  2318   INR   --   --   --   --  0 93   PTT seconds 79* 60* 68*   < > 38*    < > = values in this interval not displayed  Results from last 7 days   Lab Units 03/02/23  0601   MAGNESIUM mg/dL 2 1       Counseling / Coordination of Care  Total floor / unit time spent today 25 minutes  Greater than 50% of total time was spent with the patient and / or family counseling and / or coordination of care

## 2023-03-06 LAB
ANION GAP SERPL CALCULATED.3IONS-SCNC: 7 MMOL/L (ref 4–13)
APTT PPP: 88 SECONDS (ref 23–37)
BASOPHILS # BLD AUTO: 0.04 THOUSANDS/ÂΜL (ref 0–0.1)
BASOPHILS NFR BLD AUTO: 1 % (ref 0–1)
BUN SERPL-MCNC: 14 MG/DL (ref 5–25)
CALCIUM SERPL-MCNC: 9.4 MG/DL (ref 8.3–10.1)
CHLORIDE SERPL-SCNC: 107 MMOL/L (ref 96–108)
CO2 SERPL-SCNC: 22 MMOL/L (ref 21–32)
CREAT SERPL-MCNC: 0.67 MG/DL (ref 0.6–1.3)
EOSINOPHIL # BLD AUTO: 0.23 THOUSAND/ÂΜL (ref 0–0.61)
EOSINOPHIL NFR BLD AUTO: 4 % (ref 0–6)
ERYTHROCYTE [DISTWIDTH] IN BLOOD BY AUTOMATED COUNT: 11.7 % (ref 11.6–15.1)
GFR SERPL CREATININE-BSD FRML MDRD: 110 ML/MIN/1.73SQ M
GLUCOSE SERPL-MCNC: 95 MG/DL (ref 65–140)
HCT VFR BLD AUTO: 41.2 % (ref 36.5–49.3)
HGB BLD-MCNC: 14.4 G/DL (ref 12–17)
IMM GRANULOCYTES # BLD AUTO: 0.02 THOUSAND/UL (ref 0–0.2)
IMM GRANULOCYTES NFR BLD AUTO: 0 % (ref 0–2)
LYMPHOCYTES # BLD AUTO: 1.88 THOUSANDS/ÂΜL (ref 0.6–4.47)
LYMPHOCYTES NFR BLD AUTO: 33 % (ref 14–44)
MCH RBC QN AUTO: 31.9 PG (ref 26.8–34.3)
MCHC RBC AUTO-ENTMCNC: 35 G/DL (ref 31.4–37.4)
MCV RBC AUTO: 91 FL (ref 82–98)
MONOCYTES # BLD AUTO: 0.59 THOUSAND/ÂΜL (ref 0.17–1.22)
MONOCYTES NFR BLD AUTO: 10 % (ref 4–12)
NEUTROPHILS # BLD AUTO: 2.98 THOUSANDS/ÂΜL (ref 1.85–7.62)
NEUTS SEG NFR BLD AUTO: 52 % (ref 43–75)
NRBC BLD AUTO-RTO: 0 /100 WBCS
PLATELET # BLD AUTO: 256 THOUSANDS/UL (ref 149–390)
PMV BLD AUTO: 9.8 FL (ref 8.9–12.7)
POTASSIUM SERPL-SCNC: 3.9 MMOL/L (ref 3.5–5.3)
RBC # BLD AUTO: 4.52 MILLION/UL (ref 3.88–5.62)
SODIUM SERPL-SCNC: 136 MMOL/L (ref 135–147)
WBC # BLD AUTO: 5.74 THOUSAND/UL (ref 4.31–10.16)

## 2023-03-06 RX ADMIN — MUPIROCIN 1 APPLICATION.: 20 OINTMENT TOPICAL at 20:43

## 2023-03-06 RX ADMIN — CHLORHEXIDINE GLUCONATE 15 ML: 1.2 SOLUTION ORAL at 20:43

## 2023-03-06 RX ADMIN — ATORVASTATIN CALCIUM 80 MG: 80 TABLET, FILM COATED ORAL at 17:09

## 2023-03-06 RX ADMIN — CHLORHEXIDINE GLUCONATE 15 ML: 1.2 SOLUTION ORAL at 08:19

## 2023-03-06 RX ADMIN — ASPIRIN 81 MG: 81 TABLET, COATED ORAL at 08:19

## 2023-03-06 RX ADMIN — HEPARIN SODIUM 15.1 UNITS/KG/HR: 10000 INJECTION, SOLUTION INTRAVENOUS at 15:27

## 2023-03-06 RX ADMIN — MUPIROCIN 1 APPLICATION.: 20 OINTMENT TOPICAL at 08:19

## 2023-03-06 RX ADMIN — METOPROLOL TARTRATE 25 MG: 25 TABLET, FILM COATED ORAL at 20:44

## 2023-03-06 RX ADMIN — METOPROLOL TARTRATE 25 MG: 25 TABLET, FILM COATED ORAL at 08:19

## 2023-03-06 NOTE — PROGRESS NOTES
[address] [facility]  Progress Note Emi Borges 1970, 46 y o  male MRN: [id number]  Unit/Bed#: [facility] 404-01 Encounter: [id number]  Primary Care Provider: Marc Washington PA-C   Date and time admitted to hospital: 3/2/2023 11:38 PM    * ACS (acute coronary syndrome) [facility])  Assessment & Plan  · Patient originally by PCP for complaint of dyspnea with exertion  Patient denies any true chest pain  He was then recommended to undergo stress test   Patient was found to have abnormal stress test as an outpatient with 8 out of 10 chest pain and ST depressions in inferolateral leads   · Mild troponin elevation on presentation to [facility]  3-26-19 over 6 hours  · Current EKG NSR  Patient denies any symptoms at this time  · Patient underwent cardiac cath at [facility] showing evidence of triple-vessel disease    99% stenosis of mid circumflex, 70% stenosis of first marginal, 85% stenosis of mid LAD, 70% stenosis of first diagonal, 50% stenosis of distal RCA, 75% stenosis of proximal RCA, 80% stenosis of RPAV,and  50% stenosis of RPDA  · Patient transferred from [facility] to [facility] for CT surgery evaluation for possible CABG  · Continue ASA 81 mg oral daily  · Continue metoprolol tartrate 25 mg oral twice daily  · Continue nitroglycerin sublingual every 5 minutes as needed for chest pain  · Heparin gtt per ACS protocol  · CT surgery consult  · Cardiology consult  ·  telemetry    Patient planned for CABG this upcoming Wednesday per CT surgery  Patient ambulating around the hallways with family without any difficulty denies any chest pressure, pain shortness of breath lightheadedness dizziness    Hyperlipidemia, mixed  Assessment & Plan  · Continue Lipitor 80 mg oral daily-increase from home dose  · Lipid panel reviewed    Essential hypertension  Assessment & Plan  · Pressure slightly elevated from 1 2768 systolic while holding home medications, will continue to monitor and use as needed agents if blood pressure goes above 160  · Continue metoprolol tartrate 25 mg oral twice daily  · Holding home lisinopril preprocedurally-as well as HCTZ          VTE Pharmacologic Prophylaxis: VTE Score: 5 High Risk (Score >/= 5) - Pharmacological DVT Prophylaxis Ordered: heparin drip  Sequential Compression Devices Ordered  Patient Centered Rounds: I performed bedside rounds with nursing staff today  Discussions with Specialists or Other Care Team Provider: earline    Education and Discussions with Family / Patient: Updated  (significant other) at bedside  Total Time Spent on Date of Encounter in care of patient: 35 minutes This time was spent on one or more of the following: performing physical exam; counseling and coordination of care; obtaining or reviewing history; documenting in the medical record; reviewing/ordering tests, medications or procedures; communicating with other healthcare professionals and discussing with patient's family/caregivers  Current Length of Stay: 4 day(s)  Current Patient Status: Inpatient   Certification Statement: The patient will continue to require additional inpatient hospital stay due to Pending CABG Wednesday  Discharge Plan: Anticipate discharge in >72 hrs to discharge location to be determined pending rehab evaluations  Code Status: Level 1 - Full Code    Subjective:   Patient denies any acute complaints continues to ambulate without any symptoms denies chest pain shortness of breath lightheadedness dizziness nausea vomiting    Objective:     Vitals:   Temp (24hrs), Av 8 °F (36 6 °C), Min:97 6 °F (36 4 °C), Max:98 °F (36 7 °C)    Temp:  [97 6 °F (36 4 °C)-98 °F (36 7 °C)] 97 6 °F (36 4 °C)  HR:  [56-67] 59  Resp:  [16-18] 17  BP: (141-176)/(87-98) 144/91  SpO2:  [95 %-98 %] 95 %  Body mass index is 28 69 kg/m²  Input and Output Summary (last 24 hours):      Intake/Output Summary (Last 24 hours) at 3/6/2023 [address] [facility] filed at 3/6/2023 1527  Gross per 24 hour   Intake 1325 72 ml   Output --   Net 1325 72 ml       Physical Exam:   Physical Exam  Vitals and nursing note reviewed  Constitutional:       General: He is not in acute distress  Appearance: He is well-developed  HENT:      Head: Normocephalic and atraumatic  Eyes:      Conjunctiva/sclera: Conjunctivae normal    Cardiovascular:      Rate and Rhythm: Normal rate and regular rhythm  Heart sounds: No murmur heard  Pulmonary:      Effort: Pulmonary effort is normal  No respiratory distress  Breath sounds: Normal breath sounds  Abdominal:      Palpations: Abdomen is soft  Tenderness: There is no abdominal tenderness  Musculoskeletal:         General: No swelling  Cervical back: Neck supple  Skin:     General: Skin is warm and dry  Capillary Refill: Capillary refill takes less than 2 seconds  Neurological:      Mental Status: He is alert     Psychiatric:         Mood and Affect: Mood normal           Additional Data:     Labs:  Results from last 7 days   Lab Units 03/06/23  0506   WBC Thousand/uL 5 74   HEMOGLOBIN g/dL 14 4   HEMATOCRIT % 41 2   PLATELETS Thousands/uL 256   NEUTROS PCT % 52   LYMPHS PCT % 33   MONOS PCT % 10   EOS PCT % 4     Results from last 7 days   Lab Units 03/06/23  0506   SODIUM mmol/L 136   POTASSIUM mmol/L 3 9   CHLORIDE mmol/L 107   CO2 mmol/L 22   BUN mg/dL 14   CREATININE mg/dL 0 67   ANION GAP mmol/L 7   CALCIUM mg/dL 9 4   GLUCOSE RANDOM mg/dL 95     Results from last 7 days   Lab Units 03/01/23  2318   INR  0 93         Results from last 7 days   Lab Units 03/03/23  1017   HEMOGLOBIN A1C % 5 6           Lines/Drains:  Invasive Devices     Peripheral Intravenous Line  Duration           Peripheral IV 03/05/23 Proximal;Right;Ventral (anterior) Forearm 1 day                  Telemetry:  Telemetry Orders (From admission, onward)             48 Hour Telemetry Monitoring  Continuous x 48 hours    References:    Telemetry Guidelines   Question:  Reason for 48 Hour Telemetry  Answer:  Cardiac Surgery                 Telemetry Reviewed: Normal Sinus Rhythm  Indication for Continued Telemetry Use: Awaiting PCI/EP Study/CABG             Imaging: No pertinent imaging reviewed  Recent Cultures (last 7 days):         Last 24 Hours Medication List:   Current Facility-Administered Medications   Medication Dose Route Frequency Provider Last Rate   • acetaminophen  650 mg Oral Q6H PRN Uyen Parks PA-C     • aspirin  81 mg Oral Daily Uyen Parks PA-C     • atorvastatin  80 mg Oral Daily With [employer]DO     • chlorhexidine  15 mL Mouth/Throat Q12H [address]     • heparin (porcine)  3-20 Units/kg/hr (Order-Specific) Intravenous Titrated Uyen Parks PA-C 15 1 Units/kg/hr (23 1527)   • metoprolol tartrate  25 mg Oral Q12H [facility] & intermediate Claude Sung PA-C     • mupirocin  1 application  Nasal Q12H [address]     • nitroglycerin  0 4 mg Sublingual Q5 Min PRN Uyen Parks PA-C     • ondansetron  4 mg Intravenous Q6H PRN Uyen Parks PA-C          Today, Patient Was Seen By: Daya Talbert DO    **Please Note: This note may have been constructed using a voice recognition system  **

## 2023-03-06 NOTE — ASSESSMENT & PLAN NOTE
· Patient originally by PCP for complaint of dyspnea with exertion  Patient denies any true chest pain  He was then recommended to undergo stress test   Patient was found to have abnormal stress test as an outpatient with 8 out of 10 chest pain and ST depressions in inferolateral leads   · Mild troponin elevation on presentation to [facility]  3-26-19 over 6 hours  · Current EKG NSR  Patient denies any symptoms at this time  · Patient underwent cardiac cath at [facility] showing evidence of triple-vessel disease    99% stenosis of mid circumflex, 70% stenosis of first marginal, 85% stenosis of mid LAD, 70% stenosis of first diagonal, 50% stenosis of distal RCA, 75% stenosis of proximal RCA, 80% stenosis of RPAV,and  50% stenosis of RPDA  · Patient transferred from [facility] to [facility] for CT surgery evaluation for possible CABG  · Continue ASA 81 mg oral daily  · Continue metoprolol tartrate 25 mg oral twice daily  · Continue nitroglycerin sublingual every 5 minutes as needed for chest pain  · Heparin gtt per ACS protocol  · CT surgery consult  · Cardiology consult  ·  telemetry    Patient planned for CABG this upcoming Wednesday per CT surgery  Patient ambulating around the hallways with family without any difficulty denies any chest pressure, pain shortness of breath lightheadedness dizziness

## 2023-03-06 NOTE — PROGRESS NOTES
Cardiology Progress Note - Clarence Barrera 46 y o  male MRN: [id number]    Unit/Bed#: [facility] 404-01 Encounter: [id number]      Impression:  1  Multivessel CAD - for CABG 3/8  2  HTN - controlled  3  Dyslipidemia     Plan:  1  Continue current medications  2  CABG 3/8  Subjective:    No significant events overnight  Feels well  Review of Systems   Cardiovascular: Negative for chest pain, leg swelling and palpitations  Respiratory: Negative for shortness of breath  Objective:   Vitals: Blood pressure 142/91, pulse 67, temperature 97 6 °F (36 4 °C), resp  rate 16, height 5' 10" (1 778 m), weight 90 7 kg (199 lb 15 3 oz), SpO2 97 %  , Body mass index is 28 69 kg/m² ,   Orthostatic Blood Pressures    Flowsheet Row Most Recent Value   Blood Pressure 142/91 filed at 03/06/2023 0723   Patient Position - Orthostatic VS Sitting filed at 03/03/2023 8381         Systolic (20ALB), KMH:337 , Min:141 , BSN:984     Diastolic (12OVK), BAR:73, Min:87, Max:98      Intake/Output Summary (Last 24 hours) at 3/6/2023 1308  Last data filed at 3/6/2023 1210  Gross per 24 hour   Intake 1115 83 ml   Output --   Net 1115 83 ml     Weight (last 2 days)     None              Telemetry Review: NSR    Physical Exam  Cardiovascular:      Rate and Rhythm: Normal rate and regular rhythm  Heart sounds: Normal heart sounds  No murmur heard  No friction rub  No gallop  Pulmonary:      Breath sounds: Normal breath sounds  No wheezing or rales             Laboratory Results:        CBC with diff:   Results from last 7 days   Lab Units 03/06/23  0506 03/03/23  0254 03/02/23  0601 03/01/23  1345   WBC Thousand/uL 5 74 6 49 6 04 5 85   HEMOGLOBIN g/dL 14 4 15 0 15 8 16 0   HEMATOCRIT % 41 2 43 2 44 2 45 8   MCV fL 91 93 92 92   PLATELETS Thousands/uL 256 283 270 303   MCH pg 31 9 32 1 32 8 32 3   MCHC g/dL 35 0 34 7 35 7 34 9   RDW % 11 7 12 0 11 9 11 8   MPV fL 9 8 9 5 9 3 9 5   NRBC AUTO /100 WBCs 0  --   --  0 CMP:  Results from last 7 days   Lab Units 03/06/23  0506 03/03/23  0254 03/02/23  1041 03/02/23  0601 03/01/23  1345   POTASSIUM mmol/L 3 9 3 8 4 0 4 0 4 0   CHLORIDE mmol/L 107 106 103 104 101   CO2 mmol/L 22 24 27 25 27   BUN mg/dL 14 14 13 14 13   CREATININE mg/dL 0 67 0 60 0 66 0 66 0 74   CALCIUM mg/dL 9 4 9 2 9 8 9 7 10 3*   EGFR ml/min/1 73sq m 110 115 111 111 106         BMP:  Results from last 7 days   Lab Units 03/06/23  0506 03/03/23  0254 03/02/23  1041 03/02/23  0601 03/01/23  1345   POTASSIUM mmol/L 3 9 3 8 4 0 4 0 4 0   CHLORIDE mmol/L 107 106 103 104 101   CO2 mmol/L 22 24 27 25 27   BUN mg/dL 14 14 13 14 13   CREATININE mg/dL 0 67 0 60 0 66 0 66 0 74   CALCIUM mg/dL 9 4 9 2 9 8 9 7 10 3*       BNP:   Results Reviewed     None        No results for input(s): BNP in the last 72 hours  Magnesium:   Results from last 7 days   Lab Units 03/02/23  0601   MAGNESIUM mg/dL 2 1       Coags:   Results from last 7 days   Lab Units 03/06/23  0506 03/05/23  0440 03/04/23  1817 03/04/23  1039 03/04/23  0314 03/03/23  2018 03/03/23  1206 03/02/23  0554 03/01/23  2318   PTT seconds 88* 79* 60* 68* 95* 65* 52*   < > 38*   INR   --   --   --   --   --   --   --   --  0 93    < > = values in this interval not displayed  TSH:       Lipid Profile:   Results from last 7 days   Lab Units 03/03/23  0254 03/02/23  0601   TRIGLYCERIDES mg/dL 120 180*   HDL mg/dL 41 42           Cardiac testing:   No results found for this or any previous visit  No results found for this or any previous visit  No results found for this or any previous visit  No results found for this or any previous visit        Meds/Allergies   Current Facility-Administered Medications   Medication Dose Route Frequency Provider Last Rate   • acetaminophen  650 mg Oral Q6H PRN Mendez Sales PA-C     • aspirin  81 mg Oral Daily Mendez Sales PA-C     • atorvastatin  80 mg Oral Daily With [address], DO     • chlorhexidine  15 mL Mouth/Throat Q12H [address] Chiqui Cronin PA-C     • heparin (porcine)  3-20 Units/kg/hr (Order-Specific) Intravenous Titrated Pillo Virgen PA-C 15 1 Units/kg/hr (03/05/23 2105)   • metoprolol tartrate  25 mg Oral Q12H [address] Claude Sung PA-C     • mupirocin  1 application   Nasal Q12H [address], JOEY     • nitroglycerin  0 4 mg Sublingual Q5 Min PRN Pillo Virgen PA-C     • ondansetron  4 mg Intravenous Q6H PRN Pillo Virgen PA-C       heparin (porcine), 3-20 Units/kg/hr (Order-Specific), Last Rate: 15 1 Units/kg/hr (03/05/23 2105)      Medications Prior to Admission   Medication   • ascorbic acid (VITAMIN C) 500 mg tablet   • Ergocalciferol (VITAMIN D2 PO)   • lisinopril-hydrochlorothiazide (PRINZIDE,ZESTORETIC) 20-12 5 MG per tablet   • Lysine 1000 MG TABS   • metoprolol tartrate (LOPRESSOR) 25 mg tablet   • Misc Natural Products (OSTEO BI-FLEX ADV DOUBLE ST PO)   • Multiple Vitamin (MULTIVITAMIN) capsule       Assessment:  Principal Problem:    ACS (acute coronary syndrome) (HCC)  Active Problems:    Essential hypertension    Hyperlipidemia, mixed

## 2023-03-06 NOTE — PROGRESS NOTES
Progress Note - Cardiothoracic Surgery   Kang Vargas 46 y o  male MRN: [id number]  Unit/Bed#: [facility] 404-01 Encounter: [id number]      24 Hour Events: No events or complaints  Medications:   Scheduled Meds:  Current Facility-Administered Medications   Medication Dose Route Frequency Provider Last Rate   • acetaminophen  650 mg Oral Q6H PRN Pillo Virgen PA-C     • aspirin  81 mg Oral Daily Pillo Virgen PA-C     • atorvastatin  80 mg Oral Daily With [employer], DO     • chlorhexidine  15 mL Mouth/Throat Q12H [address], JOE[place]     • heparin (porcine)  3-20 Units/kg/hr (Order-Specific) Intravenous Titrated Pillo Virgen PA-C 15 1 Units/kg/hr (03/05/23 2105)   • metoprolol tartrate  25 mg Oral Q12H [address] Claude Sung PA-C     • mupirocin  1 application  Nasal Q12H [address]     • nitroglycerin  0 4 mg Sublingual Q5 Min PRN Pillo Virgen PA-C     • ondansetron  4 mg Intravenous Q6H PRN Pillo Virgen PA-C       Continuous Infusions:heparin (porcine), 3-20 Units/kg/hr (Order-Specific), Last Rate: 15 1 Units/kg/hr (03/05/23 2105)        Results:   Results from last 7 days   Lab Units 03/06/23  0506 03/03/23  0254 03/02/23  0601   WBC Thousand/uL 5 74 6 49 6 04   HEMOGLOBIN g/dL 14 4 15 0 15 8   HEMATOCRIT % 41 2 43 2 44 2   PLATELETS Thousands/uL 256 283 270     Results from last 7 days   Lab Units 03/06/23  0506 03/03/23  0254 03/02/23  1041   POTASSIUM mmol/L 3 9 3 8 4 0   CHLORIDE mmol/L 107 106 103   CO2 mmol/L 22 24 27   BUN mg/dL 14 14 13   CREATININE mg/dL 0 67 0 60 0 66   CALCIUM mg/dL 9 4 9 2 9 8     Results from last 7 days   Lab Units 03/06/23  0506 03/05/23  0440 03/04/23  1817 03/02/23  0554 03/01/23  2318   INR   --   --   --   --  0 93   PTT seconds 88* 79* 60*   < > 38*    < > = values in this interval not displayed         Studies:     Cardiac Catheterization: Multivessel coronary artery disease involving 85% mid LAD, 70% proximal diagonal, 70% high OM, 99% mid circumflex and 75% proximal RCA  Echocardiogram: Interpretation Summary       •  Left Ventricle: Left ventricular cavity size is normal  Wall thickness is normal  The left ventricular ejection fraction is 55%  Systolic function is normal  Wall motion is normal  Diastolic function is mildly abnormal, consistent with grade I (abnormal) relaxation  Carotid artery duplex:  CONCLUSION:     Impression  RIGHT:  There is <50% stenosis noted in the internal carotid artery  Plaque is  heterogenous and irregular  Vertebral artery flow is antegrade  There is no significant subclavian artery  disease  LEFT:  There is <50% stenosis noted in the internal carotid artery  Plaque is  heterogenous and irregular  Vertebral artery flow is antegrade  There is no significant subclavian artery  Disease  palmar  CONCLUSION:     Impression     RIGHT UPPER LIMB:  ABNORMAL:  Evaluation shows an incomplete palmar arch, as interrogated with alternate  compression and release of the radial and ulnar arteries  The perfusion of the right hand is dependent upon the ulnar artery  LEFT UPPER LIMB:  Evaluation shows a complete palmar arch, as interrogated with alternate  compression and release of the radial and ulnar arteries  Greater saphenous vein mapping: CONCLUSION:     Impression:  RIGHT LOWER LIMB:  The great saphenous vein is patent  from the groin to the ankle  The intraluminal diameter measurements range from 9 1 mm to 1 5 mm throughout  LEFT LOWER LIMB:  The great saphenous vein is patent  from the groin to the ankle  The intraluminal diameter measurements range from 6 9 mm to 1 2 mm throughout      Vitals:   Vitals:    03/06/23 0000 03/06/23 0203 03/06/23 0723 03/06/23 0723   BP:  144/87 142/91 142/91   Pulse:  56 57 67   Resp:  16     Temp:  97 8 °F (36 6 °C) 97 6 °F (36 4 °C) 97 6 °F (36 4 °C)   TempSrc:  Oral     SpO2: 96% 97% 95% 96%   Weight:       Height: Physical Exam:    HEENT/NECK:  Normocephalic  Atraumatic   no jugular venous distention  Cardiac: Regular rate and rhythm and No murmurs/rubs/gallops  Pulmonary:  Breath sounds clear bilaterally  Abdomen:  Non-tender, Non-distended and Normal bowel sounds  Extremities: Extremities warm/dry and No edema B/L  Neuro: Alert and oriented X 3, Sensation is grossly intact and No focal deficits  Skin: Warm/Dry, without rashes or lesions  Assessment: CAD ongoing CABG workup     Plan:  Patient agreeable to proceed with surgery; Informed consent will be signed today    Carotid ultrasound completed, and acceptable    poor vein mapping for use in conduit for CABG, has a complete palmar arch on the left hand  right hand dominant - plan to use left radial    Continue Mupirocin 2% nasal ointment q 12 hrs    Continue topical chlorhexidine bath and mouth rise    Coronary artery bypass grafting with left radial scheduled for wednesday with VIKAS Souza     SIGNATURE: Sophie Warner [place]  DATE: March 6, 2023  TIME: 7:51 AM    * This note was completed in part utilizing m-modal fluency direct voice recognition software  Grammatical errors, random word insertion, spelling mistakes, and incomplete sentences may be an occasional consequence of the system secondary to software limitations, ambient noise and hardware issues  At the time of dictation, efforts were made to edit, clarify and /or correct errors  Please read the chart carefully and recognize, using context, where substitutions have occurred  If you have any questions or concerns about the context, text or information contained within the body of this dictation, please contact myself, the provider, for further clarification

## 2023-03-06 NOTE — ASSESSMENT & PLAN NOTE
· Pressure slightly elevated from 1 6837 systolic while holding home medications, will continue to monitor and use as needed agents if blood pressure goes above 160  · Continue metoprolol tartrate 25 mg oral twice daily  · Holding home lisinopril preprocedurally-as well as HCTZ

## 2023-03-07 ENCOUNTER — HOME HEALTH ADMISSION (OUTPATIENT)
Dept: HOME HEALTH SERVICES | Facility: HOME HEALTHCARE | Age: 53
End: 2023-03-07

## 2023-03-07 LAB
ABO GROUP BLD: NORMAL
APTT PPP: 76 SECONDS (ref 23–37)
BLD GP AB SCN SERPL QL: NEGATIVE
RH BLD: POSITIVE
SPECIMEN EXPIRATION DATE: NORMAL

## 2023-03-07 RX ORDER — CEFAZOLIN SODIUM 2 G/50ML
2000 SOLUTION INTRAVENOUS ONCE
Status: CANCELLED | OUTPATIENT
Start: 2023-03-07 | End: 2023-03-07

## 2023-03-07 RX ORDER — HEPARIN SODIUM 1000 [USP'U]/ML
400 INJECTION, SOLUTION INTRAVENOUS; SUBCUTANEOUS ONCE
Status: CANCELLED | OUTPATIENT
Start: 2023-03-08

## 2023-03-07 RX ORDER — CHLORHEXIDINE GLUCONATE 0.12 MG/ML
15 RINSE ORAL EVERY 12 HOURS SCHEDULED
Status: DISCONTINUED | OUTPATIENT
Start: 2023-03-07 | End: 2023-03-08

## 2023-03-07 RX ADMIN — CHLORHEXIDINE GLUCONATE 15 ML: 1.2 SOLUTION ORAL at 08:48

## 2023-03-07 RX ADMIN — ASPIRIN 81 MG: 81 TABLET, COATED ORAL at 08:48

## 2023-03-07 RX ADMIN — MUPIROCIN 1 APPLICATION.: 20 OINTMENT TOPICAL at 21:48

## 2023-03-07 RX ADMIN — HEPARIN SODIUM 15.1 UNITS/KG/HR: 10000 INJECTION, SOLUTION INTRAVENOUS at 12:06

## 2023-03-07 RX ADMIN — METOPROLOL TARTRATE 25 MG: 25 TABLET, FILM COATED ORAL at 08:48

## 2023-03-07 RX ADMIN — ATORVASTATIN CALCIUM 80 MG: 80 TABLET, FILM COATED ORAL at 16:35

## 2023-03-07 RX ADMIN — CHLORHEXIDINE GLUCONATE 15 ML: 1.2 SOLUTION ORAL at 21:48

## 2023-03-07 RX ADMIN — METOPROLOL TARTRATE 25 MG: 25 TABLET, FILM COATED ORAL at 21:48

## 2023-03-07 RX ADMIN — MUPIROCIN 1 APPLICATION.: 20 OINTMENT TOPICAL at 08:48

## 2023-03-07 NOTE — PROGRESS NOTES
[address] [facility]  Progress Note Meggan Cartagena 1970, 46 y o  male MRN: [id number]  Unit/Bed#: [facility] 404-01 Encounter: [id number]  Primary Care Provider: Devan Kent PA-C   Date and time admitted to hospital: 3/2/2023 11:38 PM    * ACS (acute coronary syndrome) [facility])  Assessment & Plan  · Patient originally by PCP for complaint of dyspnea with exertion  Patient denies any true chest pain  He was then recommended to undergo stress test   Patient was found to have abnormal stress test as an outpatient with 8 out of 10 chest pain and ST depressions in inferolateral leads   · Mild troponin elevation on presentation to [facility]  3-26-19 over 6 hours  · Current EKG NSR  Patient denies any symptoms at this time  · Patient underwent cardiac cath at [facility] showing evidence of triple-vessel disease  99% stenosis of mid circumflex, 70% stenosis of first marginal, 85% stenosis of mid LAD, 70% stenosis of first diagonal, 50% stenosis of distal RCA, 75% stenosis of proximal RCA, 80% stenosis of RPAV,and  50% stenosis of RPDA  · Patient transferred from [facility] to [facility] for CT surgery evaluation for possible CABG  · Continue ASA, statin and beta-blocker 81 mg oral daily  · Heparin gtt per ACS protocol  ·  telemetry    Patient planned for CABG tomorrow per CT surgery  Currently asymptomatic    Hyperlipidemia, mixed  Assessment & Plan  · Continue Lipitor 80 mg oral daily-increase from home dose  · Lipid panel reviewed    Essential hypertension  Assessment & Plan  · Acceptable BP  · Continue metoprolol tartrate 25 mg oral twice daily  · Holding home lisinopril preprocedurally-as well as HCTZ      VTE Pharmacologic Prophylaxis: VTE Score: 5 High Risk (Score >/= 5) - Pharmacological DVT Prophylaxis Ordered: heparin drip  Sequential Compression Devices Ordered      Patient Centered Rounds: I performed bedside rounds with nursing staff today   Discussions with Specialists or Other Care Team Provider:     Education and Discussions with Family / Patient: Patient declined call to   Total Time Spent on Date of Encounter in care of patient: 55 minutes This time was spent on one or more of the following: performing physical exam; counseling and coordination of care; obtaining or reviewing history; documenting in the medical record; reviewing/ordering tests, medications or procedures; communicating with other healthcare professionals and discussing with patient's family/caregivers  Current Length of Stay: 5 day(s)  Current Patient Status: Inpatient   Certification Statement: The patient will continue to require additional inpatient hospital stay due to CABG tomorrow      Code Status: Level 1 - Full Code    Subjective:   Patient seen and examined  Comfortable ambulating in the hallway  No chest pain or shortness of breath  No complaint  N p o  for CABG tomorrow    Objective:     Vitals:   Temp (24hrs), Av 9 °F (36 6 °C), Min:97 6 °F (36 4 °C), Max:98 1 °F (36 7 °C)    Temp:  [97 6 °F (36 4 °C)-98 1 °F (36 7 °C)] 98 1 °F (36 7 °C)  HR:  [55-86] 65  Resp:  [16-17] 16  BP: (136-158)/(85-96) 136/87  SpO2:  [93 %-96 %] 96 %  Body mass index is 28 34 kg/m²  Input and Output Summary (last 24 hours):      Intake/Output Summary (Last 24 hours) at 3/7/2023 1116  Last data filed at 3/7/2023 0758  Gross per 24 hour   Intake 1576 57 ml   Output --   Net 1576 57 ml       Physical Exam:   Physical Exam   Patient is awake alert oriented no acute distress  Lungs clear to auscultation bilateral  Heart positive S1-S2 no murmur  Abdomen soft nontender  Lower extremities no edema    Additional Data:     Labs:  Results from last 7 days   Lab Units 23  0506   WBC Thousand/uL 5 74   HEMOGLOBIN g/dL 14 4   HEMATOCRIT % 41 2   PLATELETS Thousands/uL 256   NEUTROS PCT % 52   LYMPHS PCT % 33   MONOS PCT % 10   EOS PCT % 4     Results from last 7 days   Lab Units 03/06/23  0506   SODIUM mmol/L 136   POTASSIUM mmol/L 3 9   CHLORIDE mmol/L 107   CO2 mmol/L 22   BUN mg/dL 14   CREATININE mg/dL 0 67   ANION GAP mmol/L 7   CALCIUM mg/dL 9 4   GLUCOSE RANDOM mg/dL 95     Results from last 7 days   Lab Units 03/01/23  2318   INR  0 93         Results from last 7 days   Lab Units 03/03/23  1017   HEMOGLOBIN A1C % 5 6           Lines/Drains:  Invasive Devices     Peripheral Intravenous Line  Duration           Peripheral IV 03/05/23 Proximal;Right;Ventral (anterior) Forearm 2 days                  Telemetry:  Telemetry Orders (From admission, onward)             48 Hour Telemetry Monitoring  Continuous x 48 hours        References:    Telemetry Guidelines   Question:  Reason for 48 Hour Telemetry  Answer:  Acute MI, chest pain - R/O MI, or unstable angina                 Telemetry Reviewed: yes  Indication for Continued Telemetry Use: Acute MI/Unstable Angina/Rule out ACS             Imaging: No pertinent imaging reviewed  Recent Cultures (last 7 days):         Last 24 Hours Medication List:   Current Facility-Administered Medications   Medication Dose Route Frequency Provider Last Rate   • acetaminophen  650 mg Oral Q6H PRN Gene Hunt PA-C     • aspirin  81 mg Oral Daily Gene JOEY Hunt     • atorvastatin  80 mg Oral Daily With [employer], DO     • chlorhexidine  15 mL Swish & Spit [facility]12H [facility]     • heparin (porcine)  3-20 Units/kg/hr (Order-Specific) Intravenous Titrated Kirstie Gibson PA-C 15 1 Units/kg/hr (03/06/23 1527)   • [START ON 3/8/2023] metoprolol tartrate  12 5 mg Oral Once Kirstie Gibson PA-C     • metoprolol tartrate  25 mg Oral Q12H [address] Claude Sung PA-C     • mupirocin  1 application  Nasal Q12H [address]     • mupirocin  1 application   Nasal Once Kirstie Gibson PA-C     • nitroglycerin  0 4 mg Sublingual Q5 Min PRN Gene Hunt PA-C     • ondansetron  4 mg Intravenous Q6H PRN Jacky Brown PA-C          Today, Patient Was Seen By: Kamla Milian DO    **Please Note: This note may have been constructed using a voice recognition system  **

## 2023-03-07 NOTE — PROGRESS NOTES
Progress Note - Cardiothoracic Surgery   Leno Gregg 46 y o  male MRN: [id number]  Unit/Bed#: [facility] 404-01 Encounter: [id number]      24 Hour Events: no issues, questions answered    Medications:   Scheduled Meds:  Current Facility-Administered Medications   Medication Dose Route Frequency Provider Last Rate   • acetaminophen  650 mg Oral Q6H PRN Ashley Keyshawn PA-C     • aspirin  81 mg Oral Daily Ashley PlTI andrewsC     • atorvastatin  80 mg Oral Daily With [employer], DO     • chlorhexidine  15 mL Mouth/Throat Q12H [address], JOEY     • heparin (porcine)  3-20 Units/kg/hr (Order-Specific) Intravenous Titrated Ashley Pldarryl PA-C 15 1 Units/kg/hr (03/06/23 1527)   • metoprolol tartrate  25 mg Oral Q12H [address] Claude Sung PA-C     • mupirocin  1 application  Nasal Q12H [address]     • nitroglycerin  0 4 mg Sublingual Q5 Min PRN Ashley Pldarryl PA-C     • ondansetron  4 mg Intravenous Q6H PRN Ashley Pldarryl, PA-C       Continuous Infusions:heparin (porcine), 3-20 Units/kg/hr (Order-Specific), Last Rate: 15 1 Units/kg/hr (03/06/23 1527)        Results:   Results from last 7 days   Lab Units 03/06/23  0506 03/03/23  0254 03/02/23  0601   WBC Thousand/uL 5 74 6 49 6 04   HEMOGLOBIN g/dL 14 4 15 0 15 8   HEMATOCRIT % 41 2 43 2 44 2   PLATELETS Thousands/uL 256 283 270     Results from last 7 days   Lab Units 03/06/23  0506 03/03/23  0254 03/02/23  1041   POTASSIUM mmol/L 3 9 3 8 4 0   CHLORIDE mmol/L 107 106 103   CO2 mmol/L 22 24 27   BUN mg/dL 14 14 13   CREATININE mg/dL 0 67 0 60 0 66   CALCIUM mg/dL 9 4 9 2 9 8     Results from last 7 days   Lab Units 03/07/23  0424 03/06/23  0506 03/05/23  0440 03/02/23  0554 03/01/23  2318   INR   --   --   --   --  0 93   PTT seconds 76* 88* 79*   < > 38*    < > = values in this interval not displayed         Studies:     Cardiac Catheterization: Multivessel coronary artery disease involving 85% mid LAD, 70% proximal diagonal, 70% high OM, 99% mid circumflex and 75% proximal RCA         Echocardiogram: Interpretation Summary        •  Left Ventricle: Left ventricular cavity size is normal  Wall thickness is normal  The left ventricular ejection fraction is 55%  Systolic function is normal  Wall motion is normal  Diastolic function is mildly abnormal, consistent with grade I (abnormal) relaxation         Carotid artery duplex:  CONCLUSION:     Impression  RIGHT:  There is <50% stenosis noted in the internal carotid artery  Plaque is  heterogenous and irregular  Vertebral artery flow is antegrade  There is no significant subclavian artery  disease      LEFT:  There is <50% stenosis noted in the internal carotid artery  Plaque is  heterogenous and irregular  Vertebral artery flow is antegrade  There is no significant subclavian artery  Disease      palmar  CONCLUSION:     Impression     RIGHT UPPER LIMB:  ABNORMAL:  Evaluation shows an incomplete palmar arch, as interrogated with alternate  compression and release of the radial and ulnar arteries  The perfusion of the right hand is dependent upon the ulnar artery      LEFT UPPER LIMB:  Evaluation shows a complete palmar arch, as interrogated with alternate  compression and release of the radial and ulnar arteries      Greater saphenous vein mapping: CONCLUSION:     Impression:  RIGHT LOWER LIMB:  The great saphenous vein is patent  from the groin to the ankle  The intraluminal diameter measurements range from 9 1 mm to 1 5 mm throughout      LEFT LOWER LIMB:  The great saphenous vein is patent  from the groin to the ankle  The intraluminal diameter measurements range from 6 9 mm to 1 2 mm throughout      Vitals:   Vitals:    03/07/23 0224 03/07/23 0227 03/07/23 0652 03/07/23 0743   BP: 152/85 152/85  136/87   BP Location: Right arm Right arm  Right arm   Pulse: 57 55  65   Resp: 16   16   Temp: 98 °F (36 7 °C) 98 °F (36 7 °C)  98 1 °F (36 7 °C)   TempSrc:  Oral Oral   SpO2: 93% 95%  94%   Weight:   89 6 kg (197 lb 8 5 oz)    Height:           Physical Exam:    HEENT/NECK:  Normocephalic  Atraumatic  Cardiac: Regular rate and rhythm and No murmurs/rubs/gallops  Pulmonary:  Breath sounds clear bilaterally  Neuro: Alert and oriented X 3, Sensation is grossly intact and No focal deficits      Assessment:    Severe coronary artery disease; Ongoing CABG workup    Plan:  Patient agreeable to proceed with surgery; Informed consent signed    Carotid ultrasound completed, and acceptable    Vein mapping completed; Findings show poor vein for use as conduit     Has a complete palmar arch on the left hand - plan to use left radial    Blood type and cross match completed    Continue Mupirocin 2% nasal ointment q 12 hrs    Continue topical chlorhexidine bath and mouth rise    NPO after midnight  RBC prepared  Hold Heparin at 0300  Preoperative beta blocker, insulin, and antibiotics ordered  coronary artery bypass grafting with left radial scheduled for 3/8 with VIKAS Gupta     SIGNATURE: Sumanth Kenny [place]  DATE: March 7, 2023  TIME: 8:08 AM    * This note was completed in part utilizing [employer] fluency direct voice recognition software  Grammatical errors, random word insertion, spelling mistakes, and incomplete sentences may be an occasional consequence of the system secondary to software limitations, ambient noise and hardware issues  At the time of dictation, efforts were made to edit, clarify and /or correct errors  Please read the chart carefully and recognize, using context, where substitutions have occurred  If you have any questions or concerns about the context, text or information contained within the body of this dictation, please contact myself, the provider, for further clarification

## 2023-03-07 NOTE — UTILIZATION REVIEW
Continued Stay Review    Date: 03/07/2023                          Current Patient Class: Inpatient  Current Level of Care: Med/Surg    HPI:52 y o  male initially admitted on 03/03/2023     Assessment/Plan:  ACS  Monitor on telemetry  Continue ASA, Statin - lipitor increase to 80mg daily   , BB  Continue IV heparin gtt  Plan for CAGB tomorrow (on schedule for AM)  Severe coronary artery disease; Ongoing CABG workup  Plan:  Carotid ultrasound completed, and acceptable  Vein mapping completed; Findings show poor vein for use as conduit  Has a complete palmar arch on the left hand - plan to use left radial   Blood type and cross match completed  Continue Mupirocin 2% nasal ointment q 12 hrs  Continue topical chlorhexidine bath and mouth rise  NPO after midnight  RBC prepared  Hold Heparin at 0300  Preoperative beta blocker, insulin, and antibiotics ordered  Coronary artery bypass grafting with left radial scheduled for 3/8 with VIKAS Maxwell      Vital Signs: /92   Pulse 62   Temp 97 6 °F (36 4 °C)   Resp 16   Ht 5' 10" (1 778 m)   Wt 89 6 kg (197 lb 8 5 oz)   SpO2 97%   BMI 28 34 kg/m²     Pertinent Labs/Diagnostic Results:     Results from last 7 days   Lab Units 03/06/23  0506 03/03/23  0254 03/02/23  0601 03/01/23  1345   WBC Thousand/uL 5 74 6 49 6 04 5 85   HEMOGLOBIN g/dL 14 4 15 0 15 8 16 0   HEMATOCRIT % 41 2 43 2 44 2 45 8   PLATELETS Thousands/uL 256 283 270 303   NEUTROS ABS Thousands/µL 2 98  --   --  2 75     Results from last 7 days   Lab Units 03/06/23  0506 03/03/23  0254 03/02/23  1041 03/02/23  0601 03/01/23  1345   SODIUM mmol/L 136 137 137 137 137   POTASSIUM mmol/L 3 9 3 8 4 0 4 0 4 0   CHLORIDE mmol/L 107 106 103 104 101   CO2 mmol/L 22 24 27 25 27   ANION GAP mmol/L 7 7 7 8 9   BUN mg/dL 14 14 13 14 13   CREATININE mg/dL 0 67 0 60 0 66 0 66 0 74   EGFR ml/min/1 73sq m 110 115 111 111 106   CALCIUM mg/dL 9 4 9 2 9 8 9 7 10 3*   MAGNESIUM mg/dL  --   --   -- 2 1  --      Results from last 7 days   Lab Units 03/06/23  0506 03/03/23  0254 03/02/23  1041 03/02/23  0601 03/01/23  1345   GLUCOSE RANDOM mg/dL 95 104 137 110 88     Results from last 7 days   Lab Units 03/03/23  1017   HEMOGLOBIN A1C % 5 6   EAG mg/dl 114     Results from last 7 days   Lab Units 03/01/23  1958 03/01/23  1639 03/01/23  1345   HS TNI 0HR ng/L  --   --  3   HS TNI 2HR ng/L  --  26  --    HSTNI D2 ng/L  --  23*  --    HS TNI 4HR ng/L 19  --   --    HSTNI D4 ng/L 16  --   --      Results from last 7 days   Lab Units 03/07/23  0424 03/06/23  0506 03/05/23  0440 03/02/23  0554 03/01/23  2318   PROTIME seconds  --   --   --   --  12 3   INR   --   --   --   --  0 93   PTT seconds 76* 88* 79*   < > 38*    < > = values in this interval not displayed  Medications:   Scheduled Medications:  aspirin, 81 mg, Oral, Daily  atorvastatin, 80 mg, Oral, Daily With Dinner  chlorhexidine, 15 mL, Swish & Spit, Q12H [address]  [START ON 3/8/2023] metoprolol tartrate, 12 5 mg, Oral, Once  metoprolol tartrate, 25 mg, Oral, Q12H SUZAN  mupirocin, 1 application  , Nasal, Q12H [address]  mupirocin, 1 application  , Nasal, Once      Continuous IV Infusions:  heparin (porcine), 3-20 Units/kg/hr (Order-Specific), Intravenous, Titrated      PRN Meds:  acetaminophen, 650 mg, Oral, Q6H PRN  nitroglycerin, 0 4 mg, Sublingual, Q5 Min PRN  ondansetron, 4 mg, Intravenous, Q6H PRN        Discharge Plan: TBD    Network Utilization Review Department  ATTENTION: Please call with any questions or concerns to [phone] and carefully listen to the prompts so that you are directed to the right person  All voicemails are confidential   Marcos Antu all requests for admission clinical reviews, approved or denied determinations and any other requests to dedicated fax number below belonging to the campus where the patient is receiving treatment   List of dedicated fax numbers for the Facilities:  FACILITY NAME UR FAX NUMBER   ADMISSION DENIALS (Administrative/Medical Necessity) [phone]   [address] (Maternity/NICU/Pediatrics) Gilma Dean [address] Armida Christy  [phone]   1306 [facility] [address] Medical [place] [place] [address] [facility] [address] [facility] [place] [address] 815 [facility] [phone]

## 2023-03-07 NOTE — CASE MANAGEMENT
Case Management Discharge Planning Note    Patient name Kelsie Rosenberg  [facility]N [id number]  : 1970 Date 3/7/2023       Current Admission Date: 3/2/2023  Current Admission Diagnosis:ACS (acute coronary syndrome) [facility])   Patient Active Problem List    Diagnosis Date Noted   • [facility] ) 2023   • Chest pain 2023   • Arthritis of both hips 2021   • Femoroacetabular impingement of left hip 2021   • Hamstring tightness of both lower extremities 2021   • Weakness of both hips 2021   • COVID-19 2020   • Claudication of both lower extremities ([facility] 75 ) 2020   • Varicose veins of both lower extremities with pain 2020   • Bilateral leg pain 2020   • Bilateral hand numbness 2020   • Excessive daytime sleepiness 2020   • Overweight (BMI 25 0-29 9) 10/16/2019   • Achilles tendinosis of right lower extremity 2019   • Cutaneous skin tags 2019   • Essential hypertension 2018   • Hyperlipidemia, mixed 2018   • Vitamin D deficiency 2018   • Low libido 2018      LOS (days): 5  Geometric Mean LOS (GMLOS) (days):   Days to GMLOS:     OBJECTIVE:  Risk of Unplanned Readmission Score: 8 51         Current admission status: Inpatient   Preferred Pharmacy:   [facility] [address], [address] [place] [address] [address]  Phone: [phone] Fax: [place] [address]  [address] - [address] [place] [facility] 58  [address] [place] [address] [place] [address]  Phone: [phone] Fax: [phone]    [place] [address]  Phone: [phone] Fax: [phone]    Primary Care Provider: Yun Garrison PA-C    Primary Insurance: Mis Corona  Secondary Insurance:     DISCHARGE DETAILS:    Discharge planning discussed with[de-identified] spoke with pt re   HHC postop, no preference, referrals sent to several in his area  Freedom of Choice: Yes                   Contacts  Reason/Outcome: Continuity of Care, Emergency Contact, Discharge [address] [place]         Is the patient interested in [facility] at discharge?: Yes  [address] requested[de-identified] 60 [facility] Provider[de-identified] PCP  Home Health Services Needed[de-identified] Post-Op Care and Assessment  Homebound Criteria Met[de-identified] Requires the Assistance of Another Person for Safe Ambulation or to Leave the Home  Supporting Clincal Findings[de-identified] Limited Endurance, Fatigues Easliy in [employer]         Other Referral/Resources/Interventions Provided:  Interventions: HHC  Referral Comments: Referrals sent to several [facility] in his area, no preference         Treatment Team Recommendation: Home with [facility]  Discharge Destination Plan[de-identified] Home with Gabemilytad at Discharge : Family                                      Additional Comments: 47yo maleadmitted with abnormal stress test and ACS, to have CABG tomorrow  Discussed HHC, pt has no preference, referrals sent  Plan home with his wife when ready

## 2023-03-07 NOTE — CASE MANAGEMENT
Case Management Discharge Planning Note    Patient name Shlomo Keenan  Location 99 [facility] 404/[facility] 858-06 MRN [id number]  : 1970 Date 3/7/2023       Current Admission Date: 3/2/2023  Current Admission Diagnosis:ACS (acute coronary syndrome) [facility])   Patient Active Problem List    Diagnosis Date Noted   • ACS (acute coronary syndrome) ([facility] 75 ) 2023   • Chest pain 2023   • Arthritis of both hips 2021   • Femoroacetabular impingement of left hip 2021   • Hamstring tightness of both lower extremities 2021   • Weakness of both hips 2021   • COVID-19 2020   • Claudication of both lower extremities ([facility] 75 ) 2020   • Varicose veins of both lower extremities with pain 2020   • Bilateral leg pain 2020   • Bilateral hand numbness 2020   • Excessive daytime sleepiness 2020   • Overweight (BMI 25 0-29 9) 10/16/2019   • Achilles tendinosis of right lower extremity 2019   • Cutaneous skin tags 2019   • Essential hypertension 2018   • Hyperlipidemia, mixed 2018   • Vitamin D deficiency 2018   • Low libido 2018      LOS (days): 5  Geometric Mean LOS (GMLOS) (days):   Days to GMLOS:     OBJECTIVE:  Risk of Unplanned Readmission Score: 8 51         Current admission status: Inpatient   Preferred Pharmacy:   [facility] [address], [address] [place] [address]   [place] [address]   [address] [place] [address]  Phone: [phone] Fax: [place] [address], [place] - [address] [facility] 58  [address]  Phone: [phone] Fax: [phone]    [place] [address]  Phone: [phone] Fax: [phone]    Primary Care Provider: Marta Lanes, PA-C    Primary Insurance: Tessa Gunn  Secondary Insurance:     DISCHARGE DETAILS:                                [address] Agency Name[de-identified] [facility] Provider[de-identified] PCP  Homebound Criteria Met[de-identified] Requires the Assistance of Another Person for Safe Ambulation or to Leave the Home                                                                Additional Comments: Choices obtained for pt, chose [facility], confirmed

## 2023-03-07 NOTE — ASSESSMENT & PLAN NOTE
· Patient originally by PCP for complaint of dyspnea with exertion  Patient denies any true chest pain  He was then recommended to undergo stress test   Patient was found to have abnormal stress test as an outpatient with 8 out of 10 chest pain and ST depressions in inferolateral leads   · Mild troponin elevation on presentation to [facility]  3-26-19 over 6 hours  · Current EKG NSR  Patient denies any symptoms at this time  · Patient underwent cardiac cath at [facility] showing evidence of triple-vessel disease    99% stenosis of mid circumflex, 70% stenosis of first marginal, 85% stenosis of mid LAD, 70% stenosis of first diagonal, 50% stenosis of distal RCA, 75% stenosis of proximal RCA, 80% stenosis of RPAV,and  50% stenosis of RPDA  · Patient transferred from [facility] to [facility] for CT surgery evaluation for possible CABG  · Continue ASA, statin and beta-blocker 81 mg oral daily  · Heparin gtt per ACS protocol  ·  telemetry    Patient planned for CABG tomorrow per CT surgery  Currently asymptomatic

## 2023-03-07 NOTE — ASSESSMENT & PLAN NOTE
· Acceptable BP  · Continue metoprolol tartrate 25 mg oral twice daily  · Holding home lisinopril preprocedurally-as well as HCTZ

## 2023-03-07 NOTE — PROGRESS NOTES
Cardiology Progress Note - Lawrence Bergman 46 y o  male MRN: [id number]    Unit/Bed#: [facility] 404-01 Encounter: [id number]      Assessment:  Principal Problem:    ACS (acute coronary syndrome) ([address] )  Active Problems:    Essential hypertension    Hyperlipidemia, mixed    Impression:  1  Multivessel CAD - Discovered in the setting of an abnormal stress test  Patient has remained chest pain free  Plan for CABG tomorrow, 3/8    2  HTN - controlled  3  Dyslipidemia     Plan:  1  Continue current medications  2  CABG 3/8  Subjective:   No cardiopulmonary complaints at this time  Review of Systems   Cardiovascular: Negative for chest pain, leg swelling and palpitations  Respiratory: Negative for shortness of breath  Objective:   Vitals: Blood pressure 136/87, pulse 65, temperature 98 1 °F (36 7 °C), temperature source Oral, resp  rate 16, height 5' 10" (1 778 m), weight 89 6 kg (197 lb 8 5 oz), SpO2 94 %  , Body mass index is 28 34 kg/m² ,   Orthostatic Blood Pressures    Flowsheet Row Most Recent Value   Blood Pressure 136/87 filed at 03/07/2023 0743   Patient Position - Orthostatic VS Sitting filed at 03/07/2023 1765         Systolic (76CCD), ERO:321 , Min:136 , WMQ:508     Diastolic (62YBW), IEE:19, Min:85, Max:96      Intake/Output Summary (Last 24 hours) at 3/7/2023 1004  Last data filed at 3/7/2023 0758  Gross per 24 hour   Intake 1576 57 ml   Output --   Net 1576 57 ml     Weight (last 2 days)     Date/Time Weight    03/07/23 0652 89 6 (197 53)              Telemetry Review: NSR    Physical Exam  Cardiovascular:      Rate and Rhythm: Normal rate and regular rhythm  Heart sounds: Normal heart sounds  No murmur heard  No friction rub  No gallop  Pulmonary:      Breath sounds: Normal breath sounds  No wheezing or rales             Laboratory Results:        CBC with diff:   Results from last 7 days   Lab Units 03/06/23  0506 03/03/23  0254 03/02/23  0601 03/01/23  1345   WBC Thousand/uL 5 74 6  49 6 04 5 85   HEMOGLOBIN g/dL 14 4 15 0 15 8 16 0   HEMATOCRIT % 41 2 43 2 44 2 45 8   MCV fL 91 93 92 92   PLATELETS Thousands/uL 256 283 270 303   MCH pg 31 9 32 1 32 8 32 3   MCHC g/dL 35 0 34 7 35 7 34 9   RDW % 11 7 12 0 11 9 11 8   MPV fL 9 8 9 5 9 3 9 5   NRBC AUTO /100 WBCs 0  --   --  0         CMP:  Results from last 7 days   Lab Units 03/06/23  0506 03/03/23  0254 03/02/23  1041 03/02/23  0601 03/01/23  1345   POTASSIUM mmol/L 3 9 3 8 4 0 4 0 4 0   CHLORIDE mmol/L 107 106 103 104 101   CO2 mmol/L 22 24 27 25 27   BUN mg/dL 14 14 13 14 13   CREATININE mg/dL 0 67 0 60 0 66 0 66 0 74   CALCIUM mg/dL 9 4 9 2 9 8 9 7 10 3*   EGFR ml/min/1 73sq m 110 115 111 111 106         BMP:  Results from last 7 days   Lab Units 03/06/23  0506 03/03/23  0254 03/02/23  1041 03/02/23  0601 03/01/23  1345   POTASSIUM mmol/L 3 9 3 8 4 0 4 0 4 0   CHLORIDE mmol/L 107 106 103 104 101   CO2 mmol/L 22 24 27 25 27   BUN mg/dL 14 14 13 14 13   CREATININE mg/dL 0 67 0 60 0 66 0 66 0 74   CALCIUM mg/dL 9 4 9 2 9 8 9 7 10 3*       BNP:   Results Reviewed     None        No results for input(s): BNP in the last 72 hours  Magnesium:   Results from last 7 days   Lab Units 03/02/23  0601   MAGNESIUM mg/dL 2 1       Coags:   Results from last 7 days   Lab Units 03/07/23  0424 03/06/23  0506 03/05/23  0440 03/04/23  1817 03/04/23  1039 03/04/23  0314 03/03/23 2018 03/02/23  0554 03/01/23  2318   PTT seconds 76* 88* 79* 60* 68* 95* 65*   < > 38*   INR   --   --   --   --   --   --   --   --  0 93    < > = values in this interval not displayed         TSH:       Lipid Profile:   Results from last 7 days   Lab Units 03/03/23  0254 03/02/23  0601   TRIGLYCERIDES mg/dL 120 180*   HDL mg/dL 41 42           Cardiac testing:     Meds/Allergies   Current Facility-Administered Medications   Medication Dose Route Frequency Provider Last Rate   • acetaminophen  650 mg Oral Q6H PRN Corazon Razo PA-C     • aspirin  81 mg Oral Daily Gene Hunt PA-C     • atorvastatin  80 mg Oral Daily With Bart Apgar, DO     • chlorhexidine  15 mL Swish & Spit Q12H [address], [place]     • heparin (porcine)  3-20 Units/kg/hr (Order-Specific) Intravenous Titrated Kirstie Gibson PA-C 15 1 Units/kg/hr (23 1527)   • [START ON 3/8/2023] metoprolol tartrate  12 5 mg Oral Once Kirstie Gibson PA-C     • metoprolol tartrate  25 mg Oral Q12H [facility] Claude Sung PA-C     • mupirocin  1 application  Nasal Q12H [address], [place]     • mupirocin  1 application   Nasal Once Kirstie Gibson PA-C     • nitroglycerin  0 4 mg Sublingual Q5 Min PRN Gene Hunt PA-C     • ondansetron  4 mg Intravenous Q6H PRN Gene Hunt PA-C       heparin (porcine), 3-20 Units/kg/hr (Order-Specific), Last Rate: 15 1 Units/kg/hr (23)      Medications Prior to Admission   Medication   • ascorbic acid (VITAMIN C) 500 mg tablet   • Ergocalciferol (VITAMIN D2 PO)   • lisinopril-hydrochlorothiazide (PRINZIDE,ZESTORETIC) 20-12 5 MG per tablet   • Lysine 1000 MG TABS   • metoprolol tartrate (LOPRESSOR) 25 mg tablet   • Misc Natural Products (OSTEO BI-FLEX ADV DOUBLE ST PO)   • Multiple Vitamin (MULTIVITAMIN) capsule       Sherine Sherman MD

## 2023-03-07 NOTE — RESPIRATORY THERAPY NOTE
RT Protocol Note  Stafford Frankel 46 y o  male MRN: [id number]  Unit/Bed#: [facility] 404-01 Encounter: [id number]    Assessment    Principal Problem:    ACS (acute coronary syndrome) [facility])  Active Problems:    Essential hypertension    Hyperlipidemia, mixed      Home Pulmonary Medications:  none       Past Medical History:   Diagnosis Date    Disseminated herpes zoster 10/16/2019    HLD (hyperlipidemia)     Hypertension     Known health problems: none     Vitamin D deficiency      Social History     Socioeconomic History    Marital status: /Civil Union     Spouse name: None    Number of children: None    Years of education: None    Highest education level: None   Occupational History    None   Tobacco Use    Smoking status: Former     Packs/day: 0 25     Years: 6 00     Pack years: 1 50     Types: Cigarettes     Quit date:      Years since quittin 1    Smokeless tobacco: Never   Vaping Use    Vaping Use: Never used   Substance and Sexual Activity    Alcohol use: Yes     Comment: Social    Drug use: Not Currently     Types: Marijuana     Comment: rare marijuana use    Sexual activity: None   Other Topics Concern    None   Social History Narrative    None     Social Determinants of Health     Financial Resource Strain: Not on file   Food Insecurity: No Food Insecurity    Worried About Running Out of Food in the Last Year: Never true    Ran Out of Food in the Last Year: Never true   Transportation Needs: No Transportation Needs    Lack of Transportation (Medical): No    Lack of Transportation (Non-Medical):  No   Physical Activity: Not on file   Stress: Not on file   Social Connections: Not on file   Intimate Partner Violence: Not on file   Housing Stability: Low Risk     Unable to Pay for Housing in the Last Year: No    Number of Places Lived in the Last Year: 1    Unstable Housing in the Last Year: No       Subjective         Objective    Physical Exam:   Assessment Type: Assess only  General Appearance: Awake, Alert  Respiratory Pattern: Normal  Chest Assessment: Chest expansion asymmetrical  Bilateral Breath Sounds: Clear  Cough: Dry  O2 Device: ra    Vitals:  Blood pressure 136/87, pulse 65, temperature 98 1 °F (36 7 °C), temperature source Oral, resp  rate 16, height 5' 10" (1 778 m), weight 89 6 kg (197 lb 8 5 oz), SpO2 94 %  Imaging and other studies: I have personally reviewed pertinent reports  O2 Device: ra     Plan       Airway Clearance Plan: Incentive Spirometer     Resp Comments: pt on ra and assessed for resp protocol ACP  BS clear, pt tolerated IS  pt has no signifigant smoking hx  pt has cabg surgery tomorrow and will continue to use IS per resp protocol

## 2023-03-08 ENCOUNTER — ANESTHESIA (INPATIENT)
Dept: PERIOP | Facility: HOSPITAL | Age: 53
End: 2023-03-08

## 2023-03-08 ENCOUNTER — ANESTHESIA EVENT (INPATIENT)
Dept: PERIOP | Facility: HOSPITAL | Age: 53
End: 2023-03-08

## 2023-03-08 ENCOUNTER — APPOINTMENT (INPATIENT)
Dept: NON INVASIVE DIAGNOSTICS | Facility: HOSPITAL | Age: 53
End: 2023-03-08

## 2023-03-08 ENCOUNTER — APPOINTMENT (OUTPATIENT)
Dept: RADIOLOGY | Facility: HOSPITAL | Age: 53
End: 2023-03-08

## 2023-03-08 PROBLEM — Z95.1 S/P CABG (CORONARY ARTERY BYPASS GRAFT): Status: ACTIVE | Noted: 2023-03-08

## 2023-03-08 LAB
ANION GAP SERPL CALCULATED.3IONS-SCNC: 2 MMOL/L (ref 4–13)
APTT PPP: 50 SECONDS (ref 23–37)
ATRIAL RATE: 42 BPM
ATRIAL RATE: 66 BPM
BASE EXCESS BLDA CALC-SCNC: -1 MMOL/L (ref -2–3)
BASE EXCESS BLDA CALC-SCNC: -1 MMOL/L (ref -2–3)
BASE EXCESS BLDA CALC-SCNC: -10 MMOL/L (ref -2–3)
BASE EXCESS BLDA CALC-SCNC: -3 MMOL/L (ref -2–3)
BASE EXCESS BLDA CALC-SCNC: -4 MMOL/L (ref -2–3)
BASE EXCESS BLDA CALC-SCNC: -6.1 MMOL/L
BASE EXCESS BLDA CALC-SCNC: 0 MMOL/L (ref -2–3)
BASE EXCESS BLDA CALC-SCNC: 2 MMOL/L (ref -2–3)
BUN SERPL-MCNC: 9 MG/DL (ref 5–25)
CA-I BLD-SCNC: 0.93 MMOL/L (ref 1.12–1.32)
CA-I BLD-SCNC: 0.99 MMOL/L (ref 1.12–1.32)
CA-I BLD-SCNC: 1 MMOL/L (ref 1.12–1.32)
CA-I BLD-SCNC: 1 MMOL/L (ref 1.12–1.32)
CA-I BLD-SCNC: 1.05 MMOL/L (ref 1.12–1.32)
CA-I BLD-SCNC: 1.09 MMOL/L (ref 1.12–1.32)
CA-I BLD-SCNC: 1.2 MMOL/L (ref 1.12–1.32)
CA-I BLD-SCNC: 1.21 MMOL/L (ref 1.12–1.32)
CA-I BLD-SCNC: 1.27 MMOL/L (ref 1.12–1.32)
CALCIUM SERPL-MCNC: 6.4 MG/DL (ref 8.3–10.1)
CHLORIDE SERPL-SCNC: 120 MMOL/L (ref 96–108)
CO2 SERPL-SCNC: 19 MMOL/L (ref 21–32)
CREAT SERPL-MCNC: 0.36 MG/DL (ref 0.6–1.3)
FIBRINOGEN PPP-MCNC: 216 MG/DL (ref 227–495)
FIO2 GAS DIL.REBREATH: 60 L
GFR SERPL CREATININE-BSD FRML MDRD: 142 ML/MIN/1.73SQ M
GLUCOSE SERPL-MCNC: 107 MG/DL (ref 65–140)
GLUCOSE SERPL-MCNC: 107 MG/DL (ref 65–140)
GLUCOSE SERPL-MCNC: 108 MG/DL (ref 65–140)
GLUCOSE SERPL-MCNC: 109 MG/DL (ref 65–140)
GLUCOSE SERPL-MCNC: 115 MG/DL (ref 65–140)
GLUCOSE SERPL-MCNC: 124 MG/DL (ref 65–140)
GLUCOSE SERPL-MCNC: 127 MG/DL (ref 65–140)
GLUCOSE SERPL-MCNC: 128 MG/DL (ref 65–140)
GLUCOSE SERPL-MCNC: 134 MG/DL (ref 65–140)
GLUCOSE SERPL-MCNC: 135 MG/DL (ref 65–140)
GLUCOSE SERPL-MCNC: 135 MG/DL (ref 65–140)
GLUCOSE SERPL-MCNC: 147 MG/DL (ref 65–140)
GLUCOSE SERPL-MCNC: 154 MG/DL (ref 65–140)
GLUCOSE SERPL-MCNC: 160 MG/DL (ref 65–140)
GLUCOSE SERPL-MCNC: 89 MG/DL (ref 65–140)
GLUCOSE SERPL-MCNC: 89 MG/DL (ref 65–140)
GLUCOSE SERPL-MCNC: 99 MG/DL (ref 65–140)
HCO3 BLDA-SCNC: 14.8 MMOL/L (ref 22–28)
HCO3 BLDA-SCNC: 19.5 MMOL/L (ref 22–28)
HCO3 BLDA-SCNC: 21 MMOL/L (ref 22–28)
HCO3 BLDA-SCNC: 22.7 MMOL/L (ref 22–28)
HCO3 BLDA-SCNC: 23.3 MMOL/L (ref 22–28)
HCO3 BLDA-SCNC: 24.3 MMOL/L (ref 22–28)
HCO3 BLDA-SCNC: 24.7 MMOL/L (ref 24–30)
HCO3 BLDA-SCNC: 24.9 MMOL/L (ref 22–28)
HCO3 BLDA-SCNC: 25.1 MMOL/L (ref 24–30)
HCO3 BLDA-SCNC: 26.9 MMOL/L (ref 22–28)
HCT VFR BLD AUTO: 33.4 % (ref 36.5–49.3)
HCT VFR BLD AUTO: 34.6 % (ref 36.5–49.3)
HCT VFR BLD CALC: 21 % (ref 36.5–49.3)
HCT VFR BLD CALC: 24 % (ref 36.5–49.3)
HCT VFR BLD CALC: 26 % (ref 36.5–49.3)
HCT VFR BLD CALC: 28 % (ref 36.5–49.3)
HCT VFR BLD CALC: 29 % (ref 36.5–49.3)
HCT VFR BLD CALC: 36 % (ref 36.5–49.3)
HCT VFR BLD CALC: 40 % (ref 36.5–49.3)
HGB BLD-MCNC: 11.4 G/DL (ref 12–17)
HGB BLD-MCNC: 12 G/DL (ref 12–17)
HGB BLDA-MCNC: 12.2 G/DL (ref 12–17)
HGB BLDA-MCNC: 13.6 G/DL (ref 12–17)
HGB BLDA-MCNC: 7.1 G/DL (ref 12–17)
HGB BLDA-MCNC: 8.2 G/DL (ref 12–17)
HGB BLDA-MCNC: 8.8 G/DL (ref 12–17)
HGB BLDA-MCNC: 9.5 G/DL (ref 12–17)
HGB BLDA-MCNC: 9.9 G/DL (ref 12–17)
INR PPP: 1.44 (ref 0.84–1.19)
KCT BLD-ACNC: 126 SEC (ref 89–137)
KCT BLD-ACNC: 132 SEC (ref 89–137)
KCT BLD-ACNC: 398 SEC (ref 89–137)
KCT BLD-ACNC: 455 SEC (ref 89–137)
KCT BLD-ACNC: 517 SEC (ref 89–137)
KCT BLD-ACNC: 549 SEC (ref 89–137)
KCT BLD-ACNC: 631 SEC (ref 89–137)
O2 CT BLDA-SCNC: 18.3 ML/DL (ref 16–23)
OXYHGB MFR BLDA: 97.7 % (ref 94–97)
P AXIS: 50 DEGREES
P AXIS: 71 DEGREES
PCO2 BLD: 16 MMOL/L (ref 21–32)
PCO2 BLD: 22 MMOL/L (ref 21–32)
PCO2 BLD: 24 MMOL/L (ref 21–32)
PCO2 BLD: 24 MMOL/L (ref 21–32)
PCO2 BLD: 25 MMOL/L (ref 21–32)
PCO2 BLD: 26 MMOL/L (ref 21–32)
PCO2 BLD: 28 MMOL/L (ref 21–32)
PCO2 BLD: 28.1 MM HG (ref 36–44)
PCO2 BLD: 35.8 MM HG (ref 36–44)
PCO2 BLD: 36.6 MM HG (ref 36–44)
PCO2 BLD: 37.1 MM HG (ref 36–44)
PCO2 BLD: 40 MM HG (ref 36–44)
PCO2 BLD: 41.7 MM HG (ref 42–50)
PCO2 BLD: 42 MM HG (ref 36–44)
PCO2 BLD: 42.7 MM HG (ref 42–50)
PCO2 BLD: 44.5 MM HG (ref 36–44)
PCO2 BLDA: 39 MM HG (ref 36–44)
PH BLD: 7.33 [PH] (ref 7.35–7.45)
PH BLD: 7.36 [PH] (ref 7.35–7.45)
PH BLD: 7.37 [PH] (ref 7.35–7.45)
PH BLD: 7.37 [PH] (ref 7.3–7.4)
PH BLD: 7.38 [PH] (ref 7.35–7.45)
PH BLD: 7.39 [PH] (ref 7.35–7.45)
PH BLD: 7.39 [PH] (ref 7.3–7.4)
PH BLD: 7.42 [PH] (ref 7.35–7.45)
PH BLD: 7.42 [PH] (ref 7.35–7.45)
PH BLDA: 7.32 [PH] (ref 7.35–7.45)
PLATELET # BLD AUTO: 185 THOUSANDS/UL (ref 149–390)
PLATELET # BLD AUTO: 236 THOUSANDS/UL (ref 149–390)
PMV BLD AUTO: 9.4 FL (ref 8.9–12.7)
PMV BLD AUTO: 9.7 FL (ref 8.9–12.7)
PO2 BLD: 202 MM HG (ref 75–129)
PO2 BLD: 214 MM HG (ref 75–129)
PO2 BLD: 214 MM HG (ref 75–129)
PO2 BLD: 252 MM HG (ref 75–129)
PO2 BLD: 315 MM HG (ref 75–129)
PO2 BLD: 353 MM HG (ref 75–129)
PO2 BLD: 36 MM HG (ref 35–45)
PO2 BLD: 37 MM HG (ref 35–45)
PO2 BLD: 88 MM HG (ref 75–129)
PO2 BLDA: 148 MM HG (ref 75–129)
POTASSIUM BLD-SCNC: 2.3 MMOL/L (ref 3.5–5.3)
POTASSIUM BLD-SCNC: 3.5 MMOL/L (ref 3.5–5.3)
POTASSIUM BLD-SCNC: 4 MMOL/L (ref 3.5–5.3)
POTASSIUM BLD-SCNC: 4.1 MMOL/L (ref 3.5–5.3)
POTASSIUM BLD-SCNC: 4.3 MMOL/L (ref 3.5–5.3)
POTASSIUM BLD-SCNC: 4.7 MMOL/L (ref 3.5–5.3)
POTASSIUM BLD-SCNC: 4.7 MMOL/L (ref 3.5–5.3)
POTASSIUM BLD-SCNC: 4.8 MMOL/L (ref 3.5–5.3)
POTASSIUM BLD-SCNC: 4.9 MMOL/L (ref 3.5–5.3)
POTASSIUM SERPL-SCNC: 2.9 MMOL/L (ref 3.5–5.3)
POTASSIUM SERPL-SCNC: 4.4 MMOL/L (ref 3.5–5.3)
POTASSIUM SERPL-SCNC: 4.4 MMOL/L (ref 3.5–5.3)
PR INTERVAL: 162 MS
PR INTERVAL: 170 MS
PROTHROMBIN TIME: 17.8 SECONDS (ref 11.6–14.5)
PS CM H2O: 6
PS VENT FIO2: 40
PS VENT PEEP: 6
QRS AXIS: 4 DEGREES
QRS AXIS: 6 DEGREES
QRSD INTERVAL: 90 MS
QRSD INTERVAL: 90 MS
QT INTERVAL: 394 MS
QT INTERVAL: 426 MS
QTC INTERVAL: 328 MS
QTC INTERVAL: 446 MS
SAO2 % BLD FROM PO2: 100 % (ref 60–85)
SAO2 % BLD FROM PO2: 67 % (ref 60–85)
SAO2 % BLD FROM PO2: 69 % (ref 60–85)
SAO2 % BLD FROM PO2: 97 % (ref 60–85)
SODIUM BLD-SCNC: 135 MMOL/L (ref 136–145)
SODIUM BLD-SCNC: 136 MMOL/L (ref 136–145)
SODIUM BLD-SCNC: 136 MMOL/L (ref 136–145)
SODIUM BLD-SCNC: 138 MMOL/L (ref 136–145)
SODIUM BLD-SCNC: 138 MMOL/L (ref 136–145)
SODIUM BLD-SCNC: 141 MMOL/L (ref 136–145)
SODIUM BLD-SCNC: 147 MMOL/L (ref 136–145)
SODIUM SERPL-SCNC: 141 MMOL/L (ref 135–147)
SPECIMEN SOURCE: ABNORMAL
SPECIMEN SOURCE: NORMAL
SPECIMEN SOURCE: NORMAL
T WAVE AXIS: 80 DEGREES
T WAVE AXIS: 88 DEGREES
VENT - PS: ABNORMAL
VENTRICULAR RATE: 42 BPM
VENTRICULAR RATE: 66 BPM

## 2023-03-08 PROCEDURE — 021109W BYPASS CORONARY ARTERY, TWO ARTERIES FROM AORTA WITH AUTOLOGOUS VENOUS TISSUE, OPEN APPROACH: ICD-10-PCS | Performed by: THORACIC SURGERY (CARDIOTHORACIC VASCULAR SURGERY)

## 2023-03-08 PROCEDURE — 02100Z9 BYPASS CORONARY ARTERY, ONE ARTERY FROM LEFT INTERNAL MAMMARY, OPEN APPROACH: ICD-10-PCS | Performed by: THORACIC SURGERY (CARDIOTHORACIC VASCULAR SURGERY)

## 2023-03-08 PROCEDURE — 05HY33Z INSERTION OF INFUSION DEVICE INTO UPPER VEIN, PERCUTANEOUS APPROACH: ICD-10-PCS | Performed by: ANESTHESIOLOGY

## 2023-03-08 PROCEDURE — 5A1221Z PERFORMANCE OF CARDIAC OUTPUT, CONTINUOUS: ICD-10-PCS | Performed by: THORACIC SURGERY (CARDIOTHORACIC VASCULAR SURGERY)

## 2023-03-08 PROCEDURE — 5A1223Z PERFORMANCE OF CARDIAC PACING, CONTINUOUS: ICD-10-PCS | Performed by: THORACIC SURGERY (CARDIOTHORACIC VASCULAR SURGERY)

## 2023-03-08 PROCEDURE — 03BC4ZZ EXCISION OF LEFT RADIAL ARTERY, PERCUTANEOUS ENDOSCOPIC APPROACH: ICD-10-PCS | Performed by: THORACIC SURGERY (CARDIOTHORACIC VASCULAR SURGERY)

## 2023-03-08 PROCEDURE — 02100AW BYPASS CORONARY ARTERY, ONE ARTERY FROM AORTA WITH AUTOLOGOUS ARTERIAL TISSUE, OPEN APPROACH: ICD-10-PCS | Performed by: THORACIC SURGERY (CARDIOTHORACIC VASCULAR SURGERY)

## 2023-03-08 PROCEDURE — 06BQ4ZZ EXCISION OF LEFT SAPHENOUS VEIN, PERCUTANEOUS ENDOSCOPIC APPROACH: ICD-10-PCS | Performed by: THORACIC SURGERY (CARDIOTHORACIC VASCULAR SURGERY)

## 2023-03-08 DEVICE — MARKER CORONARY BYPASS VOSS GRAFT: Type: IMPLANTABLE DEVICE | Status: FUNCTIONAL

## 2023-03-08 RX ORDER — ACETAMINOPHEN 650 MG/1
650 SUPPOSITORY RECTAL EVERY 4 HOURS PRN
Status: DISCONTINUED | OUTPATIENT
Start: 2023-03-08 | End: 2023-03-11 | Stop reason: HOSPADM

## 2023-03-08 RX ORDER — CALCIUM CHLORIDE 100 MG/ML
INJECTION INTRAVENOUS; INTRAVENTRICULAR AS NEEDED
Status: DISCONTINUED | OUTPATIENT
Start: 2023-03-08 | End: 2023-03-10

## 2023-03-08 RX ORDER — SODIUM CHLORIDE, SODIUM GLUCONATE, SODIUM ACETATE, POTASSIUM CHLORIDE, MAGNESIUM CHLORIDE, SODIUM PHOSPHATE, DIBASIC, AND POTASSIUM PHOSPHATE .53; .5; .37; .037; .03; .012; .00082 G/100ML; G/100ML; G/100ML; G/100ML; G/100ML; G/100ML; G/100ML
INJECTION, SOLUTION INTRAVENOUS AS NEEDED
Status: DISCONTINUED | OUTPATIENT
Start: 2023-03-08 | End: 2023-03-10

## 2023-03-08 RX ORDER — METHOCARBAMOL 500 MG/1
500 TABLET, FILM COATED ORAL EVERY 6 HOURS PRN
Status: DISCONTINUED | OUTPATIENT
Start: 2023-03-08 | End: 2023-03-11 | Stop reason: HOSPADM

## 2023-03-08 RX ORDER — VANCOMYCIN HYDROCHLORIDE 1 G/20ML
INJECTION, POWDER, LYOPHILIZED, FOR SOLUTION INTRAVENOUS AS NEEDED
Status: DISCONTINUED | OUTPATIENT
Start: 2023-03-08 | End: 2023-03-08 | Stop reason: HOSPADM

## 2023-03-08 RX ORDER — CEFAZOLIN SODIUM 2 G/50ML
2000 SOLUTION INTRAVENOUS EVERY 8 HOURS
Status: COMPLETED | OUTPATIENT
Start: 2023-03-08 | End: 2023-03-09

## 2023-03-08 RX ORDER — HEPARIN SODIUM 1000 [USP'U]/ML
INJECTION, SOLUTION INTRAVENOUS; SUBCUTANEOUS AS NEEDED
Status: DISCONTINUED | OUTPATIENT
Start: 2023-03-08 | End: 2023-03-10

## 2023-03-08 RX ORDER — OXYCODONE HYDROCHLORIDE 5 MG/1
2.5 TABLET ORAL EVERY 4 HOURS PRN
Status: DISCONTINUED | OUTPATIENT
Start: 2023-03-08 | End: 2023-03-11 | Stop reason: HOSPADM

## 2023-03-08 RX ORDER — AMIODARONE HYDROCHLORIDE 200 MG/1
200 TABLET ORAL EVERY 8 HOURS SCHEDULED
Status: DISCONTINUED | OUTPATIENT
Start: 2023-03-08 | End: 2023-03-11 | Stop reason: HOSPADM

## 2023-03-08 RX ORDER — AMINOCAPROIC ACID 250 MG/ML
INJECTION, SOLUTION INTRAVENOUS AS NEEDED
Status: DISCONTINUED | OUTPATIENT
Start: 2023-03-08 | End: 2023-03-10

## 2023-03-08 RX ORDER — POTASSIUM CHLORIDE 29.8 MG/ML
40 INJECTION INTRAVENOUS ONCE
Status: COMPLETED | OUTPATIENT
Start: 2023-03-08 | End: 2023-03-08

## 2023-03-08 RX ORDER — BISACODYL 10 MG
10 SUPPOSITORY, RECTAL RECTAL DAILY PRN
Status: DISCONTINUED | OUTPATIENT
Start: 2023-03-08 | End: 2023-03-11 | Stop reason: HOSPADM

## 2023-03-08 RX ORDER — MAGNESIUM HYDROXIDE 1200 MG/15ML
LIQUID ORAL AS NEEDED
Status: DISCONTINUED | OUTPATIENT
Start: 2023-03-08 | End: 2023-03-08 | Stop reason: HOSPADM

## 2023-03-08 RX ORDER — MAGNESIUM SULFATE HEPTAHYDRATE 40 MG/ML
2 INJECTION, SOLUTION INTRAVENOUS ONCE
Status: COMPLETED | OUTPATIENT
Start: 2023-03-08 | End: 2023-03-08

## 2023-03-08 RX ORDER — SODIUM CHLORIDE 9 MG/ML
INJECTION, SOLUTION INTRAVENOUS CONTINUOUS PRN
Status: DISCONTINUED | OUTPATIENT
Start: 2023-03-08 | End: 2023-03-10

## 2023-03-08 RX ORDER — POTASSIUM CHLORIDE 14.9 MG/ML
20 INJECTION INTRAVENOUS ONCE AS NEEDED
Status: DISCONTINUED | OUTPATIENT
Start: 2023-03-08 | End: 2023-03-09

## 2023-03-08 RX ORDER — PROPOFOL 10 MG/ML
INJECTION, EMULSION INTRAVENOUS AS NEEDED
Status: DISCONTINUED | OUTPATIENT
Start: 2023-03-08 | End: 2023-03-10

## 2023-03-08 RX ORDER — ATORVASTATIN CALCIUM 80 MG/1
80 TABLET, FILM COATED ORAL
Status: DISCONTINUED | OUTPATIENT
Start: 2023-03-08 | End: 2023-03-11 | Stop reason: HOSPADM

## 2023-03-08 RX ORDER — MANNITOL 250 MG/ML
INJECTION, SOLUTION INTRAVENOUS AS NEEDED
Status: DISCONTINUED | OUTPATIENT
Start: 2023-03-08 | End: 2023-03-10

## 2023-03-08 RX ORDER — PANTOPRAZOLE SODIUM 40 MG/1
40 TABLET, DELAYED RELEASE ORAL
Status: DISCONTINUED | OUTPATIENT
Start: 2023-03-09 | End: 2023-03-11 | Stop reason: HOSPADM

## 2023-03-08 RX ORDER — CALCIUM CHLORIDE 100 MG/ML
1 INJECTION INTRAVENOUS; INTRAVENTRICULAR ONCE
Status: COMPLETED | OUTPATIENT
Start: 2023-03-08 | End: 2023-03-08

## 2023-03-08 RX ORDER — FENTANYL CITRATE 50 UG/ML
50 INJECTION, SOLUTION INTRAMUSCULAR; INTRAVENOUS
Status: DISCONTINUED | OUTPATIENT
Start: 2023-03-08 | End: 2023-03-09

## 2023-03-08 RX ORDER — ACETAMINOPHEN 325 MG/1
975 TABLET ORAL EVERY 8 HOURS
Status: DISCONTINUED | OUTPATIENT
Start: 2023-03-08 | End: 2023-03-11 | Stop reason: HOSPADM

## 2023-03-08 RX ORDER — HEPARIN SODIUM 1000 [USP'U]/ML
10000 INJECTION, SOLUTION INTRAVENOUS; SUBCUTANEOUS ONCE
Status: COMPLETED | OUTPATIENT
Start: 2023-03-08 | End: 2023-03-08

## 2023-03-08 RX ORDER — CEFAZOLIN SODIUM 2 G/50ML
SOLUTION INTRAVENOUS AS NEEDED
Status: DISCONTINUED | OUTPATIENT
Start: 2023-03-08 | End: 2023-03-10

## 2023-03-08 RX ORDER — HYDROMORPHONE HCL/PF 1 MG/ML
0.5 SYRINGE (ML) INJECTION EVERY 2 HOUR PRN
Status: DISCONTINUED | OUTPATIENT
Start: 2023-03-08 | End: 2023-03-09

## 2023-03-08 RX ORDER — SODIUM CHLORIDE 450 MG/100ML
20 INJECTION, SOLUTION INTRAVENOUS CONTINUOUS
Status: DISCONTINUED | OUTPATIENT
Start: 2023-03-08 | End: 2023-03-09

## 2023-03-08 RX ORDER — CARDIOPLEGIC SOLUTION NO.16 26/1052.8
PLASTIC BAG, PERFUSION (ML) PERFUSION AS NEEDED
Status: DISCONTINUED | OUTPATIENT
Start: 2023-03-08 | End: 2023-03-10

## 2023-03-08 RX ORDER — SODIUM CHLORIDE, SODIUM LACTATE, POTASSIUM CHLORIDE, CALCIUM CHLORIDE 600; 310; 30; 20 MG/100ML; MG/100ML; MG/100ML; MG/100ML
INJECTION, SOLUTION INTRAVENOUS CONTINUOUS PRN
Status: DISCONTINUED | OUTPATIENT
Start: 2023-03-08 | End: 2023-03-10

## 2023-03-08 RX ORDER — FONDAPARINUX SODIUM 2.5 MG/.5ML
2.5 INJECTION SUBCUTANEOUS DAILY
Status: DISCONTINUED | OUTPATIENT
Start: 2023-03-09 | End: 2023-03-11 | Stop reason: HOSPADM

## 2023-03-08 RX ORDER — HYDROMORPHONE HCL 110MG/55ML
PATIENT CONTROLLED ANALGESIA SYRINGE INTRAVENOUS AS NEEDED
Status: DISCONTINUED | OUTPATIENT
Start: 2023-03-08 | End: 2023-03-10

## 2023-03-08 RX ORDER — SUCCINYLCHOLINE/SOD CL,ISO/PF 100 MG/5ML
SYRINGE (ML) INTRAVENOUS AS NEEDED
Status: DISCONTINUED | OUTPATIENT
Start: 2023-03-08 | End: 2023-03-10

## 2023-03-08 RX ORDER — POTASSIUM CHLORIDE 14.9 MG/ML
20 INJECTION INTRAVENOUS
Status: DISCONTINUED | OUTPATIENT
Start: 2023-03-08 | End: 2023-03-09

## 2023-03-08 RX ORDER — PROTAMINE SULFATE 10 MG/ML
INJECTION, SOLUTION INTRAVENOUS AS NEEDED
Status: DISCONTINUED | OUTPATIENT
Start: 2023-03-08 | End: 2023-03-10

## 2023-03-08 RX ORDER — ASPIRIN 325 MG
325 TABLET ORAL DAILY
Status: DISCONTINUED | OUTPATIENT
Start: 2023-03-08 | End: 2023-03-11 | Stop reason: HOSPADM

## 2023-03-08 RX ORDER — ROCURONIUM BROMIDE 10 MG/ML
INJECTION, SOLUTION INTRAVENOUS AS NEEDED
Status: DISCONTINUED | OUTPATIENT
Start: 2023-03-08 | End: 2023-03-10

## 2023-03-08 RX ORDER — FUROSEMIDE 10 MG/ML
40 INJECTION INTRAMUSCULAR; INTRAVENOUS EVERY 6 HOURS PRN
Status: DISCONTINUED | OUTPATIENT
Start: 2023-03-08 | End: 2023-03-09

## 2023-03-08 RX ORDER — ESMOLOL HYDROCHLORIDE 10 MG/ML
INJECTION INTRAVENOUS AS NEEDED
Status: DISCONTINUED | OUTPATIENT
Start: 2023-03-08 | End: 2023-03-10

## 2023-03-08 RX ORDER — ALBUMIN, HUMAN INJ 5% 5 %
SOLUTION INTRAVENOUS
Status: COMPLETED
Start: 2023-03-08 | End: 2023-03-08

## 2023-03-08 RX ORDER — CHLORHEXIDINE GLUCONATE 0.12 MG/ML
15 RINSE ORAL EVERY 12 HOURS
Status: DISCONTINUED | OUTPATIENT
Start: 2023-03-08 | End: 2023-03-09

## 2023-03-08 RX ORDER — ONDANSETRON 2 MG/ML
4 INJECTION INTRAMUSCULAR; INTRAVENOUS EVERY 6 HOURS PRN
Status: DISCONTINUED | OUTPATIENT
Start: 2023-03-08 | End: 2023-03-11 | Stop reason: HOSPADM

## 2023-03-08 RX ORDER — FENTANYL CITRATE 50 UG/ML
INJECTION, SOLUTION INTRAMUSCULAR; INTRAVENOUS AS NEEDED
Status: DISCONTINUED | OUTPATIENT
Start: 2023-03-08 | End: 2023-03-10

## 2023-03-08 RX ORDER — GLYCOPYRROLATE 0.2 MG/ML
INJECTION INTRAMUSCULAR; INTRAVENOUS AS NEEDED
Status: DISCONTINUED | OUTPATIENT
Start: 2023-03-08 | End: 2023-03-10

## 2023-03-08 RX ORDER — FENTANYL CITRATE 50 UG/ML
50 INJECTION, SOLUTION INTRAMUSCULAR; INTRAVENOUS ONCE
Status: DISCONTINUED | OUTPATIENT
Start: 2023-03-08 | End: 2023-03-09

## 2023-03-08 RX ORDER — NEOSTIGMINE METHYLSULFATE 1 MG/ML
INJECTION INTRAVENOUS AS NEEDED
Status: DISCONTINUED | OUTPATIENT
Start: 2023-03-08 | End: 2023-03-10

## 2023-03-08 RX ORDER — OXYCODONE HYDROCHLORIDE 5 MG/1
5 TABLET ORAL EVERY 4 HOURS PRN
Status: DISCONTINUED | OUTPATIENT
Start: 2023-03-08 | End: 2023-03-11 | Stop reason: HOSPADM

## 2023-03-08 RX ORDER — AMLODIPINE BESYLATE 2.5 MG/1
2.5 TABLET ORAL DAILY
Status: DISCONTINUED | OUTPATIENT
Start: 2023-03-08 | End: 2023-03-11 | Stop reason: HOSPADM

## 2023-03-08 RX ORDER — POLYETHYLENE GLYCOL 3350 17 G/17G
17 POWDER, FOR SOLUTION ORAL DAILY
Status: DISCONTINUED | OUTPATIENT
Start: 2023-03-08 | End: 2023-03-11 | Stop reason: HOSPADM

## 2023-03-08 RX ADMIN — MAGNESIUM SULFATE HEPTAHYDRATE 2 G: 500 INJECTION, SOLUTION INTRAMUSCULAR; INTRAVENOUS at 11:41

## 2023-03-08 RX ADMIN — Medication 100 MG: at 07:32

## 2023-03-08 RX ADMIN — SODIUM CHLORIDE: 0.9 INJECTION, SOLUTION INTRAVENOUS at 07:51

## 2023-03-08 RX ADMIN — ESMOLOL HYDROCHLORIDE 100 MG: 10 INJECTION, SOLUTION INTRAVENOUS at 07:33

## 2023-03-08 RX ADMIN — SODIUM CHLORIDE 1 EACH: 0.9 INJECTION, SOLUTION INTRAVENOUS at 08:00

## 2023-03-08 RX ADMIN — AMIODARONE HYDROCHLORIDE 200 MG: 200 TABLET ORAL at 21:01

## 2023-03-08 RX ADMIN — SODIUM CHLORIDE, SODIUM LACTATE, POTASSIUM CHLORIDE, AND CALCIUM CHLORIDE 500 ML: .6; .31; .03; .02 INJECTION, SOLUTION INTRAVENOUS at 14:30

## 2023-03-08 RX ADMIN — LIDOCAINE HYDROCHLORIDE 100 MG: 20 INJECTION INTRAVENOUS at 11:53

## 2023-03-08 RX ADMIN — AMINOCAPROIC ACID 5 G: 250 INJECTION, SOLUTION INTRAVENOUS at 08:24

## 2023-03-08 RX ADMIN — MANNITOL 25 G: 12.5 INJECTION, SOLUTION INTRAVENOUS at 08:01

## 2023-03-08 RX ADMIN — ROCURONIUM BROMIDE 45 MG: 50 INJECTION, SOLUTION INTRAVENOUS at 07:34

## 2023-03-08 RX ADMIN — MUPIROCIN 1 APPLICATION.: 20 OINTMENT TOPICAL at 20:58

## 2023-03-08 RX ADMIN — SODIUM CHLORIDE, SODIUM GLUCONATE, SODIUM ACETATE, POTASSIUM CHLORIDE, MAGNESIUM CHLORIDE, SODIUM PHOSPHATE, DIBASIC, AND POTASSIUM PHOSPHATE 400 ML: .53; .5; .37; .037; .03; .012; .00082 INJECTION, SOLUTION INTRAVENOUS at 11:33

## 2023-03-08 RX ADMIN — CEFAZOLIN SODIUM 2000 MG: 2 SOLUTION INTRAVENOUS at 07:55

## 2023-03-08 RX ADMIN — HEPARIN SODIUM 5000 UNITS: 1000 INJECTION INTRAVENOUS; SUBCUTANEOUS at 08:01

## 2023-03-08 RX ADMIN — DEXMEDETOMIDINE 0.3 MCG/KG/HR: 100 INJECTION, SOLUTION, CONCENTRATE INTRAVENOUS at 08:20

## 2023-03-08 RX ADMIN — AMINOCAPROIC ACID 1 G/HR: 250 INJECTION, SOLUTION INTRAVENOUS at 14:34

## 2023-03-08 RX ADMIN — CHLORHEXIDINE GLUCONATE 15 ML: 1.2 SOLUTION ORAL at 05:44

## 2023-03-08 RX ADMIN — PHENYLEPHRINE HYDROCHLORIDE 100 MCG: 10 INJECTION INTRAVENOUS at 10:27

## 2023-03-08 RX ADMIN — ROCURONIUM BROMIDE 50 MG: 50 INJECTION, SOLUTION INTRAVENOUS at 08:15

## 2023-03-08 RX ADMIN — ROCURONIUM BROMIDE 5 MG: 50 INJECTION, SOLUTION INTRAVENOUS at 07:32

## 2023-03-08 RX ADMIN — PHENYLEPHRINE HYDROCHLORIDE 250 MCG: 10 INJECTION INTRAVENOUS at 11:28

## 2023-03-08 RX ADMIN — GLYCOPYRROLATE 0.8 MCG: 0.2 INJECTION INTRAMUSCULAR; INTRAVENOUS at 12:46

## 2023-03-08 RX ADMIN — HYDROMORPHONE HYDROCHLORIDE 2 MG: 2 INJECTION, SOLUTION INTRAMUSCULAR; INTRAVENOUS; SUBCUTANEOUS at 12:24

## 2023-03-08 RX ADMIN — AMIODARONE HYDROCHLORIDE 150 MG: 50 INJECTION, SOLUTION INTRAVENOUS at 11:45

## 2023-03-08 RX ADMIN — SODIUM CHLORIDE, SODIUM LACTATE, POTASSIUM CHLORIDE, AND CALCIUM CHLORIDE 500 ML: .6; .31; .03; .02 INJECTION, SOLUTION INTRAVENOUS at 13:15

## 2023-03-08 RX ADMIN — LIDOCAINE HYDROCHLORIDE 100 MG: 20 INJECTION INTRAVENOUS at 07:32

## 2023-03-08 RX ADMIN — PHENYLEPHRINE HYDROCHLORIDE 30 MCG/MIN: 10 INJECTION INTRAVENOUS at 13:15

## 2023-03-08 RX ADMIN — PHENYLEPHRINE HYDROCHLORIDE 200 MCG: 10 INJECTION INTRAVENOUS at 11:12

## 2023-03-08 RX ADMIN — PHENYLEPHRINE HYDROCHLORIDE 250 MCG: 10 INJECTION INTRAVENOUS at 11:58

## 2023-03-08 RX ADMIN — SODIUM BICARBONATE 50 MEQ: 84 INJECTION, SOLUTION INTRAVENOUS at 08:01

## 2023-03-08 RX ADMIN — SODIUM CHLORIDE, SODIUM GLUCONATE, SODIUM ACETATE, POTASSIUM CHLORIDE, MAGNESIUM CHLORIDE, SODIUM PHOSPHATE, DIBASIC, AND POTASSIUM PHOSPHATE 500 ML: .53; .5; .37; .037; .03; .012; .00082 INJECTION, SOLUTION INTRAVENOUS at 08:01

## 2023-03-08 RX ADMIN — PHENYLEPHRINE HYDROCHLORIDE 500 MCG: 10 INJECTION INTRAVENOUS at 12:04

## 2023-03-08 RX ADMIN — PHENYLEPHRINE HYDROCHLORIDE 500 MCG: 10 INJECTION INTRAVENOUS at 10:39

## 2023-03-08 RX ADMIN — NEOSTIGMINE METHYLSULFATE 5 MG: 1 INJECTION INTRAVENOUS at 12:46

## 2023-03-08 RX ADMIN — PHENYLEPHRINE HYDROCHLORIDE 100 MCG: 10 INJECTION INTRAVENOUS at 11:17

## 2023-03-08 RX ADMIN — FENTANYL CITRATE 1000 MCG: 0.05 INJECTION, SOLUTION INTRAMUSCULAR; INTRAVENOUS at 08:20

## 2023-03-08 RX ADMIN — CALCIUM CHLORIDE 1 G: 100 INJECTION INTRAVENOUS; INTRAVENTRICULAR at 12:03

## 2023-03-08 RX ADMIN — HEPARIN SODIUM 30000 UNITS: 1000 INJECTION INTRAVENOUS; SUBCUTANEOUS at 09:31

## 2023-03-08 RX ADMIN — METOPROLOL TARTRATE 25 MG: 25 TABLET, FILM COATED ORAL at 05:45

## 2023-03-08 RX ADMIN — HYDROMORPHONE HYDROCHLORIDE 0.5 MG: 1 INJECTION, SOLUTION INTRAMUSCULAR; INTRAVENOUS; SUBCUTANEOUS at 22:11

## 2023-03-08 RX ADMIN — HEPARIN SODIUM 5000 UNITS: 1000 INJECTION INTRAVENOUS; SUBCUTANEOUS at 10:41

## 2023-03-08 RX ADMIN — CEFAZOLIN SODIUM 2000 MG: 2 SOLUTION INTRAVENOUS at 19:28

## 2023-03-08 RX ADMIN — ACETAMINOPHEN 975 MG: 325 TABLET ORAL at 20:58

## 2023-03-08 RX ADMIN — ROCURONIUM BROMIDE 50 MG: 50 INJECTION, SOLUTION INTRAVENOUS at 10:22

## 2023-03-08 RX ADMIN — PROPOFOL 200 MG: 10 INJECTION, EMULSION INTRAVENOUS at 07:32

## 2023-03-08 RX ADMIN — ALBUMIN (HUMAN) 12.5 G: 12.5 INJECTION, SOLUTION INTRAVENOUS at 18:23

## 2023-03-08 RX ADMIN — POTASSIUM CHLORIDE 40 MEQ: 29.8 INJECTION, SOLUTION INTRAVENOUS at 14:34

## 2023-03-08 RX ADMIN — SODIUM CHLORIDE, SODIUM LACTATE, POTASSIUM CHLORIDE, AND CALCIUM CHLORIDE: .6; .31; .03; .02 INJECTION, SOLUTION INTRAVENOUS at 07:24

## 2023-03-08 RX ADMIN — HEPARIN SODIUM 5000 UNITS: 1000 INJECTION INTRAVENOUS; SUBCUTANEOUS at 09:08

## 2023-03-08 RX ADMIN — MAGNESIUM SULFATE HEPTAHYDRATE 2 G: 40 INJECTION, SOLUTION INTRAVENOUS at 13:15

## 2023-03-08 RX ADMIN — PHENYLEPHRINE HYDROCHLORIDE 100 MCG: 10 INJECTION INTRAVENOUS at 10:26

## 2023-03-08 RX ADMIN — MANNITOL 12.5 G: 250 INJECTION, SOLUTION INTRAVENOUS at 10:31

## 2023-03-08 RX ADMIN — MUPIROCIN 1 APPLICATION.: 20 OINTMENT TOPICAL at 05:45

## 2023-03-08 RX ADMIN — PHENYLEPHRINE HYDROCHLORIDE 500 MCG: 10 INJECTION INTRAVENOUS at 12:03

## 2023-03-08 RX ADMIN — PHENYLEPHRINE HYDROCHLORIDE 100 MCG: 10 INJECTION INTRAVENOUS at 11:01

## 2023-03-08 RX ADMIN — OXYCODONE HYDROCHLORIDE 2.5 MG: 5 TABLET ORAL at 15:56

## 2023-03-08 RX ADMIN — SODIUM CHLORIDE, SODIUM GLUCONATE, SODIUM ACETATE, POTASSIUM CHLORIDE, MAGNESIUM CHLORIDE, SODIUM PHOSPHATE, DIBASIC, AND POTASSIUM PHOSPHATE 350 ML: .53; .5; .37; .037; .03; .012; .00082 INJECTION, SOLUTION INTRAVENOUS at 11:13

## 2023-03-08 RX ADMIN — PHENYLEPHRINE HYDROCHLORIDE 250 MCG: 10 INJECTION INTRAVENOUS at 11:30

## 2023-03-08 RX ADMIN — HEPARIN SODIUM 10000 UNITS: 1000 INJECTION INTRAVENOUS; SUBCUTANEOUS at 10:52

## 2023-03-08 RX ADMIN — Medication 1000 ML: at 10:23

## 2023-03-08 RX ADMIN — CALCIUM CHLORIDE 1 G: 100 INJECTION INTRAVENOUS; INTRAVENTRICULAR at 14:33

## 2023-03-08 RX ADMIN — CHLORHEXIDINE GLUCONATE 15 ML: 1.2 SOLUTION ORAL at 20:58

## 2023-03-08 RX ADMIN — PROTAMINE SULFATE 290 MG: 10 INJECTION, SOLUTION INTRAVENOUS at 12:11

## 2023-03-08 RX ADMIN — PHENYLEPHRINE HYDROCHLORIDE 250 MCG: 10 INJECTION INTRAVENOUS at 11:59

## 2023-03-08 RX ADMIN — CEFAZOLIN SODIUM 2000 MG: 2 SOLUTION INTRAVENOUS at 11:47

## 2023-03-08 RX ADMIN — PHENYLEPHRINE HYDROCHLORIDE 100 MCG: 10 INJECTION INTRAVENOUS at 10:21

## 2023-03-08 RX ADMIN — INSULIN HUMAN 5 UNITS: 100 INJECTION, SOLUTION PARENTERAL at 11:50

## 2023-03-08 RX ADMIN — PHENYLEPHRINE HYDROCHLORIDE 250 MCG: 10 INJECTION INTRAVENOUS at 11:47

## 2023-03-08 RX ADMIN — PROTAMINE SULFATE 10 MG: 10 INJECTION, SOLUTION INTRAVENOUS at 12:10

## 2023-03-08 RX ADMIN — PHENYLEPHRINE HYDROCHLORIDE 200 MCG: 10 INJECTION INTRAVENOUS at 11:44

## 2023-03-08 RX ADMIN — AMINOCAPROIC ACID 1 G/HR: 250 INJECTION, SOLUTION INTRAVENOUS at 08:24

## 2023-03-08 RX ADMIN — PHENYLEPHRINE HYDROCHLORIDE 200 MCG: 10 INJECTION INTRAVENOUS at 10:53

## 2023-03-08 NOTE — ANESTHESIA PROCEDURE NOTES
Introducer/[place]  Performed by: Petey العلي MD  Authorized by: Petey العلي MD     Date/Time: 3/8/2023 7:51 AM  Consent: Written consent obtained  Risks and benefits: risks, benefits and alternatives were discussed  Consent given by: patient  Patient understanding: patient states understanding of the procedure being performed  Patient consent: the patient's understanding of the procedure matches consent given  Procedure consent: procedure consent matches procedure scheduled  Required items: required blood products, implants, devices, and special equipment available  Patient identity confirmed: arm band  Time out: Immediately prior to procedure a "time out" was called to verify the correct patient, procedure, equipment, support staff and site/side marked as required    Indications: vascular access and central pressure monitoring  Location details: right internal jugular  Patient position: Trendelenburg    Sedation:  Patient sedated: yes    Assessment: blood return through all ports and free fluid flow  Preparation: skin prepped with ChloraPrep  Skin prep agent dried: skin prep agent completely dried prior to procedure  Sterile barriers: all five maximum sterile barriers used - cap, mask, sterile gown, sterile gloves, and large sterile sheet  Hand hygiene: hand hygiene performed prior to central venous catheter insertion  Ultrasound guidance: yes  Pre-procedure: landmarks identified  Number of attempts: 1  Successful placement: yes  Post-procedure: line sutured and dressing applied  Patient tolerance: patient tolerated the procedure well with no immediate complications  Comments: Procedure start 0739  Procedure end [id number]

## 2023-03-08 NOTE — ANESTHESIA POSTPROCEDURE EVALUATION
Post-Op Assessment Note    CV Status:  Stable    Pain management: adequate     Mental Status:  Arousable   Hydration Status:  Stable   PONV Controlled:  None   Airway Patency:  Patent  Airway: intubated      Post Op Vitals Reviewed: Yes      Staff: Anesthesiologist         No notable events documented      BP      Temp     Pulse     Resp      SpO2

## 2023-03-08 NOTE — CONSULTS
[address] [facility] 46 y o  male MRN: [id number]  Unit/Bed#: [facility] 414-01 Encounter: [id number]        Physician Requesting Consult: Yashira Robbins DO    Reason for Consult / Principal Problem: ACS (acute coronary syndrome) [facility])    HPI: Leno Gregg is a 46 y o  male with PMH of hypertension and hyperlipidemia who originally presented to [facility] on 3/1 after a positive cardiac stress test   Patient had reported ongoing anginal type symptoms with exertional chest pressure and shortness of breath and was reffered for cardiac stress test by PCP  On 3/1, Patient was found to have a positive cardiac stress test (significant EKG changes with 2 mm horizontal ST segment depression in the inferolateral leads which persisted up to 8 minutes into recovery) and subsequent positive cardiac catheterization (3/2)  (Multivessel coronary artery disease involving 85% mid LAD, 70% proximal diagonal, 70% high OM, 99% mid circumflex and 75% proximal RCA )  Patient was transferred to [facility] for cardiothoracic surgery evaluation  Patient is now POD 0 from CABG x4  CARDIOPULMONARY BYPASS TIME:106  CROSSCLAMP TIME:95    History obtained from chart review due to patient being intubated and sedated  ---------------------------------------------------------------------------------------------------------------------------------------------------------------------  Impressions:  1  Multi-Vessel Severe CAD S/P CABG x4   2  HTN  3  HLD      Plan:    Neuro: Precidex continuous sedation, wean as able   Fentanyl 50mcg Q1hr PRN  for agitation  Tylenol 975 Q6 hours PRN, Oxycodone 2 5/5mg Q4hrs PRN  Dilaudid 0 5mg IV Q2hr PRN  Trend neuro exam   Delirium precautions  CV: MAP goal >65  SBP goal <130  CI>2 2  Post-op medications: Neosynephrine, 30 mcg/min  Wean Jesus as able  Volume resuscitation as needed  Monitor rhythm on telemetry  Epicardial pacing wires Ax1 Vx1  Intra-op MARLYS LVEF 65%       Lung: Check STAT post-op ABG and CXR  Wean vent with spontaneous breathing trial with goal to extubate  GI: GI prophylaxis with PPI  Bowel regimen  Zofran PRN for nausea  FEN: NPO  Replenish K >4 0, mag >2 0 and calcium >7 0  : Check STAT post-op BMP  Lao in place  Monitor UOP with goal >0 5cc/kg/hour  Lasix versus volume resuscitate as needed depending on hemodynamics and volume status  ID: Prophylactic post-op abx  Maintain normothermia  Trend temps  Heme: Check STAT post-op H/H and platelets  Monitor incision site, invasive lines, and chest tube outputs for bleeding  Send coag panel if needed  Endo: Insulin gtt for blood sugar control  Results from last 6 Months   Lab Units 03/03/23  1017   HEMOGLOBIN A1C % 5 6     Disposition: ICU Care   ---------------------------------------------------------------------------------------------------------------------------------------------------------------------  Historical Information   Past Medical History:   Diagnosis Date   • Disseminated herpes zoster 10/16/2019   • HLD (hyperlipidemia)    • Hypertension    • Known health problems: none    • Vitamin D deficiency      Past Surgical History:   Procedure Laterality Date   • CARDIAC CATHETERIZATION N/A 3/2/2023    Procedure: Cardiac catheterization;  Surgeon: Christy Quintanilla MD;  Location: [address] [place] [address] CATH LAB; Service: Cardiology   • CARDIAC CATHETERIZATION N/A 3/2/2023    Procedure: Cardiac Coronary Angiogram;  Surgeon: Christy Quintanilla MD;  Location: [address] [place] [address] CATH LAB;   Service: Cardiology   • WISDOM TOOTH EXTRACTION       Social History   Social History     Substance and Sexual Activity   Alcohol Use Yes    Comment: Social     Social History     Substance and Sexual Activity   Drug Use Not Currently   • Types: Marijuana    Comment: rare marijuana use     Social History     Tobacco Use   Smoking Status Former   • Packs/day: 0 25   • Years: 6 00   • Pack years: 1 50   • Types: Cigarettes   • Quit date:    • Years since quittin 1   Smokeless Tobacco Never     Family History   Problem Relation Age of Onset   • Hypertension Father    • Breast cancer Sister    • Heart attack Maternal Grandfather    • Heart attack Paternal Grandfather         s/p CABG   • Coronary artery disease Paternal Uncle         s/p cardiac stent   • Coronary artery disease Paternal Uncle      I have reviewed this patient's family history and commented on sigificant items within the HPI    ROS: ROS unable to be obtained due to patient being intubated and sedated  Allergies: Allergies   Allergen Reactions   • Codeine Drowsiness     incapacitates       Home Medications:   Prior to Admission medications    Medication Sig Start Date End Date Taking? Authorizing Provider   ascorbic acid (VITAMIN C) 500 mg tablet Take 500 mg by mouth daily   Yes Historical Provider, MD   Ergocalciferol (VITAMIN D2 PO) Take by mouth   Yes Historical Provider, MD   lisinopril-hydrochlorothiazide (PRINZIDE,ZESTORETIC) 20-12 5 MG per tablet Take 1 tablet by mouth daily 22  Yes Ruthann Nicholas PA-C   Lysine 1000 MG TABS Take by mouth   Yes Historical Provider, MD   metoprolol tartrate (LOPRESSOR) 25 mg tablet TAKE ONE TABLET BY MOUTH TWICE A DAY 23  Yes Brenna Loera MD   [facility] Natural Products (OSTEO BI-FLEX ADV DOUBLE ST PO) Take by mouth   Yes Historical Provider, MD   Multiple Vitamin (MULTIVITAMIN) capsule Take 1 capsule by mouth daily   Yes Historical Provider, MD     Inpatient Medications:  Scheduled Meds:   acetaminophen, 975 mg, Q8H  amiodarone, 200 mg, Q8H [address]  amLODIPine, 2 5 mg, Daily  aspirin, 325 mg, Daily  atorvastatin, 80 mg, Daily With Dinner  calcium chloride, 1 g, Once  cefazolin, 2,000 mg, Q8H  chlorhexidine, 15 mL, Q12H  fentanyl citrate (PF), 50 mcg, Once  [START ON 3/9/2023] fondaparinux, 2 5 mg, Daily  mupirocin, 1 application  , Q12H [address]  [START ON 3/9/2023] pantoprazole, 40 mg, Early Morning  polyethylene glycol, 17 g, Daily  potassium chloride, 40 mEq, Once       Continuous Infusions:  aminocaproic acid (AMICAR) IV, 1 g/hr, Last Rate: 1 g/hr (23 1434)  insulin regular (HumuLIN R,NovoLIN R) infusion, 0 3-21 Units/hr  niCARdipine, 2 5-15 mg/hr  phenylephine,  mcg/min, Last Rate: Stopped (23 1420)  sodium chloride, 20 mL/hr, Last Rate: 20 mL/hr (23 1315)      PRN Meds:  acetaminophen, 650 mg, Q4H PRN  bisacodyl, 10 mg, Daily PRN  fentanyl citrate (PF), 50 mcg, Q1H PRN  furosemide, 40 mg, Q6H PRN  HYDROmorphone, 0 5 mg, Q2H PRN  lactated ringers, 500 mL, Q30 Min PRN  lidocaine (cardiac), 100 mg, Q30 Min PRN  ondansetron, 4 mg, Q6H PRN  oxyCODONE, 2 5 mg, Q4H PRN  oxyCODONE, 5 mg, Q4H PRN  potassium chloride, 20 mEq, Once PRN  potassium chloride, 20 mEq, Q1H PRN  potassium chloride, 20 mEq, Q30 Min PRN      ---------------------------------------------------------------------------------------------------------------------------------------------------------------------  Vitals:   Vitals:    23 1345 23 1400 23 1415 23 1430   BP:       BP Location:       Pulse: 55 (!) 54 55 80   Resp:  14 14 14   Temp: 97 5 °F (36 4 °C) 98 2 °F (36 8 °C) 98 4 °F (36 9 °C) 98 6 °F (37 °C)   TempSrc: Probe  Probe Probe   SpO2: 100% 100% 100% 100%   Weight:       Height:         Arterial Line:  Arterial Line BP: 91/53  Arterial Line MAP (mmHg): (!) 64 mmHg    Temperature: Temp (24hrs), Av 9 °F (36 6 °C), Min:97 2 °F (36 2 °C), Max:98 6 °F (37 °C)  Current: Temperature: 98 6 °F (37 °C)    Weights: IBW (Ideal Body Weight): 73 kg  Body mass index is 27 84 kg/m²      Hemodynamic Monitoring:  PAP: PAP: 27/19, CVP: CVP (mean): 16 mmHg, CO: CO (L/min): 7 L/min, CI: CI (L/min/m2): 3 4 L/min/m2, SVR: SVR (dyne*sec)/cm5: 565 (dyne*sec)/cm5    Ventilator Settings:  Respiratory    Lab Data (Last 4 hours)    None         O2/Vent Data (Last 4 hours)       1308          Patient safety screen outcome: Failed Labs:   Results from last 7 days   Lab Units 23  1318 23  1315 23  1145 23  1108 23  1105 23  0750 23  0506 23  0254 23  0601   WBC Thousand/uL  --   --   --   --   --   --  5 74 6 49 6 04   HEMOGLOBIN g/dL  --  12 0  --   --   --   --  14 4 15 0 15 8   I STAT HEMOGLOBIN g/dl 7 1*  --  8 2*   < >  --    < >  --   --   --    HEMATOCRIT %  --  34 6*  --   --   --   --  41 2 43 2 44 2   HEMATOCRIT, ISTAT % 21*  --  24*   < >  --    < >  --   --   --    PLATELETS Thousands/uL  --  185  --   --  236  --  256 283 270   NEUTROS PCT %  --   --   --   --   --   --  52  --   --    MONOS PCT %  --   --   --   --   --   --  10  --   --     < > = values in this interval not displayed  Results from last 7 days   Lab Units 23  1318 23  1315 23  1145 23  1108 23  0750 23  0506 23  0254   SODIUM mmol/L  --  141  --   --   --  136 137   POTASSIUM mmol/L  --  2 9*  --   --   --  3 9 3 8   CHLORIDE mmol/L  --  120*  --   --   --  107 106   CO2 mmol/L  --  19*  --   --   --  22 24   CO2, I-STAT mmol/L 16*  --  25 26   < >  --   --    BUN mg/dL  --  9  --   --   --  14 14   CREATININE mg/dL  --  0 36*  --   --   --  0 67 0 60   CALCIUM mg/dL  --  6 4*  --   --   --  9 4 9 2   GLUCOSE, ISTAT mg/dl 89  --  160* 147*   < >  --   --     < > = values in this interval not displayed  Post-op /28  8/50%  Post-op CXR: Lines and tubes appear to be in place, no PTHX, infiltrate or effusion  I have personally reviewed pertinent films in PACS  Post-op EKG: Minimal ST elevations in II, III, AvF This was personally reviewed by myself  Physical Exam  Vitals and nursing note reviewed  Constitutional:       Appearance: He is ill-appearing  HENT:      Head: Normocephalic and atraumatic  Nose: Nose normal       Mouth/Throat:      Mouth: Mucous membranes are moist       Pharynx: Oropharynx is clear     Eyes: Pupils: Pupils are equal, round, and reactive to light  Neck:      Comments: CVC in place RIJ, no bleeding   Cardiovascular:      Rate and Rhythm: Normal rate and regular rhythm  Pulses: Normal pulses  Heart sounds: Normal heart sounds  Pulmonary:      Effort: Pulmonary effort is normal       Breath sounds: Normal breath sounds  Comments: No air leak   Abdominal:      General: Abdomen is flat  There is no distension  Palpations: Abdomen is soft  There is no mass  Tenderness: There is no abdominal tenderness  Musculoskeletal:         General: Normal range of motion  Cervical back: Normal range of motion and neck supple  Skin:     General: Skin is warm  Capillary Refill: Capillary refill takes less than 2 seconds  Neurological:      General: No focal deficit present  Comments: GCS 3/1/6T Moving all 4 extremities        Invasive lines and devices: Invasive Devices     Central Venous Catheter Line  Duration           CVC Central Lines 03/08/23 Triple <1 day    Introducer 03/08/23 <1 day          Peripheral Intravenous Line  Duration           Peripheral IV 03/05/23 Proximal;Right;Ventral (anterior) Forearm 3 days          Arterial Line  Duration           Arterial Line 03/08/23 Right Radial <1 day          Line  Duration           Pacer Wires <1 day    Pacer Wires <1 day          Drain  Duration           Chest Tube 1 Left Pleural 32 Fr  <1 day    Chest Tube 2 Anterior;Mediastinal 32 Fr  <1 day    Chest Tube 3 Posterior;Mediastinal 24 Fr  <1 day    NG/OG/Enteral Tube Orogastric 18 Fr Center mouth <1 day    Urethral Catheter Non-latex;Straight-tip; Temperature probe 16 Fr  <1 day          Airway  Duration           ETT  Hi-Lo; Cuffed;Oral 8 mm <1 day              ---------------------------------------------------------------------------------------------------------------------------------------------------------------------  Care Time Delivered:   No Critical Care time spent     SIGNATURE: Ro Garcia PA-C  DATE: March 8, 2023  TIME: 2:45 PM

## 2023-03-08 NOTE — ANESTHESIA PREPROCEDURE EVALUATION
Procedure:  CORONARY ARTERY BYPASS GRAFT (CABG) 4 VESSELS, poss left radial, evh, hector (Chest)    Relevant Problems   CARDIO   (+) Chest pain   (+) Essential hypertension   (+) Hyperlipidemia, mixed      MUSCULOSKELETAL   (+) Arthritis of both hips   (+) Hamstring tightness of both lower extremities      NEURO/PSYCH   (+) Claudication of both lower extremities (HCC)        Physical Exam    Airway    Mallampati score: II         Dental   No notable dental hx     Cardiovascular      Pulmonary      Other Findings        Anesthesia Plan  ASA Score- 4     Anesthesia Type- general with ASA Monitors  Additional Monitors: arterial line, central venous line and pulmonary artery catheter  Airway Plan: ETT  Comment: I, Dr Kelly Lama, the attending physician, have personally seen and evaluated the patient prior to anesthetic care  I have reviewed the pre-anesthetic record, and other medical records if appropriate to the anesthetic care  If a CRNA is involved in the case, I have reviewed the CRNA assessment, if present, and agree  The patient is in a suitable condition to proceed with my formulated anesthetic plan          Plan Factors-    Chart reviewed  Induction- intravenous  Postoperative Plan-     Informed Consent- Anesthetic plan and risks discussed with patient  I personally reviewed this patient with the CRNA  Discussed and agreed on the Anesthesia Plan with the CRNA  Nicolasa Gunn

## 2023-03-08 NOTE — INTERVAL H&P NOTE
H&P reviewed  After examining the patient I find no changes in the patients condition since the H&P had been written  Anticipated Length of Stay:  Patient will be admitted on an Inpatient basis with an anticipated length of stay of  greater than 2 midnights  Justification for Hospital Stay:  Post surgical recovery following open heart surgery        Vitals:    03/08/23 0230   BP: 159/93   Pulse: 61   Resp:    Temp: (!) 97 3 °F (36 3 °C)   SpO2: 99%

## 2023-03-08 NOTE — OP NOTE
OPERATIVE REPORT  PATIENT NAME: Anabell Roach    :  1970  MRN: [id number]  Pt Location: [facility] OR ROOM 10    SURGERY DATE: 3/8/2023    SURGEON: Homer Fountain DO    ASSISTANT: Olaf Esposito PA-C    PREOPERATIVE DIAGNOSIS:  Multivessel coronary artery disease, stable angina    POSTOPERATIVE DIAGNOSIS:  Multivessel coronary artery disease, stable angina    NYHA Class: 2    CCS Class: 3    PROCEDURE: Coronary artery bypass grafting x 4 with left internal mammary artery to left anterior descending, saphenous vein graft to right posterior descending artery, saphenous vein graft to obtuse marginal 2, radial artery to obtuse marginal 1  ANESTHESIA: General endotracheal anesthesia with transesophageal echocardiogram guidance, Dr Jason Andrews: 106 minutes  CROSSCLAMP TIME: 95 minutes  PACKS/TUBES/DRAINS: Chest tubes x 3  MATERIALS: Pacing wires: A x1  V x1  TRANSFUSION: None  SPECIMENS: None  ESTIMATED BLOOD LOSS: 250 mL    OPERATIVE TECHNIQUE:    The patient was taken to the operating room and placed supine on the operating table  Following the satisfactory induction of general anesthesia and placement of monitoring lines, the patient was prepped and draped in the usual sterile fashion  A time-out procedure was performed  The patient underwent median sternotomy, LIMA harvest, endoscopic left greater saphenous vein harvest, left radial artery harvest, systemic heparinization and conduit preparation  The patient underwent pericardiotomy and epiaortic ultrasound was used to evaluate the ascending aorta, which was found to be free of significant atheromatous disease  The patient underwent aortic and right atrial cannulation and was initiated on bypass  The ascending aorta was crossclamped   [place] cardioplegia was delivered with an excellent arrest     The saphenous vein was anastomosed to the right posterior descending artery in end-to-side fashion using running 7-0 Prolene suture  The flow was good at 80ml/min  The saphenous vein was anastomosed to the  obtuse marginal 2 in end-to-side fashion using running 7-0 Prolene suture, the flow was good at 100ml/min  The radial artery was anastomosed to the obtuse marginal 1 in end-to-side fashion using running 7-0 Prolene suture  The flow was good at 80ml/min  The left internal mammary artery was anastomosed to the left anterior descending in end-to-side fashion using running 7-0 Prolene suture  The flow was excellent  A total of three proximal anastomoses were completed on the ascending aorta in end-to-side fashion using running 6-0 Prolene suture  The heart was de-aired and the crossclamp was removed  Atrial and ventricular pacing wires were placed  Following a period of reperfusion, the patient was weaned from cardiopulmonary bypass and decannulated  Protamine was administered with normalization of the ACT  Hemostasis was confirmed in all fields  Thoracostomy tubes were placed  The sternum was closed with stainless steel wires  The fascia, subdermis and skin were closed with multiple layers of running absorbable suture  As the attending surgeon, I was present and scrubbed for all critical portions of this procedure  Sponge, needle and instrument counts were reported as correct by the nursing staff  There is no cardiac residency program at this facility and for complex procedures such as this, it is vital to have a physician assistant experienced in cardiac surgery  The assistance of Manisha Knott PA-C was necessary for endoscopic saphenous vein harvesting, retraction of the heart and coronary conduit with proper tissue handling techniques, and following of the suture with correct tension during construction of the proximal and distal coronary anastomoses  Final transesophageal echocardiogram demonstrated normal biventricular function          SIGNATURE: Mayra Yeboah DO  DATE: March 8, 2023  TIME: 12:44 PM

## 2023-03-08 NOTE — ANESTHESIA PROCEDURE NOTES
Arterial Line Insertion  Performed by: Petey العلي MD  Authorized by: Petey العلي MD   Consent: Written consent obtained  Risks and benefits: risks, benefits and alternatives were discussed  Consent given by: patient  Patient understanding: patient states understanding of the procedure being performed  Patient consent: the patient's understanding of the procedure matches consent given  Procedure consent: procedure consent matches procedure scheduled  Required items: required blood products, implants, devices, and special equipment available  Patient identity confirmed: arm band  Time out: Immediately prior to procedure a "time out" was called to verify the correct patient, procedure, equipment, support staff and site/side marked as required    Indications: multiple ABGs and hemodynamic monitoring  Orientation:  Right  Location: radial artery  Sedation:  Patient sedated: no    Procedure Details:  Needle gauge: 20  Number of attempts: 1    Post-procedure:  Post-procedure: dressing applied  Waveform: good waveform and waveform confirmed  Post-procedure CNS: normal  Patient tolerance: patient tolerated the procedure well with no immediate complications  Comments: Procedure start 3866  Procedure end 5944

## 2023-03-08 NOTE — ANESTHESIA PROCEDURE NOTES
Procedure Performed: MARLYS Anesthesia  Start Time:  3/8/2023 7:53 AM        Preanesthesia Checklist    Patient identified, IV assessed, risks and benefits discussed, monitors and equipment assessed, procedure being performed at surgeon's request and anesthesia consent obtained  Procedure    Diagnostic Indications for MARLYS:  assessment of surgical repair and hemodynamic monitoring  Type of MARLYS: complete MARLYS with interpretation  Images Saved: ultrasound permanent image saved  Physician Requesting Echo: Peyton Schultz,   Location performed: OR  Intubated  Bite block not placed  Heart visualized  Insertion of MARLYS Probe:  Atraumatic  Probe Type:  Multiplane  Modalities:  2D only, color flow mapping, continuous wave Doppler and pulse wave Doppler  Echocardiographic and Doppler Measurements    PREPROCEDURE    LEFT VENTRICLE:  Systolic Function: normal  Ejection Fraction: 65%  Cavity size: normal        RIGHT VENTRICLE:  Systolic Function: normal   Cavity size severely dilated  No hypertrophy              AORTIC VALVE:  Leaflets: normal and trileaflet  Leaflet motions normal and normal  Stenosis: none  Regurgitation: trace  MITRAL VALVE:  Leaflets: normal  Leaflet Motions: normal  Regurgitation: mild  Stenosis: none  TRICUSPID VALVE:  Leaflets: normal  Leaflet Motions: normal  Stenosis: none  Regurgitation: mild  PULMONIC VALVE:  Leaflets: normal  Regurgitation: mild  Stenosis: none  ASCENDING AORTA:  Size:  normal   Dissection not present  AORTIC ARCH:  Size:  normal   dissection not present  Grade 2: severe intimal thickening without protruding atheroma  DESCENDING AORTA:  Size: normal   Dissection not present  Grade 2: severe intimal thickening without protruding atheroma          RIGHT ATRIUM:  Size:  normal      LEFT ATRIUM:  Size: normal      LEFT ATRIAL APPENDAGE:  Size: normal           ATRIAL SEPTUM:  Intra-atrial septal morphology: normal           VENTRICULAR SEPTUM:  Intra-ventricular septum morphology: normal              OTHER FINDINGS:  Pericardium:  normal  Pleural Effusion:  none  POSTPROCEDURE    LEFT VENTRICLE: Unchanged   RIGHT VENTRICLE: Unchanged   AORTIC VALVE: Unchanged   MITRAL VALVE: Unchanged   TRICUSPID VALVE: Unchanged   PULMONIC VALVE: Unchanged             ATRIA: Unchanged   AORTA: Unchanged   REMOVAL:  Probe Removal: atraumatic

## 2023-03-08 NOTE — ANESTHESIA PROCEDURE NOTES
Central Line Insertion  Performed by: Isidra Su MD  Authorized by: Isidra Su MD     Date/Time: 3/8/2023 7:51 AM  Catheter Type:  triple lumen  Consent: Written consent obtained  Risks and benefits: risks, benefits and alternatives were discussed  Consent given by: patient  Patient understanding: patient states understanding of the procedure being performed  Patient consent: the patient's understanding of the procedure matches consent given  Procedure consent: procedure consent matches procedure scheduled  Required items: required blood products, implants, devices, and special equipment available  Patient identity confirmed: arm band  Time out: Immediately prior to procedure a "time out" was called to verify the correct patient, procedure, equipment, support staff and site/side marked as required    Indications: vascular access and central pressure monitoring  Patient position: Trendelenburg    Sedation:  Patient sedated: yes    Assessment: blood return through all ports and free fluid flow  Preparation: skin prepped with ChloraPrep  Skin prep agent dried: skin prep agent completely dried prior to procedure  Sterile barriers: all five maximum sterile barriers used - cap, mask, sterile gown, sterile gloves, and large sterile sheet  Hand hygiene: hand hygiene performed prior to central venous catheter insertion  sterile gel and probe cover used in ultrasound-guided central venous catheter insertionPre-procedure: landmarks identified  Number of attempts: 1  Successful placement: yes  Post-procedure: chlorhexidine patch applied, dressing applied and line sutured  Patient tolerance: patient tolerated the procedure well with no immediate complications  Comments: Procedure start 0739  Procedure end [id number]

## 2023-03-09 ENCOUNTER — APPOINTMENT (OUTPATIENT)
Dept: RADIOLOGY | Facility: HOSPITAL | Age: 53
End: 2023-03-09

## 2023-03-09 LAB
ABO GROUP BLD BPU: NORMAL
ANION GAP SERPL CALCULATED.3IONS-SCNC: 9 MMOL/L (ref 4–13)
ATRIAL RATE: 81 BPM
BPU ID: NORMAL
BUN SERPL-MCNC: 11 MG/DL (ref 5–25)
CALCIUM SERPL-MCNC: 8 MG/DL (ref 8.3–10.1)
CHLORIDE SERPL-SCNC: 110 MMOL/L (ref 96–108)
CO2 SERPL-SCNC: 20 MMOL/L (ref 21–32)
CREAT SERPL-MCNC: 0.66 MG/DL (ref 0.6–1.3)
CROSSMATCH: NORMAL
ERYTHROCYTE [DISTWIDTH] IN BLOOD BY AUTOMATED COUNT: 12.4 % (ref 11.6–15.1)
GFR SERPL CREATININE-BSD FRML MDRD: 111 ML/MIN/1.73SQ M
GLUCOSE SERPL-MCNC: 102 MG/DL (ref 65–140)
GLUCOSE SERPL-MCNC: 123 MG/DL (ref 65–140)
GLUCOSE SERPL-MCNC: 137 MG/DL (ref 65–140)
GLUCOSE SERPL-MCNC: 145 MG/DL (ref 65–140)
GLUCOSE SERPL-MCNC: 153 MG/DL (ref 65–140)
GLUCOSE SERPL-MCNC: 156 MG/DL (ref 65–140)
GLUCOSE SERPL-MCNC: 92 MG/DL (ref 65–140)
HCT VFR BLD AUTO: 32.8 % (ref 36.5–49.3)
HGB BLD-MCNC: 11.1 G/DL (ref 12–17)
MAGNESIUM SERPL-MCNC: 2 MG/DL (ref 1.6–2.6)
MCH RBC QN AUTO: 32.3 PG (ref 26.8–34.3)
MCHC RBC AUTO-ENTMCNC: 33.8 G/DL (ref 31.4–37.4)
MCV RBC AUTO: 95 FL (ref 82–98)
P AXIS: 62 DEGREES
PLATELET # BLD AUTO: 215 THOUSANDS/UL (ref 149–390)
PMV BLD AUTO: 10.2 FL (ref 8.9–12.7)
POTASSIUM SERPL-SCNC: 4.1 MMOL/L (ref 3.5–5.3)
PR INTERVAL: 138 MS
QRS AXIS: -22 DEGREES
QRSD INTERVAL: 80 MS
QT INTERVAL: 364 MS
QTC INTERVAL: 422 MS
RBC # BLD AUTO: 3.44 MILLION/UL (ref 3.88–5.62)
SODIUM SERPL-SCNC: 139 MMOL/L (ref 135–147)
T WAVE AXIS: 73 DEGREES
UNIT DISPENSE STATUS: NORMAL
UNIT PRODUCT CODE: NORMAL
UNIT PRODUCT VOLUME: 350 ML
UNIT RH: NORMAL
VENTRICULAR RATE: 81 BPM
WBC # BLD AUTO: 10.11 THOUSAND/UL (ref 4.31–10.16)

## 2023-03-09 RX ORDER — DOCUSATE SODIUM 100 MG/1
100 CAPSULE, LIQUID FILLED ORAL 2 TIMES DAILY
Status: DISCONTINUED | OUTPATIENT
Start: 2023-03-09 | End: 2023-03-11 | Stop reason: HOSPADM

## 2023-03-09 RX ORDER — POTASSIUM CHLORIDE 20 MEQ/1
20 TABLET, EXTENDED RELEASE ORAL 2 TIMES DAILY
Status: DISCONTINUED | OUTPATIENT
Start: 2023-03-09 | End: 2023-03-11 | Stop reason: HOSPADM

## 2023-03-09 RX ORDER — KETOROLAC TROMETHAMINE 30 MG/ML
15 INJECTION, SOLUTION INTRAMUSCULAR; INTRAVENOUS EVERY 6 HOURS SCHEDULED
Status: COMPLETED | OUTPATIENT
Start: 2023-03-09 | End: 2023-03-10

## 2023-03-09 RX ORDER — INSULIN LISPRO 100 [IU]/ML
1-6 INJECTION, SOLUTION INTRAVENOUS; SUBCUTANEOUS
Status: DISCONTINUED | OUTPATIENT
Start: 2023-03-09 | End: 2023-03-11 | Stop reason: HOSPADM

## 2023-03-09 RX ORDER — FUROSEMIDE 10 MG/ML
40 INJECTION INTRAMUSCULAR; INTRAVENOUS
Status: DISCONTINUED | OUTPATIENT
Start: 2023-03-09 | End: 2023-03-10

## 2023-03-09 RX ADMIN — ATORVASTATIN CALCIUM 80 MG: 80 TABLET, FILM COATED ORAL at 15:30

## 2023-03-09 RX ADMIN — AMIODARONE HYDROCHLORIDE 200 MG: 200 TABLET ORAL at 05:15

## 2023-03-09 RX ADMIN — CEFAZOLIN SODIUM 2000 MG: 2 SOLUTION INTRAVENOUS at 05:14

## 2023-03-09 RX ADMIN — ASPIRIN 325 MG ORAL TABLET 325 MG: 325 PILL ORAL at 09:41

## 2023-03-09 RX ADMIN — POTASSIUM CHLORIDE 20 MEQ: 1500 TABLET, EXTENDED RELEASE ORAL at 17:17

## 2023-03-09 RX ADMIN — ACETAMINOPHEN 975 MG: 325 TABLET ORAL at 05:15

## 2023-03-09 RX ADMIN — DOCUSATE SODIUM 100 MG: 100 CAPSULE, LIQUID FILLED ORAL at 09:41

## 2023-03-09 RX ADMIN — OXYCODONE HYDROCHLORIDE 5 MG: 5 TABLET ORAL at 05:54

## 2023-03-09 RX ADMIN — HYDROMORPHONE HYDROCHLORIDE 0.5 MG: 1 INJECTION, SOLUTION INTRAMUSCULAR; INTRAVENOUS; SUBCUTANEOUS at 01:04

## 2023-03-09 RX ADMIN — FUROSEMIDE 40 MG: 10 INJECTION, SOLUTION INTRAMUSCULAR; INTRAVENOUS at 15:23

## 2023-03-09 RX ADMIN — Medication 12.5 MG: at 12:02

## 2023-03-09 RX ADMIN — AMLODIPINE BESYLATE 2.5 MG: 2.5 TABLET ORAL at 09:54

## 2023-03-09 RX ADMIN — AMIODARONE HYDROCHLORIDE 200 MG: 200 TABLET ORAL at 21:21

## 2023-03-09 RX ADMIN — KETOROLAC TROMETHAMINE 15 MG: 30 INJECTION, SOLUTION INTRAMUSCULAR; INTRAVENOUS at 13:32

## 2023-03-09 RX ADMIN — FONDAPARINUX SODIUM 2.5 MG: 2.5 INJECTION, SOLUTION SUBCUTANEOUS at 09:41

## 2023-03-09 RX ADMIN — PANTOPRAZOLE SODIUM 40 MG: 40 TABLET, DELAYED RELEASE ORAL at 05:15

## 2023-03-09 RX ADMIN — POLYETHYLENE GLYCOL 3350 17 G: 17 POWDER, FOR SOLUTION ORAL at 09:41

## 2023-03-09 RX ADMIN — POTASSIUM CHLORIDE 20 MEQ: 1500 TABLET, EXTENDED RELEASE ORAL at 09:53

## 2023-03-09 RX ADMIN — MUPIROCIN 1 APPLICATION.: 20 OINTMENT TOPICAL at 09:41

## 2023-03-09 RX ADMIN — KETOROLAC TROMETHAMINE 15 MG: 30 INJECTION, SOLUTION INTRAMUSCULAR; INTRAVENOUS at 17:17

## 2023-03-09 RX ADMIN — OXYCODONE HYDROCHLORIDE 5 MG: 5 TABLET ORAL at 21:22

## 2023-03-09 RX ADMIN — ACETAMINOPHEN 975 MG: 325 TABLET ORAL at 21:21

## 2023-03-09 RX ADMIN — AMIODARONE HYDROCHLORIDE 200 MG: 200 TABLET ORAL at 13:32

## 2023-03-09 RX ADMIN — FUROSEMIDE 40 MG: 10 INJECTION, SOLUTION INTRAMUSCULAR; INTRAVENOUS at 09:54

## 2023-03-09 RX ADMIN — DOCUSATE SODIUM 100 MG: 100 CAPSULE, LIQUID FILLED ORAL at 17:17

## 2023-03-09 RX ADMIN — ACETAMINOPHEN 975 MG: 325 TABLET ORAL at 13:32

## 2023-03-09 RX ADMIN — CEFAZOLIN SODIUM 2000 MG: 2 SOLUTION INTRAVENOUS at 11:51

## 2023-03-09 RX ADMIN — MUPIROCIN 1 APPLICATION.: 20 OINTMENT TOPICAL at 21:21

## 2023-03-09 NOTE — PROGRESS NOTES
Cardiology Progress Note - Reuben Lopez 46 y o  male MRN: [id number]    Unit/Bed#: [facility] 414-01 Encounter: [id number]      Assessment:  Principal Problem:    ACS (acute coronary syndrome) ([address] )  Active Problems:    Essential hypertension    Hyperlipidemia, mixed    Impression:  1  Multivessel CAD - Discovered in the setting of an abnormal stress test  Now s/p CABG x4 (LIMA-LAD, SVG-RPDA, SVG-OM2, RA-OM1)  Doing well postoperatively  Rhythm is sinus  Chest tubes, epicardial pacer wires, and RIJ TLC remain in place  Independent of vasopressors  Reports that he is ambulating without discomfort  2  HTN - controlled  3  Dyslipidemia     Recommendations:  1  Continue Aspirin and high intensity statin  2  Continue Amlodipine 2 5mg QD given radial graft  3  Continue low dose beta blocker as tolerated by HR and BP  4  Amiodarone loading per CT-surgery protocol  5  Continue Lasix 40mg BID  Anticipate that we can de-escalate his diuretic regimen within the next 24-48 hours  Subjective:   Doing well post-operatively  No active cardiopulmonary complaints  Review of Systems   Cardiovascular: Negative for chest pain, leg swelling and palpitations  Respiratory: Negative for shortness of breath  Objective:   Vitals: Blood pressure 110/73, pulse 85, temperature 98 3 °F (36 8 °C), temperature source Oral, resp  rate 20, height 5' 10" (1 778 m), weight 88 kg (194 lb 0 1 oz), SpO2 98 %  , Body mass index is 27 84 kg/m² ,   Orthostatic Blood Pressures    Flowsheet Row Most Recent Value   Blood Pressure 110/73 filed at 03/09/2023 0800   Patient Position - Orthostatic VS Sitting filed at 03/09/2023 9154         Systolic (35TVQ), MEO:665 , Min:92 , RUBEN:927     Diastolic (33BRF), ZZN:12, Min:57, Max:73      Intake/Output Summary (Last 24 hours) at 3/9/2023 0919  Last data filed at 3/9/2023 0811  Gross per 24 hour   Intake 5907 29 ml   Output 4195 ml   Net 1712 29 ml     Weight (last 2 days)     Date/Time Weight    03/08/23 0518 88 (194 01)    03/07/23 0652 89 6 (197 53)              Telemetry Review: Normal sinus rhythm    Physical Exam  Constitutional:       General: He is not in acute distress  Appearance: He is not diaphoretic  HENT:      Head: Normocephalic and atraumatic  Eyes:      Conjunctiva/sclera: Conjunctivae normal    Cardiovascular:      Rate and Rhythm: Normal rate and regular rhythm  Heart sounds: Normal heart sounds  No murmur heard  No friction rub  No gallop  Pulmonary:      Breath sounds: Normal breath sounds  No wheezing or rales  Musculoskeletal:      Right lower leg: No edema  Left lower leg: No edema  Neurological:      General: No focal deficit present  Mental Status: He is alert and oriented to person, place, and time     Psychiatric:         Mood and Affect: Mood normal            Laboratory Results:        CBC with diff:   Results from last 7 days   Lab Units 03/09/23  0409 03/08/23  2058 03/08/23  1318 03/08/23  1315 03/08/23  1243 03/08/23  1205 03/08/23  1145 03/08/23  1108 03/08/23  1105 03/08/23  0750 03/06/23  0506 03/03/23  0254   WBC Thousand/uL 10 11  --   --   --   --   --   --   --   --   --  5 74 6 49   HEMOGLOBIN g/dL 11 1* 11 4*  --  12 0  --   --   --   --   --   --  14 4 15 0   I STAT HEMOGLOBIN g/dl  --   --  7 1*  --  9 9* 8 8* 8 2*   < >  --    < >  --   --    HEMATOCRIT % 32 8* 33 4*  --  34 6*  --   --   --   --   --   --  41 2 43 2   HEMATOCRIT, ISTAT %  --   --  21*  --  29* 26* 24*   < >  --    < >  --   --    MCV fL 95  --   --   --   --   --   --   --   --   --  91 93   PLATELETS Thousands/uL 215  --   --  185  --   --   --   --  236  --  256 283   MCH pg 32 3  --   --   --   --   --   --   --   --   --  31 9 32 1   MCHC g/dL 33 8  --   --   --   --   --   --   --   --   --  35 0 34 7   RDW % 12 4  --   --   --   --   --   --   --   --   --  11 7 12 0   MPV fL 10 2  --   --  9 7  --   --   --   --  9 4  --  9 8 9 5   NRBC AUTO /100 WBCs  --   --   --   --   --   --   --   --   --   --  0  --     < > = values in this interval not displayed  CMP:  Results from last 7 days   Lab Units 03/09/23 0409 03/08/23 2058 03/08/23 1715 03/08/23 1318 03/08/23  1315 03/08/23  1243 03/08/23  1205 03/08/23  1145 03/08/23  1108 03/08/23  1048 03/08/23  1032 03/08/23  0750 03/06/23  0506 03/03/23  0254 03/02/23  1041   POTASSIUM mmol/L 4 1 4 4 4 4  --  2 9*  --   --   --   --   --   --   --  3 9 3 8 4 0   CHLORIDE mmol/L 110*  --   --   --  120*  --   --   --   --   --   --   --  107 106 103   CO2 mmol/L 20*  --   --   --  19*  --   --   --   --   --   --   --  22 24 27   CO2, I-STAT mmol/L  --   --   --  16*  --  22 24 25 26 26 28   < >  --   --   --    BUN mg/dL 11  --   --   --  9  --   --   --   --   --   --   --  14 14 13   CREATININE mg/dL 0 66  --   --   --  0 36*  --   --   --   --   --   --   --  0 67 0 60 0 66   GLUCOSE, ISTAT mg/dl  --   --   --  89  --  135 154* 160* 147* 127 124   < >  --   --   --    CALCIUM mg/dL 8 0*  --   --   --  6 4*  --   --   --   --   --   --   --  9 4 9 2 9 8   EGFR ml/min/1 73sq m 111  --   --   --  142  --   --   --   --   --   --   --  110 115 111    < > = values in this interval not displayed           BMP:  Results from last 7 days   Lab Units 03/09/23 0409 03/08/23 2058 03/08/23 1715 03/08/23 1318 03/08/23  1315 03/08/23  1243 03/08/23  1205 03/08/23  1145 03/08/23  1108 03/08/23  0750 03/06/23  0506 03/03/23  0254 03/02/23  1041   POTASSIUM mmol/L 4 1 4 4 4 4  --  2 9*  --   --   --   --   --  3 9 3 8 4 0   CHLORIDE mmol/L 110*  --   --   --  120*  --   --   --   --   --  107 106 103   CO2 mmol/L 20*  --   --   --  19*  --   --   --   --   --  22 24 27   CO2, I-STAT mmol/L  --   --   --  16*  --  22 24 25 26   < >  --   --   --    BUN mg/dL 11  --   --   --  9  --   --   --   --   --  14 14 13   CREATININE mg/dL 0 66  --   --   --  0 36*  --   --   --   --   --  0 67 0 60 0 66   GLUCOSE, ISTAT mg/dl --   --   --  89  --  135 154* 160* 147*   < >  --   --   --    CALCIUM mg/dL 8 0*  --   --   --  6 4*  --   --   --   --   --  9 4 9 2 9 8    < > = values in this interval not displayed  BNP:   Results Reviewed     None        No results for input(s): BNP in the last 72 hours      Magnesium:   Results from last 7 days   Lab Units 03/09/23  0409   MAGNESIUM mg/dL 2 0       Coags:   Results from last 7 days   Lab Units 03/08/23  1358 03/08/23  0513 03/07/23  0424 03/06/23  0506 03/05/23  0440 03/04/23  1817 03/04/23  1039 03/04/23  0314   PTT seconds  --  50* 76* 88* 79* 60* 68* 95*   INR  1 44*  --   --   --   --   --   --   --        TSH:       Lipid Profile:   Results from last 7 days   Lab Units 03/03/23  0254   TRIGLYCERIDES mg/dL 120   HDL mg/dL 41           Cardiac testing:     Meds/Allergies   Current Facility-Administered Medications   Medication Dose Route Frequency Provider Last Rate   • acetaminophen  650 mg Rectal Q4H PRN Dasha Reynaga PA-C     • acetaminophen  975 mg Oral Jareth [address] [place] [address]     • amiodarone  200 mg Oral [facility] Yayo Paganistie Chain, [place]     • amLODIPine  2 5 mg Oral Daily Augustin Dyson PA-C     • aspirin  325 mg Oral Daily Augustin Paganistie Chain, [place]     • atorvastatin  80 mg Oral Daily With [employer] JOEY Hanley     • bisacodyl  10 mg Rectal Daily PRN Dasha Reynaga PA-C     • cefazolin  2,000 mg Intravenous Q8H Augustin Dyson PA-C 2,000 mg (03/09/23 0514)   • chlorhexidine  15 mL [employer]it Q12H Augustin Patelie Chain, [place]     • fentanyl citrate (PF)  50 mcg Intravenous Once Dasha Reynaga PA-C     • fentanyl citrate (PF)  50 mcg Intravenous Q1H PRN Dasha Reynaga PA-C     • fondaparinux  2 5 mg Subcutaneous Daily Augustin Hanley PA-C     • furosemide  40 mg Intravenous Q6H PRN Dasha Reynaga PA-C     • HYDROmorphone  0 5 mg Intravenous Q2H PRN Augustin Hanley PA-C     • insulin regular (HumuLIN R,NovoLIN R) infusion 0 3-21 Units/hr Intravenous Titrated Orvil JOEY Amaral     • lidocaine (cardiac)  100 mg Intravenous Q30 Min PRN Radha Fairchild PA-C     • methocarbamol  500 mg Oral Q6H PRN Gregory Marin PA-C     • mupirocin  1 application   Nasal Q12H [facility] Radha Fairchild PA-C     • niCARdipine  2 5-15 mg/hr Intravenous Titrated Radha Capone [facility] [place] Stopped (03/08/23 2210)   • ondansetron  4 mg Intravenous Q6H PRN Orvil JOEY Amaral     • oxyCODONE  2 5 mg Oral Q4H PRN Orvil JOEY Amaral     • oxyCODONE  5 mg Oral Q4H PRN Radha Fairchild PA-C     • pantoprazole  40 mg Oral [facility] [place]     • phenylephine   mcg/min Intravenous Titrated Akin Rooney PA-C Stopped (03/09/23 0158)   • polyethylene glycol  17 g Oral Daily Orvil JOEY Amaral     • potassium chloride  20 mEq Intravenous Once PRN Orvil JOEY Amaral     • potassium chloride  20 mEq Intravenous Q1H PRN Orvil JOEY Amaral     • potassium chloride  20 mEq Intravenous Q30 Min PRN Radha Fairchild PA-C     • sodium chloride  20 mL/hr Intravenous Continuous Radha Fairchild PA-C Stopped (03/09/23 2830)     Facility-Administered Medications Ordered in Other Encounters   Medication Dose Route Frequency Provider Last Rate   • aminocaproic acid (AMICAR) IV   Intravenous Continuous PRN Isidra Su MD 1 g/hr (03/08/23 1747)   • aminocaproic acid   Intravenous PRN Isidra Su MD     • calcium chloride   Intravenous PRN Velia Lopez DO     • cardioplegia   Intracardiac PRN Velia Lopez DO     • ceFAZolin   Intravenous PRN Isidra Su MD     • cell saver (heparin 50,000 units in 1000 ml sodium chloride 0 9%)   Irrigation PRN Velia Lopez DO     • dexmedeTOMIDine (Precedex) 400 mcg in 100 ml sodium chlorid* 0 9%   Intravenous Continuous PRN Isidra Su MD 0 3 mcg/kg/hr (03/08/23 0820)   • esmolol   Intravenous PRN Isidra Su MD     • fentanyl citrate (PF)   Intravenous PRN Walt Muse MD     • glycopyrrolate   Intravenous PRN Walt Muse MD     • heparin (porcine)   Intravenous PRN Walt Muse MD     • HYDROmorphone   Intravenous PRN Walt Muse MD     • insulin regular   Subcutaneous PRN Walt Muse MD     • lactated ringers   Intravenous Continuous PRN Walt Muse MD     • lidocaine (cardiac)   Intravenous PRN Walt Muse MD     • mannitol   Intravenous PRN Og Aguirre, DO     • mannitol   Intravenous PRN Og Aguirre, DO     • multi-electrolyte   Intravenous PRN Og Aguirre, DO     • neostigmine   Intravenous PRN Walt Muse MD     • phenylephrine   Intravenous Continuous PRN Og Aguirre, DO     • propofol   Intravenous PRN Walt Muse MD     • protamine   Intravenous PRN Walt Muse MD     • ROCuronium   Intravenous PRN Walt Muse MD     • sodium bicarbonate   Intravenous PRN Og Aguirre, DO     • sodium chloride   Intravenous Continuous PRN Walt Muse MD     • Succinylcholine Chloride   Intravenous PRN Walt Muse MD       insulin regular (HumuLIN R,NovoLIN R) infusion, 0 3-21 Units/hr  niCARdipine, 2 5-15 mg/hr, Last Rate: Stopped (03/08/23 2210)  phenylephine,  mcg/min, Last Rate: Stopped (03/09/23 0158)  sodium chloride, 20 mL/hr, Last Rate: Stopped (03/09/23 9442)      Medications Prior to Admission   Medication   • ascorbic acid (VITAMIN C) 500 mg tablet   • Ergocalciferol (VITAMIN D2 PO)   • lisinopril-hydrochlorothiazide (PRINZIDE,ZESTORETIC) 20-12 5 MG per tablet   • Lysine 1000 MG TABS   • metoprolol tartrate (LOPRESSOR) 25 mg tablet   • Misc Natural Products (OSTEO BI-FLEX ADV DOUBLE ST PO)   • Multiple Vitamin (MULTIVITAMIN) capsule       Mitch Allred MD

## 2023-03-09 NOTE — PHYSICAL THERAPY NOTE
Physical Therapy Evaluation     Patient's Name: Loren Baker    Admitting Diagnosis  Abnormal stress test [R94 [id number]]    Problem List  Patient Active Problem List   Diagnosis    Essential hypertension    Hyperlipidemia, mixed    Vitamin D deficiency    Low libido    Achilles tendinosis of right lower extremity    Cutaneous skin tags    Overweight (BMI 25 0-29  9)    Claudication of both lower extremities (HCC)    Varicose veins of both lower extremities with pain    Bilateral leg pain    Bilateral hand numbness    Excessive daytime sleepiness    COVID-19    Arthritis of both hips    Femoroacetabular impingement of left hip    Hamstring tightness of both lower extremities    Weakness of both hips    ACS (acute coronary syndrome) (HCC)    Chest pain    S/P CABG (coronary artery bypass graft)       Past Medical History  Past Medical History:   Diagnosis Date    Disseminated herpes zoster 10/16/2019    HLD (hyperlipidemia)     Hypertension     Known health problems: none     Vitamin D deficiency        Past Surgical History  Past Surgical History:   Procedure Laterality Date    CARDIAC CATHETERIZATION N/A 3/2/2023    Procedure: Cardiac catheterization;  Surgeon: Olivier Warner MD;  Location: [address] [place] [address] CATH LAB; Service: Cardiology    CARDIAC CATHETERIZATION N/A 3/2/2023    Procedure: Cardiac Coronary Angiogram;  Surgeon: Olivier Warner MD;  Location: [address] [place] [address] CATH LAB;   Service: Cardiology    GA CORONARY ARTERY BYP W/VEIN & ARTERY GRAFT 4 VEIN N/A 3/8/2023    Procedure: CORONARY ARTERY BYPASS GRAFT (CABG) 4 VESSELS,  SVG to PDA  and OM2                             r      LRA-->OM1 , LIMA to LAD, evh, hector;  Surgeon: Peyton Schultz DO;  Location: [facility];  Service: Cardiac Surgery    WISDOM TOOTH EXTRACTION          03/09/23 0841   PT Last Visit   PT Visit Date 03/09/23   Note Type   Note type Evaluation   Pain Assessment   Pain Assessment Tool 0-10   Pain Score No Pain   Restrictions/Precautions   Other Precautions Cardiac/sternal;Multiple lines;Telemetry  (CT)   Home Living   Type of Home House   Home Layout Two level  (3 DUNCAN w/ hand rail; flight of steps up w/ hand rail 1/2 way (then --> wall))   Prior Function   Level of [place] Independent with functional mobility  (amb w/o AD)   Lives With Spouse   General   Additional Pertinent History cleared for assessment (spoke to nsg)   Cognition   Overall Cognitive Status WFL   Arousal/Participation Alert   Orientation Level Oriented to person;Oriented to situation   Memory Decreased recall of precautions   Following Commands Follows one step commands without difficulty   Subjective   Subjective Alert; in the chair; agreeable to mobilize   RUE Assessment   RUE Assessment WFL  (AROM)   LUE Assessment   LUE Assessment WFL  (AROM)   RLE Assessment   RLE Assessment WFL  (AROM)   Strength RLE   RLE Overall Strength   (good - (grossly))   LLE Assessment   LLE Assessment WFL  (AROM)   Strength LLE   LLE Overall Strength   (good - (grossly))   Transfers   Sit to Stand 4  Minimal assistance   Additional items Assist x 1;Verbal cues   Stand to Sit 4  Minimal assistance   Additional items Assist x 1;Verbal cues   Ambulation/Elevation   Gait pattern Short stride; Inconsistent crys   Gait Assistance 4  Minimal assist   Additional items Assist x 1;Verbal cues; Tactile cues   Assistive Device Rolling walker   Distance 240 ft   Balance   Static Sitting Fair +   Dynamic Sitting Fair   Static Standing Fair -   Dynamic Standing Fair -   Ambulatory Poor +   Activity Tolerance   Activity Tolerance Patient limited by fatigue   Medical Staff Made Aware Co-eval performed w/ OTR due to complexity of medical status   Nurse Made Aware spoke to Meryl Estrella [place]   Assessment   Prognosis Good   Problem List Decreased strength;Decreased endurance; Impaired balance;Decreased mobility   Assessment Pt is 46 y o  male admitted with hx of exertional chest pressure and GRUBER and Dx of ACS (acute coronary syndrome); pt underwent cardiac catheterization yesterday, which revealed MV CAD; during the course, pt underwent Coronary artery bypass grafting x 4 with left internal mammary artery to left anterior descending, saphenous vein graft to right posterior descending artery, saphenous vein graft to obtuse marginal 2, radial artery to obtuse marginal 1 on 3/8/2023  Pt 's comorbidities affecting POC include: Hypertension, Claudication of both lower extremities, and (B) hips arthritis and personal factors of: DUNCAN and steps in the house  Pt's clinical presentation is currently unstable/unpredictable which is evident in ongoing telemetry monitoring while in a critical care, abnormal lab values being monitored/trending, and CT in place  Pt presents w/ min generalized weakness, min decreased functional endurance and min impaired balance w/ associated gait deviations requiring use of rw at this time  Will cont to follow pt in PT for progressive mobilization to address above functional deficits and to max level of (I), endurance, and safety  Otherwise, anticipate pt will return home w/ available family support upon D/C provided he cont improving w/ mobility skills, safety, and endurance (incl on the steps) and when medically cleared; will follow  Goals   Patient Goals to feel better   STG Expiration Date 03/19/23   Short Term Goal #1 7-10 days  Pt will amb 300 ft w/ least restrictive assistive device PRN, mod (I) in order to facilitate safe return to premorbid environment and community amb status  Pt will negotiate 3 --> 12 steps w/ hand rail/ no hand rail (7steps w/ wall) and SPC PRN, mod (I) in order to navigate in and out of the house and between levels of home environment safely  Pt will achieve (I) level w/ bed mob in order to facilitate safety with OOB and back to bed transitions in own living environment   Pt will perform transfers w/ mod (I) to assure (I) and safety w/ functional mobility/transitions w/ all aspects of mobility/locomotion  Pt will participate in LE therex and balance activities to max progression w/ mobility skills  PT Treatment Day 0   Plan   Treatment/Interventions Functional transfer training;LE strengthening/ROM; Elevations; Therapeutic exercise; Endurance training;Equipment eval/education; Bed mobility;Gait training;Spoke to nursing;OT   PT Frequency 4-6x/wk   Recommendation   PT Discharge Recommendation No rehabilitation needs   Equipment Recommended Walker  (at this time)   [address] Mobility Inpatient   Turning in Flat Bed Without Bedrails 3   Lying on Back to Sitting on Edge of Flat Bed Without Bedrails 3   Moving Bed to Chair 3   Standing Up From Chair Using Arms 3   Walk in Room 3   Climb 3-5 Stairs With Railing 3   Basic Mobility Inpatient Raw Score 18   Basic Mobility Standardized Score 41 05   Highest Level Of Mobility   JH-HLM Goal 6: Walk 10 steps or more   JH-HLM Achieved 7: Walk 25 feet or more   Modified [place] Scale   Modified [place] Scale 4   End of Consult   Patient Position at End of Consult Bedside chair; All needs within reach           [facility], PT

## 2023-03-09 NOTE — PROGRESS NOTES
Progress Note - Critical Care   Óscar Monterroso 46 y o  male MRN: [id number]  Unit/Bed#: [facility] 414-01 Encounter: [id number]      Attending Physician: Yair Blackwood DO    24 Hour Events/HPI: POD # 1 s/p CABGx4  Postoperatively was supported on 30 of Jesus with CI of 3 4 with low SVR,  received Amicar,  1L of LR and 250 of albumin and jesus weaned off overnight  No complaints this morning, feeling well after oxycodone which he received for mild chest tube site discomfort  ROS: Review of Systems   Constitutional: Negative for appetite change, chills, diaphoresis, fatigue and fever  HENT: Negative for congestion and dental problem  Respiratory: Negative for cough, chest tightness and shortness of breath  Cardiovascular: Negative for chest pain and leg swelling  Gastrointestinal: Negative for abdominal distention, diarrhea, nausea and vomiting  Genitourinary: Negative for difficulty urinating, flank pain, frequency and urgency  Musculoskeletal: Negative for arthralgias  Skin: Negative for color change, pallor and rash  Neurological: Negative for dizziness, syncope, weakness and light-headedness  All other systems reviewed and are negative     ---------------------------------------------------------------------------------------------------------------------------------------------------------------------  Impressions:  1  Multi-Vessel Severe CAD S/P CABG x4 (LIMA>LAD, SVG>RPD, SVG> OM2, RA>OM2)  2  HTN  3   HLD    Plan:    Neuro:   · Sedation plan: off all sedation, awake and alert this morning  · Pain controlled with: Dilaudid 0 5mg Q2 PRN for breakthrough pain (x1), Oxy 2 5/5 Q4 hrs PRN(x1), Tylenol 650mg Q4 PRN  · Regulate sleep/wake cycle  · Delirium precautions  · CAM-ICU daily  · Trend neuro exam    CV:   · Cardiac infusions: None  · MAP goal > 65 and CI >2 2  · PA catheter- Removed   ·  Arterial line- Removed   · Rhythm: NSR  · Follow rhythm on telemetry  · Lopressor 12 5 mg PO BID  · Maintain epicardial pacing wires for POD1   ·  mg QD  · Statin: Lipitor 80mg qHS  · Amiodarone 200 mg PO q8 hours     Lung:   · Good Room air oxygen saturation; Continue incentive spirometry/Coughing/Deep breathing exercises  · Chlorhexidine ordered: yes  · Chest Tubes remain in Place     GI:   · Continue PPI for stress ulcer prophylaxis  · Continue bowel regimen  · Trend abdominal exam and bowel function    FEN:   · Diuretic plan: Lasix 40 mg IV q BID  · K-dur 20 mEQ PO q BID  · Nutrition/diet plan: Cardiovascular with Fluid Restriction   · Replenish electrolytes with goals: K >4 0, Mag >2 0, and Phos >3 0    :   · Indwelling Lao present: yes   · Discontinue Lao  · Trend UOP and BUN/creat  · Strict I and O    ID:   · Trend temps and WBC count  · Maintain normothermia        Heme:   · Trend hgb and plts    Endo:   · Glycemic control plan: Glucose well-controlled  Discontinue continuous insulin infusion and add Insulin sliding scale coverage    Results from last 6 Months   Lab Units 03/03/23  1017   HEMOGLOBIN A1C % 5 6     MSK/Skin:  · Mobility goal: Up to chair this AM with mobility today   · PT consult: yes  · OT consult: yes  · Frequent turning and pressure off-loading  · Local wound care as needed    Disposition:  Transfer to telemetry  ---------------------------------------------------------------------------------------------------------------------------------------------------------------------  Allergies:    Allergies   Allergen Reactions   • Codeine Drowsiness     incapacitates       Medications:     VTE Pharmacologic Prophylaxis: Fondaparinux (Arixtra)  VTE Mechanical Prophylaxis: sequential compression device    Scheduled Meds:   acetaminophen, 975 mg, Q8H  amiodarone, 200 mg, Q8H SUZAN  amLODIPine, 2 5 mg, Daily  aspirin, 325 mg, Daily  atorvastatin, 80 mg, Daily With Dinner  cefazolin, 2,000 mg, Q8H  chlorhexidine, 15 mL, Q12H  fentanyl citrate (PF), 50 mcg, Once  fondaparinux, 2 5 mg, Daily  mupirocin, 1 application  , Q12H [facility]  pantoprazole, 40 mg, Early Morning  polyethylene glycol, 17 g, Daily       Continuous Infusions:OFF  insulin regular (HumuLIN R,NovoLIN R) infusion, 0 3-21 Units/hr  niCARdipine, 2 5-15 mg/hr, Last Rate: Stopped (23)  phenylephine,  mcg/min, Last Rate: Stopped (23 0158)  sodium chloride, 20 mL/hr, Last Rate: 20 mL/hr (23 1315)      PRN Meds:  acetaminophen, 650 mg, Q4H PRN  bisacodyl, 10 mg, Daily PRN  fentanyl citrate (PF), 50 mcg, Q1H PRN  furosemide, 40 mg, Q6H PRN  HYDROmorphone, 0 5 mg, Q2H PRN  lidocaine (cardiac), 100 mg, Q30 Min PRN  methocarbamol, 500 mg, Q6H PRN  ondansetron, 4 mg, Q6H PRN  oxyCODONE, 2 5 mg, Q4H PRN  oxyCODONE, 5 mg, Q4H PRN  potassium chloride, 20 mEq, Once PRN  potassium chloride, 20 mEq, Q1H PRN  potassium chloride, 20 mEq, Q30 Min PRN      ---------------------------------------------------------------------------------------------------------------------------------------------------------------------  Vitals:   Vitals:    23 0300 23 0400 23 0500 23 0600   BP:    117/67   BP Location:       Pulse: 75 71 67 83   Resp: 15 16 14 (!) 27   Temp:  99 9 °F (37 7 °C)     TempSrc:  Probe     SpO2: 98% 99% 97% 95%   Weight:       Height:         Arterial Line:  Arterial Line BP: 107/58  Arterial Line MAP (mmHg): 72 mmHg    Tele Rhythm: NSR (This was personally reviewed by myself )    Respiratory:  SpO2: SpO2: 95 %  SpO2 Device: O2 Device: Nasal cannula      Ventilator:  Respiratory    Lab Data (Last 4 hours)    None         O2/Vent Data (Last 4 hours)    None              Temperature: Temp (24hrs), Av 3 °F (37 4 °C), Min:97 2 °F (36 2 °C), Max:100 6 °F (38 1 °C)  Current: Temperature: 99 9 °F (37 7 °C)    Weights:   Weight (last 2 days)     Date/Time Weight    23 0518 88 (194 01)    23 0652 89 6 (197 53)        Body mass index is 27 84 kg/m²      Hemodynamic Monitoring:  PAP: PAP: 23/17 (20)  CVP: CVP (mean): 10 mmHg  CO: CO (L/min): 6 9 L/min  CI: CI (L/min/m2): 3 4 L/min/m2  SVR: SVR (dyne*sec)/cm5: 773 (dyne*sec)/cm5    Intake and Outputs:    Intake/Output Summary (Last 24 hours) at 3/9/2023 0726  Last data filed at 3/9/2023 0600  Gross per 24 hour   Intake 5863 62 ml   Output 4075 ml   Net 1788 62 ml     I/O last 24 hours: In: 5863 6 [P O :300; I V :3413 6; IV Piggyback:1400]  Out: 1406 [KAPLD:5811; Blood:750; Chest Tube:660]    UOP: 56 25/hour   BM: No in the last 24 hours    Chest tube Output:  Mediastinal tubes: 110 mL/8 hours  400 mL/24 hours   Pleural tubes: 110 mL/8 hours  260 mL/24 hours     Labs:  Results from last 7 days   Lab Units 03/09/23 0409 03/08/23 2058 03/08/23  1318 03/08/23  1315 03/08/23  1108 03/08/23  1105 03/08/23  0750 03/06/23  0506 03/03/23  0254   WBC Thousand/uL 10 11  --   --   --   --   --   --  5 74 6 49   HEMOGLOBIN g/dL 11 1* 11 4*  --  12 0  --   --   --  14 4 15 0   I STAT HEMOGLOBIN g/dl  --   --  7 1*  --    < >  --    < >  --   --    HEMATOCRIT % 32 8* 33 4*  --  34 6*  --   --   --  41 2 43 2   HEMATOCRIT, ISTAT %  --   --  21*  --    < >  --    < >  --   --    PLATELETS Thousands/uL 215  --   --  185  --  236  --  256 283   NEUTROS PCT %  --   --   --   --   --   --   --  52  --    MONOS PCT %  --   --   --   --   --   --   --  10  --     < > = values in this interval not displayed       Results from last 7 days   Lab Units 03/09/23 0409 03/08/23 2058 03/08/23  1715 03/08/23  1318 03/08/23  1315 03/08/23  1243 03/08/23  1205 03/08/23  0750 03/06/23  0506   SODIUM mmol/L 139  --   --   --  141  --   --   --  136   POTASSIUM mmol/L 4 1 4 4 4 4  --  2 9*  --   --   --  3 9   CHLORIDE mmol/L 110*  --   --   --  120*  --   --   --  107   CO2 mmol/L 20*  --   --   --  19*  --   --   --  22   CO2, I-STAT mmol/L  --   --   --  16*  --  22 24   < >  --    BUN mg/dL 11  --   --   --  9  --   --   --  14   CREATININE mg/dL 0 66  --   --   --  0 36*  -- --   --  0 67   CALCIUM mg/dL 8 0*  --   --   --  6 4*  --   --   --  9 4   GLUCOSE, ISTAT mg/dl  --   --   --  89  --  135 154*   < >  --     < > = values in this interval not displayed  Baseline Creat: 0 6-0 8    Results from last 7 days   Lab Units 03/09/23  0409   MAGNESIUM mg/dL 2 0          Results from last 7 days   Lab Units 03/08/23  1358 03/08/23  0513 03/07/23  0424 03/06/23  0506   INR  1 44*  --   --   --    PTT seconds  --  50* 76* 88*                  Results from last 7 days   Lab Units 03/08/23  1510   PH ART  7 317*   PCO2 ART mm Hg 39 0   PO2 ART mm Hg 148 0*   HCO3 ART mmol/L 19 5*   BASE EXC ART mmol/L -6 1   ABG SOURCE  Line, Arterial         SVO2: 97%    Micro: None    Diagnostic Studies:  03/09/23 CXR: LLL atelectasis  This was personally reviewed by myself in PACS   03/09/23 EKG: NSR  This was personally reviewed by myself  Nutrition:        Diet Orders   (From admission, onward)             Start     Ordered    03/09/23 0000  Diet Cardiovascular; Cardiac; Fluid Restriction 1800 ML  Diet effective 0500        References:    Nutrtion Support Algorithm Enteral vs  Parenteral   Question Answer Comment   Diet Type Cardiovascular    Cardiac Cardiac    Other Restriction(s): Fluid Restriction 1800 ML    RD to adjust diet per protocol? Yes        03/08/23 1309              Physical Exam  Vitals and nursing note reviewed  Constitutional:       General: He is not in acute distress  Appearance: Normal appearance  He is normal weight  He is not ill-appearing or toxic-appearing  HENT:      Head: Normocephalic and atraumatic  Nose: Nose normal       Mouth/Throat:      Mouth: Mucous membranes are moist    Eyes:      Extraocular Movements: Extraocular movements intact  Pupils: Pupils are equal, round, and reactive to light  Cardiovascular:      Rate and Rhythm: Normal rate and regular rhythm  Pulses: Normal pulses  Heart sounds: Normal heart sounds        Comments: RRR no murmur or rub   Pulmonary:      Effort: Pulmonary effort is normal       Breath sounds: Normal breath sounds  Comments: Clear and equal bilaterally  Abdominal:      General: Abdomen is flat  There is no distension  Palpations: Abdomen is soft  Tenderness: There is no abdominal tenderness  Musculoskeletal:         General: Normal range of motion  Cervical back: Normal range of motion and neck supple  Comments: Warm and well perfused  Bilateral upper and lower extremities have good cap refill and sensation  RUE: 2+ radial  2+ DP B/L   Skin:     General: Skin is warm  Capillary Refill: Capillary refill takes less than 2 seconds  Neurological:      General: No focal deficit present  Mental Status: He is alert and oriented to person, place, and time  Mental status is at baseline  Comments: PERRLA       Invasive lines and devices: Invasive Devices     Central Venous Catheter Line  Duration           CVC Central Lines 03/08/23 Triple <1 day          Line  Duration           Pacer Wires <1 day    Pacer Wires <1 day          Drain  Duration           Chest Tube 1 Left Pleural 32 Fr  <1 day    Chest Tube 2 Anterior;Mediastinal 32 Fr  <1 day    Chest Tube 3 Posterior;Mediastinal 24 Fr  <1 day    Urethral Catheter Non-latex;Straight-tip; Temperature probe 16 Fr  <1 day              ---------------------------------------------------------------------------------------------------------------------------------------------------------------------  Code Status: Level 1 - Full Code    Care Time Delivered:   No Critical Care time spent     Collaborative bedside rounds performed with cardiac surgery attending, critical care attending and bedside RN      SIGNATURE: Maribeth Patton PA-C  DATE: March 9, 2023  TIME: 7:26 AM

## 2023-03-09 NOTE — PLAN OF CARE
Problem: PHYSICAL THERAPY ADULT  Goal: Performs mobility at highest level of function for planned discharge setting  See evaluation for individualized goals  Description: Treatment/Interventions: Functional transfer training, LE strengthening/ROM, Elevations, Therapeutic exercise, Endurance training, Equipment eval/education, Bed mobility, Gait training, Spoke to nursing, OT  Equipment Recommended: Ginger Hagan (at this time)       See flowsheet documentation for full assessment, interventions and recommendations  Note: Prognosis: Good  Problem List: Decreased strength, Decreased endurance, Impaired balance, Decreased mobility  Assessment: Pt is 46 y o  male admitted with hx of exertional chest pressure and GRUBER and Dx of ACS (acute coronary syndrome); pt underwent cardiac catheterization yesterday, which revealed MV CAD; during the course, pt underwent Coronary artery bypass grafting x 4 with left internal mammary artery to left anterior descending, saphenous vein graft to right posterior descending artery, saphenous vein graft to obtuse marginal 2, radial artery to obtuse marginal 1 on 3/8/2023  Pt 's comorbidities affecting POC include: Hypertension, Claudication of both lower extremities, and (B) hips arthritis and personal factors of: DUNCAN and steps in the house  Pt's clinical presentation is currently unstable/unpredictable which is evident in ongoing telemetry monitoring while in a critical care, abnormal lab values being monitored/trending, and CT in place  Pt presents w/ min generalized weakness, min decreased functional endurance and min impaired balance w/ associated gait deviations requiring use of rw at this time  Will cont to follow pt in PT for progressive mobilization to address above functional deficits and to max level of (I), endurance, and safety   Otherwise, anticipate pt will return home w/ available family support upon D/C provided he cont improving w/ mobility skills, safety, and endurance (incl on the steps) and when medically cleared; will follow  PT Discharge Recommendation: No rehabilitation needs    See flowsheet documentation for full assessment

## 2023-03-09 NOTE — PROGRESS NOTES
Pastoral Care Progress Note    3/9/2023  Patient: Kang Vargas : 1970  Admission Date & Time: 3/2/2023 2338  MRN: [id number] CSN: [id number]      Had good introductory visit w Pt and spouse in room  They are very grateful to [facility] and feel they are getting great care    I encouraged them in the healing journey, let them know I'm available as part of the ongoing care team                Chaplaincy Interventions Utilized:   Empowerment: Clarified, confirmed, or reviewed information from treatment team     Exploration: Explored emotional needs & resources and Explored relational needs & resources    Relationship Building: Cultivated a relationship of care and support     23 1100   Clinical Encounter Type   Visited With Patient and family together   Routine Visit Introduction

## 2023-03-09 NOTE — PROGRESS NOTES
Progress Note - Cardiothoracic Surgery   Chris Ferris 46 y o  male MRN: [id number]  Unit/Bed#: [facility] [place] 414-01 Encounter: [id number]      POD # 1 s/p CABG x4 ERAH    Pt seen/examined  Interval history and data reviewed with critical care team   Pt doing well  No specific complaints  Medications:   Scheduled Meds:  Current Facility-Administered Medications   Medication Dose Route Frequency Provider Last Rate   • acetaminophen  650 mg Rectal Q4H PRN Melany Miner PA-C     • acetaminophen  975 mg Oral Jareth [address]     • amiodarone  200 mg Oral [place], [place]     • amLODIPine  2 5 mg Oral Daily [place], [place]     • aspirin  325 mg Oral Daily [place], [place]     • atorvastatin  80 mg Oral Daily With Portable Scores JOEY Hanley     • bisacodyl  10 mg Rectal Daily PRN Melany Miner PA-C     • cefazolin  2,000 mg Intravenous Q8H Aiyana Fairchild PA-C 2,000 mg (03/09/23 0514)   • chlorhexidine  15 mL Swish & Spit Q12H [place], [place]     • fentanyl citrate (PF)  50 mcg Intravenous Once Melany Miner PA-C     • fentanyl citrate (PF)  50 mcg Intravenous Q1H PRN Melany Miner PA-C     • fondaparinux  2 5 mg Subcutaneous Daily Aiyana Fairchild PA-C     • furosemide  40 mg Intravenous Q6H PRN Melany Miner PA-C     • HYDROmorphone  0 5 mg Intravenous Q2H PRN Aiyana Fairchild PA-C     • insulin regular (HumuLIN R,NovoLIN R) infusion  0 3-21 Units/hr Intravenous Titrated Melany Miner PA-C     • lidocaine (cardiac)  100 mg Intravenous Q30 Min PRN Aiyana Fairchild PA-C     • methocarbamol  500 mg Oral Q6H PRN Subhash Harry PA-C     • mupirocin  1 application   Nasal Q12H [facility] Aiyana Fairchild PA-C     • niCARdipine  2 5-15 mg/hr Intravenous Titrated Aiyana Fairchild PA-C Stopped (03/08/23 6387)   • ondansetron  4 mg Intravenous Q6H PRN Melany Miner PA-C     • oxyCODONE  2 5 mg Oral Q4H PRN Sarah Beth Sutton Megan Giron PA-C     • oxyCODONE  5 mg Oral Q4H PRN Aleda Prader Laurita Marrow, PA-C     • pantoprazole  40 mg Oral Early Morning Aleda Prader Laurita Marrow, [place]     • phenylephine   mcg/min Intravenous Titrated Adri Somers PA-C Stopped (03/09/23 0158)   • polyethylene glycol  17 g Oral Daily Ailyn Nam PA-C     • potassium chloride  20 mEq Intravenous Once PRN Ailyn Nam PA-C     • potassium chloride  20 mEq Intravenous Q1H PRN Ailyn Nam PA-C     • potassium chloride  20 mEq Intravenous Q30 Min PRN Aleda Prader Laurita Marrow, PA-C     • sodium chloride  20 mL/hr Intravenous Continuous Aleda Prader Laurita Marrow, PA-C Stopped (03/09/23 6078)     Facility-Administered Medications Ordered in Other Encounters   Medication Dose Route Frequency Provider Last Rate   • aminocaproic acid (AMICAR) IV   Intravenous Continuous PRN Yony Fowler MD 1 g/hr (03/08/23 4238)   • aminocaproic acid   Intravenous PRN Yony Fowler MD     • calcium chloride   Intravenous PRN Yair Blackwood DO     • cardioplegia   Intracardiac PRN Yair Blackwood DO     • ceFAZolin   Intravenous PRN Yony Fowler MD     • cell saver (heparin 50,000 units in 1000 ml sodium chloride 0 9%)   Irrigation PRN Yair Blackwood DO     • dexmedeTOMIDine (Precedex) 400 mcg in 100 ml sodium chlorid* 0 9%   Intravenous Continuous PRN Yony Fowler MD 0 3 mcg/kg/hr (03/08/23 0820)   • esmolol   Intravenous PRN Yony Fowler MD     • fentanyl citrate (PF)   Intravenous PRN Yony Fowler MD     • glycopyrrolate   Intravenous PRN Yony Fowler MD     • heparin (porcine)   Intravenous PRN Yony Fowler MD     • HYDROmorphone   Intravenous PRN Yony Fowler MD     • insulin regular   Subcutaneous PRN Yony Fowler MD     • lactated ringers   Intravenous Continuous PRN Yony Fowler MD     • lidocaine (cardiac)   Intravenous PRN Yony Fowler MD     • mannitol Intravenous PRN Danika Sandoval DO     • mannitol   Intravenous PRN Danika Sandoval DO     • multi-electrolyte   Intravenous PRN Danika Sandoval DO     • neostigmine   Intravenous PRN Henny Mcclellan MD     • phenylephrine   Intravenous Continuous PRN Danika Sandoval DO     • propofol   Intravenous PRN Henny Mcclellan MD     • protamine   Intravenous PRN Henny Mcclellan MD     • ROCuronium   Intravenous PRN Henny Mcclellan MD     • sodium bicarbonate   Intravenous PRN Danika Sandoval DO     • sodium chloride   Intravenous Continuous PRN Henny Mcclellan MD     • Succinylcholine Chloride   Intravenous PRN Henny Mcclellan MD       Continuous Infusions:insulin regular (HumuLIN R,NovoLIN R) infusion, 0 3-21 Units/hr  niCARdipine, 2 5-15 mg/hr, Last Rate: Stopped (03/08/23 2210)  phenylephine,  mcg/min, Last Rate: Stopped (03/09/23 0158)  sodium chloride, 20 mL/hr, Last Rate: Stopped (03/09/23 0811)      PRN Meds: •  acetaminophen  •  bisacodyl  •  fentanyl citrate (PF)  •  furosemide  •  HYDROmorphone  •  lidocaine (cardiac)  •  methocarbamol  •  ondansetron  •  oxyCODONE  •  oxyCODONE  •  potassium chloride  •  potassium chloride  •  potassium chloride    Vitals: Blood pressure 117/67, pulse 83, temperature 99 9 °F (37 7 °C), temperature source Probe, resp  rate (!) 27, height 5' 10" (1 778 m), weight 88 kg (194 lb 0 1 oz), SpO2 95 %  ,Body mass index is 27 84 kg/m²  I/O last 24 hours: In: 5907 3 [P O :300; I V :3457 3; IV Piggyback:1400]  Out: 0343 [Urine:2745; Blood:750; Chest Tube:700]  Invasive Devices     Central Venous Catheter Line  Duration           CVC Central Lines 03/08/23 Triple 1 day          Line  Duration           Pacer Wires <1 day    Pacer Wires <1 day          Drain  Duration           Urethral Catheter Non-latex;Straight-tip; Temperature probe 16 Fr  1 day    Chest Tube 1 Left Pleural 32 Fr  <1 day    Chest Tube 2 Anterior;Mediastinal 32 Fr  <1 day Chest Tube 3 Posterior;Mediastinal 24 Fr  <1 day                  Lab, Imaging and other studies:   Results from last 7 days   Lab Units 03/09/23  0409 03/08/23 2058 03/08/23  1318 03/08/23  1315 03/08/23  1108 03/08/23  1105 03/08/23  0750 03/06/23  0506 03/03/23  0254   WBC Thousand/uL 10 11  --   --   --   --   --   --  5 74 6 49   HEMOGLOBIN g/dL 11 1* 11 4*  --  12 0  --   --   --  14 4 15 0   I STAT HEMOGLOBIN g/dl  --   --  7 1*  --    < >  --    < >  --   --    HEMATOCRIT % 32 8* 33 4*  --  34 6*  --   --   --  41 2 43 2   HEMATOCRIT, ISTAT %  --   --  21*  --    < >  --    < >  --   --    PLATELETS Thousands/uL 215  --   --  185  --  236  --  256 283    < > = values in this interval not displayed  Results from last 7 days   Lab Units 03/09/23 0409 03/08/23 2058 03/08/23  1715 03/08/23  1318 03/08/23  1315 03/08/23  0750 03/06/23  0506   POTASSIUM mmol/L 4 1 4 4 4 4  --  2 9*  --  3 9   CHLORIDE mmol/L 110*  --   --   --  120*  --  107   CO2 mmol/L 20*  --   --   --  19*  --  22   CO2, I-STAT mmol/L  --   --   --  16*  --    < >  --    BUN mg/dL 11  --   --   --  9  --  14   CREATININE mg/dL 0 66  --   --   --  0 36*  --  0 67   GLUCOSE, ISTAT mg/dl  --   --   --  89  --    < >  --    CALCIUM mg/dL 8 0*  --   --   --  6 4*  --  9 4    < > = values in this interval not displayed  Results from last 7 days   Lab Units 03/08/23  1358 03/08/23  0513 03/07/23  0424 03/06/23  0506   INR  1 44*  --   --   --    PTT seconds  --  50* 76* 88*     Recent Labs     03/08/23  1510   PHART 7 317*   [id number] 19 5*   PO2ART 148 0*   IJZ3GCR 39 0   BEART -6 1           Plan:    DC [place]  Continue chest tubes, pacing wires  Transfer to floor  Ambulate  Incentive spirometry  Diuresis  PO ASA/Statin/B blocker    Low dose norvasc as tolerated for radial graft        SIGNATURE: Trenton Caldwell DO  DATE: March 9, 2023  TIME: 8:12 AM

## 2023-03-09 NOTE — OCCUPATIONAL THERAPY NOTE
Occupational Therapy Evaluation      Janna Garner    3/9/2023    Principal Problem:    ACS (acute coronary syndrome) ([address] )  Active Problems:    Essential hypertension    Hyperlipidemia, mixed    Overweight (BMI 25 0-29  9)    Varicose veins of both lower extremities with pain    S/P CABG (coronary artery bypass graft)      Past Medical History:   Diagnosis Date    Disseminated herpes zoster 10/16/2019    HLD (hyperlipidemia)     Hypertension     Known health problems: none     Vitamin D deficiency        Past Surgical History:   Procedure Laterality Date    CARDIAC CATHETERIZATION N/A 3/2/2023    Procedure: Cardiac catheterization;  Surgeon: Lawana Goodpasture, MD;  Location: [address] [place] [address] CATH LAB; Service: Cardiology    CARDIAC CATHETERIZATION N/A 3/2/2023    Procedure: Cardiac Coronary Angiogram;  Surgeon: Lawana Goodpasture, MD;  Location: [address] [place] [address] CATH LAB; Service: Cardiology    NH CORONARY ARTERY BYP W/VEIN & ARTERY GRAFT 4 VEIN N/A 3/8/2023    Procedure: CORONARY ARTERY BYPASS GRAFT (CABG) 4 VESSELS,  SVG to PDA  and OM2                             r      LRA-->OM1 , LIMA to LAD, evh, hector;  Surgeon: Lida Washington DO;  Location: [facility];  Service: Cardiac Surgery    WISDOM TOOTH EXTRACTION          03/09/23 0846   OT Last Visit   OT Visit Date 03/09/23   Note Type   Note type Evaluation   Pain Assessment   Pain Assessment Tool 0-10   Pain Score No Pain   Restrictions/Precautions   Other Precautions Cardiac/sternal;Multiple lines;Telemetry  (chest tube, mart)   Home Living   Type of Home House   Home Layout Two level;1/2 bath on main level  (3 DUNCAN w rail)   Bathroom Shower/Tub Tub/shower unit   Bathroom Toilet Standard   Additional Comments no DME at baseline   Prior Function   Level of [place] Independent with ADLs; Independent with functional mobility   Lives With Spouse   IADLs Independent with driving; Independent with meal prep; Independent with medication management   Falls in the last 6 months 0 Vocational Full time employment   Comments desk job/office work   Lifestyle   Autonomy pt reports being fully independent w self care, mobility, driving, home management at baseline   Reciprocal Relationships supportive family   Intrinsic Gratification enjoys his 2 dogs, 3 cats, go to festivals and concerts   General   Additional Pertinent History pt sitting up in chair upon arrival, no complaints, states he feels really good   Subjective   Subjective "Im doing really good right now"   ADL   Eating Assistance 7  Independent   Grooming Assistance 5  Supervision/Setup   Grooming Deficit Setup   UB Bathing Assistance 5  Supervision/Setup   UB Bathing Deficit Setup; Increased time to complete   LB Bathing Assistance 4  Minimal Assistance   LB Bathing Deficit Increased time to complete   UB Dressing Assistance 4  Minimal Assistance   UB Dressing Deficit Increased time to complete   LB Dressing Assistance 4  Minimal Assistance   LB Dressing Deficit Increased time to complete   Functional Assistance 4  Minimal Assistance   Functional Deficit Increased time to complete   Bed Mobility   Additional Comments pt OOB in chair   Transfers   Sit to Stand 4  Minimal assistance   Additional items Verbal cues   Stand to Sit 5  Supervision   Additional items Verbal cues   Stand pivot 4  Minimal assistance   Additional items Verbal cues   Functional Mobility   Functional Mobility 4  Minimal assistance   Additional items Rolling walker   Balance   Static Sitting Good   Dynamic Sitting Fair +   Static Standing Fair -   Dynamic Standing Fair -   Activity Tolerance   Activity Tolerance Patient tolerated treatment well;Patient limited by fatigue   Medical Staff Made Aware co-eval with PT due to medical complexity   Nurse Made Aware ok to see   RUE Assessment   RUE Assessment WFL   LUE Assessment   LUE Assessment WFL   Hand Function   Gross Motor Coordination Functional   Fine Motor Coordination Functional   Psychosocial   Psychosocial (WDL) WDL   Perception   Inattention/Neglect Appears intact   Cognition   Overall Cognitive Status WFL   Arousal/Participation Alert; Cooperative   Attention Within functional limits   Orientation Level Oriented X4   Memory Within functional limits   Following Commands Follows all commands and directions without difficulty   Assessment   Limitation Decreased endurance;Decreased high-level ADLs   Assessment Pt is a 46 y o  male seen for OT evaluation s/p admit to [facility] on [facility]23 w/ ACS (acute coronary syndrome) [address] [address] )  Pt is now s/p CABG X 4 3/8/23  He has been cleared for OOB, OT eval  He remains in cardiac critical care unit, is on tele, has chest tubes and mart  Comorbidities affecting pt's functional performance at time of assessment include: HTN and varicose veins, claudication of both lower extremities, covid, arthritis  Personal factors affecting pt at time of IE include:difficulty performing IADLS   Prior to admission, pt was living w his wife in a 2 SH, being fully independent w self care, mobility, driving, working etc  Upon evaluation: Pt requires min assist due to multiple lines post op  Pt doing well  Provided cardiac education packet  Pt verbalized good understanding  See separate note below  Based on findings from OT evaluation and functional performance deficits, pt has been identified as a  high complexity evaluation  The patient's raw score on the AM-PAC Daily Activity inpatient short form is 20, standardized score is 42 03, greater than 39 4  Patients at this level are likely to benefit from discharge to home  From OT standpoint, recommendation at time of d/c would be home w family support   Pt for oupt therapy for cardiac rehab as per protocol   Goals   Patient Goals to get better   Plan   OT Frequency Eval only   Recommendation   OT Discharge Recommendation No rehabilitation needs   Additional Comments  follow up with cardiac rehab as per protocol   AM-[facility] Daily Activity Inpatient   Lower Body Dressing 3   Bathing 3   Toileting 3   Upper Body Dressing 3   Grooming 4   Eating 4   Daily Activity Raw Score 20   Daily Activity Standardized Score (Calc for Raw Score >=11) 42 03   Additional Treatment Session   Treatment Assessment Pt seen for OT treatment session w focus on cardiac education  Cardiac education packet reviewed with patient  Patient verbalized good understanding of education provided  Pt educated on the do's and the dont's following cardiac surgery such as no lifting over 10lbs, no driving for at least 4 weeks, no strenuous activities  Pt educated on importance of daily activities and mobilization  Pt educated on simple Energy Conservation Techniques, self pacing skills  Pt pleasant and cooperative and verbalized good understanding of education provided   DC OT at this time

## 2023-03-10 LAB
ALBUMIN SERPL BCP-MCNC: 3 G/DL (ref 3.5–5)
ALP SERPL-CCNC: 56 U/L (ref 46–116)
ALT SERPL W P-5'-P-CCNC: 51 U/L (ref 12–78)
ANION GAP SERPL CALCULATED.3IONS-SCNC: 8 MMOL/L (ref 4–13)
AST SERPL W P-5'-P-CCNC: 90 U/L (ref 5–45)
BASOPHILS # BLD AUTO: 0.02 THOUSANDS/ÂΜL (ref 0–0.1)
BASOPHILS NFR BLD AUTO: 0 % (ref 0–1)
BILIRUB SERPL-MCNC: 0.49 MG/DL (ref 0.2–1)
BUN SERPL-MCNC: 17 MG/DL (ref 5–25)
CALCIUM ALBUM COR SERPL-MCNC: 9.4 MG/DL (ref 8.3–10.1)
CALCIUM SERPL-MCNC: 8.6 MG/DL (ref 8.3–10.1)
CHLORIDE SERPL-SCNC: 103 MMOL/L (ref 96–108)
CO2 SERPL-SCNC: 25 MMOL/L (ref 21–32)
CREAT SERPL-MCNC: 0.76 MG/DL (ref 0.6–1.3)
EOSINOPHIL # BLD AUTO: 0.07 THOUSAND/ÂΜL (ref 0–0.61)
EOSINOPHIL NFR BLD AUTO: 1 % (ref 0–6)
ERYTHROCYTE [DISTWIDTH] IN BLOOD BY AUTOMATED COUNT: 12.4 % (ref 11.6–15.1)
GFR SERPL CREATININE-BSD FRML MDRD: 104 ML/MIN/1.73SQ M
GLUCOSE SERPL-MCNC: 103 MG/DL (ref 65–140)
GLUCOSE SERPL-MCNC: 103 MG/DL (ref 65–140)
GLUCOSE SERPL-MCNC: 108 MG/DL (ref 65–140)
GLUCOSE SERPL-MCNC: 115 MG/DL (ref 65–140)
HCT VFR BLD AUTO: 28.7 % (ref 36.5–49.3)
HGB BLD-MCNC: 9.9 G/DL (ref 12–17)
IMM GRANULOCYTES # BLD AUTO: 0.07 THOUSAND/UL (ref 0–0.2)
IMM GRANULOCYTES NFR BLD AUTO: 1 % (ref 0–2)
LYMPHOCYTES # BLD AUTO: 1.71 THOUSANDS/ÂΜL (ref 0.6–4.47)
LYMPHOCYTES NFR BLD AUTO: 16 % (ref 14–44)
MAGNESIUM SERPL-MCNC: 2.3 MG/DL (ref 1.6–2.6)
MCH RBC QN AUTO: 32.6 PG (ref 26.8–34.3)
MCHC RBC AUTO-ENTMCNC: 34.5 G/DL (ref 31.4–37.4)
MCV RBC AUTO: 94 FL (ref 82–98)
MONOCYTES # BLD AUTO: 1.36 THOUSAND/ÂΜL (ref 0.17–1.22)
MONOCYTES NFR BLD AUTO: 12 % (ref 4–12)
NEUTROPHILS # BLD AUTO: 7.76 THOUSANDS/ÂΜL (ref 1.85–7.62)
NEUTS SEG NFR BLD AUTO: 70 % (ref 43–75)
NRBC BLD AUTO-RTO: 0 /100 WBCS
PHOSPHATE SERPL-MCNC: 2.3 MG/DL (ref 2.7–4.5)
PLATELET # BLD AUTO: 183 THOUSANDS/UL (ref 149–390)
PMV BLD AUTO: 10.8 FL (ref 8.9–12.7)
POTASSIUM SERPL-SCNC: 3.9 MMOL/L (ref 3.5–5.3)
PROT SERPL-MCNC: 6.2 G/DL (ref 6.4–8.4)
RBC # BLD AUTO: 3.04 MILLION/UL (ref 3.88–5.62)
SODIUM SERPL-SCNC: 136 MMOL/L (ref 135–147)
WBC # BLD AUTO: 10.99 THOUSAND/UL (ref 4.31–10.16)

## 2023-03-10 RX ORDER — TORSEMIDE 20 MG/1
20 TABLET ORAL 2 TIMES DAILY
Status: DISCONTINUED | OUTPATIENT
Start: 2023-03-10 | End: 2023-03-11 | Stop reason: HOSPADM

## 2023-03-10 RX ADMIN — ASPIRIN 325 MG ORAL TABLET 325 MG: 325 PILL ORAL at 08:12

## 2023-03-10 RX ADMIN — DOCUSATE SODIUM 100 MG: 100 CAPSULE, LIQUID FILLED ORAL at 17:28

## 2023-03-10 RX ADMIN — POTASSIUM CHLORIDE 20 MEQ: 1500 TABLET, EXTENDED RELEASE ORAL at 17:28

## 2023-03-10 RX ADMIN — ACETAMINOPHEN 975 MG: 325 TABLET ORAL at 05:28

## 2023-03-10 RX ADMIN — FONDAPARINUX SODIUM 2.5 MG: 2.5 INJECTION, SOLUTION SUBCUTANEOUS at 08:12

## 2023-03-10 RX ADMIN — Medication 12.5 MG: at 22:00

## 2023-03-10 RX ADMIN — KETOROLAC TROMETHAMINE 15 MG: 30 INJECTION, SOLUTION INTRAMUSCULAR; INTRAVENOUS at 01:00

## 2023-03-10 RX ADMIN — MUPIROCIN 1 APPLICATION.: 20 OINTMENT TOPICAL at 08:12

## 2023-03-10 RX ADMIN — POTASSIUM CHLORIDE 20 MEQ: 1500 TABLET, EXTENDED RELEASE ORAL at 08:12

## 2023-03-10 RX ADMIN — Medication 12.5 MG: at 08:12

## 2023-03-10 RX ADMIN — AMIODARONE HYDROCHLORIDE 200 MG: 200 TABLET ORAL at 05:28

## 2023-03-10 RX ADMIN — ACETAMINOPHEN 975 MG: 325 TABLET ORAL at 22:15

## 2023-03-10 RX ADMIN — ATORVASTATIN CALCIUM 80 MG: 80 TABLET, FILM COATED ORAL at 17:28

## 2023-03-10 RX ADMIN — PANTOPRAZOLE SODIUM 40 MG: 40 TABLET, DELAYED RELEASE ORAL at 05:28

## 2023-03-10 RX ADMIN — AMLODIPINE BESYLATE 2.5 MG: 2.5 TABLET ORAL at 08:12

## 2023-03-10 RX ADMIN — MUPIROCIN 1 APPLICATION.: 20 OINTMENT TOPICAL at 22:00

## 2023-03-10 RX ADMIN — OXYCODONE HYDROCHLORIDE 5 MG: 5 TABLET ORAL at 03:54

## 2023-03-10 RX ADMIN — AMIODARONE HYDROCHLORIDE 200 MG: 200 TABLET ORAL at 22:23

## 2023-03-10 RX ADMIN — ACETAMINOPHEN 975 MG: 325 TABLET ORAL at 13:31

## 2023-03-10 RX ADMIN — FUROSEMIDE 40 MG: 10 INJECTION, SOLUTION INTRAMUSCULAR; INTRAVENOUS at 08:12

## 2023-03-10 RX ADMIN — TORSEMIDE 20 MG: 20 TABLET ORAL at 17:28

## 2023-03-10 RX ADMIN — AMIODARONE HYDROCHLORIDE 200 MG: 200 TABLET ORAL at 13:31

## 2023-03-10 NOTE — PROGRESS NOTES
Progress Note - Cardiothoracic Surgery   Govind Reilly 46 y o  male MRN: [id number]  Unit/Bed#: [facility] 426-01 Encounter: [id number]    Coronary artery disease  S/P coronary artery bypass grafting with SVG and Left Radial; POD # 2      24 Hour Events: doing well no complaints    Medications:   Scheduled Meds:  Current Facility-Administered Medications   Medication Dose Route Frequency Provider Last Rate   • acetaminophen  650 mg Rectal Q4H PRN Benny Corbett PA-C     • acetaminophen  975 mg Oral 10 Arabella Spears PA-C     • amiodarone  200 mg Oral [facility] Maxwell Nielsen PA-C     • amLODIPine  2 5 mg Oral Daily Benny Corbett PA-C     • aspirin  325 mg Oral Daily Benny Corbett PA-C     • atorvastatin  80 mg Oral Daily With [facility], PA-C     • bisacodyl  10 mg Rectal Daily PRN Benny Corbett PA-C     • docusate sodium  100 mg Oral BID Benny Corbett PA-C     • fondaparinux  2 5 mg Subcutaneous Daily Benny Corbett PA-C     • furosemide  40 mg Intravenous BID (diuretic) Benny Corbett PA-C     • insulin lispro  1-6 Units Subcutaneous TID AC FANNY Stephenson     • insulin lispro  1-6 Units Subcutaneous HS FANNY Stephenson     • methocarbamol  500 mg Oral Q6H PRN Benny Corbett PA-C     • metoprolol tartrate  12 5 mg Oral Q12H [address] Maxwell Nielsen PA-C     • mupirocin  1 application   Nasal Q12H [address] Maxwell Nielsen PA-C     • ondansetron  4 mg Intravenous Q6H PRN Benny Corbett PA-C     • oxyCODONE  2 5 mg Oral Q4H PRN Benny Corbett PA-C     • oxyCODONE  5 mg Oral Q4H PRN Benny Corbett PA-C     • pantoprazole  40 mg Oral Early Morning Benny Corbett PA-C     • polyethylene glycol  17 g Oral Daily Benny Corbett PA-C     • potassium chloride  20 mEq Oral BID Benny Corbett PA-C       Facility-Administered Medications Ordered in Other Encounters   Medication Dose Route Frequency Provider Last Rate   • aminocaproic acid (AMICAR) IV   Intravenous Continuous PRN Patrick Ramirez Xin Otoole MD 1 g/hr (03/08/23 1484)   • aminocaproic acid   Intravenous PRN Bridgett Gutierrez MD     • calcium chloride   Intravenous PRN Vishal Adair DO     • cardioplegia   Intracardiac PRN Vishal Adair DO     • ceFAZolin   Intravenous PRN Bridgett Gutierrez MD     • cell saver (heparin 50,000 units in 1000 ml sodium chloride 0 9%)   Irrigation PRN Vishal Adair, DO     • dexmedeTOMIDine (Precedex) 400 mcg in 100 ml sodium chlorid* 0 9%   Intravenous Continuous PRN Bridgett Gutierrez MD 0 3 mcg/kg/hr (03/08/23 0820)   • esmolol   Intravenous PRN Bridgett Gutierrez MD     • fentanyl citrate (PF)   Intravenous PRN Bridgett Gutierrez MD     • glycopyrrolate   Intravenous PRN Bridgett Gutierrez MD     • heparin (porcine)   Intravenous PRN Bridgett Gutierrez MD     • HYDROmorphone   Intravenous PRN Bridgett Gutierrez MD     • insulin regular   Subcutaneous PRN Bridgett Gutierrez MD     • lactated ringers   Intravenous Continuous PRN Bridgett Gutierrez MD     • lidocaine (cardiac)   Intravenous PRN Bridgett Gutierrez MD     • mannitol   Intravenous PRN Vishal Adair DO     • mannitol   Intravenous PRN Vishal Adair DO     • multi-electrolyte   Intravenous PRN Vishal Adair DO     • neostigmine   Intravenous PRN Bridgett Gutierrez MD     • phenylephrine   Intravenous Continuous PRN Vishal Adair DO     • propofol   Intravenous PRN Bridgett Gutierrez MD     • protamine   Intravenous PRN Bridgett Gutierrez MD     • ROCuronium   Intravenous PRN Bridgett Gutierrez MD     • sodium bicarbonate   Intravenous PRN Vishal Adair DO     • sodium chloride   Intravenous Continuous PRN Bridgett Gutirerez MD     • Succinylcholine Chloride   Intravenous PRN Bridgett Gutierrez MD       Continuous Infusions:   PRN Meds: •  acetaminophen  •  bisacodyl  •  methocarbamol  •  ondansetron  •  oxyCODONE  •  oxyCODONE    Vitals:   Vitals:    03/10/23 0216 03/10/23 0358 03/10/23 0600 03/10/23 0727   BP: 121/57   117/78   BP Location:       Pulse: 86   71   Resp:    15   Temp: 98 °F (36 7 °C)   98 2 °F (36 8 °C)   TempSrc:       SpO2: 97%   98%   Weight:  93 2 kg (205 lb 7 5 oz) 93 2 kg (205 lb 7 5 oz)    Height:           Telemetry: NSR; Heart Rate: 62    Respiratory:   SpO2: SpO2: 98 %; Room Air    Intake/Output:     Intake/Output Summary (Last 24 hours) at 3/10/2023 0818  Last data filed at 3/10/2023 3278  Gross per 24 hour   Intake 1280 ml   Output 1960 ml   Net -680 ml    UO 1 8L/24h    Chest tube Output:    Mediastinal tubes: 100 mL/8 hours  190 mL/24 hours   Pleural tubes: 50 mL/8 hours  90 mL/24 hours     Weights:   Weight (last 2 days)     Date/Time Weight    03/10/23 0600 93 2 (205 47)    03/10/23 0358 93 2 (205 47)     Weight: x 2 at 03/10/23 0358    03/08/23 0518 88 (194 01)            Results:   Results from last 7 days   Lab Units 03/10/23  0528 03/09/23  0409 03/08/23  2058 03/08/23  1318 03/08/23  1315 03/08/23  0750 03/06/23  0506   WBC Thousand/uL 10 99* 10 11  --   --   --   --  5 74   HEMOGLOBIN g/dL 9 9* 11 1* 11 4*  --  12 0  --  14 4   I STAT HEMOGLOBIN   --   --   --    < >  --    < >  --    HEMATOCRIT % 28 7* 32 8* 33 4*  --  34 6*  --  41 2   HEMATOCRIT, ISTAT   --   --   --    < >  --    < >  --    PLATELETS Thousands/uL 183 215  --   --  185   < > 256    < > = values in this interval not displayed       Results from last 7 days   Lab Units 03/10/23  0528 03/09/23  0409 03/08/23  2058 03/08/23  1715 03/08/23  1318 03/08/23  1315   SODIUM mmol/L 136 139  --   --   --  141   POTASSIUM mmol/L 3 9 4 1 4 4   < >  --  2 9*   CHLORIDE mmol/L 103 110*  --   --   --  120*   CO2 mmol/L 25 20*  --   --   --  19*   CO2, I-STAT mmol/L  --   --   --   --  16*  --    BUN mg/dL 17 11  --   --   --  9   CREATININE mg/dL 0 76 0 66  --   --   --  0 36*   GLUCOSE, ISTAT mg/dl  --   --   --   --  89  --    CALCIUM mg/dL 8 6 8 0*  --   --   --  6 4*    < > = values in this interval not displayed  Results from last 7 days   Lab Units 03/08/23  1358 03/08/23  0513 03/07/23  0424 03/06/23  0506   INR  1 44*  --   --   --    PTT seconds  --  50* 76* 88*     Point of care glucose: 123 - 156    Studies:  None today        Invasive Lines/Tubes:  Invasive Devices     Central Venous Catheter Line  Duration           CVC Central Lines 03/08/23 Triple 2 days          Line  Duration           Pacer Wires 1 day    Pacer Wires 1 day          Drain  Duration           Chest Tube 1 Left Pleural 32 Fr  1 day    Chest Tube 2 Anterior;Mediastinal 32 Fr  1 day    Chest Tube 3 Posterior;Mediastinal 24 Fr  1 day                Physical Exam:    HEENT/NECK:  Normocephalic  Atraumatic   no jugular venous distention  Cardiac: Regular rate and rhythm and No murmurs/rubs/gallops  Pulmonary:  Breath sounds clear bilaterally  Abdomen:  Non-tender, Non-distended and Normal bowel sounds  Incisions: Sternum is stable  Incision is clean, dry, and intact  , Saphenectomy incison is clean, dry, and intact  and Endoscopic radial artery harvest incision is clean, dry, and intact  Motor/Sensory intact  Extremities: Extremities warm/dry and Trace edema B/L  Neuro: Alert and oriented X 3, Sensation is grossly intact and No focal deficits  Skin: Warm/Dry, without rashes or lesions  Assessment:  Principal Problem:    ACS (acute coronary syndrome) ([facility] [address] )  Active Problems:    Essential hypertension    Hyperlipidemia, mixed    Overweight (BMI 25 0-29  9)    Varicose veins of both lower extremities with pain    S/P CABG (coronary artery bypass graft)       Coronary artery disease  S/P coronary artery bypass grafting with SVG and Left radial; POD # 2    Plan:    1  Cardiac:   NSR; HR/BP well-controlled  Continue Lopressor, 12 5mg PO BID   Continue Norvasc 2 5mg QD for [facility]  Continue ASA and Statin therapy  Epicardial pacing wires no longer required    Remove today  Maintain central IV access today  Continue DVT prophylaxis    2  Pulmonary:   Good Room air oxygen saturation; Continue incentive spirometry/Coughing/Deep breathing exercises  Chest tube drainage diminished; D/C today    3  Renal:   Intake/Output net: -876 mL/24 hours, UO 1 8L/24  Diuretic Regimen:  Continue Lasix 40 mg IV BID  Continue Potassium Chloride 20 mEq PO BID  Post op Creatinine stable; Follow up labs prn    4  Neuro:  Neurologically intact; No active issues  Incisional pain well-controlled  Continue Tylenol, 975 mg PO q 8, standing dose  Continue Oxycodone, 2 5 to 5 mg PO q 4 hours prn pain    5  GI:  Tolerating TLC 2 3 gm sodium diet  Maintain 1800 mL daily fluid restriction   Continue stool softeners and prn suppository  Continue GI prophylaxis    6  Endo:   Glucose well-controlled with sliding scale coverage    7    Hematology:    Post-operative blood count acceptable; Trend prn    8     Disposition:      Ambulating independently, Anticipate discharge to home when medically ready likely tomorrow    VTE Pharmacologic Prophylaxis: Fondaparinux (Arixtra)  VTE Mechanical Prophylaxis: sequential compression device    Collaborative rounds completed with supervising physician  Plan of care discussed with bedside nurse    SIGNATURE: Radha Christian PA-C  DATE: March 10, 2023  TIME: 8:18 AM

## 2023-03-10 NOTE — UTILIZATION REVIEW
Continued Stay Review    Date: 03/10                       Current Patient Class: IP Current Level of Care: MS    HPI:52 y o  male initially admitted on 03/03    Assessment/Plan: Pt POD #2 s/p CABG w/ SVG and L radial  Doing well, no complaints  Cont ASA, high intensity statin  Cont Amlodipine  Cont low dose BB as carter by HR and BP  Cont amiodarone  Cont Torsemide  EP pacing wires removed today  DVt ppx  CT drainage diminished today, dc  Cont Lasix IV  Cont KCl  F/u labs prn  Pain control prn  GI ppx  Continue stool softeners and prn suppository  Vital Signs: /72   Pulse 77   Temp 98 2 °F (36 8 °C)   Resp 15   Ht 5' 10" (1 778 m)   Wt 93 2 kg (205 lb 7 oz)   SpO2 98%   BMI 29 48 kg/m²       Pertinent Labs/Diagnostic Results:       Results from last 7 days   Lab Units 03/10/23  0528 03/09/23  0409 03/08/23 2058 03/08/23  1318 03/08/23  1315 03/08/23  0750 03/06/23  0506   WBC Thousand/uL 10 99* 10 11  --   --   --   --  5 74   HEMOGLOBIN g/dL 9 9* 11 1* 11 4*  --  12 0  --  14 4   I STAT HEMOGLOBIN g/dl  --   --   --  7 1*  --    < >  --    HEMATOCRIT % 28 7* 32 8* 33 4*  --  34 6*  --  41 2   HEMATOCRIT, ISTAT %  --   --   --  21*  --    < >  --    PLATELETS Thousands/uL 183 215  --   --  185   < > 256   NEUTROS ABS Thousands/µL 7 76*  --   --   --   --   --  2 98    < > = values in this interval not displayed           Results from last 7 days   Lab Units 03/10/23  0528 03/09/23  0409 03/08/23 2058 03/08/23  1715 03/08/23  1318 03/08/23  1315 03/08/23  1243 03/08/23  1205 03/08/23  1145 03/08/23  1108 03/08/23  0750 03/06/23  0506   SODIUM mmol/L 136 139  --   --   --  141  --   --   --   --   --  136   POTASSIUM mmol/L 3 9 4 1 4 4 4 4  --  2 9*  --   --   --   --   --  3 9   CHLORIDE mmol/L 103 110*  --   --   --  120*  --   --   --   --   --  107   CO2 mmol/L 25 20*  --   --   --  19*  --   --   --   --   --  22   CO2, I-STAT mmol/L  --   --   --   --  16*  --  22 24 25 26   < >  --    ANION GAP mmol/L 8 9  --   --   --  2*  --   --   --   --   --  7   BUN mg/dL 17 11  --   --   --  9  --   --   --   --   --  14   CREATININE mg/dL 0 76 0 66  --   --   --  0 36*  --   --   --   --   --  0 67   EGFR ml/min/1 73sq m 104 111  --   --   --  142  --   --   --   --   --  110   CALCIUM mg/dL 8 6 8 0*  --   --   --  6 4*  --   --   --   --   --  9 4   CALCIUM, IONIZED, ISTAT mmol/L  --   --   --   --  0 93*  --  1 21 0 99* 1 00* 1 09*   < >  --    MAGNESIUM mg/dL 2 3 2 0  --   --   --   --   --   --   --   --   --   --    PHOSPHORUS mg/dL 2 3*  --   --   --   --   --   --   --   --   --   --   --     < > = values in this interval not displayed       Results from last 7 days   Lab Units 03/10/23  0528   AST U/L 90*   ALT U/L 51   ALK PHOS U/L 56   TOTAL PROTEIN g/dL 6 2*   ALBUMIN g/dL 3 0*   TOTAL BILIRUBIN mg/dL 0 49     Results from last 7 days   Lab Units 03/10/23  1135 03/09/23  2044 03/09/23  1521 03/09/23  0614 03/09/23  0415 03/09/23  0158 03/09/23  0004 03/08/23  2209 03/08/23  1953 03/08/23  1738 03/08/23  1547 03/08/23  1357   POC GLUCOSE mg/dl 115 123 153* 156* 137 102 92 107 108 128 109 134     Results from last 7 days   Lab Units 03/10/23  0528 03/09/23  0409 03/08/23  1315 03/06/23  0506   GLUCOSE RANDOM mg/dL 108 145* 115 95     Results from last 7 days   Lab Units 03/08/23  1510   PH ART  7 317*   PCO2 ART mm Hg 39 0   PO2 ART mm Hg 148 0*   HCO3 ART mmol/L 19 5*   BASE EXC ART mmol/L -6 1   O2 CONTENT ART mL/dL 18 3   O2 HGB, ARTERIAL % 97 7*   ABG SOURCE  Line, Arterial         Results from last 7 days   Lab Units 03/08/23  1318 03/08/23  1243 03/08/23  1205 03/08/23  1108 03/08/23  1048 03/08/23  0950 03/08/23  0750   PH, JORI I-STAT   --   --   --   --  7 371  --  7 387   PCO2, JORI ISTAT mm HG  --   --   --   --  42 7  --  41 7*   PO2, JORI ISTAT mm HG  --   --   --   --  36 0  --  37 0   HCO3, JORI ISTAT mmol/L  --   --   --   --  24 7  --  25 1   I STAT BASE EXC mmol/L -10* -4* -1   < > -1   < > 0 I STAT O2 SAT % 100* 97* 100*   < > 67   < > 69   ISTAT PH ART  7 329* 7 367 7 420   < >  --    < >  --    I STAT ART PCO2 mm HG 28 1* 36 6 35 8*   < >  --    < >  --    I STAT ART PO2 mm  0* 88 0 353 0*   < >  --    < >  --    I STAT ART HCO3 mmol/L 14 8* 21 0* 23 3   < >  --    < >  --     < > = values in this interval not displayed  Results from last 7 days   Lab Units 03/08/23  1358 03/08/23  0513 03/07/23  0424 03/06/23  0506   PROTIME seconds 17 8*  --   --   --    INR  1 44*  --   --   --    PTT seconds  --  50* 76* 88*     Medications:   Scheduled Medications:  acetaminophen, 975 mg, Oral, Q8H  amiodarone, 200 mg, Oral, Q8H SUZAN  amLODIPine, 2 5 mg, Oral, Daily  aspirin, 325 mg, Oral, Daily  atorvastatin, 80 mg, Oral, Daily With Dinner  docusate sodium, 100 mg, Oral, BID  fondaparinux, 2 5 mg, Subcutaneous, Daily  insulin lispro, 1-6 Units, Subcutaneous, TID AC  insulin lispro, 1-6 Units, Subcutaneous, HS  metoprolol tartrate, 12 5 mg, Oral, Q12H SUZAN  mupirocin, 1 application  , Nasal, Q12H [facility]  pantoprazole, 40 mg, Oral, Early Morning  polyethylene glycol, 17 g, Oral, Daily  potassium chloride, 20 mEq, Oral, BID  torsemide, 20 mg, Oral, BID  furosemide (LASIX) injection 40 mg  Dose: 40 mg  Freq: 2 times daily (diuretic) Route: IV  Start: 03/09/23 0930 End: 03/10/23 1119    Continuous IV Infusions:     PRN Meds:  acetaminophen, 650 mg, Rectal, Q4H PRN  bisacodyl, 10 mg, Rectal, Daily PRN  methocarbamol, 500 mg, Oral, Q6H PRN  ondansetron, 4 mg, Intravenous, Q6H PRN  oxyCODONE, 2 5 mg, Oral, Q4H PRN  oxyCODONE, 5 mg, Oral, Q4H PRN 03/10 x 1        Discharge Plan: TBD    Network Utilization Review Department  ATTENTION: Please call with any questions or concerns to [phone] and carefully listen to the prompts so that you are directed to the right person   All voicemails are confidential   Suleiman Florez all requests for admission clinical reviews, approved or denied determinations and any other requests to dedicated fax number below belonging to the campus where the patient is receiving treatment   List of dedicated fax numbers for the Facilities:  [address] [place] [address] DENIALS (Administrative/Medical Necessity) [phone]   [address] (Maternity/NICU/Pediatrics) [phone]   [address] [phone]   Lise Christy 77 [phone]   [address] [place] [address] [facility]el [facility] 28 [phone]   1552 [facility] 134 815 [facility] [phone]

## 2023-03-10 NOTE — CASE MANAGEMENT
Case Management Discharge Planning Note    Patient name Janna Garner  Location [address] [place] [address] [facility] [id number]  : 1970 Date 3/10/2023       Current Admission Date: 3/2/2023  Current Admission Diagnosis:ACS (acute coronary syndrome) [facility])   Patient Active Problem List    Diagnosis Date Noted   • S/P CABG (coronary artery bypass graft) 2023   • [facility] 2023   • Chest pain 2023   • Arthritis of both hips 2021   • Femoroacetabular impingement of left hip 2021   • Hamstring tightness of both lower extremities 2021   • Weakness of both hips 2021   • COVID-19 2020   • Claudication of both lower extremities ([facility] ) 2020   • Varicose veins of both lower extremities with pain 2020   • Bilateral leg pain 2020   • Bilateral hand numbness 2020   • Excessive daytime sleepiness 2020   • Overweight (BMI 25 0-29 9) 10/16/2019   • Achilles tendinosis of right lower extremity 2019   • Cutaneous skin tags 2019   • Essential hypertension 2018   • Hyperlipidemia, mixed 2018   • Vitamin D deficiency 2018   • Low libido 2018      LOS (days): 8  Geometric Mean LOS (GMLOS) (days):   Days to GMLOS:     OBJECTIVE:  Risk of Unplanned Readmission Score: 18 11         Current admission status: Inpatient   Preferred Pharmacy:   [facility] [address], [address]   [address]   [address] [address]  Phone: [phone] Fax: Priyanka Chavira8 [address], [place] [address] [employer] [facility] 58  [address] [employer] [address]  Phone: [phone] Fax: [phone]    [place] [address]  Phone: [phone] Fax: [phone]    Primary Care Provider: Rod Diane PA-C    Primary Insurance: Maryanne Hitchcock  Secondary Insurance:     DISCHARGE DETAILS: Other Referral/Resources/Interventions Provided:  Interventions: [facility]  Referral Comments: Per provider, pt will likely be medically stable for discharge tomorrow, 3/11/2023  Pt remains accepted by [facility] for at-home SN  No therapy needs identified at this time  CM will follow for DME needs as pt progresses to home  CM will continue to monitor           Treatment Team Recommendation: Home with [address]  Discharge Destination Plan[de-identified] Home with [address]

## 2023-03-10 NOTE — PROGRESS NOTES
Cardiology Progress Note - Sandra  46 y o  male MRN: [id number]    Unit/Bed#: [facility] 426-01 Encounter: [id number]      Assessment:  Principal Problem:    ACS (acute coronary syndrome) ([address] )  Active Problems:    Essential hypertension    Hyperlipidemia, mixed    Impression:  1  Multivessel CAD - Discovered in the setting of an abnormal stress test  Now s/p CABG x4 (LIMA-LAD, SVG-RPDA, SVG-OM2, RA-OM1)  Doing well postoperatively, no rhythm issues  2  HTN - controlled  3  Dyslipidemia     Recommendations:  1  Continue Aspirin and high intensity statin  2  Continue Amlodipine 2 5mg QD given radial graft  3  Continue low dose beta blocker as tolerated by HR and BP  4  Amiodarone loading per CT-surgery protocol  5  PO Torsemide as ordered  Subjective:   Continues to do well postoperatively  He has no active cardiopulmonary complaints  Review of Systems   Cardiovascular: Negative for chest pain, leg swelling and palpitations  Respiratory: Negative for shortness of breath  Objective:   Vitals: Blood pressure 111/73, pulse 77, temperature 98 2 °F (36 8 °C), resp  rate 15, height 5' 10" (1 778 m), weight 93 2 kg (205 lb 7 oz), SpO2 98 %  , Body mass index is 29 48 kg/m² ,   Orthostatic Blood Pressures    Flowsheet Row Most Recent Value   Blood Pressure 111/73 filed at 03/10/2023 1145   Patient Position - Orthostatic VS Sitting filed at 03/09/2023 0601         Systolic (22FVT), ODD:461 , Min:83 , QEQ:432     Diastolic (83HEW), SHC:25, Min:53, Max:78      Intake/Output Summary (Last 24 hours) at 3/10/2023 1154  Last data filed at 3/10/2023 1035  Gross per 24 hour   Intake 680 ml   Output 2850 ml   Net -2170 ml     Weight (last 2 days)     Date/Time Weight    03/10/23 0926 93 2 (205 44)    03/10/23 0600 93 2 (205 47)    03/10/23 0358 93 2 (205 47)     Weight: x 2 at 03/10/23 0358    03/08/23 0518 88 (194 01)              Telemetry Review: Normal sinus rhythm    Physical Exam  Constitutional:       General: He is not in acute distress  Appearance: He is not diaphoretic  HENT:      Head: Normocephalic and atraumatic  Eyes:      Conjunctiva/sclera: Conjunctivae normal    Cardiovascular:      Rate and Rhythm: Normal rate and regular rhythm  Heart sounds: Normal heart sounds  No murmur heard  No friction rub  No gallop  Pulmonary:      Breath sounds: Normal breath sounds  No wheezing or rales  Musculoskeletal:      Right lower leg: No edema  Left lower leg: No edema  Neurological:      General: No focal deficit present  Mental Status: He is alert and oriented to person, place, and time  Psychiatric:         Mood and Affect: Mood normal            Laboratory Results:        CBC with diff:   Results from last 7 days   Lab Units 03/10/23  0528 03/09/23  0409 03/08/23  2058 03/08/23  1318 03/08/23  1315 03/08/23  1243 03/08/23  1205 03/08/23  1108 03/08/23  1105 03/08/23  0750 03/06/23  0506   WBC Thousand/uL 10 99* 10 11  --   --   --   --   --   --   --   --  5 74   HEMOGLOBIN g/dL 9 9* 11 1* 11 4*  --  12 0  --   --   --   --   --  14 4   I STAT HEMOGLOBIN g/dl  --   --   --  7 1*  --  9 9* 8 8*   < >  --    < >  --    HEMATOCRIT % 28 7* 32 8* 33 4*  --  34 6*  --   --   --   --   --  41 2   HEMATOCRIT, ISTAT %  --   --   --  21*  --  29* 26*   < >  --    < >  --    MCV fL 94 95  --   --   --   --   --   --   --   --  91   PLATELETS Thousands/uL 183 215  --   --  185  --   --   --  236  --  256   MCH pg 32 6 32 3  --   --   --   --   --   --   --   --  31 9   MCHC g/dL 34 5 33 8  --   --   --   --   --   --   --   --  35 0   RDW % 12 4 12 4  --   --   --   --   --   --   --   --  11 7   MPV fL 10 8 10 2  --   --  9 7  --   --   --  9 4  --  9 8   NRBC AUTO /100 WBCs 0  --   --   --   --   --   --   --   --   --  0    < > = values in this interval not displayed           CMP:  Results from last 7 days   Lab Units 03/10/23  0528 03/09/23  0409 03/08/23 2058 03/08/23 1715 03/08/23  1318 03/08/23  1315 03/08/23  1243 03/08/23  1205 03/08/23  1145 03/08/23  1108 03/08/23  1048 03/08/23  1032 03/08/23  0750 03/06/23  0506   POTASSIUM mmol/L 3 9 4 1 4 4 4 4  --  2 9*  --   --   --   --   --   --   --  3 9   CHLORIDE mmol/L 103 110*  --   --   --  120*  --   --   --   --   --   --   --  107   CO2 mmol/L 25 20*  --   --   --  19*  --   --   --   --   --   --   --  22   CO2, I-STAT mmol/L  --   --   --   --  16*  --  22 24 25 26 26 28   < >  --    BUN mg/dL 17 11  --   --   --  9  --   --   --   --   --   --   --  14   CREATININE mg/dL 0 76 0 66  --   --   --  0 36*  --   --   --   --   --   --   --  0 67   GLUCOSE, ISTAT mg/dl  --   --   --   --  89  --  135 154* 160* 147* 127 124   < >  --    CALCIUM mg/dL 8 6 8 0*  --   --   --  6 4*  --   --   --   --   --   --   --  9 4   AST U/L 90*  --   --   --   --   --   --   --   --   --   --   --   --   --    ALT U/L 51  --   --   --   --   --   --   --   --   --   --   --   --   --    ALK PHOS U/L 56  --   --   --   --   --   --   --   --   --   --   --   --   --    EGFR ml/min/1 73sq m 104 111  --   --   --  142  --   --   --   --   --   --   --  110    < > = values in this interval not displayed           BMP:  Results from last 7 days   Lab Units 03/10/23  0528 03/09/23  0409 03/08/23 2058 03/08/23 1715 03/08/23  1318 03/08/23  1315 03/08/23  1243 03/08/23  1205 03/08/23  1145 03/08/23  0750 03/06/23  0506   POTASSIUM mmol/L 3 9 4 1 4 4 4 4  --  2 9*  --   --   --   --  3 9   CHLORIDE mmol/L 103 110*  --   --   --  120*  --   --   --   --  107   CO2 mmol/L 25 20*  --   --   --  19*  --   --   --   --  22   CO2, I-STAT mmol/L  --   --   --   --  16*  --  22 24 25   < >  --    BUN mg/dL 17 11  --   --   --  9  --   --   --   --  14   CREATININE mg/dL 0 76 0 66  --   --   --  0 36*  --   --   --   --  0 67   GLUCOSE, ISTAT mg/dl  --   --   --   --  89  --  135 154* 160*   < >  --    CALCIUM mg/dL 8 6 8 0*  --   -- --  6 4*  --   --   --   --  9 4    < > = values in this interval not displayed  BNP:   Results Reviewed     None        No results for input(s): BNP in the last 72 hours  Magnesium:   Results from last 7 days   Lab Units 03/10/23  0528 03/09/23  0409   MAGNESIUM mg/dL 2 3 2 0       Coags:   Results from last 7 days   Lab Units 03/08/23  1358 03/08/23  0513 03/07/23  0424 03/06/23  0506 03/05/23  0440 03/04/23  1817 03/04/23  1039 03/04/23  0314   PTT seconds  --  50* 76* 88* 79* 60* 68* 95*   INR  1 44*  --   --   --   --   --   --   --        TSH:       Lipid Profile:             Cardiac testing:     Meds/Allergies   Current Facility-Administered Medications   Medication Dose Route Frequency Provider Last Rate   • acetaminophen  650 mg Rectal Q4H PRN Angela JOEY Goldberg     • [facility]aminophen  975 mg [facility]     • amiodarone  200 mg Oral [facility] Maxwell Nielsen PA-C     • amLODIPine  2 5 mg Oral Daily Angela JOEY Goldberg     • aspirin  325 mg Oral Daily Angela JOEY Goldberg     • atorvastatin  80 mg Oral Daily With [facility], PA-C     • bisacodyl  10 mg Rectal Daily PRN Angela JOEY Goldberg     • docusate sodium  100 mg Oral BID Angela JOEY Goldberg     • fondaparinux  2 5 mg Subcutaneous Daily Angela JOEY Goldberg     • insulin lispro  1-6 Units Subcutaneous TID AC Wilhelminia Every, CRNP     • insulin lispro  1-6 Units Subcutaneous HS Wilhelminia Every, CRNP     • methocarbamol  500 mg Oral Q6H PRN Angela JOEY Goldberg     • metoprolol tartrate  12 5 mg Oral Q12H [facility] Maxwell Nielsen PA-C     • mupirocin  1 application   Nasal Q12H [facility] Maxwell Nielsen PA-C     • ondansetron  4 mg Intravenous Q6H PRN Angela JOEY Goldberg     • oxyCODONE  2 5 mg Oral Q4H PRN Angela JOEY Goldberg     • oxyCODONE  5 mg Oral Q4H PRN Angela JOEY Goldberg     • pantoprazole  40 mg Oral Early Morning Angela JOEY Goldberg     • polyethylene glycol  17 g Oral Daily Angela JOEY Goldberg     • potassium chloride  20 mEq Oral BID Herminia Mendoza PA-C     • torsemide  20 mg Oral BID Kirstie Gibson PA-C       Facility-Administered Medications Ordered in Other Encounters   Medication Dose Route Frequency Provider Last Rate   • aminocaproic acid (AMICAR) IV   Intravenous Continuous PRN Hudson Alfaro MD 1 g/hr (03/08/23 2100)   • aminocaproic acid   Intravenous PRN Hudson Alfaro MD     • calcium chloride   Intravenous PRN Bhanu Jacques, DO     • cardioplegia   Intracardiac PRN Bhanu Jacques, DO     • ceFAZolin   Intravenous PRN Hudson Alfaro MD     • cell saver (heparin 50,000 units in 1000 ml sodium chloride 0 9%)   Irrigation PRN Bhanu [facility], DO     • dexmedeTOMIDine (Precedex) 400 mcg in 100 ml sodium chlorid* 0 9%   Intravenous Continuous PRN Hudson Alfaro MD 0 3 mcg/kg/hr (03/08/23 0820)   • esmolol   Intravenous PRN Hudson Alfaro MD     • fentanyl citrate (PF)   Intravenous PRN Hudson Alfaro MD     • glycopyrrolate   Intravenous PRN Hudson Alfaro MD     • heparin (porcine)   Intravenous PRN Hudson Alfaro MD     • HYDROmorphone   Intravenous PRN Hudson Alfaro MD     • insulin regular   Subcutaneous PRN Hudson Alfaro MD     • lactated ringers   Intravenous Continuous PRN Hudson Alfaro MD     • lidocaine (cardiac)   Intravenous PRN Hudson Alfaro MD     • mannitol   Intravenous PRN Bhanu Jacques, DO     • mannitol   Intravenous PRN Bhanu Jacques, DO     • multi-electrolyte   Intravenous PRN Bhanu [facility], DO     • neostigmine   Intravenous PRN Hudson Alfaro MD     • phenylephrine   Intravenous Continuous PRN Bhanu [facility], DO     • propofol   Intravenous PRN Hudson Alfaro MD     • protamine   Intravenous PRN Hudson Alfaro MD     • ROCuronium   Intravenous PRN Hudson Alfaro MD     • sodium bicarbonate   Intravenous PRN Bhanu Jacques, DO     • sodium chloride Intravenous Continuous PRN Holly Oliver MD     • Succinylcholine Chloride   Intravenous PRN Holly Oliver MD          Medications Prior to Admission   Medication   • ascorbic acid (VITAMIN C) 500 mg tablet   • Ergocalciferol (VITAMIN D2 PO)   • lisinopril-hydrochlorothiazide (PRINZIDE,ZESTORETIC) 20-12 5 MG per tablet   • Lysine 1000 MG TABS   • metoprolol tartrate (LOPRESSOR) 25 mg tablet   • Misc Natural Products (OSTEO BI-FLEX ADV DOUBLE ST PO)   • Multiple Vitamin (MULTIVITAMIN) capsule       Suzette Jacobsen MD

## 2023-03-11 VITALS
WEIGHT: 198.63 LBS | DIASTOLIC BLOOD PRESSURE: 81 MMHG | OXYGEN SATURATION: 98 % | RESPIRATION RATE: 16 BRPM | BODY MASS INDEX: 28.44 KG/M2 | HEART RATE: 82 BPM | TEMPERATURE: 98.5 F | SYSTOLIC BLOOD PRESSURE: 119 MMHG | HEIGHT: 70 IN

## 2023-03-11 PROBLEM — D62 ACUTE BLOOD LOSS AS CAUSE OF POSTOPERATIVE ANEMIA: Status: ACTIVE | Noted: 2023-03-11

## 2023-03-11 LAB — GLUCOSE SERPL-MCNC: 101 MG/DL (ref 65–140)

## 2023-03-11 RX ORDER — OXYCODONE HYDROCHLORIDE 5 MG/1
5 TABLET ORAL EVERY 6 HOURS PRN
Qty: 20 TABLET | Refills: 0 | Status: SHIPPED | OUTPATIENT
Start: 2023-03-11 | End: 2023-03-21

## 2023-03-11 RX ORDER — ATORVASTATIN CALCIUM 80 MG/1
80 TABLET, FILM COATED ORAL
Qty: 30 TABLET | Refills: 3 | Status: SHIPPED | OUTPATIENT
Start: 2023-03-11

## 2023-03-11 RX ORDER — ACETAMINOPHEN 325 MG/1
650 TABLET ORAL EVERY 6 HOURS PRN
Refills: 0
Start: 2023-03-11

## 2023-03-11 RX ORDER — AMLODIPINE BESYLATE 2.5 MG/1
2.5 TABLET ORAL DAILY
Qty: 30 TABLET | Refills: 2 | Status: SHIPPED | OUTPATIENT
Start: 2023-03-11

## 2023-03-11 RX ORDER — TORSEMIDE 20 MG/1
20 TABLET ORAL DAILY
Qty: 5 TABLET | Refills: 0 | Status: SHIPPED | OUTPATIENT
Start: 2023-03-11

## 2023-03-11 RX ORDER — POTASSIUM CHLORIDE 20 MEQ/1
20 TABLET, EXTENDED RELEASE ORAL DAILY
Qty: 5 TABLET | Refills: 0 | Status: SHIPPED | OUTPATIENT
Start: 2023-03-11

## 2023-03-11 RX ORDER — POLYETHYLENE GLYCOL 3350 17 G/17G
17 POWDER, FOR SOLUTION ORAL DAILY PRN
Qty: 500 G | Refills: 0 | Status: SHIPPED | OUTPATIENT
Start: 2023-03-11 | End: 2023-04-10

## 2023-03-11 RX ORDER — OMEPRAZOLE 20 MG/1
20 TABLET, DELAYED RELEASE ORAL DAILY
Qty: 30 TABLET | Refills: 0 | Status: SHIPPED | OUTPATIENT
Start: 2023-03-11

## 2023-03-11 RX ORDER — ASPIRIN 325 MG
325 TABLET ORAL DAILY
Qty: 30 TABLET | Refills: 3 | Status: SHIPPED | OUTPATIENT
Start: 2023-03-11

## 2023-03-11 RX ORDER — METHOCARBAMOL 500 MG/1
500 TABLET, FILM COATED ORAL EVERY 6 HOURS PRN
Qty: 20 TABLET | Refills: 0 | Status: SHIPPED | OUTPATIENT
Start: 2023-03-11

## 2023-03-11 RX ADMIN — AMLODIPINE BESYLATE 2.5 MG: 2.5 TABLET ORAL at 08:43

## 2023-03-11 RX ADMIN — Medication 12.5 MG: at 08:43

## 2023-03-11 RX ADMIN — ACETAMINOPHEN 975 MG: 325 TABLET ORAL at 06:00

## 2023-03-11 RX ADMIN — POLYETHYLENE GLYCOL 3350 17 G: 17 POWDER, FOR SOLUTION ORAL at 08:44

## 2023-03-11 RX ADMIN — DOCUSATE SODIUM 100 MG: 100 CAPSULE, LIQUID FILLED ORAL at 08:44

## 2023-03-11 RX ADMIN — FONDAPARINUX SODIUM 2.5 MG: 2.5 INJECTION, SOLUTION SUBCUTANEOUS at 08:43

## 2023-03-11 RX ADMIN — MUPIROCIN 1 APPLICATION.: 20 OINTMENT TOPICAL at 08:43

## 2023-03-11 RX ADMIN — POTASSIUM CHLORIDE 20 MEQ: 1500 TABLET, EXTENDED RELEASE ORAL at 08:43

## 2023-03-11 RX ADMIN — AMIODARONE HYDROCHLORIDE 200 MG: 200 TABLET ORAL at 06:16

## 2023-03-11 RX ADMIN — ASPIRIN 325 MG ORAL TABLET 325 MG: 325 PILL ORAL at 08:43

## 2023-03-11 RX ADMIN — TORSEMIDE 20 MG: 20 TABLET ORAL at 08:43

## 2023-03-11 RX ADMIN — PANTOPRAZOLE SODIUM 40 MG: 40 TABLET, DELAYED RELEASE ORAL at 06:17

## 2023-03-11 NOTE — DISCHARGE SUMMARY
Discharge Summary - Cardiothoracic Surgery   Lawrence Bergman 46 y o  male MRN: [id number]  [facility] Encounter: [id number]    Admission Date: 3/2/2023     Discharge Date: 03/11/23    Admitting Diagnosis: Abnormal stress test [R94 [id number]]    Primary Discharge Diagnosis:   Coronary artery disease  S/P coronary artery bypass grafting with SVG and [facility];    Secondary Discharge Diagnosis:   HLD, HTN, Vit D Deficiency, ACS, ABLA    Attending: VIKAS Rubalcava  Consulting Physician(s):   Cardiology  Medical/Critical Care    Procedures Performed:   Procedure(s):  CORONARY ARTERY BYPASS GRAFT (CABG) 4 VESSELS,  SVG to PDA  and OM2                             r      LRA-->OM1 , LIMA to LAD, evh, hector     Hospital Course:   3/3: 45 y/o male with no previous cardiac history presented for an elective outpatient exercise stress test ordered by his PCP for the evaluation of exertional chest pain and dyspnea on exertion  Pt developed ST depressions in the inferolateral leads, significant for ischemia  Pt sent immediately to [facility] ER  He underwent cardiac cath, which revealed MV CAD  He was transferred to [facility] for cardiac surgical evaluation  Seen in consultation  Preop orders placed  Lisinopril discontinued  3/4: No events overnight       3/5: complete on the left, limb alert placed  3/6: VSS, no events, consent signed     3/7: No events  Preop orders placed  3/8: CABG X 4 with ERAH  No significant postoperative bleeding  Transferred to ICU without inotropic or pressor support  Wean towards extubation  3/9: Required Jesus overnight, weaned off this morning  NSR, RA  Delined Given 1L LR and 250ml albumin overnight  Labs stable  +1 7L/24 hrs  Start Norvasc, Metoprolol 12 5mg q12, Lasix BID  Toradol x 3 doses  Transition to [facility]  D/C mart  Transfer to telemetry  3/10: VSS  D/c CTs/PWs  Cont Lopressor and Norvasc  Cotn lasix BID  SSI  Home tomorrow     3/11: VSS  Ambulating   Continue current meds  Making great urine  Decrease to torsemide daily  SSI  D/c home today     Condition at Discharge:   fair     Discharge Physical Exam:    Please see the documented physical exam from this morning's progress note for details  Discharge Data:  Results from last 7 days   Lab Units 03/10/23  0528 03/09/23  0409 03/08/23  2058 03/08/23  1318 03/08/23  1315 03/08/23  0750 03/06/23  0506   WBC Thousand/uL 10 99* 10 11  --   --   --   --  5 74   HEMOGLOBIN g/dL 9 9* 11 1* 11 4*  --  12 0  --  14 4   I STAT HEMOGLOBIN   --   --   --    < >  --    < >  --    HEMATOCRIT % 28 7* 32 8* 33 4*  --  34 6*  --  41 2   HEMATOCRIT, ISTAT   --   --   --    < >  --    < >  --    PLATELETS Thousands/uL 183 215  --   --  185   < > 256    < > = values in this interval not displayed  Results from last 7 days   Lab Units 03/10/23  0528 03/09/23  0409 03/08/23  2058 03/08/23  1715 03/08/23  1318 03/08/23  1315   POTASSIUM mmol/L 3 9 4 1 4 4   < >  --  2 9*   CHLORIDE mmol/L 103 110*  --   --   --  120*   CO2 mmol/L 25 20*  --   --   --  19*   CO2, I-STAT mmol/L  --   --   --   --  16*  --    BUN mg/dL 17 11  --   --   --  9   CREATININE mg/dL 0 76 0 66  --   --   --  0 36*   GLUCOSE, ISTAT mg/dl  --   --   --   --  89  --    CALCIUM mg/dL 8 6 8 0*  --   --   --  6 4*    < > = values in this interval not displayed  Results from last 7 days   Lab Units 03/08/23  1358 03/08/23  0513 03/07/23  0424 03/06/23  0506   INR  1 44*  --   --   --    PTT seconds  --  50* 76* 88*       Discharge instructions/Information to patient and family:   See after visit summary for information provided to patient and family  Nubia Albright was educated on restrictions regarding driving and lifting, and techniques of proper incisional care    They were specifically counselled on signs and symptoms of an incisional infection, and advised to contact our service immediately should they develop fevers, sweats, chill, redness or drainage at the site of any incisions  Provisions for Follow-Up Care:  See after visit summary for information related to follow-up care and any pertinent home health orders  Disposition:  Home    Planned Readmission:   No    Discharge Medications:  See after visit summary for reconciled discharge medications provided to patient and family  Chris Ferris was provided contact information and scheduled a follow up appointment with VIKAS Cruz  Additionally, follow up appointments have been scheduled for their primary care physician and primary cardiologist   Contact information was provided  Upon presentation, Unstable angina was identified  Chris Ferris was counseled on the importance of avoiding tobacco products  As with all patients whom have undergone open heart surgery, tobacco cessation medication was contraindicated at the time of discharge  ACE/ARB was Contraindicated secondary to EF > 40% and no history of heart failure    Beta Blocker was Prescribed at discharge    Aspirin was Prescribed at discharge    Statin was Prescribed at discharge     Patient was prescribed Norvasc 2 5mg QD to continue for [facility]      The patient was discharged on ongoing diuretic therapy with Torsemide 20 mg, PO QD and Potassium Chloride 20 mEq, PO QD  They were advised to continue these medications for 5 days, unless otherwise directed  Narcotic pain medication was prescribed in the form of Oxycodone 5mg PRN  Prior to prescribing, their prescription profile was reviewed on the [place] department of health prescription drug monitoring program     The patient was informed that following their postoperative surgical evaluation, they will be referred to outpatient cardiac rehabilitation  They were counseled that this program is run by specialists who will help them safely strengthen their heart and prevent more heart disease    Cardiac rehabilitation will include exercise, relaxation, stress management, and heart-healthy nutrition  Caregivers will also check to make sure their medication regimen is working  During this admission, the patient was questioned on their use of tobacco, alcohol, and illicit/non-prescription drug use in the  previous 24 months  [facility] states that they have not used any of these substances in this time frame  Rare marijuana use    I spent 30 minutes discharging the patient  This time was spent on the day of discharge  I had direct contact with the patient on the day of discharge  Additional documentation is required if more than 30 minutes were spent on discharge       SIGNATURE: Rodrigo Pierre  DATE: March 11, 2023  TIME: 9:31 AM

## 2023-03-11 NOTE — PROGRESS NOTES
Progress Note - Cardiothoracic Surgery   Chris Barros 46 y o  male MRN: [id number]  Unit/Bed#: [facility] 426-01 Encounter: [id number]    Coronary artery disease  S/P coronary artery bypass grafting with SVG and Left Radial; POD # 3      24 Hour Events: doing well no complaints    Medications:   Scheduled Meds:  Current Facility-Administered Medications   Medication Dose Route Frequency Provider Last Rate   • acetaminophen  650 mg Rectal Q4H PRN Glorious Sieving, JOEY     • acetaminophen  975 mg Oral 10 Bell JOEY     • amiodarone  200 mg Oral [facility] Maxwell Nielsen PA-C     • amLODIPine  2 5 mg Oral Daily Glorious Sieving, JOEY     • aspirin  325 mg Oral Daily Glorious Sieving, JOEY     • atorvastatin  80 mg Oral Daily With [facility]ite AidJOEY     • bisacodyl  10 mg Rectal Daily PRN Glorious Sieving, JOEY     • docusate sodium  100 mg Oral BID Glorious Sieving, JOEY     • fondaparinux  2 5 mg Subcutaneous Daily Glorious Sieving, JOEY     • insulin lispro  1-6 Units Subcutaneous TID AC Earla Blarocio CRNP     • insulin lispro  1-6 Units Subcutaneous HS Earla Blazer CRCHRIS     • methocarbamol  500 mg Oral Q6H PRN Glorious Sieving, JOEY     • metoprolol tartrate  12 5 mg Oral Q12H [address] Maxwell Nielsen PA-C     • mupirocin  1 application   Nasal Q12H [address] Maxwell Nielsen PA-C     • ondansetron  4 mg Intravenous Q6H PRN Glorious Sieving, JOEY     • oxyCODONE  2 5 mg Oral Q4H PRN Glorious Sieving, JOEY     • oxyCODONE  5 mg Oral Q4H PRN Glorious Sieving, JOEY     • pantoprazole  40 mg Oral Early Morning Glorious Sieving, JOEY     • polyethylene glycol  17 g Oral Daily Glorious Sieving, JOEY     • potassium chloride  20 mEq Oral BID Glorious Sieving, JOEY     • torsemide  20 mg Oral BID Jair James Gibson PA-C       Continuous Infusions:   PRN Meds: •  acetaminophen  •  bisacodyl  •  methocarbamol  •  ondansetron  •  oxyCODONE  •  oxyCODONE    Vitals:   Vitals:    03/11/23 0000 03/11/23 0130 03/11/23 0221 03/11/23 0600   BP:   101/63    BP Location:   Left arm    Pulse: 76 69 77 69   Resp:   16    Temp:   (!) 97 3 °F (36 3 °C)    TempSrc:   Oral    SpO2: 97% 96% 97% 97%   Weight:    90 1 kg (198 lb 10 2 oz)   Height:           Telemetry: NSR; Heart Rate: 69    Respiratory:   SpO2: SpO2: 97 %; Room Air    Intake/Output:     Intake/Output Summary (Last 24 hours) at 3/11/2023 0735  Last data filed at 3/11/2023 0601  Gross per 24 hour   Intake 120 ml   Output 4775 ml   Net -4655 ml    UO 4 7L/24h    Chest tube Output:  removed    Weights:   Weight (last 2 days)     Date/Time Weight    03/11/23 0600 90 1 (198 63)    03/10/23 0926 93 2 (205 44)    03/10/23 0600 93 2 (205 47)    03/10/23 0358 93 2 (205 47)     Weight: x 2 at 03/10/23 0358            Results:   Results from last 7 days   Lab Units 03/10/23  0528 03/09/23  0409 03/08/23  2058 03/08/23  1318 03/08/23  1315 03/08/23  0750 03/06/23  0506   WBC Thousand/uL 10 99* 10 11  --   --   --   --  5 74   HEMOGLOBIN g/dL 9 9* 11 1* 11 4*  --  12 0  --  14 4   I STAT HEMOGLOBIN   --   --   --    < >  --    < >  --    HEMATOCRIT % 28 7* 32 8* 33 4*  --  34 6*  --  41 2   HEMATOCRIT, ISTAT   --   --   --    < >  --    < >  --    PLATELETS Thousands/uL 183 215  --   --  185   < > 256    < > = values in this interval not displayed  Results from last 7 days   Lab Units 03/10/23  0528 03/09/23  0409 03/08/23  2058 03/08/23  1715 03/08/23  1318 03/08/23  1315   SODIUM mmol/L 136 139  --   --   --  141   POTASSIUM mmol/L 3 9 4 1 4 4   < >  --  2 9*   CHLORIDE mmol/L 103 110*  --   --   --  120*   CO2 mmol/L 25 20*  --   --   --  19*   CO2, I-STAT mmol/L  --   --   --   --  16*  --    BUN mg/dL 17 11  --   --   --  9   CREATININE mg/dL 0 76 0 66  --   --   --  0 36*   GLUCOSE, ISTAT mg/dl  --   --   --   --  89  --    CALCIUM mg/dL 8 6 8 0*  --   --   --  6 4*    < > = values in this interval not displayed       Results from last 7 days   Lab Units 03/08/23  1358 03/08/23  0751 03/07/23  0424 03/06/23  0506   INR  1 44*  --   --   --    PTT seconds  --  50* 76* 88*     Point of care glucose: 101 - 123    Studies:  None today        Invasive Lines/Tubes:  Invasive Devices     Central Venous Catheter Line  Duration           CVC Central Lines 03/08/23 Triple 2 days                Physical Exam:    HEENT/NECK:  Normocephalic  Atraumatic   no jugular venous distention  Cardiac: Regular rate and rhythm and No murmurs/rubs/gallops  Pulmonary:  Breath sounds clear bilaterally and No rales/rhonchi/wheezes  Abdomen:  Non-tender, Non-distended and Normal bowel sounds  Incisions: Sternum is stable  Incision is clean, dry, and intact  , Saphenectomy incison is clean, dry, and intact  and Endoscopic radial artery harvest incision is clean, dry, and intact  Motor/Sensory intact  Extremities: Extremities warm/dry and No edema B/L  Neuro: Alert and oriented X 3, Sensation is grossly intact and No focal deficits  Skin: Warm/Dry, without rashes or lesions  Assessment:  Principal Problem:    ACS (acute coronary syndrome) ([facility] [address] )  Active Problems:    Essential hypertension    Hyperlipidemia, mixed    Overweight (BMI 25 0-29  9)    Varicose veins of both lower extremities with pain    S/P CABG (coronary artery bypass graft)       Coronary artery disease  S/P coronary artery bypass grafting with SVG and Left radial; POD # 3    Plan:    1  Cardiac:   NSR; HR/BP well-controlled  Continue Lopressor, 12 5mg PO BID   Continue Norvasc 2 5mg QD for [facility]  Continue ASA and Statin therapy  Epicardial pacing wires out  Remove TLC  Continue DVT prophylaxis    2  Pulmonary:   Good Room air oxygen saturation; Continue incentive spirometry/Coughing/Deep breathing exercises  Chest tubes have been discontinued    3  Renal:   Intake/Output net: -4 6 L/24 hours, UO 4 7L/24  Diuretic Regimen:  Decrease to Torsemide 20mg QD  Post op Creatinine stable; Follow up labs prn    4  Neuro:  Neurologically intact;  No active issues  Incisional pain well-controlled  Continue Tylenol, 975 mg PO q 8, standing dose  Continue Oxycodone, 2 5 to 5 mg PO q 4 hours prn pain    5  GI:  Tolerating TLC 2 3 gm sodium diet  Maintain 1800 mL daily fluid restriction   Continue stool softeners and prn suppository  Continue GI prophylaxis    6  Endo:   Glucose well-controlled with sliding scale coverage    7    Hematology:    Post-operative blood count acceptable; Trend prn    8     Disposition:      Ambulating independently, Anticipate discharge to home today    VTE Pharmacologic Prophylaxis: Fondaparinux (Arixtra)  VTE Mechanical Prophylaxis: sequential compression device    Collaborative rounds completed with supervising physician  Plan of care discussed with bedside nurse    SIGNATURE: Markel Goldmann, PA-C  DATE: March 11, 2023  TIME: 7:35 AM

## 2023-03-13 ENCOUNTER — TRANSITIONAL CARE MANAGEMENT (OUTPATIENT)
Dept: FAMILY MEDICINE CLINIC | Facility: CLINIC | Age: 53
End: 2023-03-13

## 2023-03-13 ENCOUNTER — HOME CARE VISIT (OUTPATIENT)
Dept: HOME HEALTH SERVICES | Facility: HOME HEALTHCARE | Age: 53
End: 2023-03-13

## 2023-03-15 NOTE — CASE COMMUNICATION
Patient was an assess not admit to homecare services  Sn instructed patient on incisional care and importance of showering daily to keep incisions clean  Sn instructed patient on s s of infection and when to contact MD  Pt verbalized good understanding  Pt compliant with checking temperature daily and recording  All medications reviewed with patient  SN isntructed patient on dose, frequency, purpose and side effects of all medications  Pt verbalized good understanding  Pt instructed on activity restrictions: No lifting more than 10lbs; use caution when reaching, bending or getting up from a sitting position  Sn instructed on daily weights and importance of contacting md with a weight gain of 2 lbs in 24 hrs and or 5 lbs in 1 week  Pt is moving around independently without any assistive device  Pt denies any shortness of breath or chest pain  Surgical incisions are ope n to air and without any s s of infection  Old drain and wire sites with minimal pink/red serosang drainage  Pt covering with a DCD  Pt reported doing well and did not think he needed further homecare services   called Dr Brandon Lipscomb office and made MD aware that patient not admitted to homecare services

## 2023-03-16 ENCOUNTER — TELEPHONE (OUTPATIENT)
Dept: CARDIAC SURGERY | Facility: CLINIC | Age: 53
End: 2023-03-16

## 2023-03-16 ENCOUNTER — TELEPHONE (OUTPATIENT)
Dept: CARDIOLOGY CLINIC | Facility: CLINIC | Age: 53
End: 2023-03-16

## 2023-03-16 DIAGNOSIS — Z95.1 S/P CABG (CORONARY ARTERY BYPASS GRAFT): Primary | ICD-10-CM

## 2023-03-16 NOTE — TELEPHONE ENCOUNTER
LM for patient to call back to schedule appointment  Going to offer 3/23 with Viki in [place]  Normal

## 2023-03-16 NOTE — TELEPHONE ENCOUNTER
Post-op call  S/p CABGx4 w/ [facility] 3/8/2023   -Returned patient phone call, left voicemail w/ call back information  Surgeon visit now set up in charting & office staff working on cardiology appt

## 2023-03-16 NOTE — TELEPHONE ENCOUNTER
----- Message from Curly Noel sent at 3/16/2023  4:19 PM EDT -----  Regarding: hospital f/u  Patient is s/p Cabg discharged from [address] [place] [address] , and will need a hospital follow up appointment  Please contact the patient to schedule  Thank you

## 2023-03-16 NOTE — TELEPHONE ENCOUNTER
Post-op call  S/p CABGx4 w/ [facility] 3/8/2023   -Called for post-op call, left voicemail w/ call back information  Office staff to call to establish post-operative appointment as not listed in chart

## 2023-03-16 NOTE — TELEPHONE ENCOUNTER
Patient called back for postop check in  He is doing well since going home on Saturday 3/11  He has been active, walking around his home and yard without issue  He takes his temperature, blood pressure and checks his weight every day  Weights have been steadily decreasing  He denies SOB, chest pain, LE edema  He has been using his IS several times a day, and gets the disc to the top  Answered questions and reviewed postop appointments  Cardiology has been in contact with patient for postoperative appointment with their office

## 2023-03-23 ENCOUNTER — APPOINTMENT (OUTPATIENT)
Dept: LAB | Facility: CLINIC | Age: 53
End: 2023-03-23

## 2023-03-23 ENCOUNTER — TELEPHONE (OUTPATIENT)
Dept: OBGYN CLINIC | Facility: OTHER | Age: 53
End: 2023-03-23

## 2023-03-23 ENCOUNTER — OFFICE VISIT (OUTPATIENT)
Dept: FAMILY MEDICINE CLINIC | Facility: CLINIC | Age: 53
End: 2023-03-23

## 2023-03-23 VITALS
SYSTOLIC BLOOD PRESSURE: 122 MMHG | WEIGHT: 197.6 LBS | OXYGEN SATURATION: 98 % | BODY MASS INDEX: 28.29 KG/M2 | TEMPERATURE: 97.6 F | DIASTOLIC BLOOD PRESSURE: 81 MMHG | HEART RATE: 67 BPM | HEIGHT: 70 IN

## 2023-03-23 DIAGNOSIS — Z95.1 S/P CABG (CORONARY ARTERY BYPASS GRAFT): Primary | ICD-10-CM

## 2023-03-23 DIAGNOSIS — I24.9 ACS (ACUTE CORONARY SYNDROME) (HCC): ICD-10-CM

## 2023-03-23 DIAGNOSIS — L98.9 FINGER LESION: ICD-10-CM

## 2023-03-23 DIAGNOSIS — D64.9 POSTOPERATIVE ANEMIA: ICD-10-CM

## 2023-03-23 DIAGNOSIS — I10 ESSENTIAL HYPERTENSION: ICD-10-CM

## 2023-03-23 DIAGNOSIS — E78.2 HYPERLIPIDEMIA, MIXED: ICD-10-CM

## 2023-03-23 DIAGNOSIS — Z95.1 S/P CABG (CORONARY ARTERY BYPASS GRAFT): ICD-10-CM

## 2023-03-23 LAB
ALBUMIN SERPL BCP-MCNC: 4 G/DL (ref 3.5–5)
ALP SERPL-CCNC: 130 U/L (ref 46–116)
ALT SERPL W P-5'-P-CCNC: 105 U/L (ref 12–78)
ANION GAP SERPL CALCULATED.3IONS-SCNC: 2 MMOL/L (ref 4–13)
AST SERPL W P-5'-P-CCNC: 54 U/L (ref 5–45)
BASOPHILS # BLD AUTO: 0.07 THOUSANDS/ÂΜL (ref 0–0.1)
BASOPHILS NFR BLD AUTO: 1 % (ref 0–1)
BILIRUB SERPL-MCNC: 0.39 MG/DL (ref 0.2–1)
BUN SERPL-MCNC: 9 MG/DL (ref 5–25)
CALCIUM SERPL-MCNC: 9.8 MG/DL (ref 8.3–10.1)
CHLORIDE SERPL-SCNC: 106 MMOL/L (ref 96–108)
CO2 SERPL-SCNC: 29 MMOL/L (ref 21–32)
CREAT SERPL-MCNC: 0.68 MG/DL (ref 0.6–1.3)
EOSINOPHIL # BLD AUTO: 0.19 THOUSAND/ÂΜL (ref 0–0.61)
EOSINOPHIL NFR BLD AUTO: 3 % (ref 0–6)
ERYTHROCYTE [DISTWIDTH] IN BLOOD BY AUTOMATED COUNT: 12.2 % (ref 11.6–15.1)
GFR SERPL CREATININE-BSD FRML MDRD: 109 ML/MIN/1.73SQ M
GLUCOSE SERPL-MCNC: 80 MG/DL (ref 65–140)
HCT VFR BLD AUTO: 35.7 % (ref 36.5–49.3)
HGB BLD-MCNC: 11.4 G/DL (ref 12–17)
IMM GRANULOCYTES # BLD AUTO: 0.02 THOUSAND/UL (ref 0–0.2)
IMM GRANULOCYTES NFR BLD AUTO: 0 % (ref 0–2)
LYMPHOCYTES # BLD AUTO: 1.59 THOUSANDS/ÂΜL (ref 0.6–4.47)
LYMPHOCYTES NFR BLD AUTO: 26 % (ref 14–44)
MCH RBC QN AUTO: 30.9 PG (ref 26.8–34.3)
MCHC RBC AUTO-ENTMCNC: 31.9 G/DL (ref 31.4–37.4)
MCV RBC AUTO: 97 FL (ref 82–98)
MONOCYTES # BLD AUTO: 0.41 THOUSAND/ÂΜL (ref 0.17–1.22)
MONOCYTES NFR BLD AUTO: 7 % (ref 4–12)
NEUTROPHILS # BLD AUTO: 3.78 THOUSANDS/ÂΜL (ref 1.85–7.62)
NEUTS SEG NFR BLD AUTO: 63 % (ref 43–75)
NRBC BLD AUTO-RTO: 0 /100 WBCS
PLATELET # BLD AUTO: 547 THOUSANDS/UL (ref 149–390)
PMV BLD AUTO: 9.3 FL (ref 8.9–12.7)
POTASSIUM SERPL-SCNC: 3.9 MMOL/L (ref 3.5–5.3)
PROT SERPL-MCNC: 8.3 G/DL (ref 6.4–8.4)
RBC # BLD AUTO: 3.69 MILLION/UL (ref 3.88–5.62)
SODIUM SERPL-SCNC: 137 MMOL/L (ref 135–147)
WBC # BLD AUTO: 6.06 THOUSAND/UL (ref 4.31–10.16)

## 2023-03-23 RX ORDER — OMEPRAZOLE 20 MG/1
CAPSULE, DELAYED RELEASE ORAL
COMMUNITY
Start: 2023-03-11

## 2023-03-23 NOTE — TELEPHONE ENCOUNTER
Patient is being referred to a orthopedics  Please schedule accordingly      [address]   [phone]

## 2023-03-23 NOTE — PROGRESS NOTES
Assessment & Plan     1  S/P CABG (coronary artery bypass graft)  -     Comprehensive metabolic panel; Future  -     CBC and differential; Future    2  ACS (acute coronary syndrome) ([facility] [address] )    3  Essential hypertension    4  Hyperlipidemia, mixed    5  Finger lesion  -     Ambulatory Referral to Hand Surgery; Future    6  Postoperative anemia  -     CBC and differential; Future  doing well since d/c  Continue ASA, statin, BB  No cardiac complaints  Weights stable, appears euvolemic  Incisions all c/d/i  Follow up with cardiology, CT surg, cardiac rehab as planned  Repeat labs as above today  3 month follow up, earlier prn     Subjective     Transitional Care Management Review:   Denis Flynn is a 46 y o  male here for TCM follow up  During the TCM phone call patient stated:  TCM Call     Date and time call was made  3/14/2023  2:25 PM    Hospital care reviewed  Records reviewed    Patient was hospitialized at  [facility]    Date of Admission  03/02/23    Date of discharge  03/11/23    Diagnosis  Abnormal Stress test - Cardiothoracic    Disposition  Home    Were the patients medications reviewed and updated  Yes    Current Symptoms  None      TCM Call     Post hospital issues  None    Scheduled for follow up? Yes    Did you obtain your prescribed medications  Yes    Do you need help managing your prescriptions or medications  No    Is transportation to your appointment needed  No    I have advised the patient to call PCP with any new or worsening symptoms  [id number] [place]    Living Arrangements  Spouse or Significiant other    Counseling  Patient        Hospital course from discharge summary written by Martha Ferrara on 3/11/2023  "3/3: 45 y/o male with no previous cardiac history presented for an elective outpatient exercise stress test ordered by his PCP for the evaluation of exertional chest pain and dyspnea on exertion   Pt developed ST depressions in the inferolateral leads, significant for ischemia  Pt sent immediately to [facility] ER  He underwent cardiac cath, which revealed MV CAD  He was transferred to [facility] for cardiac surgical evaluation  Seen in consultation  Preop orders placed  Lisinopril discontinued       3/4: No events overnight        3/5: complete on the left, limb alert placed       3/6: VSS, no events, consent signed      3/7: No events  Preop orders placed        3/8: CABG X 4 with ERAH  No significant postoperative bleeding  Transferred to ICU without inotropic or pressor support  Wean towards extubation        3/9: Required Jesus overnight, weaned off this morning  NSR, RA  Delined Given 1L LR and 250ml albumin overnight  Labs stable  +1 7L/24 hrs  Start Norvasc, Metoprolol 12 5mg q12, Lasix BID  Toradol x 3 doses  Transition to [facility]  D/C mart  Transfer to telemetry       3/10: VSS  D/c CTs/PWs  Cont Lopressor and Norvasc  Cotn lasix BID  SSI  Home tomorrow      3/11: VSS  Ambulating  Continue current meds  Making great urine  Decrease to torsemide daily  SSI  D/c home today "    TCM 3/23/2023  Doing well since d/c  Weights stable, finished torsemide  No CP, SOB  No LE edema  No fevers, chills  Using IS daily  Has follow up with CT surg, cardiology scheduled  Plans to start cardiac rehab  Complaint with ASA, statin, BB  /81  Tolerating PO  Pain controlled  Of note pt does note a finger lesion developed in the summer, seems to come and go and change color, no pain  Review of Systems   Constitutional: Negative for chills, fatigue and fever  HENT: Negative for congestion, ear pain, hearing loss, nosebleeds, postnasal drip, rhinorrhea, sinus pressure, sinus pain, sneezing and sore throat  Eyes: Negative for pain, discharge, itching and visual disturbance  Respiratory: Negative for cough, chest tightness, shortness of breath and wheezing  Cardiovascular: Negative for chest pain, palpitations and leg swelling     Gastrointestinal: Negative for abdominal pain, blood in stool, constipation, diarrhea, nausea and vomiting  Genitourinary: Negative for frequency and urgency  Neurological: Negative for dizziness, light-headedness and numbness  Objective     /81   Pulse 67   Temp 97 6 °F (36 4 °C)   Ht 5' 10" (1 778 m)   Wt 89 6 kg (197 lb 9 6 oz)   SpO2 98%   BMI 28 35 kg/m²      Physical Exam  Vitals and nursing note reviewed  Constitutional:       General: He is not in acute distress  Appearance: He is well-developed  HENT:      Head: Normocephalic and atraumatic  Eyes:      Conjunctiva/sclera: Conjunctivae normal    Cardiovascular:      Rate and Rhythm: Normal rate and regular rhythm  Heart sounds: No murmur heard  Pulmonary:      Effort: Pulmonary effort is normal  No respiratory distress  Breath sounds: Normal breath sounds  No wheezing, rhonchi or rales  Musculoskeletal:         General: No swelling  Cervical back: Neck supple  Right lower leg: Edema (trace) present  Left lower leg: Edema (trace) present  Skin:     General: Skin is warm and dry  Capillary Refill: Capillary refill takes less than 2 seconds  Comments: Sternal incision, CT incisions and groin/leg incisions all appear C/D/I with routine healing   Neurological:      Mental Status: He is alert     Psychiatric:         Mood and Affect: Mood normal        Medications have been reviewed by provider in current encounter    Olivier Alanis PA-C

## 2023-03-24 DIAGNOSIS — R79.89 ABNORMAL CBC: ICD-10-CM

## 2023-03-24 DIAGNOSIS — R79.89 ABNORMAL LFTS: Primary | ICD-10-CM

## 2023-03-24 NOTE — TELEPHONE ENCOUNTER
Caller: Patient    Doctor: hand doctor     Reason for call: Patient called to set up his appointment  He was trying to coordinate 4/10 or 4/19, but we unfortunately do not have that availability  Patient is going to call us back on Monday after speaking with his wife  She will have to bring him to the appointment       Call back#: n/a

## 2023-04-06 ENCOUNTER — OFFICE VISIT (OUTPATIENT)
Dept: CARDIOLOGY CLINIC | Facility: CLINIC | Age: 53
End: 2023-04-06

## 2023-04-06 VITALS
HEIGHT: 70 IN | SYSTOLIC BLOOD PRESSURE: 114 MMHG | BODY MASS INDEX: 28.35 KG/M2 | OXYGEN SATURATION: 99 % | RESPIRATION RATE: 16 BRPM | WEIGHT: 198 LBS | HEART RATE: 66 BPM | DIASTOLIC BLOOD PRESSURE: 70 MMHG

## 2023-04-06 DIAGNOSIS — E78.2 HYPERLIPEMIA, MIXED: ICD-10-CM

## 2023-04-06 DIAGNOSIS — I25.10 CORONARY ARTERY DISEASE INVOLVING NATIVE CORONARY ARTERY OF NATIVE HEART WITHOUT ANGINA PECTORIS: Primary | ICD-10-CM

## 2023-04-06 NOTE — PROGRESS NOTES
[facility] [place]  [phone] [facility]  [place] [address]  Cardiology Follow Up    Renato Dietrich  1970  [id number]      1  Coronary artery disease involving native coronary artery of native heart without angina pectoris        2  Hyperlipemia, mixed            Chief Complaint   Patient presents with   • Follow-up       Interval History: This patient presents for follow-up visit  Patient had diagnostic stress test at [facility] which was grossly abnormal   Patient subsequently underwent cardiac catheterization which showed significant multivessel coronary artery disease  Patient subsequently underwent  Coronary artery bypass grafting x 4 with left internal mammary artery to left anterior descending, saphenous vein graft to right posterior descending artery, saphenous vein graft to obtuse marginal 2, radial artery to obtuse marginal 1  By Dr Elvis Cardenas  Patient presents for follow-up visit  Patient has been doing very well  Patient has some soreness related surgery  No history of shortness of breath  No history of leg edema orthopnea PND  He states that he has been compliant with his diet as well as medications  Patient plans to start cardiac rehabilitation after he sees cardiac surgery in follow-up        Patient Active Problem List   Diagnosis   • Essential hypertension   • Hyperlipidemia, mixed   • Vitamin D deficiency   • Low libido   • Achilles tendinosis of right lower extremity   • Cutaneous skin tags   • Overweight (BMI 25 0-29  9)   • Claudication of both lower extremities (HCC)   • Varicose veins of both lower extremities with pain   • Bilateral leg pain   • Bilateral hand numbness   • Excessive daytime sleepiness   • COVID-19   • Arthritis of both hips   • Femoroacetabular impingement of left hip   • Hamstring tightness of both lower extremities   • Weakness of both hips   • ACS (acute coronary syndrome) (HCC)   • Chest pain   • S/P CABG (coronary artery bypass graft)   • Acute blood loss as cause of postoperative anemia     Past Medical History:   Diagnosis Date   • Disseminated herpes zoster 10/16/2019   • HLD (hyperlipidemia)    • Hypertension    • Known health problems: none    • Vitamin D deficiency      Social History     Socioeconomic History   • Marital status: /Civil Union     Spouse name: Not on file   • Number of children: Not on file   • Years of education: Not on file   • Highest education level: Not on file   Occupational History   • Not on file   Tobacco Use   • Smoking status: Former     Packs/day: 0 25     Years: 6 00     Pack years: 1 50     Types: Cigarettes     Quit date:      Years since quittin 2   • Smokeless tobacco: Never   Vaping Use   • Vaping Use: Never used   Substance and Sexual Activity   • Alcohol use: Yes     Comment: Social   • Drug use: Not Currently     Types: Marijuana     Comment: rare marijuana use   • Sexual activity: Not on file   Other Topics Concern   • Not on file   Social History Narrative   • Not on file     Social Determinants of Health     Financial Resource Strain: Not on file   Food Insecurity: No Food Insecurity   • Worried About Running Out of Food in the Last Year: Never true   • Ran Out of Food in the Last Year: Never true   Transportation Needs: No Transportation Needs   • Lack of Transportation (Medical): No   • Lack of Transportation (Non-Medical):  No   Physical Activity: Not on file   Stress: Not on file   Social Connections: Not on file   Intimate Partner Violence: Not on file   Housing Stability: Low Risk    • Unable to Pay for Housing in the Last Year: No   • Number of Places Lived in the Last Year: 1   • Unstable Housing in the Last Year: No      Family History   Problem Relation Age of Onset   • Hypertension Father    • Breast cancer Sister    • Heart attack Maternal Grandfather    • Heart attack Paternal Grandfather         s/p CABG   • Coronary artery disease Paternal Uncle         s/p cardiac stent   • Coronary artery disease Paternal Uncle      Past Surgical History:   Procedure Laterality Date   • CARDIAC CATHETERIZATION N/A 3/2/2023    Procedure: Cardiac catheterization;  Surgeon: Daria Butler MD;  Location: [address] [place] [address] CATH LAB; Service: Cardiology   • CARDIAC CATHETERIZATION N/A 3/2/2023    Procedure: Cardiac Coronary Angiogram;  Surgeon: Daria Butler MD;  Location: [address] [place] [address] CATH LAB;   Service: Cardiology   • [place] CORONARY ARTERY BYP W/VEIN & ARTERY GRAFT 4 VEIN N/A 3/8/2023    Procedure: CORONARY ARTERY BYPASS GRAFT (CABG) 4 VESSELS,  SVG to PDA  and OM2                             r      LRA-->OM1 , LIMA to LAD, evh, hector;  Surgeon: Jessie Chaney DO;  Location: [facility];  Service: Cardiac Surgery   • WISDOM TOOTH EXTRACTION         Current Outpatient Medications:   •  acetaminophen (TYLENOL) 325 mg tablet, Take 2 tablets (650 mg total) by mouth every 6 (six) hours as needed for mild pain, Disp: , Rfl: 0  •  amLODIPine (NORVASC) 2 5 mg tablet, Take 1 tablet (2 5 mg total) by mouth daily, Disp: 30 tablet, Rfl: 2  •  ascorbic acid (VITAMIN C) 500 mg tablet, Take 500 mg by mouth daily, Disp: , Rfl:   •  aspirin 325 mg tablet, Take 1 tablet (325 mg total) by mouth daily, Disp: 30 tablet, Rfl: 3  •  atorvastatin (LIPITOR) 80 mg tablet, Take 1 tablet (80 mg total) by mouth daily with dinner, Disp: 30 tablet, Rfl: 3  •  Ergocalciferol (VITAMIN D2 PO), Take by mouth, Disp: , Rfl:   •  Lysine 1000 MG TABS, Take by mouth, Disp: , Rfl:   •  metoprolol tartrate (LOPRESSOR) 25 mg tablet, Take 0 5 tablets (12 5 mg total) by mouth every 12 (twelve) hours, Disp: 30 tablet, Rfl: 3  •  Misc Natural Products (OSTEO BI-FLEX ADV DOUBLE ST PO), Take by mouth, Disp: , Rfl:   •  Multiple Vitamin (MULTIVITAMIN) capsule, Take 1 capsule by mouth daily, Disp: , Rfl:   •  omeprazole (PriLOSEC OTC) 20 MG tablet, Take 1 tablet (20 mg total) by mouth daily, Disp: 30 tablet, Rfl: 0  •  polyethylene glycol (GLYCOLAX) 17 GM/SCOOP powder, Take 17 g by mouth daily as needed (consitpation), Disp: 500 g, Rfl: 0  Allergies   Allergen Reactions   • Codeine Drowsiness     incapacitates       Labs:  Appointment on 03/23/2023   Component Date Value   • Sodium 03/23/2023 137    • Potassium 03/23/2023 3 9    • Chloride 03/23/2023 106    • CO2 03/23/2023 29    • ANION GAP 03/23/2023 2 (L)    • BUN 03/23/2023 9    • Creatinine 03/23/2023 0 68    • Glucose 03/23/2023 80    • Calcium 03/23/2023 9 8    • AST 03/23/2023 54 (H)    • ALT 03/23/2023 105 (H)    • Alkaline Phosphatase 03/23/2023 130 (H)    • Total Protein 03/23/2023 8 3    • Albumin 03/23/2023 4 0    • Total Bilirubin 03/23/2023 0 39    • eGFR 03/23/2023 109    • WBC 03/23/2023 6 06    • RBC 03/23/2023 3 69 (L)    • Hemoglobin 03/23/2023 11 4 (L)    • Hematocrit 03/23/2023 35 7 (L)    • MCV 03/23/2023 97    • MCH 03/23/2023 30 9    • MCHC 03/23/2023 31 9    • RDW 03/23/2023 12 2    • MPV 03/23/2023 9 3    • Platelets 18/70/9996 547 (H)    • nRBC 03/23/2023 0    • Neutrophils Relative 03/23/2023 63    • Immat GRANS % 03/23/2023 0    • Lymphocytes Relative 03/23/2023 26    • Monocytes Relative 03/23/2023 7    • Eosinophils Relative 03/23/2023 3    • Basophils Relative 03/23/2023 1    • Neutrophils Absolute 03/23/2023 3 78    • Immature Grans Absolute 03/23/2023 0 02    • Lymphocytes Absolute 03/23/2023 1 59    • Monocytes Absolute 03/23/2023 0 41    • Eosinophils Absolute 03/23/2023 0 19    • Basophils Absolute 03/23/2023 0 07    No results displayed because visit has over 200 results        Admission on 03/01/2023, Discharged on 03/02/2023   Component Date Value   • WBC 03/01/2023 5 85    • RBC 03/01/2023 4 96    • Hemoglobin 03/01/2023 16 0    • Hematocrit 03/01/2023 45 8    • MCV 03/01/2023 92    • MCH 03/01/2023 32 3    • MCHC 03/01/2023 34 9    • RDW 03/01/2023 11 8    • MPV 03/01/2023 9 5    • Platelets 27/02/4222 303    • nRBC 03/01/2023 0    • Neutrophils Relative 03/01/2023 47    • Immat GRANS % 03/01/2023 0    • Lymphocytes Relative 03/01/2023 37    • Monocytes Relative 03/01/2023 11    • Eosinophils Relative 03/01/2023 4    • Basophils Relative 03/01/2023 1    • Neutrophils Absolute 03/01/2023 2 75    • Immature Grans Absolute 03/01/2023 0 02    • Lymphocytes Absolute 03/01/2023 2 17    • Monocytes Absolute 03/01/2023 0 64    • Eosinophils Absolute 03/01/2023 0 21    • Basophils Absolute 03/01/2023 0 06    • Sodium 03/01/2023 137    • Potassium 03/01/2023 4 0    • Chloride 03/01/2023 101    • CO2 03/01/2023 27    • ANION GAP 03/01/2023 9    • BUN 03/01/2023 13    • Creatinine 03/01/2023 0 74    • Glucose 03/01/2023 88    • Calcium 03/01/2023 10 3 (H)    • eGFR 03/01/2023 106    • hs TnI 0hr 03/01/2023 3    • A4C EF 03/01/2023 57    • LVIDd 03/01/2023 4 60    • LVIDS 03/01/2023 2 90    • IVSd 03/01/2023 1 00    • LVPWd 03/01/2023 1 00    • FS 03/01/2023 37    • MV E' Tissue Velocity Se* 03/01/2023 7    • E wave deceleration time 03/01/2023 313    • MV Peak E Joey 03/01/2023 58    • MV Peak A Joey 03/01/2023 0 67    • Tricuspid annular plane * 03/01/2023 2 40    • LA size 03/01/2023 3 3    • LA/Ao Ratio 2D 03/01/2023 0 87    • MV stenosis pressure 1/2* 03/01/2023 92    • MV valve area p 1/2 meth* 03/01/2023 2 39    • Ao root 03/01/2023 3 80    • Asc Ao 03/01/2023 3 4    • Left ventricular stroke * 03/01/2023 65 00    • IVS 03/01/2023 1    • LEFT VENTRICLE SYSTOLIC * 89/58/3402 32    • LV DIASTOLIC VOLUME (MOD* 18/28/1818 97    • Left Atrium Area-systoli* 03/01/2023 13 2    • Left Atrium Area-systoli* 03/01/2023 13 6    • LVSV, 2D 03/01/2023 65    • LV EF 03/01/2023 55    • hs TnI 2hr 03/01/2023 26    • Delta 2hr hsTnI 03/01/2023 23 (H)    • hs TnI 4hr 03/01/2023 19    • Delta 4hr hsTnI 03/01/2023 16    • PTT 03/01/2023 38 (H)    • Protime 03/01/2023 12 3    • INR 03/01/2023 0 93    • Ventricular Rate 03/01/2023 66    • Atrial Rate 03/01/2023 66    • AK Interval 03/01/2023 148    • QRSD Interval 03/01/2023 98    • QT Interval 03/01/2023 388    • QTC Interval 03/01/2023 406    • P Axis 03/01/2023 58    • QRS Axis 03/01/2023 61    • T Wave Axis 03/01/2023 71    • Sodium 03/02/2023 137    • Potassium 03/02/2023 4 0    • Chloride 03/02/2023 104    • CO2 03/02/2023 25    • ANION GAP 03/02/2023 8    • BUN 03/02/2023 14    • Creatinine 03/02/2023 0 66    • Glucose 03/02/2023 110    • Glucose, Fasting 03/02/2023 110 (H)    • Calcium 03/02/2023 9 7    • eGFR 03/02/2023 111    • WBC 03/02/2023 6 04    • RBC 03/02/2023 4 82    • Hemoglobin 03/02/2023 15 8    • Hematocrit 03/02/2023 44 2    • MCV 03/02/2023 92    • MCH 03/02/2023 32 8    • MCHC 03/02/2023 35 7    • RDW 03/02/2023 11 9    • Platelets 41/72/1482 270    • MPV 03/02/2023 9 3    • Magnesium 03/02/2023 2 1    • PTT 03/02/2023 63 (H)    • Cholesterol 03/02/2023 254 (H)    • Triglycerides 03/02/2023 180 (H)    • HDL, Direct 03/02/2023 42    • LDL Calculated 03/02/2023 176 (H)    • Sodium 03/02/2023 137    • Potassium 03/02/2023 4 0    • Chloride 03/02/2023 103    • CO2 03/02/2023 27    • ANION GAP 03/02/2023 7    • BUN 03/02/2023 13    • Creatinine 03/02/2023 0 66    • Glucose 03/02/2023 137    • Glucose, Fasting 03/02/2023 137 (H)    • Calcium 03/02/2023 9 8    • eGFR 03/02/2023 111    Hospital Outpatient Visit on 03/01/2023   Component Date Value   • Baseline HR 03/01/2023 60    • Baseline BP 03/01/2023 138/80    • O2 sat rest 03/01/2023 97    • Stress peak HR 03/01/2023 133    • Post peak BP 03/01/2023 180    • Rate Pressure Product 03/01/2023 23,940 0    • O2 sat peak 03/01/2023 98    • Recovery HR 03/01/2023 95    • Recovery BP 03/01/2023 128/90    • Max HR 03/01/2023 133    • Max HR Percent 03/01/2023 79    • Exercise duration (min) 03/01/2023 5    • Exercise duration (sec) 03/01/2023 30    • Estimated workload 03/01/2023 7 0    • Stress Stage Reached 03/01/2023 2 0    • Protocol Name 03/01/2023 JOSE    • Time In Exercise Phase 03/01/2023 00:05:30    • MAX  SYSTOLIC BP 92/02/0209 671    • Max Diastolic Bp 96/88/7053 030    • Max Heart Rate 03/01/2023 133    • Max Predicted Heart Rate 03/01/2023 168    • Reason for Termination 03/01/2023 Chest discomfort, EKG CHANGES    • Test Indication 03/01/2023 CHEST PAIN    • Target Hr Formular 03/01/2023 (220 - Age)*85%    • Arrhy During Ex 03/01/2023 ventricular premature beats-isolated    • Chest Pain Statement 03/01/2023 non-limiting      Imaging: XR chest portable    Result Date: 3/10/2023  Narrative: CHEST INDICATION:   Post Open Heart Surgey  COMPARISON:  Chest radiograph 3/20/2023  EXAM PERFORMED/VIEWS:  XR CHEST PORTABLE FINDINGS:  Interval extubation, removal of NG tube and removal of right IJ [place]-Jeremie catheter  Lines and tubes otherwise stable  Cardiomediastinal silhouette appears unremarkable  Low lung volumes and bibasilar atelectasis with possible small left pleural effusion  No pneumothorax  Osseous structures appear within normal limits for patient age  Mildly distended air-filled stomach  Impression: Low lung volumes and bibasilar atelectasis with possible small left pleural effusion  Mildly distended air-filled stomach  Workstation performed: [id number]     MARLYS Anesthesia    Result Date: 3/8/2023  Narrative: Loi Luna MD     3/8/2023 12:48 PM Procedure Performed: MARLYS Anesthesia Start Time:  3/8/2023 7:53 AM Preanesthesia Checklist Patient identified, IV assessed, risks and benefits discussed, monitors and equipment assessed, procedure being performed at surgeon's request and anesthesia consent obtained  Procedure Diagnostic Indications for MARLYS:  assessment of surgical repair and hemodynamic monitoring  Type of MARLYS: complete MARLYS with interpretation  Images Saved: ultrasound permanent image saved  Physician Requesting Echo: Lora Newton DO  Location performed: OR  Intubated  Bite block not placed  Heart visualized  Insertion of MARLYS Probe:  Atraumatic  Probe Type:  Multiplane  Modalities:  2D only, color flow mapping, continuous wave Doppler and pulse wave Doppler  Echocardiographic and Doppler Measurements PREPROCEDURE LEFT VENTRICLE: Systolic Function: normal  Ejection Fraction: 65%  Cavity size: normal    RIGHT VENTRICLE: Systolic Function: normal   Cavity size severely dilated  No hypertrophy  AORTIC VALVE: Leaflets: normal and trileaflet  Leaflet motions normal and normal  Stenosis: none  Regurgitation: trace  MITRAL VALVE: Leaflets: normal  Leaflet Motions: normal  Regurgitation: mild  Stenosis: none  TRICUSPID VALVE: Leaflets: normal  Leaflet Motions: normal  Stenosis: none  Regurgitation: mild  PULMONIC VALVE: Leaflets: normal  Regurgitation: mild  Stenosis: none  ASCENDING AORTA: Size:  normal   Dissection not present  AORTIC ARCH: Size:  normal   dissection not present  Grade 2: severe intimal thickening without protruding atheroma  DESCENDING AORTA: Size: normal   Dissection not present  Grade 2: severe intimal thickening without protruding atheroma  RIGHT ATRIUM: Size:  normal  LEFT ATRIUM: Size: normal  LEFT ATRIAL APPENDAGE: Size: normal   ATRIAL SEPTUM: Intra-atrial septal morphology: normal   VENTRICULAR SEPTUM: Intra-ventricular septum morphology: normal  OTHER FINDINGS: Pericardium:  normal  Pleural Effusion:  none  POSTPROCEDURE LEFT VENTRICLE: Unchanged   RIGHT VENTRICLE: Unchanged   AORTIC VALVE: Unchanged   MITRAL VALVE: Unchanged   TRICUSPID VALVE: Unchanged   PULMONIC VALVE: Unchanged   ATRIA: Unchanged   AORTA: Unchanged   REMOVAL: Probe Removal: atraumatic  XR chest portable ICU    Result Date: 3/9/2023  Narrative: CHEST INDICATION:   S/P open heart  COMPARISON:  Chest CT March 3, 2023 EXAM PERFORMED/VIEWS:  XR CHEST PORTABLE ICU FINDINGS:  Endotracheal tube is present with its tip in satisfactory position above the level of the pebbles  An enteric tube is present with its tip in satisfactory position below the left hemidiaphragm  "Right internal jugular central venous catheter is noted  Also noted is a [place]-Jeremie catheter from a right internal jugular approach with its tip overlying the right main pulmonary artery  Mediastinal and pleural drains are present  Sternotomy wires  No focal consolidation, pleural effusion or pneumothorax  Osseous structures appear within normal limits for patient age  Impression: No focal consolidation, pleural effusion, or pneumothorax  Workstation performed: [id number]     MARLYS Intraop Interventional w/ realtime guidance of cardiac procedures    Result Date: 3/9/2023  Narrative: This order contains the linked images for the MARLYS that was performed by the Anesthesiologist   Please see the  CARDIAC MARLYS ANESTHESIA procedure for results  Review of Systems:  Review of Systems   REVIEW OF SYSTEMS:  Constitutional:  Denies fever or chills   Eyes:  Denies change in visual acuity   HENT:  Denies nasal congestion or sore throat   Respiratory:  Denies cough or shortness of breath   Cardiovascular:  Denies chest pain or edema   GI:  Denies abdominal pain, nausea, vomiting, bloody stools or diarrhea   :  Denies dysuria, frequency, difficulty in micturition and nocturia  Musculoskeletal:  Denies back pain or joint pain   Neurologic:  Denies headache, focal weakness or sensory changes   Endocrine:  Denies polyuria or polydipsia   Lymphatic:  Denies swollen glands   Psychiatric:  Denies depression or anxiety    Physical Exam:    /70 (BP Location: Right arm, Patient Position: Sitting, Cuff Size: Standard) Comment (BP Location): L arm artery removed  Pulse 66   Resp 16   Ht 5' 10\" (1 778 m)   Wt 89 8 kg (198 lb)   SpO2 99%   BMI 28 41 kg/m²     Physical Exam   PHYSICAL EXAM:  General:  Patient is not in acute distress   Head: Normocephalic, Atraumatic  HEENT:  Both pupils normal-size atraumatic, normocephalic, nonicteric  Neck:  JVP not raised   Trachea central  No carotid bruit  Respiratory:  normal breath " sounds no crackles  no rhonchi  Cardiovascular:  Regular rate and rhythm no S3 no murmurs  GI:  Abdomen soft nontender  No organomegaly  Lymphatic:  No cervical or inguinal lymphadenopathy  Neurologic:  Patient is awake alert, oriented   Grossly nonfocal  Extremities no edema    Discussion/Summary:  Patient overall doing well from a cardiovascular standpoint  Events which led to abnormal stress test followed by cardiac catheterization which showed multivessel CAD and subsequent CABG discussed with patient and family at length  Etiology and pathogenesis of coronary atherosclerosis in general discussed  Medications reviewed  Patient is on amlodipine low-dose because of radial graft  Patient will check with surgeon how long he needs to be on amlodipine  At some point in the future the dosage of aspirin can be reduced to 81 mg or 2 baby aspirin's  Cardiac rehabilitation after patient is seen by cardiac surgery  Follow-up in 6 months or earlier as needed  Patient and family are all her questions which were answered  Follow-up with primary care physician

## 2023-04-25 ENCOUNTER — CLINICAL SUPPORT (OUTPATIENT)
Dept: CARDIAC REHAB | Facility: CLINIC | Age: 53
End: 2023-04-25

## 2023-04-25 DIAGNOSIS — Z95.1 S/P CABG (CORONARY ARTERY BYPASS GRAFT): Primary | ICD-10-CM

## 2023-04-27 ENCOUNTER — CLINICAL SUPPORT (OUTPATIENT)
Dept: CARDIAC REHAB | Facility: CLINIC | Age: 53
End: 2023-04-27

## 2023-04-27 DIAGNOSIS — Z95.1 S/P CABG (CORONARY ARTERY BYPASS GRAFT): Primary | ICD-10-CM

## 2023-04-28 ENCOUNTER — CLINICAL SUPPORT (OUTPATIENT)
Dept: CARDIAC REHAB | Facility: CLINIC | Age: 53
End: 2023-04-28

## 2023-04-28 DIAGNOSIS — Z95.1 S/P CABG (CORONARY ARTERY BYPASS GRAFT): Primary | ICD-10-CM

## 2023-05-02 ENCOUNTER — CLINICAL SUPPORT (OUTPATIENT)
Dept: CARDIAC REHAB | Facility: CLINIC | Age: 53
End: 2023-05-02

## 2023-05-02 DIAGNOSIS — Z95.1 S/P CABG (CORONARY ARTERY BYPASS GRAFT): Primary | ICD-10-CM

## 2023-05-04 ENCOUNTER — OFFICE VISIT (OUTPATIENT)
Dept: OBGYN CLINIC | Facility: CLINIC | Age: 53
End: 2023-05-04

## 2023-05-04 ENCOUNTER — CLINICAL SUPPORT (OUTPATIENT)
Dept: CARDIAC REHAB | Facility: CLINIC | Age: 53
End: 2023-05-04

## 2023-05-04 VITALS — DIASTOLIC BLOOD PRESSURE: 84 MMHG | SYSTOLIC BLOOD PRESSURE: 130 MMHG | HEART RATE: 72 BPM

## 2023-05-04 DIAGNOSIS — R22.31 MASS OF SKIN OF FINGER OF RIGHT HAND: Primary | ICD-10-CM

## 2023-05-04 DIAGNOSIS — L98.9 FINGER LESION: ICD-10-CM

## 2023-05-04 DIAGNOSIS — Z95.1 S/P CABG (CORONARY ARTERY BYPASS GRAFT): Primary | ICD-10-CM

## 2023-05-04 RX ORDER — CHLORHEXIDINE GLUCONATE 0.12 MG/ML
15 RINSE ORAL ONCE
OUTPATIENT
Start: 2023-05-04 | End: 2023-05-04

## 2023-05-04 NOTE — H&P
Mireya BEAN  Attending, Orthopaedic Surgery  Hand, Wrist, and Elbow Surgery  [facility]      ORTHOPAEDIC HAND, WRIST, AND ELBOW OFFICE  VISIT       ASSESSMENT/PLAN:      46year old male here for a cyst over the middle phalanx of the right index finger  One of 2 things: retinacular cyst the discoloration could be due to it being close to a vein or it could be a  vein that has thrombosed  Right index finger dorsal mass excision with the recurrence rate of 10-12%  Surgical options: Finger is wrapped 5 days after surgery  After the wrap can come off and can wash with warm water, soap and pat dry  Sutures will come out between 10-14 days post op  Patient will meet with surgery schedulers but wishes to wait to schedule until discussed with his wife  We will follow up post op  The patient verbalized understanding of exam findings and treatment plan  We engaged in the shared decision-making process and treatment options were discussed at length with the patient  Surgical and conservative management discussed today along with risks and benefits  Diagnoses and all orders for this visit:    Mass of skin of finger of right hand    Finger lesion  -     Ambulatory Referral to Hand Surgery        Follow Up:  Return for Follow up post- op  To Do Next Visit:  Re-evaluation of current issue      Operative Discussions:  Standard Consent: The risks and benefits of the procedure were explained to the patient, which include, but are not limited to: Bleeding, infection, recurrence, pain, scar, damage to tendons, damage to nerves, and damage to blood vessels, failure to give desired results and complications related to anesthesia  These risks, along with alternative conservative treatment options, and postoperative protocols were voiced back and understood by the patient  All questions were answered to the patient's satisfaction    The patient agrees to comply with a standard postoperative protocol, and is willing to proceed  Education was provided via written and auditory forms  There were no barriers to learning  Written handouts regarding wound care, incision and scar care, and general preoperative information was provided to the patient  Prior to surgery, the patient may be requested to stop all anti-inflammatory medications  Prophylactic aspirin, Plavix, and Coumadin may be allowed to be continued  Medications including vitamin E , ginkgo, and fish oil are requested to be stopped approximately one week prior to surgery  Hypertensive medications and beta blockers, if taken, should be continued  ____________________________________________________________________________________________________________________________________________      CHIEF COMPLAINT:  Chief Complaint   Patient presents with    Right Index Finger - Pain       SUBJECTIVE:  Candance Canning is a 46y o  year old RHD male who presents       Pain/symptom timing:  Worse during the day when active  Pain/symptom context:  Worse with activites and work  Pain/symptom modifying factors:  Rest makes better, activities make worse  Pain/symptom associated signs/symptoms: none    Prior treatment   · NSAIDsNo   · Injections No   · Bracing/Orthotics No    Physical Therapy No     I have personally reviewed all the relevant PMH, PSH, SH, FH, Medications and allergies      PAST MEDICAL HISTORY:  Past Medical History:   Diagnosis Date    Disseminated herpes zoster 10/16/2019    HLD (hyperlipidemia)     Hypertension     Known health problems: none     Vitamin D deficiency        PAST SURGICAL HISTORY:  Past Surgical History:   Procedure Laterality Date    CARDIAC CATHETERIZATION N/A 3/2/2023    Procedure: Cardiac catheterization;  Surgeon: Anna Pino MD;  Location: [address] [place] [address] CATH LAB;   Service: Cardiology    CARDIAC CATHETERIZATION N/A 3/2/2023    Procedure: Cardiac Coronary Angiogram;  Surgeon: Anna Pino MD; [facility]: [facility];   Service: Cardiology    [place] CORONARY ARTERY BYP W/VEIN & ARTERY GRAFT 4 VEIN N/A 3/8/2023    Procedure: CORONARY ARTERY BYPASS GRAFT (CABG) 4 VESSELS,  SVG to PDA  and OM2                             r      LRA-->OM1 , LIMA to LAD, evh, hector;  Surgeon: Roberth Chang DO;  Location: [facility];  Service: Cardiac Surgery    WISDOM TOOTH EXTRACTION         FAMILY HISTORY:  Family History   Problem Relation Age of Onset    Hypertension Father     Breast cancer Sister     Heart attack Maternal Grandfather     Heart attack Paternal Grandfather         s/p CABG    Coronary artery disease Paternal Uncle         s/p cardiac stent    Coronary artery disease Paternal Uncle        SOCIAL HISTORY:  Social History     Tobacco Use    Smoking status: Former     Packs/day: 0 25     Years: 6 00     Pack years: 1 50     Types: Cigarettes     Quit date:      Years since quittin 3    Smokeless tobacco: Never   Vaping Use    Vaping Use: Never used   Substance Use Topics    Alcohol use: Yes     Comment: Social    Drug use: Not Currently     Types: Marijuana     Comment: rare marijuana use       MEDICATIONS:    Current Outpatient Medications:     acetaminophen (TYLENOL) 325 mg tablet, Take 2 tablets (650 mg total) by mouth every 6 (six) hours as needed for mild pain, Disp: , Rfl: 0    amLODIPine (NORVASC) 2 5 mg tablet, Take 1 tablet (2 5 mg total) by mouth daily, Disp: 30 tablet, Rfl: 2    aspirin 325 mg tablet, Take 1 tablet (325 mg total) by mouth daily, Disp: 30 tablet, Rfl: 3    atorvastatin (LIPITOR) 80 mg tablet, Take 1 tablet (80 mg total) by mouth daily with dinner, Disp: 30 tablet, Rfl: 3    metoprolol tartrate (LOPRESSOR) 25 mg tablet, Take 0 5 tablets (12 5 mg total) by mouth every 12 (twelve) hours, Disp: 30 tablet, Rfl: 3    ascorbic acid (VITAMIN C) 500 mg tablet, Take 500 mg by mouth daily (Patient not taking: Reported on 4/10/2023), Disp: , Rfl:     Ergocalciferol (VITAMIN D2 PO), Take by mouth (Patient not taking: Reported on 4/10/2023), Disp: , Rfl:     Lysine 1000 MG TABS, Take by mouth (Patient not taking: Reported on 4/10/2023), Disp: , Rfl:     Misc Natural Products (OSTEO BI-FLEX ADV DOUBLE ST PO), Take by mouth (Patient not taking: Reported on 4/10/2023), Disp: , Rfl:     Multiple Vitamin (MULTIVITAMIN) capsule, Take 1 capsule by mouth daily (Patient not taking: Reported on 4/10/2023), Disp: , Rfl:     omeprazole (PriLOSEC OTC) 20 MG tablet, Take 1 tablet (20 mg total) by mouth daily (Patient not taking: Reported on 4/10/2023), Disp: 30 tablet, Rfl: 0    polyethylene glycol (GLYCOLAX) 17 GM/SCOOP powder, Take 17 g by mouth daily as needed (consitpation) (Patient not taking: Reported on 4/10/2023), Disp: 500 g, Rfl: 0    ALLERGIES:  Allergies   Allergen Reactions    Codeine Drowsiness     incapacitates           REVIEW OF SYSTEMS:  Review of Systems   Constitutional: Negative for chills, fever and unexpected weight change  HENT: Negative for hearing loss, nosebleeds and sore throat  Eyes: Negative for pain, redness and visual disturbance  Respiratory: Negative for cough, shortness of breath and wheezing  Cardiovascular: Negative for chest pain, palpitations and leg swelling  Gastrointestinal: Negative for abdominal pain, nausea and vomiting  Endocrine: Negative for polydipsia and polyuria  Genitourinary: Negative for dysuria and hematuria  Skin: Negative for rash and wound  Neurological: Negative for dizziness, light-headedness and headaches  Psychiatric/Behavioral: Negative for decreased concentration, dysphoric mood and suicidal ideas  The patient is not nervous/anxious          VITALS:  Vitals:    05/04/23 0832   BP: 130/84   Pulse: 72       LABS:  HgA1c:   Lab Results   Component Value Date    HGBA1C 5 6 03/03/2023     BMP:   Lab Results   Component Value Date    GLUCOSE 89 03/08/2023    CALCIUM 9 8 03/23/2023    K 3 9 03/23/2023    CO2 29 03/23/2023     03/23/2023    BUN 9 03/23/2023    CREATININE 0 68 03/23/2023       _____________________________________________________  PHYSICAL EXAMINATION:  General: well developed and well nourished, alert, oriented times 3 and appears comfortable  Psychiatric: Normal  HEENT: Normocephalic, Atraumatic Trachea Midline, No torticollis  Pulmonary: No audible wheezing or respiratory distress   Abdomen/GI: Non tender, non distended   Cardiovascular: No pitting edema, 2+ radial pulse   Skin: Mass dorsal right index finger, No Erythema, No Lacerations, No Fluctuation, No Ulcerations  Neurovascular: Sensation Intact to the Median, Ulnar, Radial Nerve, Motor Intact to the Median, Ulnar, Radial Nerve and Pulses Intact  Musculoskeletal: Normal, except as noted in detailed exam and in HPI        MUSCULOSKELETAL EXAMINATION:  Right hand:    Dorsal finger mass/cyst over the middle phalanx of the index finger  Blanching noted  Mobile firm mass  Darkened in color   Full composite fist  Opposition intact  Sensation intact to light touch  Brisk capillary refill     ___________________________________________________  STUDIES REVIEWED:  No images reviewed at today's visit           PROCEDURES PERFORMED:  Procedures  No Procedures performed today    _____________________________________________________      Alea Gerber    I,:  Christine Hays am acting as a scribe while in the presence of the attending physician :       I,:  Lucila Delvalle MD personally performed the services described in this documentation    as scribed in my presence :

## 2023-05-04 NOTE — PROGRESS NOTES
Sandra BEAN  Attending, Orthopaedic Surgery  Hand, Wrist, and Elbow Surgery  [facility]      ORTHOPAEDIC HAND, WRIST, AND ELBOW OFFICE  VISIT       ASSESSMENT/PLAN:      46year old male here for a cyst over the middle phalanx of the right index finger  One of 2 things: retinacular cyst the discoloration could be due to it being close to a vein or it could be a  vein that has thrombosed  Right index finger dorsal mass excision with the recurrence rate of 10-12%  Surgical options: Finger is wrapped 5 days after surgery  After the wrap can come off and can wash with warm water, soap and pat dry  Sutures will come out between 10-14 days post op  Patient will meet with surgery schedulers but wishes to wait to schedule until discussed with his wife  We will follow up post op  The patient verbalized understanding of exam findings and treatment plan  We engaged in the shared decision-making process and treatment options were discussed at length with the patient  Surgical and conservative management discussed today along with risks and benefits  Diagnoses and all orders for this visit:    Mass of skin of finger of right hand    Finger lesion  -     Ambulatory Referral to Hand Surgery        Follow Up:  Return for Follow up post- op  To Do Next Visit:  Re-evaluation of current issue      Operative Discussions:  Standard Consent: The risks and benefits of the procedure were explained to the patient, which include, but are not limited to: Bleeding, infection, recurrence, pain, scar, damage to tendons, damage to nerves, and damage to blood vessels, failure to give desired results and complications related to anesthesia  These risks, along with alternative conservative treatment options, and postoperative protocols were voiced back and understood by the patient  All questions were answered to the patient's satisfaction    The patient agrees to comply with a standard postoperative protocol, and is willing to proceed  Education was provided via written and auditory forms  There were no barriers to learning  Written handouts regarding wound care, incision and scar care, and general preoperative information was provided to the patient  Prior to surgery, the patient may be requested to stop all anti-inflammatory medications  Prophylactic aspirin, Plavix, and Coumadin may be allowed to be continued  Medications including vitamin E , ginkgo, and fish oil are requested to be stopped approximately one week prior to surgery  Hypertensive medications and beta blockers, if taken, should be continued  ____________________________________________________________________________________________________________________________________________      CHIEF COMPLAINT:  Chief Complaint   Patient presents with    Right Index Finger - Pain       SUBJECTIVE:  Tashi Huitron is a 46y o  year old RHD male who presents       Pain/symptom timing:  Worse during the day when active  Pain/symptom context:  Worse with activites and work  Pain/symptom modifying factors:  Rest makes better, activities make worse  Pain/symptom associated signs/symptoms: none    Prior treatment   · NSAIDsNo   · Injections No   · Bracing/Orthotics No    Physical Therapy No     I have personally reviewed all the relevant PMH, PSH, SH, FH, Medications and allergies      PAST MEDICAL HISTORY:  Past Medical History:   Diagnosis Date    Disseminated herpes zoster 10/16/2019    HLD (hyperlipidemia)     Hypertension     Known health problems: none     Vitamin D deficiency        PAST SURGICAL HISTORY:  Past Surgical History:   Procedure Laterality Date    CARDIAC CATHETERIZATION N/A 3/2/2023    Procedure: Cardiac catheterization;  Surgeon: Maren Ni MD;  Location: [address] [place] [address] CATH LAB;   Service: Cardiology    CARDIAC CATHETERIZATION N/A 3/2/2023    Procedure: Cardiac Coronary Angiogram;  Surgeon: Maren Ni MD; [facility];   Service: Cardiology    [place] CORONARY ARTERY BYP W/VEIN & ARTERY GRAFT 4 VEIN N/A 3/8/2023    Procedure: CORONARY ARTERY BYPASS GRAFT (CABG) 4 VESSELS,  SVG to PDA  and OM2                             r      LRA-->OM1 , LIMA to LAD, evh, hector;  Surgeon: Jonny Bennett DO;  [facility]: [facility];  Service: Cardiac Surgery    WISDOM TOOTH EXTRACTION         FAMILY HISTORY:  Family History   Problem Relation Age of Onset    Hypertension Father     Breast cancer Sister     Heart attack Maternal Grandfather     Heart attack Paternal Grandfather         s/p CABG    Coronary artery disease Paternal Uncle         s/p cardiac stent    Coronary artery disease Paternal Uncle        SOCIAL HISTORY:  Social History     Tobacco Use    Smoking status: Former     Packs/day: 0 25     Years: 6 00     Pack years: 1 50     Types: Cigarettes     Quit date:      Years since quittin 3    Smokeless tobacco: Never   Vaping Use    Vaping Use: Never used   Substance Use Topics    Alcohol use: Yes     Comment: Social    Drug use: Not Currently     Types: Marijuana     Comment: rare marijuana use       MEDICATIONS:    Current Outpatient Medications:     acetaminophen (TYLENOL) 325 mg tablet, Take 2 tablets (650 mg total) by mouth every 6 (six) hours as needed for mild pain, Disp: , Rfl: 0    amLODIPine (NORVASC) 2 5 mg tablet, Take 1 tablet (2 5 mg total) by mouth daily, Disp: 30 tablet, Rfl: 2    aspirin 325 mg tablet, Take 1 tablet (325 mg total) by mouth daily, Disp: 30 tablet, Rfl: 3    atorvastatin (LIPITOR) 80 mg tablet, Take 1 tablet (80 mg total) by mouth daily with dinner, Disp: 30 tablet, Rfl: 3    metoprolol tartrate (LOPRESSOR) 25 mg tablet, Take 0 5 tablets (12 5 mg total) by mouth every 12 (twelve) hours, Disp: 30 tablet, Rfl: 3    ascorbic acid (VITAMIN C) 500 mg tablet, Take 500 mg by mouth daily (Patient not taking: Reported on 4/10/2023), Disp: , Rfl:     Ergocalciferol (VITAMIN D2 PO), Take by mouth (Patient not taking: Reported on 4/10/2023), Disp: , Rfl:     Lysine 1000 MG TABS, Take by mouth (Patient not taking: Reported on 4/10/2023), Disp: , Rfl:     Misc Natural Products (OSTEO BI-FLEX ADV DOUBLE ST PO), Take by mouth (Patient not taking: Reported on 4/10/2023), Disp: , Rfl:     Multiple Vitamin (MULTIVITAMIN) capsule, Take 1 capsule by mouth daily (Patient not taking: Reported on 4/10/2023), Disp: , Rfl:     omeprazole (PriLOSEC OTC) 20 MG tablet, Take 1 tablet (20 mg total) by mouth daily (Patient not taking: Reported on 4/10/2023), Disp: 30 tablet, Rfl: 0    polyethylene glycol (GLYCOLAX) 17 GM/SCOOP powder, Take 17 g by mouth daily as needed (consitpation) (Patient not taking: Reported on 4/10/2023), Disp: 500 g, Rfl: 0    ALLERGIES:  Allergies   Allergen Reactions    Codeine Drowsiness     incapacitates           REVIEW OF SYSTEMS:  Review of Systems   Constitutional: Negative for chills, fever and unexpected weight change  HENT: Negative for hearing loss, nosebleeds and sore throat  Eyes: Negative for pain, redness and visual disturbance  Respiratory: Negative for cough, shortness of breath and wheezing  Cardiovascular: Negative for chest pain, palpitations and leg swelling  Gastrointestinal: Negative for abdominal pain, nausea and vomiting  Endocrine: Negative for polydipsia and polyuria  Genitourinary: Negative for dysuria and hematuria  Skin: Negative for rash and wound  Neurological: Negative for dizziness, light-headedness and headaches  Psychiatric/Behavioral: Negative for decreased concentration, dysphoric mood and suicidal ideas  The patient is not nervous/anxious          VITALS:  Vitals:    05/04/23 0832   BP: 130/84   Pulse: 72       LABS:  HgA1c:   Lab Results   Component Value Date    HGBA1C 5 6 03/03/2023     BMP:   Lab Results   Component Value Date    GLUCOSE 89 03/08/2023    CALCIUM 9 8 03/23/2023    K 3 9 03/23/2023    CO2 29 03/23/2023     03/23/2023    BUN 9 03/23/2023    CREATININE 0 68 03/23/2023       _____________________________________________________  PHYSICAL EXAMINATION:  General: well developed and well nourished, alert, oriented times 3 and appears comfortable  Psychiatric: Normal  HEENT: Normocephalic, Atraumatic Trachea Midline, No torticollis  Pulmonary: No audible wheezing or respiratory distress   Abdomen/GI: Non tender, non distended   Cardiovascular: No pitting edema, 2+ radial pulse   Skin: Mass dorsal right index finger, No Erythema, No Lacerations, No Fluctuation, No Ulcerations  Neurovascular: Sensation Intact to the Median, Ulnar, Radial Nerve, Motor Intact to the Median, Ulnar, Radial Nerve and Pulses Intact  Musculoskeletal: Normal, except as noted in detailed exam and in HPI        MUSCULOSKELETAL EXAMINATION:  Right hand:    Dorsal finger mass/cyst over the middle phalanx of the index finger  Blanching noted  Mobile firm mass  Darkened in color   Full composite fist  Opposition intact  Sensation intact to light touch  Brisk capillary refill     ___________________________________________________  STUDIES REVIEWED:  No images reviewed at today's visit           PROCEDURES PERFORMED:  Procedures  No Procedures performed today    _____________________________________________________      Ranulfo Ready    I,:  Jennifer Tyson am acting as a scribe while in the presence of the attending physician :       I,:  Marci Bhatt MD personally performed the services described in this documentation    as scribed in my presence :

## 2023-05-05 ENCOUNTER — CLINICAL SUPPORT (OUTPATIENT)
Dept: CARDIAC REHAB | Facility: CLINIC | Age: 53
End: 2023-05-05

## 2023-05-05 DIAGNOSIS — Z95.1 S/P CABG (CORONARY ARTERY BYPASS GRAFT): Primary | ICD-10-CM

## 2023-05-09 ENCOUNTER — CLINICAL SUPPORT (OUTPATIENT)
Dept: CARDIAC REHAB | Facility: CLINIC | Age: 53
End: 2023-05-09

## 2023-05-09 DIAGNOSIS — Z95.1 S/P CABG (CORONARY ARTERY BYPASS GRAFT): Primary | ICD-10-CM

## 2023-05-11 ENCOUNTER — CLINICAL SUPPORT (OUTPATIENT)
Dept: CARDIAC REHAB | Facility: CLINIC | Age: 53
End: 2023-05-11

## 2023-05-11 DIAGNOSIS — Z95.1 S/P CABG (CORONARY ARTERY BYPASS GRAFT): Primary | ICD-10-CM

## 2023-05-12 ENCOUNTER — CLINICAL SUPPORT (OUTPATIENT)
Dept: CARDIAC REHAB | Facility: CLINIC | Age: 53
End: 2023-05-12

## 2023-05-12 DIAGNOSIS — Z95.1 S/P CABG (CORONARY ARTERY BYPASS GRAFT): Primary | ICD-10-CM

## 2023-05-16 ENCOUNTER — CLINICAL SUPPORT (OUTPATIENT)
Dept: CARDIAC REHAB | Facility: CLINIC | Age: 53
End: 2023-05-16

## 2023-05-16 DIAGNOSIS — Z95.1 S/P CABG (CORONARY ARTERY BYPASS GRAFT): Primary | ICD-10-CM

## 2023-05-16 NOTE — PROGRESS NOTES
Cardiac Rehabilitation Plan of Care   30 Day Reassessment          Today's date: 2023   # of Exercise Sessions Completed: 12  Patient name: Marcos Milner      : 1970  Age: 46 y o  MRN: [id number]  Referring Physician: No ref  provider found   Cardiologist: Wolfgang Spann MD  Provider: Fab Pimentel  Clinician: Vale Taylor, MS, CEP    Dx:   Encounter Diagnosis   Name Primary? • S/P CABG (coronary artery bypass graft) Yes     Date of onset: 3/1/2023      SUMMARY OF PROGRESS:  Kristyn Parham is compliant attending cardiac rehab exercise sessions 3x/wk  He tolerates 45-55 mins at 3 7 - 4 9 METs  A light strength training component will be added in a future exercise session  He is tolerating progression of intensity levels to maintain RPE 4-6  Resting BP elevated at 130/78 - 150/84 with Normal response to exercise reaching 142/80- 184/80  His BP's have been slowly increasing in the last couple of weeks  Kristyn Parham reports that his BP at home is typically 125/80 taken prior to meds  We will continue to monitor his BP in the coming weeks as he started taking his meds prior to Rehab instead of after today  NSR on tele with increased ST changes with exercise observed  RHR 76 - 93  with Normal response to exercise reaching 118 - 136  He has added home exercise 3 days/wk which includes walking for 25min at a moderate intensity  No cardiac complaints  He is progressing toward wt loss goals with a loss of 1 pounds  Patient has been working on  dietary modifications with the goal of rare red/processed meats, low fat dairy, reduced added sugars and refined flours  The patient is a former smoker (quit 20 years ago)  He has abstained since quitting  Depression was reassessed using the PHQ-9  The patient's score was 4 (1-4 = Minimal Depression) showing an IMPROVEMENT   Anxiety was reassessed using the ALEXANDRA-7  The patient's score was 2  (0-4  = Not anxious) showing an NO CHANGE   When addressed, the patient denies depression/anxiety and reports an improvement since returning to work almost FT (4days/wk)  Patient reports excellent social/emotional support from his wife  They were encouraged to discuss options for medical therapy and counseling with their PCP  Sven Velasquez rates their stress as 2/10  Stress management techniques were reinforced  Patient attends group educational classes on cardiac risk factor modification  His exercise program will be progressed as tolerated to maintain RPE 4-6  The patient has the following personal goals he hopes to achieved by discharge: return to doing everything he was doing prior, get stronger, increase stamina  They will continue to be educated on lifestyle modification and encouraged to supplement with a home exercise program as tolerated to reach the following goals in the next 30 days: continue to work on increasing home exercise tolerance, start weight training, work on weight loss, improvement in chest incision tightness, sleep better, return to FT at work, continue to monitor BP at home        Medication compliance: Yes   Comments: Pt reports to be compliant with medications  Fall Risk: Low   Comments: Ambulates with a steady gait with no assist device    EKG Interpretation: NSR with TWI      EXERCISE ASSESSMENT and PLAN    Exercise Prescription:      Frequency: 3 days/week   Supplement with home exercise 2+ days/wk as tolerated     Minutes: 45-55        METS: 3 7-4 9            HR: 118-136   RPE: 4-6         Modalities: Treadmill, UBE, Lifecycle and NuStep      30 Day Goals for Exercise Progression:    Frequency: 40 days/week of cardiac rehab       Supplement with home exercise 2+ days/wk as tolerated    Minutes: 40-50            >150 mins/wk of moderate intensity exercise                   METS: 4 5-6   HR: RHR +30bpm    RPE: 4-5   Modalities: Treadmill, Airdyne bike, Lifecycle and Elliptical    Strength trainin-3 days / week  12-15 repetitions  1-2 sets per modality   Will be added following 2-3 weeks of monitored exercise sessions   Modalities: Chest Press, Pull Downs, Arm Curl and Seated Row    Home Exercise: Type: walking, Frequency: 3 days/week, Duration: 25 mins    Goals: 10% improvement in functional capacity - based on max METs achieved in fitness assessment, improved DASI score by 10%, Increase in exercise capacity by 40% - based on peak METs tolerated in cardiac rehab exercise session, Exercise 5 days/wk, >150mins/wk of moderate intensity exercise, Resume ADLs with increased strength, Attend Rehab regularly, Decrease sitting time, Start a walking program and start a home exercise program    Progression Toward Goals:  Pt is progressing and showing improvement  toward the following goals:  increased exercise tolerance, walking at home for 25 min   , Patient will continue to monitor BP at home  in the next 30 days, Will continue to educate and progress as tolerated  Education: benefit of exercise for CAD risk factors, home exercise guidelines, AHA guidelines to achieve >150 mins/wk of moderate exercise, RPE scale, class: Risk Factors for Heart Disease and exercise instructions/guidelines for discharge    Plan:education on home exercise guidelines and home exercise 30+ mins 2 days opposite CR  Readiness to change: Action:  (Changing behavior)      NUTRITION ASSESSMENT AND PLAN    Weight control:    Starting weight: 199 4 lbs   Current weight:   198 lbs     Diabetes: N/A  A1c: 5 6    last measured: 3/3/23    Lipid management: Last lipid profile 3/3/23  Chol 226    HDL 41      Goals:LDL <100, CHOL <200, reduce portion sizes of meat to 3oz or less, increase intake of fish, shellfish, increase intake of meatless meals, eat 3 or more servings of whole grains a day, Eat 4-5 cups of fruits and vegetables daily and eliminate or choose low-fat sweets    Measurable goals were based Rate Your Plate Dietary Self-Assessment   These are the areas in which the patient could score higher on the assessment  Goals include recommendations for a heart healthy diet based on American Heart Association  Progression Toward Goals: Pt is progressing and showing improvement  toward the following goals:  weight loss, decrease salt/caffeine/Alcohol consumption, eleminated processed foods, increased fruit/veggies  , Patient will continue to work on weight loss in the next 30 days, Will continue to educate and progress as tolerated  Education: heart healthy eating  low sodium diet  hydration  nutrition for  lipid management  wt  loss   portion control  Plan: Education class: Reading Food Labels, Education Class: Heart Healthy Eating, replace refined grain bread with whole grain bread, replace unhealthy snacks with fruits & vegs, reduce portion sizes, will try new grains like brown rice, quinoa, farro and learn how to read food labels  Readiness to change: Action:  (Changing behavior)      PSYCHOSOCIAL ASSESSMENT AND PLAN    Emotional:  Depression assessment:  PHQ-9 = 4  1-4 = Minimal Depression            Anxiety measure:  ALEXANDRA-7 = 1  5-9 = Mild anxiety  Self-reported stress level:  2  Social support: Patient reports excellent emotional/social support from family/significant other    Goals:  PHQ-9 - reduced severity by one level, Physical Fitness in [facility] Score < 3, Social Activities in [place] Score < 3, improved positive thoughts of well being, increased energy and decreased frustration    Progression Toward Goals: Pt is progressing and showing improvement  toward the following goals:  decreased stress after increasing ADLs/returning to work   , Patient will continue to work on stress management and increasing ADLs to help with feeling antsy in the next 30 days, Will continue to educate and progress as tolerated      Education: benefits of a positive support system and stress management techniques  Plan: Class: Stress and Your Health, Class: Relaxation, Exercise, Spend time outdoors and Repeat PHQ-9 every 30 days if score >5  Readiness to change: Action:  (Changing behavior)      OTHER CORE COMPONENTS     Tobacco:   Social History     Tobacco Use   Smoking Status Former   • Packs/day: 0 25   • Years: 6 00   • Pack years: 1 50   • Types: Cigarettes   • Quit date:    • Years since quittin 3   Smokeless Tobacco Never       Tobacco Use Intervention:   N/A: Pt has a remote history of smoking    Anginal Symptoms:  chest pressure   NTG use: No prescription    Blood pressure:    Restin/78 - 150/74    Exercise: 142/80 - 184/80     Goals: consistent BP < 130/80, reduced dietary sodium <2300mg, moderate intensity exercise >150 mins/wk and medication compliance    Progression Toward Goals: Pt has not made progress toward the following goals: BP starting to increase rest  , Patient will monitor BP at home in the next 30 days, Will continue to educate and progress as tolerated      Education:  understanding high blood pressure and it's relationship to CAD, low sodium diet and HTN and Education class: Understanding Heart Disease  Plan: Class: Common Heart Medications, Avoid Processed foods, engage in regular exercise and check labels for sodium content  Readiness to change: Action:  (Changing behavior)

## 2023-05-18 ENCOUNTER — CLINICAL SUPPORT (OUTPATIENT)
Dept: CARDIAC REHAB | Facility: CLINIC | Age: 53
End: 2023-05-18

## 2023-05-18 DIAGNOSIS — Z95.1 S/P CABG (CORONARY ARTERY BYPASS GRAFT): Primary | ICD-10-CM

## 2023-05-19 ENCOUNTER — CLINICAL SUPPORT (OUTPATIENT)
Dept: CARDIAC REHAB | Facility: CLINIC | Age: 53
End: 2023-05-19

## 2023-05-19 DIAGNOSIS — Z95.1 S/P CABG (CORONARY ARTERY BYPASS GRAFT): Primary | ICD-10-CM

## 2023-05-23 ENCOUNTER — CLINICAL SUPPORT (OUTPATIENT)
Dept: CARDIAC REHAB | Facility: CLINIC | Age: 53
End: 2023-05-23

## 2023-05-23 DIAGNOSIS — Z95.1 S/P CABG (CORONARY ARTERY BYPASS GRAFT): Primary | ICD-10-CM

## 2023-05-23 NOTE — PROGRESS NOTES
Post op gtt instructions reviewed with patient. [facility]'s Cardiopulmonary Rehab  Delia Schwab      Your patient has been regularly attending cardiac rehab three times per week  His/Her resting blood pressures have been consistently elevated in the morning  Indy Brianmaico takes his meds at [facility] and has rehab at 7am where we see higher BP's  Exercise BP's have not spiked much Post rehab BP's have been stable  Blood pressures   Resting 132/78 - 150/84  Exercise 142/80 - 168/80  Recovery 122/70 - 140/84     Heart Rates  Resting 62 - 93  Exercise 104 - 136    Please see the attached summary for a detailed blood pressure and HR report

## 2023-05-25 ENCOUNTER — CLINICAL SUPPORT (OUTPATIENT)
Dept: CARDIAC REHAB | Facility: CLINIC | Age: 53
End: 2023-05-25

## 2023-05-25 DIAGNOSIS — Z95.1 S/P CABG (CORONARY ARTERY BYPASS GRAFT): Primary | ICD-10-CM

## 2023-05-26 ENCOUNTER — CLINICAL SUPPORT (OUTPATIENT)
Dept: CARDIAC REHAB | Facility: CLINIC | Age: 53
End: 2023-05-26

## 2023-05-26 DIAGNOSIS — Z95.1 S/P CABG (CORONARY ARTERY BYPASS GRAFT): Primary | ICD-10-CM

## 2023-05-30 ENCOUNTER — CLINICAL SUPPORT (OUTPATIENT)
Dept: CARDIAC REHAB | Facility: CLINIC | Age: 53
End: 2023-05-30

## 2023-05-30 DIAGNOSIS — Z95.1 S/P CABG (CORONARY ARTERY BYPASS GRAFT): Primary | ICD-10-CM

## 2023-06-01 ENCOUNTER — CLINICAL SUPPORT (OUTPATIENT)
Dept: CARDIAC REHAB | Facility: CLINIC | Age: 53
End: 2023-06-01

## 2023-06-01 DIAGNOSIS — Z95.1 S/P CABG (CORONARY ARTERY BYPASS GRAFT): Primary | ICD-10-CM

## 2023-06-02 ENCOUNTER — CLINICAL SUPPORT (OUTPATIENT)
Dept: CARDIAC REHAB | Facility: CLINIC | Age: 53
End: 2023-06-02

## 2023-06-02 DIAGNOSIS — Z95.1 S/P CABG (CORONARY ARTERY BYPASS GRAFT): Primary | ICD-10-CM

## 2023-06-05 DIAGNOSIS — Z95.1 S/P CABG (CORONARY ARTERY BYPASS GRAFT): ICD-10-CM

## 2023-06-05 DIAGNOSIS — E78.2 HYPERLIPIDEMIA, MIXED: ICD-10-CM

## 2023-06-05 DIAGNOSIS — I25.10 CAD IN NATIVE ARTERY: ICD-10-CM

## 2023-06-05 DIAGNOSIS — I10 ESSENTIAL HYPERTENSION: ICD-10-CM

## 2023-06-05 DIAGNOSIS — I24.9 ACS (ACUTE CORONARY SYNDROME) (HCC): ICD-10-CM

## 2023-06-05 RX ORDER — AMLODIPINE BESYLATE 2.5 MG/1
2.5 TABLET ORAL DAILY
Qty: 30 TABLET | Refills: 2 | Status: SHIPPED | OUTPATIENT
Start: 2023-06-05

## 2023-06-06 ENCOUNTER — CLINICAL SUPPORT (OUTPATIENT)
Dept: CARDIAC REHAB | Facility: CLINIC | Age: 53
End: 2023-06-06
Payer: COMMERCIAL

## 2023-06-06 DIAGNOSIS — Z95.1 S/P CABG (CORONARY ARTERY BYPASS GRAFT): Primary | ICD-10-CM

## 2023-06-06 PROCEDURE — 93798 PHYS/QHP OP CAR RHAB W/ECG: CPT

## 2023-06-08 ENCOUNTER — CLINICAL SUPPORT (OUTPATIENT)
Dept: CARDIAC REHAB | Facility: CLINIC | Age: 53
End: 2023-06-08
Payer: COMMERCIAL

## 2023-06-08 DIAGNOSIS — Z95.1 S/P CABG (CORONARY ARTERY BYPASS GRAFT): Primary | ICD-10-CM

## 2023-06-08 PROCEDURE — 93798 PHYS/QHP OP CAR RHAB W/ECG: CPT

## 2023-06-09 ENCOUNTER — CLINICAL SUPPORT (OUTPATIENT)
Dept: CARDIAC REHAB | Facility: CLINIC | Age: 53
End: 2023-06-09
Payer: COMMERCIAL

## 2023-06-09 DIAGNOSIS — Z95.1 S/P CABG (CORONARY ARTERY BYPASS GRAFT): Primary | ICD-10-CM

## 2023-06-09 PROCEDURE — 93798 PHYS/QHP OP CAR RHAB W/ECG: CPT

## 2023-06-13 ENCOUNTER — CLINICAL SUPPORT (OUTPATIENT)
Dept: CARDIAC REHAB | Facility: CLINIC | Age: 53
End: 2023-06-13
Payer: COMMERCIAL

## 2023-06-13 DIAGNOSIS — Z95.1 S/P CABG (CORONARY ARTERY BYPASS GRAFT): Primary | ICD-10-CM

## 2023-06-13 PROCEDURE — 93798 PHYS/QHP OP CAR RHAB W/ECG: CPT

## 2023-06-13 NOTE — PROGRESS NOTES
Cardiac Rehabilitation Plan of Care   60 Day Reassessment          Today's date: 2023   # of Exercise Sessions Completed: 24  Patient name: Regino Lennox      : 1970  Age: 46 y o  MRN: [id number]  Referring Physician: No ref  provider found   Cardiologist: Asuncion Nesbitt MD  Provider: [facility]  Clinician: Ryland Zuniga MS, CEP    Dx:   Encounter Diagnosis   Name Primary? • S/P CABG (coronary artery bypass graft) Yes     Date of onset: 3/1/2023      SUMMARY OF PROGRESS:  [place] Jean Baptiste is compliant attending cardiac rehab exercise sessions 3x/wk  He tolerates 35-45 mins at 3 8 - 5 9 METs  A light strength training component has been added to their exercise program  He is tolerating progression of intensity levels to maintain RPE 4-6  Resting BP elevated at 122/76 - 154/88 with Normal response to exercise reaching 142/80 - 192/98  Rom's BP's have been higher at start of rehab when he is taking the stairs or parking further from the entrance of rehab  Despite this his BP's are still on the higher side and increase significantly with exercise  NSR with occ PVCs on tele and increased ST changes with exercise observed  RHR 61- 74  with Normal response to exercise reaching 104 - 131  He has added home exercise 3-4 days/wk which includes walking for 40 at a moderate intensity  No cardiac complaints  He is progressing toward wt loss goals with a loss of 3 pounds  Patient has been working on dietary modifications with the goal of rare red/processed meats, low fat dairy, reduced added sugars and refined flours  The patient is a former smoker (quit 20 years ago)  He has abstained since quitting  PHQ-9 and ALEXANDRA-7 were not reassessed due to low scoring at last progress note  Most recent assessment found PHQ-9 at a 4 suggesting 1-4 = Minimal Depression and ALEXANDRA-7 at a 2 suggesting 0-4  = Not anxious  When addressed, the patient denies depression/anxiety and reports an improvement since returning to work FT  Patient reports excellent social/emotional support from his wife  Chi [employer] rates their stress as 2/10  Stress management techniques were reinforced  Patient attends group educational classes on cardiac risk factor modification  His exercise program will be progressed as tolerated to maintain RPE 4-6  The patient has the following personal goals he hopes to achieved by discharge: return to doing everything he was doing prior, get stronger, increase stamina   They will continue to be educated on lifestyle modification and encouraged to supplement with a home exercise program as tolerated to reach the following goals in the next 30 days: continue to work on increasing home exercise tolerance, start weight training, work on weight loss, sleep better, continue to monitor BP at home, consider joining a gym for DC from program       Medication compliance: Yes   Comments: Pt reports to be compliant with medications  Fall Risk: Low   Comments: Ambulates with a steady gait with no assist device    EKG Interpretation: NSR with TWI      EXERCISE ASSESSMENT and PLAN    Exercise Prescription:      Frequency: 3 days/week   Supplement with home exercise 2+ days/wk as tolerated     Minutes: 35-45        METS: 3 8- 5 9            HR: 104-131   RPE: 4-6         Modalities: Treadmill, Airdyne bike, UBE, Lifecycle, Elliptical and NuStep      30 Day Goals for Exercise Progression:    Frequency: 40 days/week of cardiac rehab       Supplement with home exercise 2+ days/wk as tolerated    Minutes: 40-50            >150 mins/wk of moderate intensity exercise                   METS: 4 5-6   HR: RHR +30bpm    RPE: 4-5   Modalities: Treadmill, Airdyne bike, Lifecycle and Elliptical    Strength trainin-3 days / week  12-15 repetitions  1-2 sets per modality    Modalities: Chest Press, Pull Downs, Arm Curl, Seated Row and Squats    Home Exercise: Type: walking, Frequency: 3 days/week, Duration: 25 mins    Goals: 10% improvement in functional capacity - based on max METs achieved in fitness assessment, improved DASI score by 10%, Increase in exercise capacity by 40% - based on peak METs tolerated in cardiac rehab exercise session, Exercise 5 days/wk, >150mins/wk of moderate intensity exercise, Resume ADLs with increased strength, Attend Rehab regularly, Decrease sitting time, Start a walking program and start a home exercise program    Progression Toward Goals:  Pt is progressing and showing improvement  toward the following goals:  increased exercise tolerance, walking at home for 40 min 3-4x/wk  , Patient will continue to monitor BP at home in the next 30 days, Will continue to educate and progress as tolerated  Education: benefit of exercise for CAD risk factors, home exercise guidelines, AHA guidelines to achieve >150 mins/wk of moderate exercise, RPE scale, class: Risk Factors for Heart Disease and exercise instructions/guidelines for discharge    Plan:education on home exercise guidelines  Readiness to change: Action:  (Changing behavior)      NUTRITION ASSESSMENT AND PLAN    Weight control:    Starting weight: 199 4 lbs   Current weight:   196 lbs     Diabetes: N/A  A1c: 5 6    last measured: 3/3/23    Lipid management: Last lipid profile 3/3/23  Chol 226    HDL 41      Goals:LDL <100, CHOL <200, reduce portion sizes of meat to 3oz or less, increase intake of fish, shellfish, increase intake of meatless meals, eat 3 or more servings of whole grains a day, Eat 4-5 cups of fruits and vegetables daily and eliminate or choose low-fat sweets    Measurable goals were based Rate Your Plate Dietary Self-Assessment  These are the areas in which the patient could score higher on the assessment  Goals include recommendations for a heart healthy diet based on American Heart Association      Progression Toward Goals: Pt is progressing and showing improvement  toward the following goals:  weight loss, decrease salt/caffeine/Alcohol consumption, eleminated processed foods, increased fruit/veggies  , Patient will continue to work on weight loss in the next 30 days, Will continue to educate and progress as tolerated  Education: heart healthy eating  low sodium diet  hydration  nutrition for  lipid management  wt  loss   portion control  education class: Heart Healthy Eating  education class:  Label Reading  Plan: replace refined grain bread with whole grain bread, replace unhealthy snacks with fruits & vegs, reduce portion sizes, will try new grains like brown rice, quinoa, farro and learn how to read food labels  Readiness to change: Action:  (Changing behavior)      PSYCHOSOCIAL ASSESSMENT AND PLAN    Emotional:  Depression assessment:  PHQ-9 = 4  1-4 = Minimal Depression            Anxiety measure:  ALEXANDRA-7 = 1  5-9 = Mild anxiety  Self-reported stress level:  2  Social support: Patient reports excellent emotional/social support from family/significant other    Goals:  PHQ-9 - reduced severity by one level, Physical Fitness in [facility] Score < 3, Social Activities in [place] Score < 3, improved positive thoughts of well being, increased energy and decreased frustration    Progression Toward Goals: Will continue to educate and progress as tolerated , Goals met: stress is manageable , Patient will be encouraged to focus on lifestyle modifications following discharge      Education: benefits of a positive support system, stress management techniques, class:  Stress and Your Health  and class:  Relaxation  Plan: Exercise and Spend time outdoors  Readiness to change: Maintenance: (Maintaining the behavior change)      OTHER CORE COMPONENTS     Tobacco:   Social History     Tobacco Use   Smoking Status Former   • Packs/day: 0 25   • Years: 6 00   • Total pack years: 1 50   • Types: Cigarettes   • Quit date:    • Years since quittin 4   Smokeless Tobacco Never       Tobacco Use Intervention:   N/A: Pt has a remote history of smoking    Anginal Symptoms:  chest pressure   NTG use: No prescription    Blood pressure:    Restin/76 - 154/88    Exercise: 142/80 - 192/98    Goals: consistent BP < 130/80, reduced dietary sodium <2300mg, moderate intensity exercise >150 mins/wk and medication compliance    Progression Toward Goals: Pt has not made progress toward the following goals: BP starting to increase rest  , Patient will monitor BP at home in the next 30 days, Will continue to educate and progress as tolerated      Education:  understanding high blood pressure and it's relationship to CAD, low sodium diet and HTN, Education class:  Common Heart Medications and Education class: Understanding Heart Disease  Plan: Avoid Processed foods, engage in regular exercise and check labels for sodium content  Readiness to change: Action:  (Changing behavior)

## 2023-06-15 ENCOUNTER — CLINICAL SUPPORT (OUTPATIENT)
Dept: CARDIAC REHAB | Facility: CLINIC | Age: 53
End: 2023-06-15
Payer: COMMERCIAL

## 2023-06-15 DIAGNOSIS — Z95.1 S/P CABG (CORONARY ARTERY BYPASS GRAFT): Primary | ICD-10-CM

## 2023-06-15 PROCEDURE — 93798 PHYS/QHP OP CAR RHAB W/ECG: CPT

## 2023-06-16 ENCOUNTER — CLINICAL SUPPORT (OUTPATIENT)
Dept: CARDIAC REHAB | Facility: CLINIC | Age: 53
End: 2023-06-16
Payer: COMMERCIAL

## 2023-06-16 DIAGNOSIS — Z95.1 S/P CABG (CORONARY ARTERY BYPASS GRAFT): Primary | ICD-10-CM

## 2023-06-16 PROCEDURE — 93798 PHYS/QHP OP CAR RHAB W/ECG: CPT

## 2023-06-20 ENCOUNTER — CLINICAL SUPPORT (OUTPATIENT)
Dept: CARDIAC REHAB | Facility: CLINIC | Age: 53
End: 2023-06-20
Payer: COMMERCIAL

## 2023-06-20 DIAGNOSIS — Z95.1 S/P CABG (CORONARY ARTERY BYPASS GRAFT): Primary | ICD-10-CM

## 2023-06-20 PROCEDURE — 93798 PHYS/QHP OP CAR RHAB W/ECG: CPT

## 2023-06-22 ENCOUNTER — CLINICAL SUPPORT (OUTPATIENT)
Dept: CARDIAC REHAB | Facility: CLINIC | Age: 53
End: 2023-06-22
Payer: COMMERCIAL

## 2023-06-22 DIAGNOSIS — Z95.1 S/P CABG (CORONARY ARTERY BYPASS GRAFT): Primary | ICD-10-CM

## 2023-06-22 PROCEDURE — 93798 PHYS/QHP OP CAR RHAB W/ECG: CPT

## 2023-06-23 ENCOUNTER — OFFICE VISIT (OUTPATIENT)
Dept: FAMILY MEDICINE CLINIC | Facility: CLINIC | Age: 53
End: 2023-06-23
Payer: COMMERCIAL

## 2023-06-23 ENCOUNTER — CLINICAL SUPPORT (OUTPATIENT)
Dept: CARDIAC REHAB | Facility: CLINIC | Age: 53
End: 2023-06-23
Payer: COMMERCIAL

## 2023-06-23 VITALS
DIASTOLIC BLOOD PRESSURE: 88 MMHG | SYSTOLIC BLOOD PRESSURE: 136 MMHG | HEART RATE: 73 BPM | BODY MASS INDEX: 27.77 KG/M2 | HEIGHT: 70 IN | TEMPERATURE: 97.5 F | OXYGEN SATURATION: 99 % | WEIGHT: 194 LBS

## 2023-06-23 DIAGNOSIS — K59.00 CONSTIPATION, UNSPECIFIED CONSTIPATION TYPE: ICD-10-CM

## 2023-06-23 DIAGNOSIS — I25.10 CAD IN NATIVE ARTERY: ICD-10-CM

## 2023-06-23 DIAGNOSIS — Z95.1 S/P CABG (CORONARY ARTERY BYPASS GRAFT): ICD-10-CM

## 2023-06-23 DIAGNOSIS — I10 ESSENTIAL HYPERTENSION: Primary | ICD-10-CM

## 2023-06-23 DIAGNOSIS — E78.2 HYPERLIPIDEMIA, MIXED: ICD-10-CM

## 2023-06-23 DIAGNOSIS — Z95.1 S/P CABG (CORONARY ARTERY BYPASS GRAFT): Primary | ICD-10-CM

## 2023-06-23 PROCEDURE — 99214 OFFICE O/P EST MOD 30 MIN: CPT | Performed by: PHYSICIAN ASSISTANT

## 2023-06-23 PROCEDURE — 93798 PHYS/QHP OP CAR RHAB W/ECG: CPT

## 2023-06-23 NOTE — PROGRESS NOTES
Name: Trish Castaneda      : 1970      MRN: [id number]  Encounter Provider: Wally Cole PA-C  Encounter Date: 2023   Encounter department: [address] [place] [address]     1  Essential hypertension  -     Comprehensive metabolic panel; Future    2  Hyperlipidemia, mixed  -     Lipid Panel with Direct LDL reflex; Future    3  S/P CABG (coronary artery bypass graft)  -     Lipid Panel with Direct LDL reflex; Future    4  CAD in native artery  -     Lipid Panel with Direct LDL reflex; Future    5  Constipation, unspecified constipation type    continues to do well s/p cabg4  No cardiac complaints  completing cardiac rehab  Overall feels well  BP controlled  Check lipids  Continue ASA, statin, BB, amlodipine  6 month follow up, earlier prn  Continue fiber, fluids, CRC screening        Subjective     Pt presents for follow up    CAD s/p cabg4 on 3/8/23  Continues with cardiac rehab  On asa, statin, BB  On amlodipine  /88  No cardiac complaints  He has been having some constipation with straining/bleeding/pain and started metamucil  Scheduling colonoscopy      Review of Systems   Constitutional: Negative for chills, fatigue and fever  HENT: Negative for congestion, ear pain, hearing loss, nosebleeds, postnasal drip, rhinorrhea, sinus pressure, sinus pain, sneezing and sore throat  Eyes: Negative for pain, discharge, itching and visual disturbance  Respiratory: Negative for cough, chest tightness, shortness of breath and wheezing  Cardiovascular: Negative for chest pain, palpitations and leg swelling  Gastrointestinal: Positive for constipation  Negative for abdominal pain, blood in stool, diarrhea, nausea and vomiting  Genitourinary: Negative for frequency and urgency  Neurological: Negative for dizziness, light-headedness and numbness         Past Medical History:   Diagnosis Date   • Disseminated herpes zoster 10/16/2019   • HLD (hyperlipidemia)    • Hypertension    • Known health problems: none    • Vitamin D deficiency      Past Surgical History:   Procedure Laterality Date   • CARDIAC CATHETERIZATION N/A 3/2/2023    Procedure: Cardiac catheterization;  Surgeon: Fab Griggs MD;  Location: [facility] CATH LAB; Service: Cardiology   • CARDIAC CATHETERIZATION N/A 3/2/2023    Procedure: Cardiac Coronary Angiogram;  Surgeon: Fab Griggs MD;  Location: [facility] CATH LAB;   Service: Cardiology   • [place] CORONARY ARTERY BYP W/VEIN & ARTERY GRAFT 4 VEIN N/A 3/8/2023    Procedure: CORONARY ARTERY BYPASS GRAFT (CABG) 4 VESSELS,  SVG to PDA  and OM2                             r      LRA-->OM1 , LIMA to LAD, evh, hector;  Surgeon: Prakash Rosado DO;  [facility];  Service: Cardiac Surgery   • WISDOM TOOTH EXTRACTION       Family History   Problem Relation Age of Onset   • Hypertension Father    • Breast cancer Sister    • Heart attack Maternal Grandfather    • Heart attack Paternal Grandfather         s/p CABG   • Coronary artery disease Paternal Uncle         s/p cardiac stent   • Coronary artery disease Paternal Uncle      Social History     Socioeconomic History   • Marital status: /Civil Union     Spouse name: None   • Number of children: None   • Years of education: None   • Highest education level: None   Occupational History   • None   Tobacco Use   • Smoking status: Former     Packs/day: 0 25     Years: 6 00     Total pack years: 1 50     Types: Cigarettes     Quit date:      Years since quittin 4   • Smokeless tobacco: Never   Vaping Use   • Vaping Use: Never used   Substance and Sexual Activity   • Alcohol use: Yes     Comment: Social   • Drug use: Not Currently     Types: Marijuana     Comment: rare marijuana use   • Sexual activity: None   Other Topics Concern   • None   Social History Narrative   • None     Social Determinants of Health     Financial Resource Strain: Not on file   Food Insecurity: No Food Insecurity (3/5/2023) Hunger Vital Sign    • Worried About Running Out of Food in the Last Year: Never true    • Ran Out of Food in the Last Year: Never true   Transportation Needs: No Transportation Needs (3/5/2023)    PRAPARE - Transportation    • Lack of Transportation (Medical): No    • Lack of Transportation (Non-Medical):  No   Physical Activity: Not on file   Stress: Not on file   Social Connections: Not on file   Intimate Partner Violence: Not on file   Housing Stability: Low Risk  (3/5/2023)    Housing Stability Vital Sign    • Unable to Pay for Housing in the Last Year: No    • Number of Places Lived in the Last Year: 1    • Unstable Housing in the Last Year: No     Current Outpatient Medications on File Prior to Visit   Medication Sig   • amLODIPine (NORVASC) 2 5 mg tablet Take 1 tablet (2 5 mg total) by mouth daily   • ascorbic acid (VITAMIN C) 500 mg tablet Take 500 mg by mouth daily (Patient not taking: Reported on 4/10/2023)   • aspirin 325 mg tablet Take 1 tablet (325 mg total) by mouth daily   • atorvastatin (LIPITOR) 80 mg tablet Take 1 tablet (80 mg total) by mouth daily with dinner   • Ergocalciferol (VITAMIN D2 PO) Take by mouth (Patient not taking: Reported on 4/10/2023)   • Lysine 1000 MG TABS Take by mouth (Patient not taking: Reported on 4/10/2023)   • metoprolol tartrate (LOPRESSOR) 25 mg tablet Take 0 5 tablets (12 5 mg total) by mouth every 12 (twelve) hours   • Misc Natural Products (OSTEO BI-FLEX ADV DOUBLE ST PO) Take by mouth (Patient not taking: Reported on 4/10/2023)   • Multiple Vitamin (MULTIVITAMIN) capsule Take 1 capsule by mouth daily (Patient not taking: Reported on 4/10/2023)   • polyethylene glycol (GLYCOLAX) 17 GM/SCOOP powder Take 17 g by mouth daily as needed (consitpation) (Patient not taking: Reported on 4/10/2023)   • [DISCONTINUED] acetaminophen (TYLENOL) 325 mg tablet Take 2 tablets (650 mg total) by mouth every 6 (six) hours as needed for mild pain   • [DISCONTINUED] omeprazole "(PriLOSEC OTC) 20 MG tablet Take 1 tablet (20 mg total) by mouth daily (Patient not taking: Reported on 4/10/2023)     Allergies   Allergen Reactions   • Codeine Drowsiness     incapacitates     Immunization History   Administered Date(s) Administered   • Td (adult), Unspecified 07/14/2015   • Tdap 07/14/2015       Objective     /88 (BP Location: Right arm, Patient Position: Sitting, Cuff Size: Large)   Pulse 73   Temp 97 5 °F (36 4 °C)   Ht 5' 10\" (1 778 m)   Wt 88 kg (194 lb)   SpO2 99%   BMI 27 84 kg/m²     Physical Exam  Vitals and nursing note reviewed  Constitutional:       General: He is not in acute distress  Appearance: Normal appearance  HENT:      Head: Normocephalic and atraumatic  Nose: Nose normal       Mouth/Throat:      Mouth: Mucous membranes are moist       Pharynx: Oropharynx is clear  No oropharyngeal exudate or posterior oropharyngeal erythema  Eyes:      Pupils: Pupils are equal, round, and reactive to light  Cardiovascular:      Rate and Rhythm: Normal rate and regular rhythm  Heart sounds: Normal heart sounds  No murmur heard  Pulmonary:      Effort: Pulmonary effort is normal  No respiratory distress  Breath sounds: Normal breath sounds  No wheezing, rhonchi or rales  Musculoskeletal:         General: Normal range of motion  Cervical back: Normal range of motion and neck supple  Right lower leg: No edema  Left lower leg: No edema  Skin:     General: Skin is warm and dry  Neurological:      Mental Status: He is alert and oriented to person, place, and time     Psychiatric:         Mood and Affect: Mood and affect normal        Richar Martinez PA-C  "

## 2023-06-27 ENCOUNTER — CLINICAL SUPPORT (OUTPATIENT)
Dept: CARDIAC REHAB | Facility: CLINIC | Age: 53
End: 2023-06-27
Payer: COMMERCIAL

## 2023-06-27 DIAGNOSIS — Z95.1 S/P CABG (CORONARY ARTERY BYPASS GRAFT): Primary | ICD-10-CM

## 2023-06-27 PROCEDURE — 93798 PHYS/QHP OP CAR RHAB W/ECG: CPT

## 2023-06-29 ENCOUNTER — CLINICAL SUPPORT (OUTPATIENT)
Dept: CARDIAC REHAB | Facility: CLINIC | Age: 53
End: 2023-06-29
Payer: COMMERCIAL

## 2023-06-29 DIAGNOSIS — Z95.1 S/P CABG (CORONARY ARTERY BYPASS GRAFT): Primary | ICD-10-CM

## 2023-06-29 PROCEDURE — 93798 PHYS/QHP OP CAR RHAB W/ECG: CPT

## 2023-06-30 ENCOUNTER — CLINICAL SUPPORT (OUTPATIENT)
Dept: CARDIAC REHAB | Facility: CLINIC | Age: 53
End: 2023-06-30
Payer: COMMERCIAL

## 2023-06-30 DIAGNOSIS — Z95.1 S/P CABG (CORONARY ARTERY BYPASS GRAFT): Primary | ICD-10-CM

## 2023-06-30 PROCEDURE — 93798 PHYS/QHP OP CAR RHAB W/ECG: CPT

## 2023-07-04 ENCOUNTER — APPOINTMENT (OUTPATIENT)
Dept: CARDIAC REHAB | Facility: CLINIC | Age: 53
End: 2023-07-04
Payer: COMMERCIAL

## 2023-07-06 ENCOUNTER — CLINICAL SUPPORT (OUTPATIENT)
Dept: CARDIAC REHAB | Facility: CLINIC | Age: 53
End: 2023-07-06
Payer: COMMERCIAL

## 2023-07-06 DIAGNOSIS — Z95.1 S/P CABG (CORONARY ARTERY BYPASS GRAFT): Primary | ICD-10-CM

## 2023-07-06 PROCEDURE — 93798 PHYS/QHP OP CAR RHAB W/ECG: CPT

## 2023-07-07 ENCOUNTER — CLINICAL SUPPORT (OUTPATIENT)
Dept: CARDIAC REHAB | Facility: CLINIC | Age: 53
End: 2023-07-07
Payer: COMMERCIAL

## 2023-07-07 DIAGNOSIS — Z95.1 S/P CABG (CORONARY ARTERY BYPASS GRAFT): Primary | ICD-10-CM

## 2023-07-07 PROCEDURE — 93798 PHYS/QHP OP CAR RHAB W/ECG: CPT

## 2023-07-10 ENCOUNTER — APPOINTMENT (OUTPATIENT)
Dept: LAB | Facility: CLINIC | Age: 53
End: 2023-07-10
Payer: COMMERCIAL

## 2023-07-10 DIAGNOSIS — Z95.1 S/P CABG (CORONARY ARTERY BYPASS GRAFT): ICD-10-CM

## 2023-07-10 DIAGNOSIS — I24.9 ACS (ACUTE CORONARY SYNDROME) (HCC): ICD-10-CM

## 2023-07-10 DIAGNOSIS — I10 ESSENTIAL HYPERTENSION: ICD-10-CM

## 2023-07-10 DIAGNOSIS — E78.2 HYPERLIPIDEMIA, MIXED: ICD-10-CM

## 2023-07-10 DIAGNOSIS — I25.10 CAD IN NATIVE ARTERY: ICD-10-CM

## 2023-07-10 LAB
ALBUMIN SERPL BCP-MCNC: 4 G/DL (ref 3.5–5)
ALP SERPL-CCNC: 139 U/L (ref 46–116)
ALT SERPL W P-5'-P-CCNC: 57 U/L (ref 12–78)
ANION GAP SERPL CALCULATED.3IONS-SCNC: 7 MMOL/L
AST SERPL W P-5'-P-CCNC: 35 U/L (ref 5–45)
BILIRUB SERPL-MCNC: 0.5 MG/DL (ref 0.2–1)
BUN SERPL-MCNC: 6 MG/DL (ref 5–25)
CALCIUM SERPL-MCNC: 8.9 MG/DL (ref 8.3–10.1)
CHLORIDE SERPL-SCNC: 110 MMOL/L (ref 96–108)
CHOLEST SERPL-MCNC: 121 MG/DL
CO2 SERPL-SCNC: 23 MMOL/L (ref 21–32)
CREAT SERPL-MCNC: 0.72 MG/DL (ref 0.6–1.3)
GFR SERPL CREATININE-BSD FRML MDRD: 107 ML/MIN/1.73SQ M
GLUCOSE P FAST SERPL-MCNC: 108 MG/DL (ref 65–99)
HDLC SERPL-MCNC: 44 MG/DL
LDLC SERPL CALC-MCNC: 64 MG/DL (ref 0–100)
POTASSIUM SERPL-SCNC: 4 MMOL/L (ref 3.5–5.3)
PROT SERPL-MCNC: 7.7 G/DL (ref 6.4–8.4)
SODIUM SERPL-SCNC: 140 MMOL/L (ref 135–147)
TRIGL SERPL-MCNC: 66 MG/DL

## 2023-07-10 PROCEDURE — 36415 COLL VENOUS BLD VENIPUNCTURE: CPT

## 2023-07-10 PROCEDURE — 80053 COMPREHEN METABOLIC PANEL: CPT

## 2023-07-10 PROCEDURE — 80061 LIPID PANEL: CPT

## 2023-07-11 ENCOUNTER — CLINICAL SUPPORT (OUTPATIENT)
Dept: CARDIAC REHAB | Facility: CLINIC | Age: 53
End: 2023-07-11
Payer: COMMERCIAL

## 2023-07-11 DIAGNOSIS — Z95.1 S/P CABG (CORONARY ARTERY BYPASS GRAFT): Primary | ICD-10-CM

## 2023-07-11 PROCEDURE — 93798 PHYS/QHP OP CAR RHAB W/ECG: CPT

## 2023-07-12 DIAGNOSIS — E78.2 HYPERLIPIDEMIA, MIXED: ICD-10-CM

## 2023-07-12 DIAGNOSIS — I25.10 CAD IN NATIVE ARTERY: ICD-10-CM

## 2023-07-12 DIAGNOSIS — I10 ESSENTIAL HYPERTENSION: ICD-10-CM

## 2023-07-12 DIAGNOSIS — Z95.1 S/P CABG (CORONARY ARTERY BYPASS GRAFT): ICD-10-CM

## 2023-07-12 DIAGNOSIS — I24.9 ACS (ACUTE CORONARY SYNDROME) (HCC): ICD-10-CM

## 2023-07-12 RX ORDER — ATORVASTATIN CALCIUM 80 MG/1
80 TABLET, FILM COATED ORAL
Qty: 30 TABLET | Refills: 3 | Status: SHIPPED | OUTPATIENT
Start: 2023-07-12

## 2023-07-13 ENCOUNTER — CLINICAL SUPPORT (OUTPATIENT)
Dept: CARDIAC REHAB | Facility: CLINIC | Age: 53
End: 2023-07-13
Payer: COMMERCIAL

## 2023-07-13 DIAGNOSIS — Z95.1 S/P CABG (CORONARY ARTERY BYPASS GRAFT): Primary | ICD-10-CM

## 2023-07-13 PROCEDURE — 93798 PHYS/QHP OP CAR RHAB W/ECG: CPT

## 2023-07-13 NOTE — PROGRESS NOTES
Cardiac Rehabilitation Plan of Care   Discharge          Today's date: 2023   # of Exercise Sessions Completed: 36  Patient name: Leslie Ramsay      : 1970  Age: 46 y.o. MRN: [id number]  Referring Physician: No ref. provider found   Cardiologist: Sabina Ignacio MD  Provider: Nicole Burroughs  Clinician: Joe Valencia, MS, [facility] [place]    Dx:   Encounter Diagnosis   Name Primary? • S/P CABG (coronary artery bypass graft) Yes     Date of onset: 3/1/2023      SUMMARY OF PROGRESS:  Discharge note for Laron Lesches. He did very well in cardiac rehab and has had great outcomes. He had 74.4% improvement in functional capacity increasing His max METs in the submaximal TM ETT  from 4.3 to 7.5 with test termination of RHR +30. His exercise tolerance (max METs in tolerated in cardiac rehab) increased by 112.5%. He had a 72.9% improvement in the [facility] activity estimated MET level with ADLs and physical activity. His [facility] QOL improved by 35.3%. PHQ-9 score decreased from 9 to 2. ALEXANDRA-7 decreased from 1 to 0. His weight decreased by 6 pounds. Rate Your Plate score improved from 61 to 63. Amy Zurita has been supplementing CR sessions with home exercise which includes walking 3 days/week for ~25 mins. He reports increased stamina, strength and reduced SOB with activity. Suleman tolerates 45-50 mins at 5.1 - 8.5 METs plus wt training. NSR with occ PVCs on telemetry. RHR 61 - 77, ExHR 120 - 140. Resting /72 - 158/86 with appropriate response to exercise reaching 130/80 - 168/90. Rom's BP's have been higher at start of rehab when he is taking the stairs or parking further from the entrance of rehab. All group education classes on cardiac risk factor modification were attended by the patient. Discharge plans include continuing to walk at home, use TM and total gym. Amy Zurita is also going to look into going to the gym to stay with his routine. Encouraged Pt to continue exercise.  Frequency: 4-6 days/wk, Intensity: RPE 4-5, Time: 40-50 mins daily, 150-200 mins/wk. Pt was encouraged to continue eating heart healthy. Pt was encouraged to remain compliant with medications and f/u with cardiologist with any cardiac symptoms, medication management and updated lipid profile. Medication compliance: Yes   Comments: Pt reports to be compliant with medications  Fall Risk: Low   Comments: Ambulates with a steady gait with no assist device    EKG Interpretation: NSR with occ PVCs      EXERCISE ASSESSMENT and PLAN    Exercise Prescription:      Frequency: 3 days/week   Supplement with home exercise 2+ days/wk as tolerated     Minutes: 45-50       METS: 5.1-8.5            HR: 120-140   RPE: 4-6         Modalities: Treadmill, Airdyne bike, UBE, Lifecycle, Elliptical and NuStep     Exercise Progression after Discharge:    Frequency: 3-5 days of gym or home exercise   Minutes: 30-50  >150 mins/wk of moderate intensity exercise   METS: 5.0 - 9.0   HR: 110 - 140    RPE: 4-6   Modalities: Treadmill, Airdyne bike, Lifecycle, Elliptical, Rower and NuStep       Strength trainin-3 days / week  12-15 repetitions  1-2 sets per modality    Modalities: Chest Press, Pull Downs, Arm Curl, Seated Row and Squats    Home Exercise: Type: walking, Frequency: 3 days/week, Duration: 25 mins    Goals: 10% improvement in functional capacity - based on max METs achieved in fitness assessment, improved DASI score by 10%, Increase in exercise capacity by 40% - based on peak METs tolerated in cardiac rehab exercise session, Exercise 5 days/wk, >150mins/wk of moderate intensity exercise, Resume ADLs with increased strength, Attend Rehab regularly, Decrease sitting time, Start a walking program and start a home exercise program    Progression Toward Goals:  Goals met: home exercise, increased peak METs in rehab by 112.5%, increased max METs in fitness assessment by 74.4%, DASI increased by 72.9%. , Patient will be encouraged to focus on lifestyle modifications following discharge. Education: benefit of exercise for CAD risk factors, home exercise guidelines, AHA guidelines to achieve >150 mins/wk of moderate exercise, RPE scale, class: Risk Factors for Heart Disease, exercise instructions/guidelines for discharge  and physical activity/exercise in extreme weather conditions   Plan:education on home exercise guidelines  Readiness to change: Maintenance: (Maintaining the behavior change)      NUTRITION ASSESSMENT AND PLAN    Weight control:    Starting weight: 199.4 lbs   Current weight:   193.6 lbs     Diabetes: N/A  A1c: 5.6    last measured: 3/3/23    Lipid management: Last lipid profile 7/10/23  Chol 121  TRG 66  HDL 44  LDL 64    Goals:LDL <100, CHOL <200, reduce portion sizes of meat to 3oz or less, increase intake of fish, shellfish, increase intake of meatless meals, eat 3 or more servings of whole grains a day, Eat 4-5 cups of fruits and vegetables daily and eliminate or choose low-fat sweets    Measurable goals were based Rate Your Plate Dietary Self-Assessment. These are the areas in which the patient could score higher on the assessment. Goals include recommendations for a heart healthy diet based on American Heart Association. Progression Toward Goals: Goals met: improved lipid profile, weight loss of 6 lbs, Rate Your Plate score increased. , Patient will be encouraged to focus on lifestyle modifications following discharge.     Education: heart healthy eating  low sodium diet  hydration  nutrition for  lipid management  wt. loss   portion control  education class: Heart Healthy Eating  education class:  Label Reading  Plan: replace refined grain bread with whole grain bread, replace unhealthy snacks with fruits & vegs, reduce portion sizes, will try new grains like brown rice, quinoa, farro and learn how to read food labels  Readiness to change: Maintenance: (Maintaining the behavior change)      PSYCHOSOCIAL ASSESSMENT AND PLAN    Emotional:  Depression assessment:  PHQ-9 = 2  1-4 = Minimal Depression            Anxiety measure:  ALEXANDRA-7 = 1  0-4  = Not anxious  Self-reported stress level:  2  Social support: Patient reports excellent emotional/social support from family/significant other    Goals:  improved positive thoughts of well being, increased energy and decreased frustration    Progression Toward Goals: Goals met: PHQ-9 and ALEXANDRA-7 scores improved, patient feeling better, and [place] increased by 35.3%. , Patient will be encouraged to focus on lifestyle modifications following discharge. Education: benefits of a positive support system, stress management techniques, class:  Stress and Your Health  and class:  Relaxation  Plan: Exercise and Spend time outdoors  Readiness to change: Maintenance: (Maintaining the behavior change)      OTHER CORE COMPONENTS     Tobacco:   Social History     Tobacco Use   Smoking Status Former   • Packs/day: 0.25   • Years: 6.00   • Total pack years: 1.50   • Types: Cigarettes   • Quit date:    • Years since quittin.5   Smokeless Tobacco Never       Tobacco Use Intervention:   N/A: Pt has a remote history of smoking    Anginal Symptoms:  chest pressure   NTG use: No prescription    Blood pressure:    Restin/72 - 158/86   Exercise: 130/80 - 168/90    Goals: consistent BP < 130/80, reduced dietary sodium <2300mg, moderate intensity exercise >150 mins/wk and medication compliance    Progression Toward Goals: Goals not met: BP elevated at times when he is taking the stairs or parking further from the entrance of rehab.  , Goals met: hydrating and watching sodium. , Patient will be encouraged to focus on lifestyle modifications following discharge.     Education:  understanding high blood pressure and it's relationship to CAD, low sodium diet and HTN, Education class:  Common Heart Medications and Education class: Understanding Heart Disease  Plan: Avoid Processed foods, engage in regular exercise and check labels for sodium content  Readiness to change: Maintenance: (Maintaining the behavior change)

## 2023-07-17 ENCOUNTER — HOSPITAL ENCOUNTER (OUTPATIENT)
Dept: ULTRASOUND IMAGING | Facility: HOSPITAL | Age: 53
Discharge: HOME | End: 2023-07-17
Payer: COMMERCIAL

## 2023-07-17 DIAGNOSIS — R79.89 ABNORMAL LFTS: ICD-10-CM

## 2023-07-17 PROCEDURE — 76705 ECHO EXAM OF ABDOMEN: CPT

## 2023-07-18 ENCOUNTER — TELEPHONE (OUTPATIENT)
Dept: OBGYN CLINIC | Facility: CLINIC | Age: 53
End: 2023-07-18

## 2023-07-20 ENCOUNTER — TELEPHONE (OUTPATIENT)
Dept: OBGYN CLINIC | Facility: CLINIC | Age: 53
End: 2023-07-20

## 2023-07-21 ENCOUNTER — TELEPHONE (OUTPATIENT)
Dept: OBGYN CLINIC | Facility: CLINIC | Age: 53
End: 2023-07-21

## 2023-07-24 ENCOUNTER — TELEPHONE (OUTPATIENT)
Dept: OBGYN CLINIC | Facility: CLINIC | Age: 53
End: 2023-07-24

## 2023-08-24 ENCOUNTER — TELEPHONE (OUTPATIENT)
Age: 53
End: 2023-08-24

## 2023-08-24 NOTE — TELEPHONE ENCOUNTER
CLM on VM to send picture via [facility] and to CB for Dr Nika Gillis [employer]aggers.

## 2023-08-24 NOTE — TELEPHONE ENCOUNTER
Can he please send in clinical images? If it is no longer bothersome can certainly hold off on surgical intervention.

## 2023-08-24 NOTE — TELEPHONE ENCOUNTER
Caller: Patient     Doctor: Loulou Calvin     Reason for call: Missed call about scheduling post op I see no message in chart. Patient states the bump that was looked at a few weeks ago he states that it is now a lot smaller and the black julita you could see before is not there anymore. Wanting to see if surgery would still be needed ?        Please call patient back     Call back#: [phone]

## 2023-08-25 ENCOUNTER — TELEPHONE (OUTPATIENT)
Age: 53
End: 2023-08-25

## 2023-08-25 NOTE — TELEPHONE ENCOUNTER
Caller: Patient     Doctor: Mehrdad Moses     Reason for call: Patient missed call, patient was warm transferred to nurse

## 2023-08-25 NOTE — TELEPHONE ENCOUNTER
[address] sent to pt with Dr Bernal's response. Pt can either call or msg via [facility] with his decision re: surgery-to cancel or not to cancel! Janice Betancourt

## 2023-08-25 NOTE — TELEPHONE ENCOUNTER
Pt returned call and states sent pictures via [facility]. Pt states it is almost gone. Will wait to cancel surgery until hears back from Dr Herman Cazares. Please advise via phone call or [facility] [address] per pt request. Thank you.

## 2023-08-29 ENCOUNTER — TELEPHONE (OUTPATIENT)
Dept: OBGYN CLINIC | Facility: CLINIC | Age: 53
End: 2023-08-29

## 2023-08-29 NOTE — TELEPHONE ENCOUNTER
Caller: Patient    Doctor: Dr. Kamryn Naidu    Reason for call: Patient calling stating that he would like to wait a week and he will call back if cyst comes back to make decision on surgery.      Call back#: [phone]

## 2023-09-18 ENCOUNTER — TELEPHONE (OUTPATIENT)
Dept: OTHER | Facility: OTHER | Age: 53
End: 2023-09-18

## 2023-09-18 NOTE — TELEPHONE ENCOUNTER
Pt. Called to cancel his appointment for today 9/18/2023 at [facility].  He will be calling back to reschedule his appointment

## 2023-09-21 DIAGNOSIS — Z95.1 S/P CABG (CORONARY ARTERY BYPASS GRAFT): ICD-10-CM

## 2023-09-21 DIAGNOSIS — I24.9 ACS (ACUTE CORONARY SYNDROME) (HCC): ICD-10-CM

## 2023-09-21 DIAGNOSIS — E78.2 HYPERLIPIDEMIA, MIXED: ICD-10-CM

## 2023-09-21 DIAGNOSIS — I25.10 CAD IN NATIVE ARTERY: ICD-10-CM

## 2023-09-21 DIAGNOSIS — I10 ESSENTIAL HYPERTENSION: ICD-10-CM

## 2023-09-22 ENCOUNTER — TELEPHONE (OUTPATIENT)
Dept: OBGYN CLINIC | Facility: CLINIC | Age: 53
End: 2023-09-22

## 2023-09-22 RX ORDER — AMLODIPINE BESYLATE 2.5 MG/1
2.5 TABLET ORAL DAILY
Qty: 30 TABLET | Refills: 2 | Status: SHIPPED | OUTPATIENT
Start: 2023-09-22

## 2023-09-25 ENCOUNTER — TELEPHONE (OUTPATIENT)
Dept: OBGYN CLINIC | Facility: CLINIC | Age: 53
End: 2023-09-25

## 2023-10-11 DIAGNOSIS — I25.10 CAD IN NATIVE ARTERY: ICD-10-CM

## 2023-10-11 DIAGNOSIS — I24.9 ACS (ACUTE CORONARY SYNDROME) (HCC): ICD-10-CM

## 2023-10-11 DIAGNOSIS — E78.2 HYPERLIPIDEMIA, MIXED: ICD-10-CM

## 2023-10-11 DIAGNOSIS — Z95.1 S/P CABG (CORONARY ARTERY BYPASS GRAFT): ICD-10-CM

## 2023-10-11 DIAGNOSIS — I10 ESSENTIAL HYPERTENSION: ICD-10-CM

## 2023-10-11 RX ORDER — AMLODIPINE BESYLATE 2.5 MG/1
2.5 TABLET ORAL DAILY
Qty: 60 TABLET | Refills: 3 | Status: SHIPPED | OUTPATIENT
Start: 2023-10-11

## 2023-10-11 RX ORDER — ATORVASTATIN CALCIUM 80 MG/1
80 TABLET, FILM COATED ORAL
Qty: 60 TABLET | Refills: 3 | Status: SHIPPED | OUTPATIENT
Start: 2023-10-11

## 2023-11-08 ENCOUNTER — OFFICE VISIT (OUTPATIENT)
Dept: GASTROENTEROLOGY | Facility: CLINIC | Age: 53
End: 2023-11-08
Payer: COMMERCIAL

## 2023-11-08 VITALS
BODY MASS INDEX: 28.92 KG/M2 | WEIGHT: 202 LBS | OXYGEN SATURATION: 98 % | SYSTOLIC BLOOD PRESSURE: 153 MMHG | HEART RATE: 62 BPM | DIASTOLIC BLOOD PRESSURE: 98 MMHG | HEIGHT: 70 IN

## 2023-11-08 DIAGNOSIS — Z12.11 SCREENING FOR COLON CANCER: Primary | ICD-10-CM

## 2023-11-08 PROCEDURE — 99203 OFFICE O/P NEW LOW 30 MIN: CPT | Performed by: INTERNAL MEDICINE

## 2023-11-08 NOTE — PATIENT INSTRUCTIONS
Scheduled date of colonoscopy (as of today):12/12/23  Physician performing colonoscopy:Margot  Location of colonoscopy:[facility]  Bowel prep reviewed with patient:Manuel/Miralax  Instructions reviewed with patient by:Aren duncan  Clearances:  none

## 2023-11-08 NOTE — PROGRESS NOTES
[facility] - Outpatient Consultation  Kirt Baird 48 y.o. male MRN: [id number]  Encounter: [id number]          ASSESSMENT AND PLAN:      1. Screening for colon cancer  Schedule colonoscopy    ______________________________________________________________________    HPI: Susu Smith is a 80-year-old male who was referred for a screening colonoscopy. He has never had a colonoscopy performed in the past.  Earlier this year, he stated that he underwent open heart surgery with four-vessel coronary artery bypass grafting. Significant coronary artery disease was picked up on an abnormal treadmill which resulted in cardiac catheterization the following day and then referral down to [facility] for coronary artery bypass grafting. He denies any current problems at all with chest pain, shortness of breath, palpitations. His open heart surgery went uneventful. There is no family history for colon cancer for colon polyp. He denies any diarrhea, constipation, abdominal pain, bloating, gaseousness, nausea, vomiting, heartburn, dysphagia, odynophagia. He states that he had some rectal bleeding a couple months ago but this has not recurred since then. REVIEW OF SYSTEMS:    CONSTITUTIONAL: Denies any fever, chills, rigors, and weight loss. HEENT: No earache or tinnitus. Denies hearing loss or visual disturbances. CARDIOVASCULAR: No chest pain or palpitations. RESPIRATORY: Denies any cough, hemoptysis, shortness of breath or dyspnea on exertion. GASTROINTESTINAL: As noted in the History of Present Illness. GENITOURINARY: No problems with urination. Denies any hematuria or dysuria. NEUROLOGIC: No dizziness or vertigo, denies headaches. MUSCULOSKELETAL: Denies any muscle or joint pain. SKIN: Denies skin rashes or itching. ENDOCRINE: Denies excessive thirst. Denies intolerance to heat or cold. PSYCHOSOCIAL: Denies depression or anxiety. Denies any recent memory loss.        Historical Information   Past Medical History:   Diagnosis Date    Disseminated herpes zoster 10/16/2019    HLD (hyperlipidemia)     Hypertension     Known health problems: none     Vitamin D deficiency      Past Surgical History:   Procedure Laterality Date    CARDIAC CATHETERIZATION N/A 3/2/2023    Procedure: Cardiac catheterization;  Surgeon: Solange Ramos MD;  Location: [address] [place] [address] CATH LAB; Service: Cardiology    CARDIAC CATHETERIZATION N/A 3/2/2023    Procedure: Cardiac Coronary Angiogram;  Surgeon: Solange Ramos MD;  Location: [facility] CATH LAB;   Service: Cardiology    [place] CORONARY ARTERY BYP W/VEIN & ARTERY GRAFT 4 VEIN N/A 3/8/2023    Procedure: CORONARY ARTERY BYPASS GRAFT (CABG) 4 VESSELS,  SVG to PDA  and OM2.                            r      LRA-->OM1 , LIMA to LAD, evh, hector;  Surgeon: Navjot Dalton DO;  [facility];  Service: Cardiac Surgery    WISDOM TOOTH EXTRACTION       Social History   Social History     Substance and Sexual Activity   Alcohol Use Yes    Comment: Social     Social History     Substance and Sexual Activity   Drug Use Not Currently    Types: Marijuana    Comment: rare marijuana use     Social History     Tobacco Use   Smoking Status Former    Packs/day: 0.25    Years: 6.00    Total pack years: 1.50    Types: Cigarettes    Quit date:     Years since quittin.8   Smokeless Tobacco Never     Family History   Problem Relation Age of Onset    Hypertension Father     Breast cancer Sister     Heart attack Maternal Grandfather     Heart attack Paternal Grandfather         s/p CABG    Coronary artery disease Paternal Uncle     Prostate cancer Paternal Uncle     Coronary artery disease Paternal Uncle         s/p cardiac stent    Prostate cancer Paternal Uncle        Meds/Allergies       Current Outpatient Medications:     amLODIPine (NORVASC) 2.5 mg tablet    aspirin 325 mg tablet    atorvastatin (LIPITOR) 80 mg tablet    metoprolol tartrate (LOPRESSOR) 25 mg tablet    Allergies   Allergen Reactions    Codeine Drowsiness     incapacitates           Objective     Blood pressure 153/98, pulse 62, height 5' 10" (1.778 m), weight 91.6 kg (202 lb), SpO2 98 %. Body mass index is 28.98 kg/m². PHYSICAL EXAM:      General Appearance:   Alert, cooperative, no distress   HEENT:   Normocephalic, atraumatic, anicteric. Neck:  Supple, symmetrical, trachea midline   Lungs:   Clear to auscultation bilaterally; no rales, rhonchi or wheezing; respirations unlabored    Heart[de-identified]   Regular rate and rhythm; no murmur, rub, or gallop. Abdomen:   Soft, non-tender, non-distended; normal bowel sounds; no masses, no organomegaly    Genitalia:   Deferred    Rectal:   Deferred    Extremities:  No cyanosis, clubbing or edema    Pulses:  2+ and symmetric    Skin:  No jaundice, rashes, or lesions    Lymph nodes:  No palpable cervical lymphadenopathy        Lab Results:   No visits with results within 1 Day(s) from this visit. Latest known visit with results is:   Appointment on 07/10/2023   Component Date Value    Sodium 07/10/2023 140     Potassium 07/10/2023 4.0     Chloride 07/10/2023 110 (H)     CO2 07/10/2023 23     ANION GAP 07/10/2023 7     BUN 07/10/2023 6     Creatinine 07/10/2023 0.72     Glucose, Fasting 07/10/2023 108 (H)     Calcium 07/10/2023 8.9     AST 07/10/2023 35     ALT 07/10/2023 57     Alkaline Phosphatase 07/10/2023 139 (H)     Total Protein 07/10/2023 7.7     Albumin 07/10/2023 4.0     Total Bilirubin 07/10/2023 0.50     eGFR 07/10/2023 107     Cholesterol 07/10/2023 121     Triglycerides 07/10/2023 66     HDL, Direct 07/10/2023 44     LDL Calculated 07/10/2023 64          Radiology Results:   No results found.

## 2023-11-15 DIAGNOSIS — Z95.1 S/P CABG (CORONARY ARTERY BYPASS GRAFT): ICD-10-CM

## 2023-11-15 DIAGNOSIS — I10 ESSENTIAL HYPERTENSION: ICD-10-CM

## 2023-11-15 DIAGNOSIS — I24.9 ACS (ACUTE CORONARY SYNDROME) (HCC): ICD-10-CM

## 2023-11-15 DIAGNOSIS — I25.10 CAD IN NATIVE ARTERY: ICD-10-CM

## 2023-11-15 DIAGNOSIS — E78.2 HYPERLIPIDEMIA, MIXED: ICD-10-CM

## 2023-11-22 ENCOUNTER — TELEPHONE (OUTPATIENT)
Dept: CARDIOLOGY CLINIC | Facility: CLINIC | Age: 53
End: 2023-11-22

## 2023-11-22 NOTE — TELEPHONE ENCOUNTER
Sarah from Ortho called today asking whether this patient needs to be seen for a pre-op exam or if just a note can be written. Patient's procedure is scheduled for 1/18 for a cyst removal. Sarah was informed that Dr. Bonita Man will not be back in office until 12/8. She asked for a direct call back once we do receive an answer.     [phone]

## 2023-11-28 DIAGNOSIS — I10 ESSENTIAL HYPERTENSION: ICD-10-CM

## 2023-11-28 DIAGNOSIS — I24.9 ACS (ACUTE CORONARY SYNDROME) (HCC): ICD-10-CM

## 2023-11-28 DIAGNOSIS — I25.10 CAD IN NATIVE ARTERY: ICD-10-CM

## 2023-11-28 DIAGNOSIS — E78.2 HYPERLIPIDEMIA, MIXED: ICD-10-CM

## 2023-11-28 DIAGNOSIS — Z95.1 S/P CABG (CORONARY ARTERY BYPASS GRAFT): ICD-10-CM

## 2023-11-29 RX ORDER — ATORVASTATIN CALCIUM 80 MG/1
80 TABLET, FILM COATED ORAL
Qty: 60 TABLET | Refills: 0 | Status: SHIPPED | OUTPATIENT
Start: 2023-11-29

## 2023-12-05 ENCOUNTER — TELEPHONE (OUTPATIENT)
Age: 53
End: 2023-12-05

## 2023-12-05 ENCOUNTER — TELEPHONE (OUTPATIENT)
Dept: GASTROENTEROLOGY | Facility: CLINIC | Age: 53
End: 2023-12-05

## 2023-12-05 NOTE — TELEPHONE ENCOUNTER
Patients GI provider:  Dr. Gemini Poole     Number to return call: [phone]    Reason for call: Pt called in stating that he will be having surgery in January on his finger and they are requiring him to see Cardio first because of his open heart surgery back in March. He wanted to know if he needs to see Cardio before his upcoming procedure with us. Please reach out to pt to confirm.      Scheduled procedure/appointment date if applicable: procedure 26/52/55

## 2023-12-05 NOTE — TELEPHONE ENCOUNTER
Spoke to pt & he is going to [facility] on 12/15/23 to see Dr Verona Thornton  Pt also stated that he was told when he has his sx, that he has to go to the same hosp he had his heart proc at & he was wondering if he also has to go to the same hosp when he has his Colonoscopy on 12/12/263

## 2023-12-06 NOTE — TELEPHONE ENCOUNTER
Called patient @ number listed as " home " got VM. LMOM with message as per Elizabeth Grove that it is ok to go ahead with the colonoscopy as she reviewed this with Dr Milagros Churchill. Left office # as well.  ( Mailbox was full on cell # )

## 2023-12-06 NOTE — TELEPHONE ENCOUNTER
Pt called in stating that he received 2 phone calls to confirm his appt. One for him and another for another pt by the name of Jose Martin Speaker . He wanted to make sure we were aware so that we can reach out to the other pt for his appt. Also, when confirming his ins. Noticed that it is coming up as [employer]. Advised pt and asked for member id to run it again. Pt stated that he didn't have that at this time but will call the ins to confirm and will call us back.

## 2023-12-11 NOTE — TELEPHONE ENCOUNTER
Patients GI provider:  Dr. Queazda Renato    Number to return call: [phone]    Reason for call: Pt calling to reschedule his 12/12 procedure due to illness.     Scheduled procedure/appointment date if applicable: 5/60/41

## 2023-12-15 ENCOUNTER — OFFICE VISIT (OUTPATIENT)
Dept: CARDIOLOGY CLINIC | Facility: CLINIC | Age: 53
End: 2023-12-15
Payer: COMMERCIAL

## 2023-12-15 VITALS
BODY MASS INDEX: 28.35 KG/M2 | OXYGEN SATURATION: 98 % | SYSTOLIC BLOOD PRESSURE: 122 MMHG | DIASTOLIC BLOOD PRESSURE: 80 MMHG | HEIGHT: 70 IN | RESPIRATION RATE: 16 BRPM | WEIGHT: 198 LBS | HEART RATE: 67 BPM

## 2023-12-15 DIAGNOSIS — E78.2 HYPERLIPEMIA, MIXED: ICD-10-CM

## 2023-12-15 DIAGNOSIS — I25.10 CORONARY ARTERY DISEASE INVOLVING NATIVE CORONARY ARTERY OF NATIVE HEART WITHOUT ANGINA PECTORIS: Primary | ICD-10-CM

## 2023-12-15 DIAGNOSIS — Z01.810 PRE-OPERATIVE CARDIOVASCULAR EXAMINATION: ICD-10-CM

## 2023-12-15 PROCEDURE — 99213 OFFICE O/P EST LOW 20 MIN: CPT | Performed by: INTERNAL MEDICINE

## 2023-12-15 PROCEDURE — 93000 ELECTROCARDIOGRAM COMPLETE: CPT | Performed by: INTERNAL MEDICINE

## 2023-12-15 RX ORDER — ASPIRIN 81 MG/1
81 TABLET, CHEWABLE ORAL DAILY
Start: 2023-12-15

## 2023-12-15 NOTE — PROGRESS NOTES
[facility] [address] [address]  Cardiology Follow Up    Greg Bustamante  1970  [id number]      1. Coronary artery disease involving native coronary artery of native heart without angina pectoris  aspirin 81 mg chewable tablet    POCT ECG      2. Pre-operative cardiovascular examination  POCT ECG      3. Hyperlipemia, mixed            Chief Complaint   Patient presents with    Follow-up       Interval History: This patient presents for preoperative cardiovascular risk assessment. This patient is scheduled to have hand surgery by Dr. Naif Lai. Patient also scheduled to have colonoscopy next month with Dr. Dayana Melendez. Patient denies any chest pain or shortness of breath. No history of leg edema orthopnea PND. No history of presyncope syncope. He states that he has been compliant with all his present medications. Patient Active Problem List   Diagnosis    Essential hypertension    Hyperlipidemia, mixed    Vitamin D deficiency    Low libido    Achilles tendinosis of right lower extremity    Cutaneous skin tags    Overweight (BMI 25.0-29. 9)    Claudication of both lower extremities (HCC)    Varicose veins of both lower extremities with pain    Bilateral leg pain    Bilateral hand numbness    Excessive daytime sleepiness    COVID-19    Arthritis of both hips    Femoroacetabular impingement of left hip    Hamstring tightness of both lower extremities    Weakness of both hips    ACS (acute coronary syndrome) (HCC)    Chest pain    S/P CABG (coronary artery bypass graft)    Acute blood loss as cause of postoperative anemia     Past Medical History:   Diagnosis Date    Disseminated herpes zoster 10/16/2019    HLD (hyperlipidemia)     Hypertension     Known health problems: none     Vitamin D deficiency      Social History     Socioeconomic History    Marital status: /Civil Union     Spouse name: Not on file    Number of children: Not on file    Years of education: Not on file    Highest education level: Not on file   Occupational History    Not on file   Tobacco Use    Smoking status: Former     Current packs/day: 0.00     Average packs/day: 0.3 packs/day for 6.0 years (1.5 ttl pk-yrs)     Types: Cigarettes     Start date: 0     Quit date:      Years since quittin.9    Smokeless tobacco: Never   Vaping Use    Vaping status: Never Used   Substance and Sexual Activity    Alcohol use: Yes     Comment: Social    Drug use: Not Currently     Types: Marijuana     Comment: rare marijuana use    Sexual activity: Not on file   Other Topics Concern    Not on file   Social History Narrative    Not on file     Social Determinants of Health     Financial Resource Strain: Not on file   Food Insecurity: No Food Insecurity (3/5/2023)    Hunger Vital Sign     Worried About Running Out of Food in the Last Year: Never true     Ran Out of Food in the Last Year: Never true   Transportation Needs: No Transportation Needs (3/5/2023)    PRAPARE - Transportation     Lack of Transportation (Medical): No     Lack of Transportation (Non-Medical):  No   Physical Activity: Not on file   Stress: Not on file   Social Connections: Not on file   Intimate Partner Violence: Not on file   Housing Stability: Low Risk  (3/5/2023)    Housing Stability Vital Sign     Unable to Pay for Housing in the Last Year: No     Number of Places Lived in the Last Year: 1     Unstable Housing in the Last Year: No      Family History   Problem Relation Age of Onset    Hypertension Father     Breast cancer Sister     Heart attack Maternal Grandfather     Heart attack Paternal Grandfather         s/p CABG    Coronary artery disease Paternal Uncle     Prostate cancer Paternal Uncle     Coronary artery disease Paternal Uncle         s/p cardiac stent    Prostate cancer Paternal Uncle      Past Surgical History:   Procedure Laterality Date    CARDIAC CATHETERIZATION N/A 3/2/2023    Procedure: Cardiac catheterization;  Surgeon: Gino Mondragon MD;  Location: [facility] CATH LAB; Service: Cardiology    CARDIAC CATHETERIZATION N/A 3/2/2023    Procedure: Cardiac Coronary Angiogram;  Surgeon: Gino Mondragon MD;  Location: [facility] CATH LAB; Service: Cardiology    IN CORONARY ARTERY BYP W/VEIN & ARTERY GRAFT 4 VEIN N/A 3/8/2023    Procedure: CORONARY ARTERY BYPASS GRAFT (CABG) 4 VESSELS,  SVG to PDA  and OM2.                            r      LRA-->OM1 , LIMA to LAD, evh, hector;  Surgeon: Frida Webb DO;  Location: [facility];  Service: Cardiac Surgery    WISDOM TOOTH EXTRACTION         Current Outpatient Medications:     amLODIPine (NORVASC) 2.5 mg tablet, Take 1 tablet (2.5 mg total) by mouth daily, Disp: 60 tablet, Rfl: 3    aspirin 81 mg chewable tablet, Chew 1 tablet (81 mg total) daily, Disp: , Rfl:     atorvastatin (LIPITOR) 80 mg tablet, Take 1 tablet (80 mg total) by mouth daily with dinner, Disp: 60 tablet, Rfl: 0    metoprolol tartrate (LOPRESSOR) 25 mg tablet, Take 0.5 tablets (12.5 mg total) by mouth every 12 (twelve) hours, Disp: 60 tablet, Rfl: 0  Allergies   Allergen Reactions    Codeine Drowsiness     incapacitates       Labs:  No visits with results within 2 Month(s) from this visit. Latest known visit with results is:   Appointment on 07/10/2023   Component Date Value    Sodium 07/10/2023 140     Potassium 07/10/2023 4.0     Chloride 07/10/2023 110 (H)     CO2 07/10/2023 23     ANION GAP 07/10/2023 7     BUN 07/10/2023 6     Creatinine 07/10/2023 0.72     Glucose, Fasting 07/10/2023 108 (H)     Calcium 07/10/2023 8.9     AST 07/10/2023 35     ALT 07/10/2023 57     Alkaline Phosphatase 07/10/2023 139 (H)     Total Protein 07/10/2023 7.7     Albumin 07/10/2023 4.0     Total Bilirubin 07/10/2023 0.50     eGFR 07/10/2023 107     Cholesterol 07/10/2023 121     Triglycerides 07/10/2023 66     HDL, Direct 07/10/2023 44     LDL Calculated 07/10/2023 64      Imaging: No results found.     Review of Systems:  Review of Systems  REVIEW OF SYSTEMS:  Constitutional:  Denies fever or chills   Eyes:  Denies change in visual acuity   HENT:  Denies nasal congestion or sore throat   Respiratory:  Denies cough or shortness of breath   Cardiovascular:  Denies chest pain or edema   GI:  Denies abdominal pain, nausea, vomiting, bloody stools or diarrhea   :  Denies dysuria, frequency, difficulty in micturition and nocturia  Musculoskeletal:  Denies back pain or joint pain   Neurologic:  Denies headache, focal weakness or sensory changes   Endocrine:  Denies polyuria or polydipsia   Lymphatic:  Denies swollen glands   Psychiatric:  Denies depression or anxiety    Physical Exam:    /80 (BP Location: Right arm, Patient Position: Sitting, Cuff Size: Standard) Comment (BP Location): L artery removed  Pulse 67   Resp 16   Ht 5' 10" (1.778 m)   Wt 89.8 kg (198 lb)   SpO2 98%   BMI 28.41 kg/m²     Physical Exam  PHYSICAL EXAM:  General:  Patient is not in acute distress   Head: Normocephalic, Atraumatic. HEENT:  Both pupils normal-size atraumatic, normocephalic, nonicteric  Neck:  JVP not raised. Trachea central. No carotid bruit  Respiratory:  normal breath sounds no crackles. no rhonchi  Cardiovascular:  Regular rate and rhythm no S3 no murmurs  GI:  Abdomen soft nontender. No organomegaly. Lymphatic:  No cervical or inguinal lymphadenopathy  Neurologic:  Patient is awake alert, oriented . Grossly nonfocal  Extremities no edema    EKG shows sinus rhythm inferior infarct age indeterminate. No acute changes noted. Discussion/Summary:  Patient overall doing well from a cardiovascular standpoint. Patient has no specific contraindication from cardiac standpoint to proceed with the planned hand surgery as well as colonoscopy. Patient presents low cardiac risk. Symptoms to watch out from cardiac standpoint which would indicate the need for further cardiac evaluation discussed with patient.   Medications reviewed. Decrease the dosage of aspirin to 81 mg p.o. daily. Follow-up in 6 months or earlier as needed. Patient is agreeable with the plan of care.

## 2023-12-15 NOTE — LETTER
December 15, 2023     Abigail Montiel MD  [address] [facility] [address]  [address] [facility]    Patient: Coleman Forrester   YOB: 1970   Date of Visit: 12/15/2023       Dear Dr. Carl Miller: Thank you for referring Coleman Forrester to me for evaluation. Below are my notes for this consultation. If you have questions, please do not hesitate to call me. I look forward to following your patient along with you. Sincerely,        Maco Lizarraga MD        CC: No Recipients    Maco Lizarraga MD  12/15/2023  6:49 PM  Incomplete  [facility] [address] [address] [facility] [address]  Cardiology Follow Up    Coleman Forrester  1970  [id number]      1. Coronary artery disease involving native coronary artery of native heart without angina pectoris  aspirin 81 mg chewable tablet    POCT ECG      2. Pre-operative cardiovascular examination  POCT ECG      3. Hyperlipemia, mixed            Chief Complaint   Patient presents with   • Follow-up       Interval History: This patient presents for preoperative cardiovascular risk assessment. This patient is scheduled to have hand surgery by Dr. Carl Miller. Patient also scheduled to have colonoscopy next month with Dr. Pilar Gross. Patient denies any chest pain or shortness of breath. No history of leg edema orthopnea PND. No history of presyncope syncope. He states that he has been compliant with all his present medications. Patient Active Problem List   Diagnosis   • Essential hypertension   • Hyperlipidemia, mixed   • Vitamin D deficiency   • Low libido   • Achilles tendinosis of right lower extremity   • Cutaneous skin tags   • Overweight (BMI 25.0-29. 9)   • Claudication of both lower extremities (HCC)   • Varicose veins of both lower extremities with pain   • Bilateral leg pain   • Bilateral hand numbness   • Excessive daytime sleepiness   • COVID-19   • Arthritis of both hips   • Femoroacetabular impingement of left hip   • Hamstring tightness of both lower extremities   • Weakness of both hips   • ACS (acute coronary syndrome) (HCC)   • Chest pain   • S/P CABG (coronary artery bypass graft)   • Acute blood loss as cause of postoperative anemia     Past Medical History:   Diagnosis Date   • Disseminated herpes zoster 10/16/2019   • HLD (hyperlipidemia)    • Hypertension    • Known health problems: none    • Vitamin D deficiency      Social History     Socioeconomic History   • Marital status: /Civil Union     Spouse name: Not on file   • Number of children: Not on file   • Years of education: Not on file   • Highest education level: Not on file   Occupational History   • Not on file   Tobacco Use   • Smoking status: Former     Current packs/day: 0.00     Average packs/day: 0.3 packs/day for 6.0 years (1.5 ttl pk-yrs)     Types: Cigarettes     Start date: 0     Quit date:      Years since quittin.9   • Smokeless tobacco: Never   Vaping Use   • Vaping status: Never Used   Substance and Sexual Activity   • Alcohol use: Yes     Comment: Social   • Drug use: Not Currently     Types: Marijuana     Comment: rare marijuana use   • Sexual activity: Not on file   Other Topics Concern   • Not on file   Social History Narrative   • Not on file     Social Determinants of Health     Financial Resource Strain: Not on file   Food Insecurity: No Food Insecurity (3/5/2023)    Hunger Vital Sign    • Worried About Running Out of Food in the Last Year: Never true    • Ran Out of Food in the Last Year: Never true   Transportation Needs: No Transportation Needs (3/5/2023)    PRAPARE - Transportation    • Lack of Transportation (Medical): No    • Lack of Transportation (Non-Medical):  No   Physical Activity: Not on file   Stress: Not on file   Social Connections: Not on file   Intimate Partner Violence: Not on file   Housing Stability: Low Risk  (3/5/2023)    Housing Stability Vital Sign    • Unable to Pay for Housing in the Last Year: No    • Number of Places Lived in the Last Year: 1    • Unstable Housing in the Last Year: No      Family History   Problem Relation Age of Onset   • Hypertension Father    • Breast cancer Sister    • Heart attack Maternal Grandfather    • Heart attack Paternal Grandfather         s/p CABG   • Coronary artery disease Paternal Uncle    • Prostate cancer Paternal Uncle    • Coronary artery disease Paternal Uncle         s/p cardiac stent   • Prostate cancer Paternal Uncle      Past Surgical History:   Procedure Laterality Date   • CARDIAC CATHETERIZATION N/A 3/2/2023    Procedure: Cardiac catheterization;  Surgeon: Ferny Dyer MD;  Location: [address] CATH LAB; Service: Cardiology   • CARDIAC CATHETERIZATION N/A 3/2/2023    Procedure: Cardiac Coronary Angiogram;  Surgeon: Ferny Dyer MD;  Location: [address] CATH LAB; Service: Cardiology   • NM CORONARY ARTERY BYP W/VEIN & ARTERY GRAFT 4 VEIN N/A 3/8/2023    Procedure: CORONARY ARTERY BYPASS GRAFT (CABG) 4 VESSELS,  SVG to PDA  and OM2.                            r      LRA-->OM1 , LIMA to LAD, evh, hector;  Surgeon: Dima Carey DO;  [facility];  Service: Cardiac Surgery   • WISDOM TOOTH EXTRACTION         Current Outpatient Medications:   •  amLODIPine (NORVASC) 2.5 mg tablet, Take 1 tablet (2.5 mg total) by mouth daily, Disp: 60 tablet, Rfl: 3  •  aspirin 81 mg chewable tablet, Chew 1 tablet (81 mg total) daily, Disp: , Rfl:   •  atorvastatin (LIPITOR) 80 mg tablet, Take 1 tablet (80 mg total) by mouth daily with dinner, Disp: 60 tablet, Rfl: 0  •  metoprolol tartrate (LOPRESSOR) 25 mg tablet, Take 0.5 tablets (12.5 mg total) by mouth every 12 (twelve) hours, Disp: 60 tablet, Rfl: 0  Allergies   Allergen Reactions   • Codeine Drowsiness     incapacitates       Labs:  No visits with results within 2 Month(s) from this visit.    Latest known visit with results is:   Appointment on 07/10/2023   Component Date Value   • Sodium 07/10/2023 140 • Potassium 07/10/2023 4.0    • Chloride 07/10/2023 110 (H)    • CO2 07/10/2023 23    • ANION GAP 07/10/2023 7    • BUN 07/10/2023 6    • Creatinine 07/10/2023 0.72    • Glucose, Fasting 07/10/2023 108 (H)    • Calcium 07/10/2023 8.9    • AST 07/10/2023 35    • ALT 07/10/2023 57    • Alkaline Phosphatase 07/10/2023 139 (H)    • Total Protein 07/10/2023 7.7    • Albumin 07/10/2023 4.0    • Total Bilirubin 07/10/2023 0.50    • eGFR 07/10/2023 107    • Cholesterol 07/10/2023 121    • Triglycerides 07/10/2023 66    • HDL, Direct 07/10/2023 44    • LDL Calculated 07/10/2023 64      Imaging: No results found. Review of Systems:  Review of Systems  REVIEW OF SYSTEMS:  Constitutional:  Denies fever or chills   Eyes:  Denies change in visual acuity   HENT:  Denies nasal congestion or sore throat   Respiratory:  Denies cough or shortness of breath   Cardiovascular:  Denies chest pain or edema   GI:  Denies abdominal pain, nausea, vomiting, bloody stools or diarrhea   :  Denies dysuria, frequency, difficulty in micturition and nocturia  Musculoskeletal:  Denies back pain or joint pain   Neurologic:  Denies headache, focal weakness or sensory changes   Endocrine:  Denies polyuria or polydipsia   Lymphatic:  Denies swollen glands   Psychiatric:  Denies depression or anxiety    Physical Exam:    /80 (BP Location: Right arm, Patient Position: Sitting, Cuff Size: Standard) Comment (BP Location): L artery removed  Pulse 67   Resp 16   Ht 5' 10" (1.778 m)   Wt 89.8 kg (198 lb)   SpO2 98%   BMI 28.41 kg/m²     Physical Exam  PHYSICAL EXAM:  General:  Patient is not in acute distress   Head: Normocephalic, Atraumatic. HEENT:  Both pupils normal-size atraumatic, normocephalic, nonicteric  Neck:  JVP not raised. Trachea central. No carotid bruit  Respiratory:  normal breath sounds no crackles. no rhonchi  Cardiovascular:  Regular rate and rhythm no S3 no murmurs  GI:  Abdomen soft nontender. No organomegaly. Lymphatic:  No cervical or inguinal lymphadenopathy  Neurologic:  Patient is awake alert, oriented . Grossly nonfocal  Extremities no edema    EKG shows sinus rhythm inferior infarct age indeterminate. No acute changes noted. Discussion/Summary:  Patient overall doing well from a cardiovascular standpoint. Patient has no specific contraindication from cardiac standpoint to proceed with the planned hand surgery as well as colonoscopy. Patient presents low cardiac risk. Symptoms to watch out from cardiac standpoint which would indicate the need for further cardiac evaluation discussed with patient. Medications reviewed. Decrease the dosage of aspirin to 81 mg p.o. daily. Follow-up in 6 months or earlier as needed. Patient is agreeable with the plan of care.

## 2023-12-27 ENCOUNTER — TELEPHONE (OUTPATIENT)
Dept: OTHER | Facility: OTHER | Age: 53
End: 2023-12-27

## 2023-12-27 NOTE — TELEPHONE ENCOUNTER
Patient is calling regarding cancelling an appointment.    Date/Time:12/27/2023 9:00 am    Patient was rescheduled: YES [] NO [x]    Patient requesting call back to reschedule: YES [x] NO []

## 2023-12-28 ENCOUNTER — TELEPHONE (OUTPATIENT)
Dept: OBGYN CLINIC | Facility: CLINIC | Age: 53
End: 2023-12-28

## 2024-01-16 ENCOUNTER — TELEPHONE (OUTPATIENT)
Dept: OBGYN CLINIC | Facility: CLINIC | Age: 54
End: 2024-01-16

## 2024-01-24 ENCOUNTER — TELEPHONE (OUTPATIENT)
Dept: GASTROENTEROLOGY | Facility: CLINIC | Age: 54
End: 2024-01-24

## 2024-01-24 NOTE — TELEPHONE ENCOUNTER
Pt called to r/s cx procedure from yesterday. Pt r/s for 2/8 with Dr. Frost in [facility]. Pt has instructions.

## 2024-01-30 DIAGNOSIS — Z95.1 S/P CABG (CORONARY ARTERY BYPASS GRAFT): ICD-10-CM

## 2024-01-30 DIAGNOSIS — I24.9 ACS (ACUTE CORONARY SYNDROME) (HCC): ICD-10-CM

## 2024-01-30 DIAGNOSIS — I25.10 CAD IN NATIVE ARTERY: ICD-10-CM

## 2024-01-30 DIAGNOSIS — I10 ESSENTIAL HYPERTENSION: ICD-10-CM

## 2024-01-30 DIAGNOSIS — E78.2 HYPERLIPIDEMIA, MIXED: ICD-10-CM

## 2024-02-05 ENCOUNTER — OFFICE VISIT (OUTPATIENT)
Dept: FAMILY MEDICINE CLINIC | Facility: CLINIC | Age: 54
End: 2024-02-05
Payer: COMMERCIAL

## 2024-02-05 VITALS
SYSTOLIC BLOOD PRESSURE: 146 MMHG | DIASTOLIC BLOOD PRESSURE: 86 MMHG | WEIGHT: 202 LBS | TEMPERATURE: 98.2 F | HEART RATE: 76 BPM | BODY MASS INDEX: 28.92 KG/M2 | HEIGHT: 70 IN | OXYGEN SATURATION: 99 %

## 2024-02-05 DIAGNOSIS — E78.2 HYPERLIPIDEMIA, MIXED: ICD-10-CM

## 2024-02-05 DIAGNOSIS — I25.10 CORONARY ARTERY DISEASE INVOLVING NATIVE HEART WITHOUT ANGINA PECTORIS, UNSPECIFIED VESSEL OR LESION TYPE: ICD-10-CM

## 2024-02-05 DIAGNOSIS — Z12.5 SCREENING FOR PROSTATE CANCER: ICD-10-CM

## 2024-02-05 DIAGNOSIS — I10 ESSENTIAL HYPERTENSION: Primary | ICD-10-CM

## 2024-02-05 PROCEDURE — 99214 OFFICE O/P EST MOD 30 MIN: CPT | Performed by: PHYSICIAN ASSISTANT

## 2024-02-05 NOTE — PROGRESS NOTES
Name: Suleman Zhang      : 1970      MRN: [id number]  Encounter Provider: Lele Coughlin PA-C  Encounter Date: 2024   Encounter department: [facility]    Assessment & Plan     1. Essential hypertension  -     Comprehensive metabolic panel; Future    2. Coronary artery disease involving native heart without angina pectoris, unspecified vessel or lesion type    3. Hyperlipidemia, mixed  -     Lipid Panel With Direct LDL; Future    4. Screening for prostate cancer  -     PSA, Total Screen; Future    BP above goal today, within goal at home. Continue same regimen and encouraged ambulatory monitoring for goal <140/90. Continue asa, statin. No cardiac complaints. Unremarkable exam. 6 month follow up with labs, earlier prn       Subjective     Pt presents for follow up    HTN: on metoprolol and amlodipine, high today 146/86 though routinely normal at home per pt. 120s-130s/70s. No end organ complaints  CAD, hx CABG. Remains on asa, statin, BB, amlodipine. No cardiac complaints. Follows with cardiology  Colonoscopy planned for Thursday       Review of Systems   Constitutional:  Negative for chills, fatigue and fever.   HENT:  Negative for congestion, ear pain, hearing loss, nosebleeds, postnasal drip, rhinorrhea, sinus pressure, sinus pain, sneezing and sore throat.    Eyes:  Negative for pain, discharge, itching and visual disturbance.   Respiratory:  Negative for cough, chest tightness, shortness of breath and wheezing.    Cardiovascular:  Negative for chest pain, palpitations and leg swelling.   Gastrointestinal:  Negative for abdominal pain, blood in stool, constipation, diarrhea, nausea and vomiting.   Genitourinary:  Negative for frequency and urgency.   Neurological:  Negative for dizziness, light-headedness and numbness.       Past Medical History:   Diagnosis Date   • Disseminated herpes zoster 10/16/2019   • HLD (hyperlipidemia)    • Hypertension    • Known health problems:  none    • Vitamin D deficiency      Past Surgical History:   Procedure Laterality Date   • CARDIAC CATHETERIZATION N/A 3/2/2023    Procedure: Cardiac catheterization;  Surgeon: Yodit Small MD;  Location: [facility];  Service: Cardiology   • CARDIAC CATHETERIZATION N/A 3/2/2023    Procedure: Cardiac Coronary Angiogram;  Surgeon: Yodit Small MD;  Location: [facility];  Service: Cardiology   • [place] CORONARY ARTERY BYP W/VEIN & ARTERY GRAFT 4 VEIN N/A 3/8/2023    Procedure: CORONARY ARTERY BYPASS GRAFT (CABG) 4 VESSELS,  SVG to PDA  and OM2.                            r      LRA-->OM1 , LIMA to LAD, evh, hector;  Surgeon: Darrell Jiménez DO;  Location: [facility];  Service: Cardiac Surgery   • WISDOM TOOTH EXTRACTION       Family History   Problem Relation Age of Onset   • Hypertension Father    • Breast cancer Sister    • Heart attack Maternal Grandfather    • Heart attack Paternal Grandfather         s/p CABG   • Coronary artery disease Paternal Uncle    • Prostate cancer Paternal Uncle    • Coronary artery disease Paternal Uncle         s/p cardiac stent   • Prostate cancer Paternal Uncle      Social History     Socioeconomic History   • Marital status: /Civil Union     Spouse name: None   • Number of children: None   • Years of education: None   • Highest education level: None   Occupational History   • None   Tobacco Use   • Smoking status: Former     Current packs/day: 0.00     Average packs/day: 0.3 packs/day for 6.0 years (1.5 ttl pk-yrs)     Types: Cigarettes     Start date:      Quit date: 2003     Years since quittin.1   • Smokeless tobacco: Never   Vaping Use   • Vaping status: Never Used   Substance and Sexual Activity   • Alcohol use: Yes     Comment: Social   • Drug use: Not Currently     Types: Marijuana     Comment: rare marijuana use   • Sexual activity: None   Other Topics Concern   • None   Social History Narrative   • None     Social Determinants of Health  "    Financial Resource Strain: Not on file   Food Insecurity: No Food Insecurity (3/5/2023)    Hunger Vital Sign    • Worried About Running Out of Food in the Last Year: Never true    • Ran Out of Food in the Last Year: Never true   Transportation Needs: No Transportation Needs (3/5/2023)    PRAPARE - Transportation    • Lack of Transportation (Medical): No    • Lack of Transportation (Non-Medical): No   Physical Activity: Not on file   Stress: Not on file   Social Connections: Not on file   Intimate Partner Violence: Not on file   Housing Stability: Low Risk  (3/5/2023)    Housing Stability Vital Sign    • Unable to Pay for Housing in the Last Year: No    • Number of Places Lived in the Last Year: 1    • Unstable Housing in the Last Year: No     Current Outpatient Medications on File Prior to Visit   Medication Sig   • metoprolol tartrate (LOPRESSOR) 25 mg tablet Take 0.5 tablets (12.5 mg total) by mouth every 12 (twelve) hours   • amLODIPine (NORVASC) 2.5 mg tablet Take 1 tablet (2.5 mg total) by mouth daily   • aspirin 81 mg chewable tablet Chew 1 tablet (81 mg total) daily   • atorvastatin (LIPITOR) 80 mg tablet Take 1 tablet (80 mg total) by mouth daily with dinner     Allergies   Allergen Reactions   • Codeine Drowsiness     incapacitates     Immunization History   Administered Date(s) Administered   • Td (adult), Unspecified 07/14/2015   • Tdap 07/14/2015       Objective     /86 (BP Location: Left arm, Patient Position: Sitting, Cuff Size: Large)   Pulse 76   Temp 98.2 °F (36.8 °C)   Ht 5' 10\" (1.778 m)   Wt 91.6 kg (202 lb)   SpO2 99%   BMI 28.98 kg/m²     Physical Exam  Vitals and nursing note reviewed.   Constitutional:       General: He is not in acute distress.     Appearance: Normal appearance.   HENT:      Head: Normocephalic and atraumatic.      Nose: Nose normal.      Mouth/Throat:      Mouth: Mucous membranes are moist.      Pharynx: Oropharynx is clear. No oropharyngeal exudate or " posterior oropharyngeal erythema.   Eyes:      Pupils: Pupils are equal, round, and reactive to light.   Cardiovascular:      Rate and Rhythm: Normal rate and regular rhythm.      Heart sounds: Normal heart sounds.   Pulmonary:      Effort: Pulmonary effort is normal. No respiratory distress.      Breath sounds: Normal breath sounds. No wheezing, rhonchi or rales.   Abdominal:      General: Bowel sounds are normal.      Palpations: Abdomen is soft.   Musculoskeletal:         General: Normal range of motion.      Cervical back: Normal range of motion and neck supple.   Skin:     General: Skin is warm and dry.   Neurological:      Mental Status: He is alert and oriented to person, place, and time.   Psychiatric:         Mood and Affect: Mood and affect normal.       Lele Coughlin PA-C

## 2024-02-08 ENCOUNTER — HOSPITAL ENCOUNTER (OUTPATIENT)
Dept: GASTROENTEROLOGY | Facility: HOSPITAL | Age: 54
Setting detail: OUTPATIENT SURGERY
End: 2024-02-08
Attending: INTERNAL MEDICINE
Payer: COMMERCIAL

## 2024-02-08 ENCOUNTER — ANESTHESIA EVENT (OUTPATIENT)
Dept: GASTROENTEROLOGY | Facility: HOSPITAL | Age: 54
End: 2024-02-08

## 2024-02-08 ENCOUNTER — ANESTHESIA (OUTPATIENT)
Dept: GASTROENTEROLOGY | Facility: HOSPITAL | Age: 54
End: 2024-02-08

## 2024-02-08 VITALS
RESPIRATION RATE: 20 BRPM | TEMPERATURE: 97.5 F | HEIGHT: 70 IN | OXYGEN SATURATION: 99 % | BODY MASS INDEX: 27.84 KG/M2 | DIASTOLIC BLOOD PRESSURE: 77 MMHG | SYSTOLIC BLOOD PRESSURE: 145 MMHG | HEART RATE: 61 BPM | WEIGHT: 194.45 LBS

## 2024-02-08 DIAGNOSIS — Z12.11 SCREENING FOR COLON CANCER: ICD-10-CM

## 2024-02-08 PROCEDURE — G0121 COLON CA SCRN NOT HI RSK IND: HCPCS | Performed by: INTERNAL MEDICINE

## 2024-02-08 RX ORDER — PROPOFOL 10 MG/ML
INJECTION, EMULSION INTRAVENOUS AS NEEDED
Status: DISCONTINUED | OUTPATIENT
Start: 2024-02-08 | End: 2024-02-08

## 2024-02-08 RX ORDER — SODIUM CHLORIDE, SODIUM LACTATE, POTASSIUM CHLORIDE, CALCIUM CHLORIDE 600; 310; 30; 20 MG/100ML; MG/100ML; MG/100ML; MG/100ML
INJECTION, SOLUTION INTRAVENOUS CONTINUOUS PRN
Status: DISCONTINUED | OUTPATIENT
Start: 2024-02-08 | End: 2024-02-08

## 2024-02-08 RX ADMIN — SODIUM CHLORIDE, SODIUM LACTATE, POTASSIUM CHLORIDE, AND CALCIUM CHLORIDE: .6; .31; .03; .02 INJECTION, SOLUTION INTRAVENOUS at 11:42

## 2024-02-08 RX ADMIN — PROPOFOL 30 MG: 10 INJECTION, EMULSION INTRAVENOUS at 11:49

## 2024-02-08 RX ADMIN — PROPOFOL 30 MG: 10 INJECTION, EMULSION INTRAVENOUS at 11:57

## 2024-02-08 RX ADMIN — PROPOFOL 30 MG: 10 INJECTION, EMULSION INTRAVENOUS at 11:47

## 2024-02-08 RX ADMIN — PROPOFOL 150 MG: 10 INJECTION, EMULSION INTRAVENOUS at 11:44

## 2024-02-08 RX ADMIN — PROPOFOL 30 MG: 10 INJECTION, EMULSION INTRAVENOUS at 11:51

## 2024-02-08 RX ADMIN — PROPOFOL 30 MG: 10 INJECTION, EMULSION INTRAVENOUS at 11:54

## 2024-02-08 NOTE — ANESTHESIA PREPROCEDURE EVALUATION
Procedure:  COLONOSCOPY    Relevant Problems   CARDIO   (+) Chest pain   (+) Essential hypertension   (+) Hyperlipidemia, mixed      HEMATOLOGY   (+) Acute blood loss as cause of postoperative anemia      MUSCULOSKELETAL   (+) Arthritis of both hips   (+) Hamstring tightness of both lower extremities      NEURO/PSYCH   (+) Claudication of both lower extremities (HCC)      Other   (+) Overweight (BMI 25.0-29.9)   (+) S/P CABG (coronary artery bypass graft)      LEFT VENTRICLE:  Systolic Function: normal. Ejection Fraction: 65%. Cavity size: normal.        RIGHT VENTRICLE:  Systolic Function: normal.  Cavity size severely dilated. No hypertrophy                   AORTIC VALVE:  Leaflets: normal and trileaflet. Leaflet motions normal and normal. Stenosis: none.     Regurgitation: trace.       MITRAL VALVE:  Leaflets: normal. Leaflet Motions: normal. Regurgitation: mild.   Stenosis: none.        TRICUSPID VALVE:  Leaflets: normal. Leaflet Motions: normal. Stenosis: none. Regurgitation: mild.        PULMONIC VALVE:  Leaflets: normal. Regurgitation: mild. Stenosis: none.           ASCENDING AORTA:  Size:  normal.  Dissection not present.       AORTIC ARCH:  Size:  normal.  dissection not present. Grade 2: severe intimal thickening without protruding atheroma.     DESCENDING AORTA:  Size: normal.  Dissection not present. Grade 2: severe intimal thickening without protruding atheroma.           RIGHT ATRIUM:  Size:  normal.      LEFT ATRIUM:  Size: normal.      LEFT ATRIAL APPENDAGE:  Size: normal.             ATRIAL SEPTUM:  Intra-atrial septal morphology: normal.             VENTRICULAR SEPTUM:  Intra-ventricular septum morphology: normal.            Physical Exam    Airway    Mallampati score: II  TM Distance: >3 FB  Neck ROM: full     Dental       Cardiovascular  Cardiovascular exam normal    Pulmonary  Pulmonary exam normal     Other Findings        Anesthesia Plan  ASA Score- 3     Anesthesia Type- IV sedation with  anesthesia with ASA Monitors.         Additional Monitors:     Airway Plan:            Plan Factors-Exercise tolerance (METS): >4 METS.    Chart reviewed. EKG reviewed. Imaging results reviewed. Existing labs reviewed. Patient summary reviewed.                  Induction- intravenous.    Postoperative Plan-     Informed Consent- Anesthetic plan and risks discussed with patient.  I personally reviewed this patient with the CRNA. Discussed and agreed on the Anesthesia Plan with the CRNA..

## 2024-02-08 NOTE — DISCHARGE INSTRUCTIONS
Colonoscopy   WHAT YOU NEED TO KNOW:   A colonoscopy is a procedure to examine the inside of your colon (intestine) with a scope. Polyps or tissue growths may have been removed during your colonoscopy. It is normal to feel bloated and to have some abdominal discomfort. You should be passing gas. If you have hemorrhoids or you had polyps removed, you may have a small amount of bleeding.        DISCHARGE INSTRUCTIONS:   Seek care immediately if:   You have sudden, severe abdominal pain.     You have problems swallowing.     You have a large amount of black, sticky bowel movements or blood in your bowel movements.     You have sudden trouble breathing.     You feel weak, lightheaded, or faint or your heart beats faster than normal for you.     Contact your healthcare provider if:   You have a fever and chills.      You have nausea or are vomiting.      Your abdomen is bloated or feels full and hard.     You have abdominal pain.   You have black, sticky bowel movements or blood in your bowel movements.  You have not had a bowel movement for 3 days after your procedure.  You have rash or hives.  You have questions or concerns about your procedure.    Activity:   Do not lift, strain, or run for 24 hours after your procedure.     Rest after your procedure. You have been given medicine to relax you. Do not drive or make important decisions until the day after your procedure. Return to your normal activity as directed.     Relieve gas and discomfort from bloating by lying on your right side with a heating pad on your abdomen. You may need to take short walks to help the gas move out. Eat small meals until bloating is relieved.  Follow up with your healthcare provider as directed: Write down your questions so you remember to ask them during your visits.     If you take a “blood thinner”, please review the specific instructions from your endoscopist about when you should resume it. These can be found in the “Recommendation”  and “Your Medication list” sections of this After Visit Summary.

## 2024-02-08 NOTE — ANESTHESIA POSTPROCEDURE EVALUATION
Post-Op Assessment Note    CV Status:  Stable  Pain Score: 0    Pain management: adequate       Mental Status:  Alert and awake   Hydration Status:  Euvolemic   PONV Controlled:  Controlled   Airway Patency:  Patent     Post Op Vitals Reviewed: Yes    No anethesia notable event occurred.    Staff: CRNA               BP      Temp      Pulse     Resp      SpO2      vss

## 2024-02-08 NOTE — H&P
History and Physical -  [facility]  Suleman Zhang 53 y.o. male MRN: [id number]      HPI: Suleman Zhang is a 53 y.o. year old male who presents for screening colonoscopy in addition to a family history for colon cancer      REVIEW OF SYSTEMS: Per the HPI, and otherwise unremarkable.    Historical Information   Past Medical History:   Diagnosis Date    Disseminated herpes zoster 10/16/2019    HLD (hyperlipidemia)     Hypertension     Known health problems: none     Vitamin D deficiency      Past Surgical History:   Procedure Laterality Date    CARDIAC CATHETERIZATION N/A 2023    Procedure: Cardiac catheterization;  Surgeon: Yodit Small MD;  Location: [facility];  Service: Cardiology    CARDIAC CATHETERIZATION N/A 2023    Procedure: Cardiac Coronary Angiogram;  Surgeon: Yodit Small MD;  Location: [facility];  Service: Cardiology    CORONARY ARTERY BYPASS GRAFT      [place] CORONARY ARTERY BYP W/VEIN & ARTERY GRAFT 4 VEIN N/A 2023    Procedure: CORONARY ARTERY BYPASS GRAFT (CABG) 4 VESSELS,  SVG to PDA  and OM2.                            r      LRA-->OM1 , LIMA to LAD, evh, hector;  Surgeon: Darrell Jiménez DO;  Location: [facility];  Service: Cardiac Surgery    WISDOM TOOTH EXTRACTION       Social History   Social History     Substance and Sexual Activity   Alcohol Use Yes    Comment: Social     Social History     Substance and Sexual Activity   Drug Use Not Currently    Types: Marijuana    Comment: rare marijuana use     Social History     Tobacco Use   Smoking Status Former    Current packs/day: 0.00    Average packs/day: 0.3 packs/day for 6.0 years (1.5 ttl pk-yrs)    Types: Cigarettes    Start date:     Quit date:     Years since quittin.1   Smokeless Tobacco Never     Family History   Problem Relation Age of Onset    Hypertension Father     Breast cancer Sister     Heart attack Maternal Grandfather     Heart attack Paternal Grandfather      "    s/p CABG    Coronary artery disease Paternal Uncle     Prostate cancer Paternal Uncle     Coronary artery disease Paternal Uncle         s/p cardiac stent    Prostate cancer Paternal Uncle        Meds/Allergies     (Not in a hospital admission)      Allergies   Allergen Reactions    Codeine Drowsiness     incapacitates       Objective     Blood pressure 166/98, pulse 70, temperature (!) 97.1 °F (36.2 °C), temperature source Temporal, resp. rate 16, height 5' 10\" (1.778 m), weight 88.2 kg (194 lb 7.1 oz), SpO2 100%.      PHYSICAL EXAM    Gen: NAD  CV: RRR  CHEST: Clear  ABD: soft, NT/ND  EXT: no edema      ASSESSMENT/PLAN:  This is a 53 y.o. year old male here for colonoscopy, and he is stable and optimized for his procedure.          "

## 2024-02-17 DIAGNOSIS — E78.2 HYPERLIPIDEMIA, MIXED: ICD-10-CM

## 2024-02-17 DIAGNOSIS — I10 ESSENTIAL HYPERTENSION: ICD-10-CM

## 2024-02-17 DIAGNOSIS — I24.9 ACS (ACUTE CORONARY SYNDROME) (HCC): ICD-10-CM

## 2024-02-17 DIAGNOSIS — I25.10 CAD IN NATIVE ARTERY: ICD-10-CM

## 2024-02-17 DIAGNOSIS — Z95.1 S/P CABG (CORONARY ARTERY BYPASS GRAFT): ICD-10-CM

## 2024-02-19 RX ORDER — ATORVASTATIN CALCIUM 80 MG/1
80 TABLET, FILM COATED ORAL
Qty: 60 TABLET | Refills: 0 | Status: SHIPPED | OUTPATIENT
Start: 2024-02-19

## 2024-05-02 DIAGNOSIS — I24.9 ACS (ACUTE CORONARY SYNDROME) (HCC): ICD-10-CM

## 2024-05-02 DIAGNOSIS — I10 ESSENTIAL HYPERTENSION: ICD-10-CM

## 2024-05-02 DIAGNOSIS — I25.10 CAD IN NATIVE ARTERY: ICD-10-CM

## 2024-05-02 DIAGNOSIS — E78.2 HYPERLIPIDEMIA, MIXED: ICD-10-CM

## 2024-05-02 DIAGNOSIS — Z95.1 S/P CABG (CORONARY ARTERY BYPASS GRAFT): ICD-10-CM

## 2024-05-02 NOTE — TELEPHONE ENCOUNTER
Patient leaving for vacation next Thursday for 3 weeks.  He would like his prescriptions as he will be running out of them when he is away.    Also, BP are still running a bit high - average 137/91.

## 2024-05-03 RX ORDER — ATORVASTATIN CALCIUM 80 MG/1
80 TABLET, FILM COATED ORAL
Qty: 60 TABLET | Refills: 0 | Status: SHIPPED | OUTPATIENT
Start: 2024-05-03

## 2024-05-03 RX ORDER — AMLODIPINE BESYLATE 2.5 MG/1
2.5 TABLET ORAL DAILY
Qty: 60 TABLET | Refills: 3 | Status: SHIPPED | OUTPATIENT
Start: 2024-05-03

## 2024-06-27 ENCOUNTER — TELEPHONE (OUTPATIENT)
Dept: OBGYN CLINIC | Facility: CLINIC | Age: 54
End: 2024-06-27

## 2024-07-01 ENCOUNTER — OFFICE VISIT (OUTPATIENT)
Dept: OBGYN CLINIC | Facility: CLINIC | Age: 54
End: 2024-07-01
Payer: COMMERCIAL

## 2024-07-01 ENCOUNTER — TELEPHONE (OUTPATIENT)
Dept: OBGYN CLINIC | Facility: CLINIC | Age: 54
End: 2024-07-01

## 2024-07-01 ENCOUNTER — APPOINTMENT (OUTPATIENT)
Dept: RADIOLOGY | Facility: CLINIC | Age: 54
End: 2024-07-01
Payer: COMMERCIAL

## 2024-07-01 VITALS
HEART RATE: 71 BPM | HEIGHT: 70 IN | SYSTOLIC BLOOD PRESSURE: 144 MMHG | DIASTOLIC BLOOD PRESSURE: 90 MMHG | WEIGHT: 198.4 LBS | BODY MASS INDEX: 28.4 KG/M2

## 2024-07-01 DIAGNOSIS — M25.551 RIGHT HIP PAIN: ICD-10-CM

## 2024-07-01 DIAGNOSIS — I25.10 CAD IN NATIVE ARTERY: ICD-10-CM

## 2024-07-01 DIAGNOSIS — I24.9 ACS (ACUTE CORONARY SYNDROME) (HCC): ICD-10-CM

## 2024-07-01 DIAGNOSIS — M16.11 ARTHRITIS OF RIGHT HIP: ICD-10-CM

## 2024-07-01 DIAGNOSIS — E78.2 HYPERLIPIDEMIA, MIXED: ICD-10-CM

## 2024-07-01 DIAGNOSIS — M16.12 ARTHRITIS OF LEFT HIP: Primary | ICD-10-CM

## 2024-07-01 DIAGNOSIS — I10 ESSENTIAL HYPERTENSION: ICD-10-CM

## 2024-07-01 DIAGNOSIS — Z95.1 S/P CABG (CORONARY ARTERY BYPASS GRAFT): ICD-10-CM

## 2024-07-01 DIAGNOSIS — M25.552 LEFT HIP PAIN: ICD-10-CM

## 2024-07-01 PROCEDURE — 99213 OFFICE O/P EST LOW 20 MIN: CPT | Performed by: FAMILY MEDICINE

## 2024-07-01 PROCEDURE — 73522 X-RAY EXAM HIPS BI 3-4 VIEWS: CPT

## 2024-07-01 RX ORDER — MULTIVIT-MIN/IRON/FOLIC ACID/K 18-600-40
3 CAPSULE ORAL AS NEEDED
COMMUNITY

## 2024-07-01 RX ORDER — MULTIVIT WITH MINERALS/LUTEIN
1000 TABLET ORAL DAILY
COMMUNITY

## 2024-07-01 RX ORDER — MULTIVITAMIN
1 TABLET ORAL DAILY
COMMUNITY

## 2024-07-01 RX ORDER — AMLODIPINE BESYLATE 2.5 MG/1
2.5 TABLET ORAL DAILY
Qty: 30 TABLET | Refills: 5 | Status: SHIPPED | OUTPATIENT
Start: 2024-07-01

## 2024-07-01 NOTE — PROGRESS NOTES
Assessment/Plan:  Assessment & Plan   Diagnoses and all orders for this visit:    Arthritis of left hip  -     XR hip/pelv 2-3 vws left if performed; Future    Arthritis of right hip    Other orders  -     Boswellia-Glucosamine-Vit D (OSTEO BI-FLEX ONE PER DAY PO); Take 1 tablet by mouth in the morning  -     Vitamin D, Cholecalciferol, 50 MCG (2000 UT) CAPS; Take 3 capsules by mouth if needed (3 TIMES A WEEK)  -     Multiple Vitamin (multivitamin) tablet; Take 1 tablet by mouth daily  -     LYSINE HCL PO; Take 1 tablet by mouth in the morning  -     Ascorbic Acid (vitamin C) 1000 MG tablet; Take 1,000 mg by mouth daily  -     Cancel: XR hip/pelv 2-3 vws right if performed; Future        53-year-old male with pain both hips, left worse than right, many years duration.  Discussed with patient physical exam, imaging studies, impression, and plan.  X-rays noted for pronounced degenerative changes both hips.  Physical exam noted for limited range of motion both hips with internal rotation.  There is positive RAMSES and FADDIR on the left.  There is positive FADDIR on the right.  Clinical impression is that he has symptoms from degenerative changes to both hips.  I advised patient that since he is now status post CABG we will defer prescription NSAIDs.  Patient would like to avoid surgery.  I discussed option of steroid injection.  I will refer him to my sports medicine colleague for evaluation and consideration of left hip joint ultrasound-guided injection.  He will follow-up with me 2 weeks after injection at which point he will be reevaluated.        Subjective:   Patient ID: Suleman Zhang is a 53 y.o. male.  Chief Complaint   Patient presents with    Left Hip - Follow-up    Right Hip - Pain        53-year-old male with osteoarthritis of both hips following up for pain both hips many years duration.  He was last seen by me more than 2.5 years ago at which point he was prescribed meloxicam and referred to formal  therapy.  He attended formal therapy.  He is now status post CABG. He has been experiencing pain described as localized to left anterior hip/groin, none radiating, worse with twisting, worse when rising from seated position, associated with limited range of motion, and improved with resting.  He has managed symptoms with taking Tylenol.  He has also tried topical CBD.  He reports that in the past cannabis provided notable improvement however recently has provided only mild improvement in symptoms.  He reports little to no pain in the right hip.    Hip Pain  This is a chronic problem. The current episode started more than 1 year ago. The problem occurs daily. The problem has been gradually worsening. Associated symptoms include arthralgias. Pertinent negatives include no joint swelling, numbness or weakness. The symptoms are aggravated by twisting. He has tried rest, position changes and acetaminophen for the symptoms. The treatment provided mild relief.               Review of Systems   Musculoskeletal:  Positive for arthralgias. Negative for joint swelling.   Neurological:  Negative for weakness and numbness.       Objective:  Vitals:    07/01/24 0924   BP: 144/90   Pulse: 71       Right Hip Exam     Muscle Strength   Abduction: 5/5   Flexion: 5/5     Tests   RAMSES: negative    Comments:  Positive FADDIR      Left Hip Exam     Muscle Strength   Abduction: 5/5   Flexion: 5/5     Tests   RAMSES: positive    Comments:  Positive FADDIR            Physical Exam  Vitals and nursing note reviewed.   Constitutional:       Appearance: Normal appearance. He is well-developed. He is not ill-appearing or diaphoretic.   HENT:      Head: Normocephalic and atraumatic.      Right Ear: External ear normal.      Left Ear: External ear normal.   Eyes:      Conjunctiva/sclera: Conjunctivae normal.   Neck:      Trachea: No tracheal deviation.   Cardiovascular:      Rate and Rhythm: Normal rate.   Pulmonary:      Effort: Pulmonary effort  is normal. No respiratory distress.   Abdominal:      General: There is no distension.   Skin:     General: Skin is warm and dry.      Coloration: Skin is not jaundiced or pale.   Neurological:      Mental Status: He is alert and oriented to person, place, and time.   Psychiatric:         Mood and Affect: Mood normal.         Behavior: Behavior normal.         Thought Content: Thought content normal.         Judgment: Judgment normal.         I have personally reviewed pertinent films in PACS and my interpretation is  .  X-rays noted for pronounced degenerative changes both hips.

## 2024-07-21 ENCOUNTER — TELEPHONE (OUTPATIENT)
Age: 54
End: 2024-07-21

## 2024-07-21 DIAGNOSIS — E78.2 HYPERLIPIDEMIA, MIXED: ICD-10-CM

## 2024-07-21 DIAGNOSIS — I10 ESSENTIAL HYPERTENSION: ICD-10-CM

## 2024-07-21 DIAGNOSIS — I24.9 ACS (ACUTE CORONARY SYNDROME) (HCC): ICD-10-CM

## 2024-07-21 DIAGNOSIS — Z95.1 S/P CABG (CORONARY ARTERY BYPASS GRAFT): ICD-10-CM

## 2024-07-21 DIAGNOSIS — I25.10 CAD IN NATIVE ARTERY: ICD-10-CM

## 2024-07-21 RX ORDER — ATORVASTATIN CALCIUM 80 MG/1
80 TABLET, FILM COATED ORAL
Qty: 30 TABLET | Refills: 0 | Status: SHIPPED | OUTPATIENT
Start: 2024-07-21

## 2024-07-21 NOTE — TELEPHONE ENCOUNTER
Patient needs updated blood work and has previously placed orders. Please contact patient to go for labs. Courtesy refill provided.     Patient needs Lipid Panel done

## 2024-07-23 ENCOUNTER — OFFICE VISIT (OUTPATIENT)
Dept: OBGYN CLINIC | Facility: CLINIC | Age: 54
End: 2024-07-23
Payer: COMMERCIAL

## 2024-07-23 VITALS
HEIGHT: 70 IN | BODY MASS INDEX: 28.49 KG/M2 | DIASTOLIC BLOOD PRESSURE: 85 MMHG | OXYGEN SATURATION: 98 % | WEIGHT: 199 LBS | RESPIRATION RATE: 18 BRPM | HEART RATE: 65 BPM | TEMPERATURE: 98.3 F | SYSTOLIC BLOOD PRESSURE: 130 MMHG

## 2024-07-23 DIAGNOSIS — M16.12 ARTHRITIS OF LEFT HIP: Primary | ICD-10-CM

## 2024-07-23 PROCEDURE — 99213 OFFICE O/P EST LOW 20 MIN: CPT | Performed by: FAMILY MEDICINE

## 2024-07-23 PROCEDURE — 20611 DRAIN/INJ JOINT/BURSA W/US: CPT | Performed by: FAMILY MEDICINE

## 2024-07-23 RX ORDER — TRIAMCINOLONE ACETONIDE 40 MG/ML
40 INJECTION, SUSPENSION INTRA-ARTICULAR; INTRAMUSCULAR
Status: COMPLETED | OUTPATIENT
Start: 2024-07-23 | End: 2024-07-23

## 2024-07-23 RX ORDER — ACETAMINOPHEN 500 MG
500 TABLET ORAL EVERY 6 HOURS PRN
COMMUNITY

## 2024-07-23 RX ORDER — BUPIVACAINE HYDROCHLORIDE 2.5 MG/ML
8 INJECTION, SOLUTION INFILTRATION; PERINEURAL
Status: COMPLETED | OUTPATIENT
Start: 2024-07-23 | End: 2024-07-23

## 2024-07-23 RX ADMIN — TRIAMCINOLONE ACETONIDE 40 MG: 40 INJECTION, SUSPENSION INTRA-ARTICULAR; INTRAMUSCULAR at 09:30

## 2024-07-23 RX ADMIN — BUPIVACAINE HYDROCHLORIDE 8 ML: 2.5 INJECTION, SOLUTION INFILTRATION; PERINEURAL at 09:30

## 2024-07-23 NOTE — TELEPHONE ENCOUNTER
Patient calling back to check status of medication refill.  Informed patient pharmacy did get the request, receipt confirmation from 7/21/24.  He will call pharmacy to check status.    Patient made aware he needs to complete his blood work.  He is aware and plans to go before his visit with Lele on 8/6/24.

## 2024-07-23 NOTE — PROGRESS NOTES
"  Chief Complaint   Patient presents with    Left Hip - Clicking, Pain, Groin Pain     Groin lessened in last 2 weeks       Suleman Zhang is a 53 y.o. male presenting for a initial evaluation consultation to discuss possible ultrasound-guided hip injection for the left hip.  Patient was referred by Dr. Bauer for advanced osteoarthritis of the left hip.  Unfortunately cannot tolerate NSAID medications due to recent cardiac procedure    The following portions of the patient's history were reviewed and updated as appropriate: allergies, current medications, past family history, past medical history, past social history, past surgical history and problem list.    Occupation:    Review of Systems   Constitutional: Negative for fever.   HENT: Negative for dental problem and headaches.    Eyes: Negative for vision loss.   Respiratory: Negative for cough and shortness of breath.    Cardiovascular: Negative for leg swelling and palpitations.   Gastrointestinal: Negative for constipation and diarrhea.   Genitourinary: Negative for bladder incontinence and difficulty urinating.   Musculoskeletal: Negative for back pain and difficulty walking.   Skin: Negative for rash and ulcer.   Neurological: Negative for dizziness and headaches.   Hem/Lymph/Immuno: Negative for blood clots. Does not bruise/bleed easily.   Psychiatric/Behavioral: Negative for confusion.         Objective:  /85   Pulse 65   Temp 98.3 °F (36.8 °C)   Resp 18   Ht 5' 10\" (1.778 m)   Wt 90.3 kg (199 lb)   SpO2 98%   BMI 28.55 kg/m²     Skin: no rashes, lesions, skin discolorations, lacerations  Vasculature: normal popliteal and pedal pulse, normal skin color, normal capillary refill in extremity, no lower extremity edema  Neurologic: Neurologic exam is normal throughout lower extremities, Awake, alert, and oriented x3, no apparent distress.    Neuro: no weakness in the L4-S1 nerve distribution  Musculoskeletal:  Inspection: no observable " abnormalities  Palpation no tenderness to palpation over greater trochanter  ROM: Full flexion and extension of the hip. full internal and external rotation  Special tests: + FADIR and RAMSES test            Assessment/Plan:  1. Arthritis of left hip        Large joint arthrocentesis: L hip joint  Universal Protocol:  Consent: Verbal consent obtained.  Risks and benefits: risks, benefits and alternatives were discussed  Consent given by: patient  Patient identity confirmed: verbally with patient  Supporting Documentation  Indications: pain   Procedure Details  Location: hip - L hip joint  Preparation: Patient was prepped and draped in the usual sterile fashion  Needle size: 22 G  Ultrasound guidance: yes  Approach: anterolateral  Medications administered: 8 mL bupivacaine 0.25 %; 40 mg triamcinolone acetonide 40 mg/mL    Patient tolerance: patient tolerated the procedure well with no immediate complications    Risks and benefits of CSI were discussed with patient extensively. Risks were highlighted which included but were not limited to infection, pain, local site swelling, and chance that injection may not be effective. Patient was also counseled regarding glucose elevation days after receiving CSI and to be mindful of diet and check sugars daily. Patient agreeable to proceed with CSI after counseling.     US video clip saved to the [employer]

## 2024-08-07 ENCOUNTER — TELEPHONE (OUTPATIENT)
Dept: CARDIOLOGY CLINIC | Facility: CLINIC | Age: 54
End: 2024-08-07

## 2024-08-07 NOTE — TELEPHONE ENCOUNTER
Suleman Zhang   to [facility] (supporting Mary Reynoso MD)         8/7/24  8:57 AM  Good morning Dr. Reynoso,   I'm reaching out with concerns of my upcoming appointment. I was in to see you in March for my 6 months follow up after CABG surgery in March of 2023. I was told to make an appointment for the upcoming September which is next month and as I was trying to do so this morning, I was told that you are book through the end of the year. I did make an appointment for Mon Sept 9th in [facility] with Ms. Escobedo a nurse practitioner. I'm not sure if this is ok but I do feel I should be checked by a Cardiologist. I do feel great and still on the same meds but concerned of not seeing my Cardiologist. Are you ok and comfortable with my next appointment, should I wait for an opening with you next year or can you refer me to a colleague?     Thank you for your time,  Amado Zhang

## 2024-08-16 ENCOUNTER — APPOINTMENT (OUTPATIENT)
Dept: LAB | Facility: CLINIC | Age: 54
End: 2024-08-16
Payer: COMMERCIAL

## 2024-08-16 DIAGNOSIS — I10 ESSENTIAL HYPERTENSION: ICD-10-CM

## 2024-08-16 DIAGNOSIS — E78.2 HYPERLIPIDEMIA, MIXED: Primary | ICD-10-CM

## 2024-08-16 DIAGNOSIS — Z12.5 SCREENING FOR PROSTATE CANCER: ICD-10-CM

## 2024-08-16 LAB
ALBUMIN SERPL BCG-MCNC: 4.7 G/DL (ref 3.5–5)
ALP SERPL-CCNC: 103 U/L (ref 34–104)
ALT SERPL W P-5'-P-CCNC: 56 U/L (ref 7–52)
ANION GAP SERPL CALCULATED.3IONS-SCNC: 10 MMOL/L (ref 4–13)
AST SERPL W P-5'-P-CCNC: 43 U/L (ref 13–39)
BILIRUB SERPL-MCNC: 1.09 MG/DL (ref 0.2–1)
BUN SERPL-MCNC: 11 MG/DL (ref 5–25)
CALCIUM SERPL-MCNC: 9.8 MG/DL (ref 8.4–10.2)
CHLORIDE SERPL-SCNC: 103 MMOL/L (ref 96–108)
CHOLEST SERPL-MCNC: 125 MG/DL
CO2 SERPL-SCNC: 26 MMOL/L (ref 21–32)
CREAT SERPL-MCNC: 0.61 MG/DL (ref 0.6–1.3)
GFR SERPL CREATININE-BSD FRML MDRD: 114 ML/MIN/1.73SQ M
GLUCOSE P FAST SERPL-MCNC: 84 MG/DL (ref 65–99)
HDLC SERPL-MCNC: 44 MG/DL
LDLC SERPL CALC-MCNC: 67 MG/DL (ref 0–100)
POTASSIUM SERPL-SCNC: 3.7 MMOL/L (ref 3.5–5.3)
PROT SERPL-MCNC: 7.9 G/DL (ref 6.4–8.4)
PSA SERPL-MCNC: 0.62 NG/ML (ref 0–4)
SODIUM SERPL-SCNC: 139 MMOL/L (ref 135–147)
TRIGL SERPL-MCNC: 71 MG/DL

## 2024-08-16 PROCEDURE — G0103 PSA SCREENING: HCPCS

## 2024-08-16 PROCEDURE — 80053 COMPREHEN METABOLIC PANEL: CPT

## 2024-08-16 PROCEDURE — 80061 LIPID PANEL: CPT

## 2024-08-16 PROCEDURE — 36415 COLL VENOUS BLD VENIPUNCTURE: CPT

## 2024-08-22 ENCOUNTER — OFFICE VISIT (OUTPATIENT)
Dept: FAMILY MEDICINE CLINIC | Facility: CLINIC | Age: 54
End: 2024-08-22
Payer: COMMERCIAL

## 2024-08-22 VITALS
HEIGHT: 70 IN | SYSTOLIC BLOOD PRESSURE: 124 MMHG | BODY MASS INDEX: 28.35 KG/M2 | HEART RATE: 67 BPM | WEIGHT: 198 LBS | OXYGEN SATURATION: 98 % | TEMPERATURE: 97.3 F | DIASTOLIC BLOOD PRESSURE: 82 MMHG

## 2024-08-22 DIAGNOSIS — Z95.1 S/P CABG (CORONARY ARTERY BYPASS GRAFT): ICD-10-CM

## 2024-08-22 DIAGNOSIS — E78.2 HYPERLIPIDEMIA, MIXED: ICD-10-CM

## 2024-08-22 DIAGNOSIS — I24.9 ACS (ACUTE CORONARY SYNDROME) (HCC): ICD-10-CM

## 2024-08-22 DIAGNOSIS — I10 ESSENTIAL HYPERTENSION: ICD-10-CM

## 2024-08-22 DIAGNOSIS — I25.10 CAD IN NATIVE ARTERY: ICD-10-CM

## 2024-08-22 PROCEDURE — 99214 OFFICE O/P EST MOD 30 MIN: CPT | Performed by: PHYSICIAN ASSISTANT

## 2024-08-22 RX ORDER — ATORVASTATIN CALCIUM 80 MG/1
80 TABLET, FILM COATED ORAL
Qty: 90 TABLET | Refills: 1 | Status: SHIPPED | OUTPATIENT
Start: 2024-08-22 | End: 2025-02-18

## 2024-08-22 NOTE — PROGRESS NOTES
Ambulatory Visit  Name: Suleman Zhang      : 1970      MRN: [id number]  Encounter Provider: Lele Coughlin PA-C  Encounter Date: 2024   Encounter department: [facility]    Assessment & Plan   1. Hyperlipidemia, mixed  -     atorvastatin (LIPITOR) 80 mg tablet; Take 1 tablet (80 mg total) by mouth daily with dinner  -     Lipid Panel with Direct LDL reflex; Future  2. Essential hypertension  -     atorvastatin (LIPITOR) 80 mg tablet; Take 1 tablet (80 mg total) by mouth daily with dinner  -     Comprehensive metabolic panel; Future  3. ACS (acute coronary syndrome) (HCC)  -     atorvastatin (LIPITOR) 80 mg tablet; Take 1 tablet (80 mg total) by mouth daily with dinner  4. S/P CABG (coronary artery bypass graft)  -     atorvastatin (LIPITOR) 80 mg tablet; Take 1 tablet (80 mg total) by mouth daily with dinner  5. CAD in native artery  -     atorvastatin (LIPITOR) 80 mg tablet; Take 1 tablet (80 mg total) by mouth daily with dinner     Overall chronic conditions remain well controlled. BP at goal, continue metoprolol, amlodipine. Continue statin, lipids at goal. Continue with cardiology. Utd with HM. 6 month follow up, earlier prn    History of Present Illness     Pt presents for follow up     HTN: on metoprolol and amlodipine, 124/82.routinely normal at home per pt. No end organ complaints  CAD, hx CABG. Remains on asa, statin, BB, amlodipine. No cardiac complaints. Follows with cardiology    Labs reviewed as below     Recent Results (from the past 672 hour(s))  -Comprehensive metabolic panel:   Collection Time: 24  7:21 AM       Result                      Value             Ref Range           Sodium                      139               135 - 147 mm*       Potassium                   3.7               3.5 - 5.3 mm*       Chloride                    103               96 - 108 mmo*       CO2                         26                21 - 32 mmol*       ANION GAP                    10                4 - 13 mmol/L       BUN                         11                5 - 25 mg/dL        Creatinine                  0.61              0.60 - 1.30 *       Glucose, Fasting            84                65 - 99 mg/dL       Calcium                     9.8               8.4 - 10.2 m*       AST                         43 (H)            13 - 39 U/L         ALT                         56 (H)            7 - 52 U/L          Alkaline Phosphatase        103               34 - 104 U/L        Total Protein               7.9               6.4 - 8.4 g/*       Albumin                     4.7               3.5 - 5.0 g/*       Total Bilirubin             1.09 (H)          0.20 - 1.00 *       eGFR                        114               ml/min/1.73s*  -PSA, Total Screen:   Collection Time: 08/16/24  7:21 AM       Result                      Value             Ref Range           PSA                         0.621             0.000 - 4.00*  -Lipid Panel with Direct LDL reflex:   Collection Time: 08/16/24  7:21 AM       Result                      Value             Ref Range           Cholesterol                 125               See Comment *       Triglycerides               71                See Comment *       HDL, Direct                 44                >=40 mg/dL          LDL Calculated              67                0 - 100 mg/dL          Review of Systems   Constitutional:  Negative for chills, fatigue and fever.   HENT:  Negative for congestion, ear pain, hearing loss, nosebleeds, postnasal drip, rhinorrhea, sinus pressure, sinus pain, sneezing and sore throat.    Eyes:  Negative for pain, discharge, itching and visual disturbance.   Respiratory:  Negative for cough, chest tightness, shortness of breath and wheezing.    Cardiovascular:  Negative for chest pain, palpitations and leg swelling.   Gastrointestinal:  Negative for abdominal pain, blood in stool, constipation, diarrhea, nausea and vomiting.    Neurological:  Negative for dizziness, light-headedness and numbness.     Past Medical History:   Diagnosis Date    Disseminated herpes zoster 10/16/2019    HLD (hyperlipidemia)     Hypertension     Known health problems: none     Vitamin D deficiency      Past Surgical History:   Procedure Laterality Date    CARDIAC CATHETERIZATION N/A 2023    Procedure: Cardiac catheterization;  Surgeon: Yodit Small MD;  Location: [facility];  Service: Cardiology    CARDIAC CATHETERIZATION N/A 2023    Procedure: Cardiac Coronary Angiogram;  Surgeon: Yodit Small MD;  Location: [facility];  Service: Cardiology    CORONARY ARTERY BYPASS GRAFT      VA CORONARY ARTERY BYP W/VEIN & ARTERY GRAFT 4 VEIN N/A 2023    Procedure: CORONARY ARTERY BYPASS GRAFT (CABG) 4 VESSELS,  SVG to PDA  and OM2.                            r      LRA-->OM1 , LIMA to LAD, evh, hector;  Surgeon: Darrell Jiménez DO;  Location: [facility];  Service: Cardiac Surgery    WISDOM TOOTH EXTRACTION       Family History   Problem Relation Age of Onset    Hypertension Father     Breast cancer Sister     Heart attack Maternal Grandfather     Heart attack Paternal Grandfather         s/p CABG    Coronary artery disease Paternal Uncle     Prostate cancer Paternal Uncle     Coronary artery disease Paternal Uncle         s/p cardiac stent    Prostate cancer Paternal Uncle      Social History     Tobacco Use    Smoking status: Former     Current packs/day: 0.00     Average packs/day: 0.3 packs/day for 6.0 years (1.5 ttl pk-yrs)     Types: Cigarettes, Cigars     Start date:      Quit date:      Years since quittin.6    Smokeless tobacco: Former     Types: Snuff, Chew   Vaping Use    Vaping status: Never Used   Substance and Sexual Activity    Alcohol use: Yes     Alcohol/week: 16.0 standard drinks of alcohol     Types: 4 Glasses of wine, 12 Cans of beer per week     Comment: Social    Drug use: Not Currently     Types:  "Marijuana     Comment: rare marijuana use    Sexual activity: Yes     Partners: Female     Birth control/protection: None     Current Outpatient Medications on File Prior to Visit   Medication Sig    amLODIPine (NORVASC) 2.5 mg tablet Take 1 tablet (2.5 mg total) by mouth daily    Ascorbic Acid (vitamin C) 1000 MG tablet Take 1,000 mg by mouth daily    aspirin 81 mg chewable tablet Chew 1 tablet (81 mg total) daily    Boswellia-Glucosamine-Vit D (OSTEO BI-FLEX ONE PER DAY PO) Take 1 tablet by mouth in the morning    LYSINE HCL PO Take 1 tablet by mouth in the morning    methylcellulose oral powder     metoprolol tartrate (LOPRESSOR) 25 mg tablet Take 0.5 tablets (12.5 mg total) by mouth every 12 (twelve) hours    Multiple Vitamin (multivitamin) tablet Take 1 tablet by mouth daily    Vitamin D, Cholecalciferol, 50 MCG (2000 UT) CAPS Take 3 capsules by mouth if needed (3 TIMES A WEEK)    [DISCONTINUED] atorvastatin (LIPITOR) 80 mg tablet Take 1 tablet (80 mg total) by mouth daily with dinner    acetaminophen (TYLENOL) 500 mg tablet Take 500 mg by mouth every 6 (six) hours as needed for mild pain     Allergies   Allergen Reactions    Codeine Drowsiness     incapacitates     Immunization History   Administered Date(s) Administered    Td (adult), Unspecified 07/14/2015    Tdap 07/14/2015     Objective     /82   Pulse 67   Temp (!) 97.3 °F (36.3 °C)   Ht 5' 10\" (1.778 m)   Wt 89.8 kg (198 lb)   SpO2 98%   BMI 28.41 kg/m²     Physical Exam  Vitals and nursing note reviewed.   Constitutional:       General: He is not in acute distress.     Appearance: He is well-developed.   HENT:      Head: Normocephalic and atraumatic.   Neck:      Vascular: No carotid bruit.   Cardiovascular:      Rate and Rhythm: Normal rate and regular rhythm.      Heart sounds: No murmur heard.  Pulmonary:      Effort: Pulmonary effort is normal. No respiratory distress.      Breath sounds: Normal breath sounds. No wheezing, rhonchi or " rales.   Musculoskeletal:         General: No swelling.      Cervical back: Neck supple.   Skin:     General: Skin is warm and dry.      Capillary Refill: Capillary refill takes less than 2 seconds.   Neurological:      Mental Status: He is alert.   Psychiatric:         Mood and Affect: Mood normal.

## 2024-09-17 ENCOUNTER — PREP FOR PROCEDURE (OUTPATIENT)
Dept: OBGYN CLINIC | Facility: HOSPITAL | Age: 54
End: 2024-09-17

## 2024-09-17 ENCOUNTER — OFFICE VISIT (OUTPATIENT)
Dept: CARDIOLOGY CLINIC | Facility: CLINIC | Age: 54
End: 2024-09-17
Payer: COMMERCIAL

## 2024-09-17 ENCOUNTER — OFFICE VISIT (OUTPATIENT)
Dept: OBGYN CLINIC | Facility: HOSPITAL | Age: 54
End: 2024-09-17
Payer: COMMERCIAL

## 2024-09-17 VITALS
BODY MASS INDEX: 28.49 KG/M2 | SYSTOLIC BLOOD PRESSURE: 126 MMHG | WEIGHT: 199 LBS | HEART RATE: 66 BPM | DIASTOLIC BLOOD PRESSURE: 74 MMHG | RESPIRATION RATE: 16 BRPM | HEIGHT: 70 IN | OXYGEN SATURATION: 100 %

## 2024-09-17 VITALS
WEIGHT: 198 LBS | SYSTOLIC BLOOD PRESSURE: 146 MMHG | HEIGHT: 70 IN | BODY MASS INDEX: 28.35 KG/M2 | HEART RATE: 70 BPM | DIASTOLIC BLOOD PRESSURE: 92 MMHG

## 2024-09-17 DIAGNOSIS — Z96.642 AFTERCARE FOLLOWING LEFT HIP JOINT REPLACEMENT SURGERY: ICD-10-CM

## 2024-09-17 DIAGNOSIS — I10 ESSENTIAL HYPERTENSION: ICD-10-CM

## 2024-09-17 DIAGNOSIS — I25.10 CAD IN NATIVE ARTERY: ICD-10-CM

## 2024-09-17 DIAGNOSIS — Z01.812 PRE-OPERATIVE LABORATORY EXAMINATION: ICD-10-CM

## 2024-09-17 DIAGNOSIS — Z47.1 AFTERCARE FOLLOWING LEFT HIP JOINT REPLACEMENT SURGERY: ICD-10-CM

## 2024-09-17 DIAGNOSIS — E78.2 HYPERLIPIDEMIA, MIXED: Primary | ICD-10-CM

## 2024-09-17 DIAGNOSIS — M16.12 PRIMARY OSTEOARTHRITIS OF LEFT HIP: Primary | ICD-10-CM

## 2024-09-17 DIAGNOSIS — Z01.810 PRE-OPERATIVE CARDIOVASCULAR EXAMINATION: ICD-10-CM

## 2024-09-17 DIAGNOSIS — M16.11 PRIMARY OSTEOARTHRITIS OF RIGHT HIP: ICD-10-CM

## 2024-09-17 DIAGNOSIS — M25.552 CHRONIC LEFT HIP PAIN: ICD-10-CM

## 2024-09-17 DIAGNOSIS — G89.29 CHRONIC LEFT HIP PAIN: ICD-10-CM

## 2024-09-17 PROBLEM — D62 ACUTE BLOOD LOSS AS CAUSE OF POSTOPERATIVE ANEMIA: Status: RESOLVED | Noted: 2023-03-11 | Resolved: 2024-09-17

## 2024-09-17 PROCEDURE — 99214 OFFICE O/P EST MOD 30 MIN: CPT | Performed by: INTERNAL MEDICINE

## 2024-09-17 PROCEDURE — 99214 OFFICE O/P EST MOD 30 MIN: CPT | Performed by: ORTHOPAEDIC SURGERY

## 2024-09-17 RX ORDER — TRANEXAMIC ACID 10 MG/ML
1000 INJECTION, SOLUTION INTRAVENOUS ONCE
OUTPATIENT
Start: 2024-09-17 | End: 2024-09-17

## 2024-09-17 RX ORDER — CHLORHEXIDINE GLUCONATE ORAL RINSE 1.2 MG/ML
15 SOLUTION DENTAL ONCE
OUTPATIENT
Start: 2024-09-17 | End: 2024-09-17

## 2024-09-17 RX ORDER — FOLIC ACID 1 MG/1
1 TABLET ORAL DAILY
Qty: 30 TABLET | Refills: 0 | Status: SHIPPED | OUTPATIENT
Start: 2024-09-17

## 2024-09-17 RX ORDER — ENOXAPARIN SODIUM 100 MG/ML
40 INJECTION SUBCUTANEOUS DAILY
Qty: 11.2 ML | Refills: 0 | Status: SHIPPED | OUTPATIENT
Start: 2024-09-17 | End: 2024-10-15

## 2024-09-17 RX ORDER — CEFAZOLIN SODIUM 2 G/50ML
2000 SOLUTION INTRAVENOUS ONCE
OUTPATIENT
Start: 2024-09-17 | End: 2024-09-17

## 2024-09-17 RX ORDER — ASCORBIC ACID 500 MG
500 TABLET ORAL 2 TIMES DAILY
Qty: 60 TABLET | Refills: 0 | Status: SHIPPED | OUTPATIENT
Start: 2024-09-17

## 2024-09-17 RX ORDER — ATORVASTATIN CALCIUM 40 MG/1
40 TABLET, FILM COATED ORAL
Qty: 90 TABLET | Refills: 3 | Status: SHIPPED | OUTPATIENT
Start: 2024-09-17 | End: 2024-12-16

## 2024-09-17 RX ORDER — FERROUS SULFATE 324(65)MG
TABLET, DELAYED RELEASE (ENTERIC COATED) ORAL
Qty: 30 TABLET | Refills: 0 | Status: SHIPPED | OUTPATIENT
Start: 2024-09-17

## 2024-09-17 NOTE — PROGRESS NOTES
[facility] [place]  [address]  [place] [address]  Cardiology Follow Up    Suleman Zhang  1970  [id number]      1. Hyperlipidemia, mixed  atorvastatin (LIPITOR) 40 mg tablet    Hepatic function panel      2. Essential hypertension  atorvastatin (LIPITOR) 40 mg tablet      3. CAD in native artery  atorvastatin (LIPITOR) 40 mg tablet          Chief Complaint   Patient presents with    Follow-up       Interval History: Patient presents for follow-up visit.  Patient denies any history of chest pain shortness of breath.  Patient denies any history of leg edema or orthopnea PND.  No history of presyncope syncope.  Patient states compliance with the present list of medications.  Patient has history of CAD status post CABG in 2023.  Patient is bothered with left hip issues and is seeing orthopedics and is possibly considering surgery.    He denies any history of palpitations or dizziness.  Recent blood work showed mildly elevated liver function test.    Patient Active Problem List   Diagnosis    Essential hypertension    Hyperlipidemia, mixed    Vitamin D deficiency    Low libido    Achilles tendinosis of right lower extremity    Cutaneous skin tags    Overweight (BMI 25.0-29.9)    Claudication of both lower extremities (HCC)    Varicose veins of both lower extremities with pain    Bilateral leg pain    Bilateral hand numbness    Excessive daytime sleepiness    COVID-19    Arthritis of both hips    Femoroacetabular impingement of left hip    Hamstring tightness of both lower extremities    Weakness of both hips    ACS (acute coronary syndrome) (HCC)    Chest pain    S/P CABG (coronary artery bypass graft)     Past Medical History:   Diagnosis Date    Disseminated herpes zoster 10/16/2019    HLD (hyperlipidemia)     Hypertension     Known health problems: none     Vitamin D deficiency      Social History     Socioeconomic History    Marital status: /Civil Union     Spouse  name: Not on file    Number of children: Not on file    Years of education: Not on file    Highest education level: Not on file   Occupational History    Not on file   Tobacco Use    Smoking status: Former     Current packs/day: 0.00     Average packs/day: 0.3 packs/day for 6.0 years (1.5 ttl pk-yrs)     Types: Cigarettes, Cigars     Start date:      Quit date:      Years since quittin.7    Smokeless tobacco: Former     Types: Snuff, Chew   Vaping Use    Vaping status: Never Used   Substance and Sexual Activity    Alcohol use: Yes     Alcohol/week: 16.0 standard drinks of alcohol     Types: 4 Glasses of wine, 12 Cans of beer per week     Comment: Social    Drug use: Not Currently     Types: Marijuana     Comment: rare marijuana use    Sexual activity: Yes     Partners: Female     Birth control/protection: None   Other Topics Concern    Not on file   Social History Narrative    Not on file     Social Determinants of Health     Financial Resource Strain: Not on file   Food Insecurity: No Food Insecurity (3/5/2023)    Hunger Vital Sign     Worried About Running Out of Food in the Last Year: Never true     Ran Out of Food in the Last Year: Never true   Transportation Needs: No Transportation Needs (3/5/2023)    PRAPARE - Transportation     Lack of Transportation (Medical): No     Lack of Transportation (Non-Medical): No   Physical Activity: Not on file   Stress: Not on file   Social Connections: Unknown (2024)    Received from [employer]    Social Connections     How often do you feel lonely or isolated from those around you? (Adult - for ages 18 years and over): Not on file   Intimate Partner Violence: Not on file   Housing Stability: Low Risk  (3/5/2023)    Housing Stability Vital Sign     Unable to Pay for Housing in the Last Year: No     Number of Places Lived in the Last Year: 1     Unstable Housing in the Last Year: No      Family History   Problem Relation Age of Onset    Hypertension Father      Breast cancer Sister     Heart attack Maternal Grandfather     Heart attack Paternal Grandfather         s/p CABG    Coronary artery disease Paternal Uncle     Prostate cancer Paternal Uncle     Coronary artery disease Paternal Uncle         s/p cardiac stent    Prostate cancer Paternal Uncle      Past Surgical History:   Procedure Laterality Date    CARDIAC CATHETERIZATION N/A 03/02/2023    Procedure: Cardiac catheterization;  Surgeon: Yodit Small MD;  Location: [facility];  Service: Cardiology    CARDIAC CATHETERIZATION N/A 03/02/2023    Procedure: Cardiac Coronary Angiogram;  Surgeon: Yodit Small MD;  Location: [facility];  Service: Cardiology    CORONARY ARTERY BYPASS GRAFT      AR CORONARY ARTERY BYP W/VEIN & ARTERY GRAFT 4 VEIN N/A 03/08/2023    Procedure: CORONARY ARTERY BYPASS GRAFT (CABG) 4 VESSELS,  SVG to PDA  and OM2.                            r      LRA-->OM1 , LIMA to LAD, evh, hector;  Surgeon: Darrell Jiménez DO;  Location: [facility];  Service: Cardiac Surgery    WISDOM TOOTH EXTRACTION         Current Outpatient Medications:     acetaminophen (TYLENOL) 500 mg tablet, Take 500 mg by mouth every 6 (six) hours as needed for mild pain, Disp: , Rfl:     amLODIPine (NORVASC) 2.5 mg tablet, Take 1 tablet (2.5 mg total) by mouth daily, Disp: 30 tablet, Rfl: 5    Ascorbic Acid (vitamin C) 1000 MG tablet, Take 1,000 mg by mouth daily, Disp: , Rfl:     aspirin 81 mg chewable tablet, Chew 1 tablet (81 mg total) daily, Disp: , Rfl:     atorvastatin (LIPITOR) 40 mg tablet, Take 1 tablet (40 mg total) by mouth daily with dinner, Disp: 90 tablet, Rfl: 3    Boswellia-Glucosamine-Vit D (OSTEO BI-FLEX ONE PER DAY PO), Take 1 tablet by mouth in the morning, Disp: , Rfl:     LYSINE HCL PO, Take 1 tablet by mouth in the morning, Disp: , Rfl:     methylcellulose oral powder, , Disp: , Rfl:     metoprolol tartrate (LOPRESSOR) 25 mg tablet, Take 0.5 tablets (12.5 mg total) by mouth every 12  "(twelve) hours, Disp: 60 tablet, Rfl: 0    Multiple Vitamin (multivitamin) tablet, Take 1 tablet by mouth daily, Disp: , Rfl:     Vitamin D, Cholecalciferol, 50 MCG (2000 UT) CAPS, Take 3 capsules by mouth if needed (3 TIMES A WEEK), Disp: , Rfl:   Allergies   Allergen Reactions    Codeine Drowsiness     incapacitates       Labs:  Appointment on 08/16/2024   Component Date Value    Sodium 08/16/2024 139     Potassium 08/16/2024 3.7     Chloride 08/16/2024 103     CO2 08/16/2024 26     ANION GAP 08/16/2024 10     BUN 08/16/2024 11     Creatinine 08/16/2024 0.61     Glucose, Fasting 08/16/2024 84     Calcium 08/16/2024 9.8     AST 08/16/2024 43 (H)     ALT 08/16/2024 56 (H)     Alkaline Phosphatase 08/16/2024 103     Total Protein 08/16/2024 7.9     Albumin 08/16/2024 4.7     Total Bilirubin 08/16/2024 1.09 (H)     eGFR 08/16/2024 114     PSA 08/16/2024 0.621     Cholesterol 08/16/2024 125     Triglycerides 08/16/2024 71     HDL, Direct 08/16/2024 44     LDL Calculated 08/16/2024 67      Imaging: No results found.    Review of Systems:  Review of Systems  REVIEW OF SYSTEMS:  Constitutional:  Denies fever or chills   Eyes:  Denies change in visual acuity   HENT:  Denies nasal congestion or sore throat   Respiratory:  Denies cough or shortness of breath   Cardiovascular:  Denies chest pain or edema   GI:  Denies abdominal pain, nausea, vomiting, bloody stools or diarrhea   :  Denies dysuria, frequency, difficulty in micturition and nocturia  Musculoskeletal: Left hip issues  Neurologic:  Denies headache, focal weakness or sensory changes   Endocrine:  Denies polyuria or polydipsia   Lymphatic:  Denies swollen glands   Psychiatric:  Denies depression or anxiety    Physical Exam:    /74 (BP Location: Right arm, Patient Position: Sitting, Cuff Size: Standard)   Pulse 66   Resp 16   Ht 5' 10\" (1.778 m)   Wt 90.3 kg (199 lb)   SpO2 100%   BMI 28.55 kg/m²     Physical Exam  PHYSICAL EXAM:  General:  Patient is " not in acute distress   Head: Normocephalic, Atraumatic.  HEENT:  Both pupils normal-size atraumatic, normocephalic, nonicteric  Neck:  JVP not raised. Trachea central. No carotid bruit  Respiratory:  normal breath sounds no crackles. no rhonchi  Cardiovascular:  Regular rate and rhythm no S3 no murmurs  GI:  Abdomen soft nontender. No organomegaly.   Lymphatic:  No cervical or inguinal lymphadenopathy  Neurologic:  Patient is awake alert, oriented . Grossly nonfocal  Extremities no edema      Discussion/Summary:    Patient with multiple medical problems who seems to be doing reasonably well from cardiac standpoint. Previous studies reviewed with patient. Medications reviewed and possible side effects discussed. concepts of cardiovascular disease , signs and symptoms of heart disease. Dietary and risk factor modification reinforced. All questions answered.  Safety measures reviewed. Patient advised to report any problems prompting medical attention.    Symptoms to watch out from cardiac standpoint which would indicate the need for further cardiac evaluation discussed with patient.  Will decrease the dosage of atorvastatin to 40 mg daily because of mildly elevated liver function test recently.  Repeat LFTs in the next month or so.    Patient will see orthopedics for left hip issues.  No specific contraindications at this point if he were to need and proceed with hip surgery.    Patient had a few questions which were answered.  Plan of care discussed with the patient.  Follow-up in 6 months.

## 2024-09-17 NOTE — PROGRESS NOTES
Assessment:   Diagnosis ICD-10-CM Associated Orders   1. Primary osteoarthritis of left hip  M16.12 Diet NPO; Sips with meds     Nursing Communication Warmimg Interventions Implemented     Nursing Communication CHG bath, have staff wash entire body (neck down) per pre-op bathing protocol. Routine, evening prior to, and day of surgery.     Nursing Communication Swab both nares with Povidone-Iodine solution, EXCLUDE if patient has shellfish/Iodine allergy, and replace with nasal alcohol swabstick. Routine, day of surgery, on call to OR     Void on call to OR     Insert peripheral IV     Ambulatory referral to [facility]     Ambulatory referral to Physical Therapy     Place sequential compression device     Case request operating room: Left total hip arthroplasty     Case request operating room: Left total hip arthroplasty      2. Chronic left hip pain  M25.552 Diet NPO; Sips with meds    G89.29 Nursing Communication Warmimg Interventions Implemented     Nursing Communication CHG bath, have staff wash entire body (neck down) per pre-op bathing protocol. Routine, evening prior to, and day of surgery.     Nursing Communication Swab both nares with Povidone-Iodine solution, EXCLUDE if patient has shellfish/Iodine allergy, and replace with nasal alcohol swabstick. Routine, day of surgery, on call to OR     Void on call to OR     Insert peripheral IV     Ambulatory referral to [facility]     Ambulatory referral to Physical Therapy     Place sequential compression device     Case request operating room: Left total hip arthroplasty     Case request operating room: Left total hip arthroplasty      3. Primary osteoarthritis of right hip  M16.11       4. Pre-operative laboratory examination  Z01.812 Comprehensive metabolic panel     Hemoglobin A1C W/EAG Estimation     CBC and differential     Anemia Panel w/Reflex     Protime-INR     APTT     Type and screen      5. Pre-operative cardiovascular examination  Z01.810        6. Aftercare following left hip joint replacement surgery  Z47.1 Ambulatory referral to Physical Therapy    Z96.641           Plan:  Recently taken diagnostics reviewed and physical exam performed.  Diagnosis, treatment options and associated risks were discussed with the patient including no treatment, nonsurgical treatment and potential for surgical intervention.  The patient was given the opportunity to ask questions regarding each.  Quality of life decision pursue elective left total hip arthroplasty.  Risks and benefits of a left total hip arthroplasty were discussed.  Consents obtained.  Preoperative vitamins and postoperative Lovenox sent to his pharmacy of record.  No guarantees given.    To do next visit:  Return for post-op with x-rays upon arrival left hip and pelvis.    The above stated was discussed in layman's terms and the patient expressed understanding.  All questions were answered to the patient's satisfaction.       Scribe Attestation      I,:  Dann Dumont am acting as a scribe while in the presence of the attending physician.:       I,:  Lyndon Ruelas MD personally performed the services described in this documentation    as scribed in my presence.:               Subjective:   Suleman Zhang is a 54 y.o. male who presents today for initial evaluation of his left hip due to pain.  He states his left hip has been bothering him for several years.  He has a multitude of various treatment modalities throughout the years with homeopathic remedies, medication, activity modification as well as an intra-articular injection of cortisone that was administered this past July with great relief however it only lasted several days.  His pain is in his left groin and will radiate at his medial thigh.  It increases with planting and twisting motions of his left lower extremity.  He finds that his left hip symptoms do limit his day-to-day activities as well as impedes on his quality of life.  At times  it can be severe and unrelenting.  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    Nondiabetic.  Quadruple cardiac bypass March 2023, got the clearance from his cardiologist this morning.    He does have several projects going on at home in the next couple of weeks, which he states he may have to push off pending scheduling of his left hip surgery      Review of systems negative unless otherwise specified in HPI  Review of Systems    Past Medical History:   Diagnosis Date    Disseminated herpes zoster 10/16/2019    HLD (hyperlipidemia)     Hypertension     Known health problems: none     Vitamin D deficiency        Past Surgical History:   Procedure Laterality Date    CARDIAC CATHETERIZATION N/A 03/02/2023    Procedure: Cardiac catheterization;  Surgeon: Yodit Small MD;  [facility]: [facility];  Service: Cardiology    CARDIAC CATHETERIZATION N/A 03/02/2023    Procedure: Cardiac Coronary Angiogram;  Surgeon: Yodit Small MD;  [facility]: [facility];  Service: Cardiology    CORONARY ARTERY BYPASS GRAFT      [place] CORONARY ARTERY BYP W/VEIN & ARTERY GRAFT 4 VEIN N/A 03/08/2023    Procedure: CORONARY ARTERY BYPASS GRAFT (CABG) 4 VESSELS,  SVG to PDA  and OM2.                            r      LRA-->OM1 , LIMA to LAD, evh, hector;  Surgeon: Darrell Jiménez DO;  [facility];  Service: Cardiac Surgery    WISDOM TOOTH EXTRACTION         Family History   Problem Relation Age of Onset    Hypertension Father     Breast cancer Sister     Heart attack Maternal Grandfather     Heart attack Paternal Grandfather         s/p CABG    Coronary artery disease Paternal Uncle     Prostate cancer Paternal Uncle     Coronary artery disease Paternal Uncle         s/p cardiac stent    Prostate cancer Paternal Uncle        Social History     Occupational History    Not on file   Tobacco Use    Smoking status: Former     Current  packs/day: 0.00     Average packs/day: 0.3 packs/day for 6.0 years (1.5 ttl pk-yrs)     Types: Cigarettes, Cigars     Start date:      Quit date:      Years since quittin.7    Smokeless tobacco: Former     Types: Snuff, Chew   Vaping Use    Vaping status: Never Used   Substance and Sexual Activity    Alcohol use: Yes     Alcohol/week: 16.0 standard drinks of alcohol     Types: 4 Glasses of wine, 12 Cans of beer per week     Comment: Social    Drug use: Not Currently     Types: Marijuana     Comment: rare marijuana use    Sexual activity: Yes     Partners: Female     Birth control/protection: None         Current Outpatient Medications:     acetaminophen (TYLENOL) 500 mg tablet, Take 500 mg by mouth every 6 (six) hours as needed for mild pain, Disp: , Rfl:     amLODIPine (NORVASC) 2.5 mg tablet, Take 1 tablet (2.5 mg total) by mouth daily, Disp: 30 tablet, Rfl: 5    Ascorbic Acid (vitamin C) 1000 MG tablet, Take 1,000 mg by mouth daily, Disp: , Rfl:     aspirin 81 mg chewable tablet, Chew 1 tablet (81 mg total) daily, Disp: , Rfl:     atorvastatin (LIPITOR) 40 mg tablet, Take 1 tablet (40 mg total) by mouth daily with dinner, Disp: 90 tablet, Rfl: 3    Boswellia-Glucosamine-Vit D (OSTEO BI-FLEX ONE PER DAY PO), Take 1 tablet by mouth in the morning, Disp: , Rfl:     LYSINE HCL PO, Take 1 tablet by mouth in the morning, Disp: , Rfl:     methylcellulose oral powder, , Disp: , Rfl:     metoprolol tartrate (LOPRESSOR) 25 mg tablet, Take 0.5 tablets (12.5 mg total) by mouth every 12 (twelve) hours, Disp: 60 tablet, Rfl: 0    Multiple Vitamin (multivitamin) tablet, Take 1 tablet by mouth daily, Disp: , Rfl:     Vitamin D, Cholecalciferol, 50 MCG (2000 UT) CAPS, Take 3 capsules by mouth if needed (3 TIMES A WEEK), Disp: , Rfl:     Allergies   Allergen Reactions    Codeine Drowsiness     incapacitates            Vitals:    24 1450   BP: 146/92   Pulse: 70       Body mass index is 28.41 kg/m².  Wt Readings  "from Last 3 Encounters:   09/17/24 89.8 kg (198 lb)   09/17/24 90.3 kg (199 lb)   08/22/24 89.8 kg (198 lb)       Objective:                    Right Hip Exam     Tenderness   The patient is experiencing no tenderness.     Range of Motion   Flexion:  90   Right hip external rotation: 20.   Right hip internal rotation: 15.     Muscle Strength   The patient has normal right hip strength.    Other   Sensation: normal      Left Hip Exam     Tenderness   The patient is experiencing no tenderness.     Range of Motion   Flexion:  80 (Leg goes into external rotation)   Left hip external rotation: restricted.   Left hip internal rotation: significantly limited.     Muscle Strength   The patient has normal left hip strength.     Other   Sensation: normal    Comments:    Essentially equal leg lengths    Mildly antalgic gait            Diagnostics, reviewed and taken today if performed as documented:    None performed but reviewed:     The attending physician has personally reviewed the pertinent films in PACS and interpretation is as follows:    Both hips and pelvis x-rays taken 7/1/24 were reviewed today in the office and show: Advanced degenerative changes at his left hip with moderate severe degenerative changes at his right hip.  Both hips have deformity of the femoral heads.  Both hips have subchondral sclerosis and osteophyte formation present.        Procedures, if performed today:    Procedures    None performed      Portions of the record may have been created with voice recognition software.  Occasional wrong word or \"sound a like\" substitutions may have occurred due to the inherent limitations of voice recognition software.  Read the chart carefully and recognize, using context, where substitutions have occurred.  "

## 2024-09-24 DIAGNOSIS — I24.9 ACS (ACUTE CORONARY SYNDROME) (HCC): ICD-10-CM

## 2024-09-24 DIAGNOSIS — I25.10 CAD IN NATIVE ARTERY: ICD-10-CM

## 2024-09-24 DIAGNOSIS — Z95.1 S/P CABG (CORONARY ARTERY BYPASS GRAFT): ICD-10-CM

## 2024-09-24 DIAGNOSIS — E78.2 HYPERLIPIDEMIA, MIXED: ICD-10-CM

## 2024-09-24 DIAGNOSIS — I10 ESSENTIAL HYPERTENSION: ICD-10-CM

## 2024-09-24 RX ORDER — METOPROLOL TARTRATE 25 MG/1
12.5 TABLET, FILM COATED ORAL EVERY 12 HOURS
Qty: 60 TABLET | Refills: 3 | Status: SHIPPED | OUTPATIENT
Start: 2024-09-24

## 2024-10-07 ENCOUNTER — TELEPHONE (OUTPATIENT)
Dept: CARDIOLOGY CLINIC | Facility: CLINIC | Age: 54
End: 2024-10-07

## 2024-10-07 NOTE — TELEPHONE ENCOUNTER
Holly Jacob MA routed this conversation to [facility]  Suleman Zhang   to [facility] (supporting Mary Reynoso MD)         10/7/24  1:09 PM  Good afternoon, I am going to reach out to Dr. Ruelas as well. Is there anything I can take beside aspirin or Tylenol for the hip pain. Is there a patch that I can use until the replacement surgery on November 18th. The pain has increased significantly in the past couple of weeks. Also, how do I schedule the requested pre-Op EKG and how long before should I do this?      Thank you.  Suleman Zhang

## 2024-10-07 NOTE — TELEPHONE ENCOUNTER
Called pt's cell phone and was unable to leave message.     Left Dr. HOFFMAN's message on pt 's home vmail.    Please schedule pt for a follow up appt / EKG as per Dr. HOFFMAN    Thanks!

## 2024-10-07 NOTE — TELEPHONE ENCOUNTER
For pain related to hip, I would suggest he reach out to his PCP or orthopedic physician.  To avoid NSAIDs in general.    Regarding EKG, please get him in for a nurse visit for EKG in the third week of October.  This is for preop.

## 2024-10-14 ENCOUNTER — TELEPHONE (OUTPATIENT)
Dept: OBGYN CLINIC | Facility: HOSPITAL | Age: 54
End: 2024-10-14

## 2024-11-13 ENCOUNTER — APPOINTMENT (OUTPATIENT)
Dept: LAB | Facility: CLINIC | Age: 54
End: 2024-11-13
Payer: COMMERCIAL

## 2024-11-13 DIAGNOSIS — I10 ESSENTIAL HYPERTENSION: ICD-10-CM

## 2024-11-13 DIAGNOSIS — E78.2 HYPERLIPIDEMIA, MIXED: ICD-10-CM

## 2024-11-13 LAB
ALBUMIN SERPL BCG-MCNC: 4.5 G/DL (ref 3.5–5)
ALP SERPL-CCNC: 109 U/L (ref 34–104)
ALT SERPL W P-5'-P-CCNC: 41 U/L (ref 7–52)
ANION GAP SERPL CALCULATED.3IONS-SCNC: 8 MMOL/L (ref 4–13)
AST SERPL W P-5'-P-CCNC: 33 U/L (ref 13–39)
BILIRUB DIRECT SERPL-MCNC: 0.18 MG/DL (ref 0–0.2)
BILIRUB SERPL-MCNC: 0.93 MG/DL (ref 0.2–1)
BUN SERPL-MCNC: 10 MG/DL (ref 5–25)
CALCIUM SERPL-MCNC: 9.2 MG/DL (ref 8.4–10.2)
CHLORIDE SERPL-SCNC: 100 MMOL/L (ref 96–108)
CHOLEST SERPL-MCNC: 150 MG/DL (ref ?–200)
CO2 SERPL-SCNC: 29 MMOL/L (ref 21–32)
CREAT SERPL-MCNC: 0.69 MG/DL (ref 0.6–1.3)
GFR SERPL CREATININE-BSD FRML MDRD: 107 ML/MIN/1.73SQ M
GLUCOSE P FAST SERPL-MCNC: 94 MG/DL (ref 65–99)
HDLC SERPL-MCNC: 48 MG/DL
LDLC SERPL CALC-MCNC: 84 MG/DL (ref 0–100)
POTASSIUM SERPL-SCNC: 4.3 MMOL/L (ref 3.5–5.3)
PROT SERPL-MCNC: 7.6 G/DL (ref 6.4–8.4)
SODIUM SERPL-SCNC: 137 MMOL/L (ref 135–147)
TRIGL SERPL-MCNC: 92 MG/DL (ref ?–150)

## 2024-11-13 PROCEDURE — 82248 BILIRUBIN DIRECT: CPT

## 2024-11-13 PROCEDURE — 36415 COLL VENOUS BLD VENIPUNCTURE: CPT

## 2024-11-13 PROCEDURE — 80053 COMPREHEN METABOLIC PANEL: CPT

## 2024-11-13 PROCEDURE — 80061 LIPID PANEL: CPT

## 2024-11-14 ENCOUNTER — RESULTS FOLLOW-UP (OUTPATIENT)
Dept: FAMILY MEDICINE CLINIC | Facility: CLINIC | Age: 54
End: 2024-11-14

## 2024-12-10 ENCOUNTER — TELEPHONE (OUTPATIENT)
Dept: OBGYN CLINIC | Facility: HOSPITAL | Age: 54
End: 2024-12-10

## 2024-12-10 DIAGNOSIS — M16.12 PRIMARY OSTEOARTHRITIS OF LEFT HIP: Primary | ICD-10-CM

## 2024-12-10 RX ORDER — MUPIROCIN 20 MG/G
OINTMENT TOPICAL 2 TIMES DAILY
Qty: 15 G | Refills: 0 | Status: SHIPPED | OUTPATIENT
Start: 2024-12-10

## 2024-12-16 ENCOUNTER — CLINICAL SUPPORT (OUTPATIENT)
Dept: CARDIOLOGY CLINIC | Facility: CLINIC | Age: 54
End: 2024-12-16
Payer: COMMERCIAL

## 2024-12-16 DIAGNOSIS — Z01.810 PRE-OPERATIVE CARDIOVASCULAR EXAMINATION: Primary | ICD-10-CM

## 2024-12-16 PROBLEM — M16.12 PRIMARY OSTEOARTHRITIS OF LEFT HIP: Status: ACTIVE | Noted: 2024-12-16

## 2024-12-16 PROBLEM — I24.9 ACS (ACUTE CORONARY SYNDROME) (HCC): Status: RESOLVED | Noted: 2023-03-01 | Resolved: 2024-12-16

## 2024-12-16 PROBLEM — R07.9 CHEST PAIN: Status: RESOLVED | Noted: 2023-03-01 | Resolved: 2024-12-16

## 2024-12-16 PROBLEM — U07.1 COVID-19: Status: RESOLVED | Noted: 2020-12-07 | Resolved: 2024-12-16

## 2024-12-16 PROCEDURE — 93000 ELECTROCARDIOGRAM COMPLETE: CPT

## 2024-12-16 PROCEDURE — 99211 OFF/OP EST MAY X REQ PHY/QHP: CPT

## 2024-12-16 NOTE — PROGRESS NOTES
Pt was in the office today for a nurse visit / EKG instructed by Dr. HOFFMAN.    EKG was done for pre - op and reviewed by Dr. Cobb.    EKG was scanned into pt's chart.

## 2024-12-16 NOTE — PATIENT INSTRUCTIONS
BEFORE SURGERY    Contact your surgical nurse  navigator with any questions regarding preoperative plan or schedule.  Stop all over the counter supplements, herbal, naturopathic  medications for 1 week prior to surgery UNLESS prescribed by your surgeon  Hold NSAIDS (i.e. advil, alleve, motrin, ibuprofen, celebrex) minimum 5 days prior to surgery  Follow presurgical medication instructions provided by preadmission nursing team reviewed during your presurgery phone call  Strategies for optimizing your surgery through breathing exercises, nutrition and physical activity can be found at [email or web address]/best  Call [phone] with any presurgical concerns or medications questions or use the messaging feature in your [email or web address] leni to contact your provider  Take amlodipine and metoprolol the morning of surgery      AFTER SURGERY    Recommend using Tylenol ( acetaminophen ) 1000 mg every eight hours during the first week post discharge along with icing the area for 20 mins every 3-4 hours while awake can be helpful in reducing your need for post operative opioid use. This opioid sparing plan can be used along side your surgeons pain plan.  Use stool softener over the counter (colace) daily after surgery during the first 1-2 weeks to avoid post operative constipation issues  If no bowel movement within 3 days after surgery then use over the counter Miralax in addition to your stool softener   If cleared by your surgical team for activity then early and often walking is encouraged and can be important in prevention of post surgical blood clots. Additionally spend as much time out of bed as possible and allowed by your surgical team  Use your incentive spirometer twice per hour in the first seven days after surgery to help prevent post surgery lung complications and infections  It is very important you follow the instructions from your surgeon regarding any medications for after surgery blood clot prevention. Compliance with these  medications is very important.  Call [phone] with any post discharge concerns or medical issues or use the messaging feature in your [employer] leni to contact your provider

## 2024-12-16 NOTE — ASSESSMENT & PLAN NOTE
Addended by: XIMENA LIZARRAGA on: 12/7/2020 04:10 PM     Modules accepted: Orders     Failed outpatient conservative measures  Electing to undergo arthroplasty

## 2024-12-16 NOTE — PROGRESS NOTES
Internal Medicine Pre-Operative Evaluation:     Reason for Visit: Pre-operative Evaluation for Risk Stratification and Optimization    Patient ID: Suleman Zhang is a 54 y.o. male.     Case: [id number] Date/Time: 01/13/25 1140   Procedure: Left total hip arthroplasty (Left: Hip)   Anesthesia type: Choice   Diagnosis:      Primary osteoarthritis of left hip [M16.12]      Chronic left hip pain [M25.552, G89.29]   Pre-op diagnosis:      Primary osteoarthritis of left hip [M16.12]      Chronic left hip pain [M25.552, G89.29]   Location:  OR ROOM 05 / [facility]   Surgeons: Lyndon Ruelas MD         Recommendations to Proceed withSurgery    Patient is considered to be Low risk for Medium risk procedure.     After evaluation and discussion with patient with emphasis that all surgery has some degree of inherent risk it is acknowledged by patient this risk is Acceptable.    Patient is optimized and may proceed with planned procedure.     Assessment    Pre-operative Medical Evaluation for planned surgery  Recommendations as listed in PLAN section below  Contact surgical nurse  navigator with any questions regarding preoperative plan or schedule.      Assessment & Plan  Primary osteoarthritis of left hip  Failed outpatient conservative measures  Electing to undergo arthroplasty    S/P CABG (coronary artery bypass graft)  2023 f/b Dr. Awad  Recent EKG unchanged  Take metoprolol the morning of surgery  Essential hypertension  Stable  Monitor post operative BP   Avoid hypotension if at all possible  Refer to PAT instructions regarding medication administration the morning of surgery    Preoperative clearance             Plan:     1. Further preoperative workup as follows:   - none no further testing required may proceed with surgery    2. Preoperative Medication Management Review performed by PAT nursing  YES    3. Patient requires further consultation with:   No Consults  Required    4. Discharge Planning / Barriers to Discharge  none identified - patients has post discharge therapy plan in place, transportation arranged for discharge day, adequate family support at home to assist with discharge to home.        Subjective:           History of Present Illness:     Suleman Zhang is a 54 y.o. male who presents to the office today for a preoperative consultation at the request of surgeon. The patient understands this is an elective procedure and not emergent. They are electing to undergo planned procedure with an understanding that all surgery has inherent risk. They have worked with their surgeon and failed conservative treatment measures. Today they present for preoperative risk assessment and recommendations for optimization in preparation for surgery.    Pt seen in surgical optimization center for upcoming proposed surgery. They have failed previous conservative measures and have elected surgical intervention.     Pt meets presurgical lab and BMI optimization goals.    Patient has a history of CAD s/p CABG in March 2023.  He has been cleared by cardiology, he is asymptomatic.      Suleman Zhang has an IN HOSPITAL cardiac risk of RCI RISK CLASS II (1 risk factor, risk of major cardiac compl. appr. 1.3%) based on RCRI calculator    Cardiac Risk Estimation: per the Revised Cardiac Risk Index (Circ. 100:1043, 1999),         Pre-op Exam    Previous history of bleeding disorders or clots?: No  Previous Anesthesia reaction?: No  Prolonged steroid use in the last 6 months?: No    Assessment of Cardiac Risk:   - Unstable or severe angina or MI in the last 6 weeks or history of stent placement in the last year?: No   - Decompensated heart failure (e.g. New onset heart failure, NYHA  Class IV heart failure, or worsening existing heart failure)?: No  - Significant arrhythmias such as high grade AV block, symptomatic ventricular arrhythmia, newly recognized ventricular tachycardia,  "supraventricular tachycardia with resting heart rate >100, or symptomatic bradycardia?: No  - Severe heart valve disease including aortic stenosis or symptomatic mitral stenosis?: No      Pre-operative Risk Factors:  Elevated-risk surgery: No    History of cerebrovascular disease: No    History of ischemic heart disease: Yes    Pre-operative treatment with insulin: No  Pre-operative creatinine >2 mg/dL: No    History of congestive heart failure: No    Duke Activity Status Index (DASI):   DASI Total Score: 23.45  METs: 5.6        ROS: No TIA's or unusual headaches, no dysphagia.  No prolonged cough. No dyspnea or chest pain on exertion.  No abdominal pain, change in bowel habits, black or bloody stools.  No urinary tract or BPH symptoms.  Positive reported pain in arthritic joint. Positive difficulty with gait. No skin rashes or issues.      Objective:    /80   Pulse 71   Ht 5' 10\" (1.778 m)   Wt 92.2 kg (203 lb 3.2 oz)   SpO2 97%   BMI 29.16 kg/m²       General Appearance: no distress, conversive  HEENT: PERRLA, conjuctiva normal; oropharynx clear; mucous membranes moist;   Neck:  Supple, no lymphadenopathy or thyromegaly  Lungs: breath sounds normal, normal respiratory effort, no retractions, expiratory effort normal  CV: normal heart sounds S1/S2, PMI normal   ABD: soft non tender, no masses , no hepatic or splenomegaly  EXT: DP pulses intact, no lymphadenopathy, no edema  Skin: normal turgor, normal texture, no rash  Psych: affect normal, mood normal  Neuro: AAOx3        The following portions of the patient's history were reviewed and updated as appropriate: allergies, current medications, past family history, past medical history, past social history, past surgical history and problem list.     Past History:       Past Medical History:   Diagnosis Date    Disseminated herpes zoster 10/16/2019    HLD (hyperlipidemia)     Hypertension     Known health problems: none     Vitamin D deficiency     Past " Surgical History:   Procedure Laterality Date    CARDIAC CATHETERIZATION N/A 2023    Procedure: Cardiac catheterization;  Surgeon: Yodit Small MD;  [facility];  Service: Cardiology    CARDIAC CATHETERIZATION N/A 2023    Procedure: Cardiac Coronary Angiogram;  Surgeon: Yodit Small MD;  [facility];  Service: Cardiology    CORONARY ARTERY BYPASS GRAFT      [place] CORONARY ARTERY BYP W/VEIN & ARTERY GRAFT 4 VEIN N/A 2023    Procedure: CORONARY ARTERY BYPASS GRAFT (CABG) 4 VESSELS,  SVG to PDA  and OM2.                            r      LRA-->OM1 , LIMA to LAD, evh, hector;  Surgeon: Darrell Jiménez DO;  [facility];  Service: Cardiac Surgery    WISDOM TOOTH EXTRACTION            Social History     Tobacco Use    Smoking status: Former     Current packs/day: 0.00     Average packs/day: 0.3 packs/day for 6.0 years (1.5 ttl pk-yrs)     Types: Cigarettes, Cigars     Start date:      Quit date:      Years since quittin.9    Smokeless tobacco: Former     Types: Snuff, Chew   Vaping Use    Vaping status: Never Used   Substance Use Topics    Alcohol use: Yes     Alcohol/week: 16.0 standard drinks of alcohol     Types: 4 Glasses of wine, 12 Cans of beer per week     Comment: Social    Drug use: Not Currently     Types: Marijuana     Comment: rare marijuana use     Family History   Problem Relation Age of Onset    Hypertension Father     Breast cancer Sister     Heart attack Maternal Grandfather     Heart attack Paternal Grandfather         s/p CABG    Coronary artery disease Paternal Uncle     Prostate cancer Paternal Uncle     Coronary artery disease Paternal Uncle         s/p cardiac stent    Prostate cancer Paternal Uncle           Allergies:     Allergies   Allergen Reactions    Codeine Drowsiness     incapacitates        Current Medications:     Current Outpatient Medications   Medication Instructions    acetaminophen (TYLENOL) 500 mg, Every 6  "hours PRN    amLODIPine (NORVASC) 2.5 mg, Oral, Daily    ascorbic acid (VITAMIN C) 500 mg, Oral, 2 times daily    aspirin 81 mg, Oral, Daily    atorvastatin (LIPITOR) 40 mg, Oral, Daily with dinner    Boswellia-Glucosamine-Vit D (OSTEO BI-FLEX ONE PER DAY PO) 1 tablet, Daily    enoxaparin (LOVENOX) 40 mg, Subcutaneous, Daily, To start postoperatively    ferrous sulfate 324 (65 Fe) mg Take one tablet daily.    folic acid (FOLVITE) 1 mg, Oral, Daily    LYSINE HCL PO 1 tablet, Daily    methylcellulose oral powder No dose, route, or frequency recorded.    metoprolol tartrate (LOPRESSOR) 12.5 mg, Oral, Every 12 hours    Multiple Vitamin (multivitamin) tablet 1 tablet, Daily    mupirocin (BACTROBAN) 2 % ointment Topical, 2 times daily, Apply twice a day to each nostril for 5 days including the day of surgery    vitamin C 1,000 mg, Daily    Vitamin D, Cholecalciferol, 50 MCG (2000 UT) CAPS 3 capsules, As needed           PRE-OP WORKSHEET DATA    Assessment of Pre-Operative Risks     MLJ Quality Hard Stops:    BMI (<40) : Estimated body mass index is 29.16 kg/m² as calculated from the following:    Height as of this encounter: 5' 10\" (1.778 m).    Weight as of this encounter: 92.2 kg (203 lb 3.2 oz).    Hgb ( >11):   Lab Results   Component Value Date    HGB 15.2 12/19/2024    HGB 11.4 (L) 03/23/2023    HGB 9.9 (L) 03/10/2023       HbA1c (<7.5) :   Lab Results   Component Value Date    HGBA1C 5.8 (H) 12/19/2024       GFR (>60) (Less then 45 = Nephrology consult):    Lab Results   Component Value Date    EGFR 109 12/19/2024    EGFR 107 11/13/2024    EGFR 114 08/16/2024            Pre-Op Data Reviewed:       Laboratory Results: I have personally reviewed the pertinent laboratory results/reports     EKG:I have personally reviewed pertinent reports.  . I personally reviewed and interpreted available tracings in the electronic medical record    No results found for this or any previous visit (from the past 4464 hours).    OLD " RECORDS: reviewed old records in the chart review section if EHR on day of visit.    Previous cardiopulmonary studies within the past year:  Echocardiogram: yes   Lab Results   Component Value Date    LVEF 55 03/01/2023     Cardiac Catheterization: yes 2023 +TVD  Stress Test: yes      Time of visit including pre-visit chart review, visit and post-visit coordination of plan and care , review of pre-surgical lab work, preparation and time spent documenting note in electronic medical record, time spent face-to-face in physical examination answering patient questions by care team 35 minutes             [facility]

## 2024-12-17 NOTE — TELEPHONE ENCOUNTER
"Preoperative Elective Admission Assessment    Pt will be going for preop testing the week of 12/16/24.      Living Situation:    Who does pt live with: spouse  What kind of home: multi-level  How do they enter the home: front  How many levels in home: 2   # of steps to enter home: 3  # of steps to second floor: 12-14  Are there handrails: Yes  Are there landings: No  Sleeping arrangement: second floor  Where is Bathroom: first and second floor   Where is the tub or shower: second floor, tub/shower  Toilet height? Concerns for low toilets: \"Standard low toilets\" - Ordering BSC  Dogs or pets: yes     First Floor Setup:   Is there a bathroom: Yes  Where would pt sleep:  Pt confirms he has sleeping accommodations on first floor.      DME:  DME ordered,  RW and BSC 12/17/24. Discussed posterior hip precautions.  Instructed pt to bring RW on DOS, verbalizes understanding.   We discussed clearing pathways in the home and making sure there is accessibly to use the walker, for example, removing throw rugs.      Patient's Current Level of Function: Ambulates: Independently and ADLs: Independent    Post-op Caregiver: spouse and additional family  Caregiver Name and phone number for Inpatient discharge needs: Jovita Zazueta (Significant Other)  [phone] (Mobile)   Currently receive any HHC/aides/community supports: No     Post-op Transport: spouse  To/from hospital: spouse  To/from PT 2-3x/week: spouse  Uses community transport now: No     Outpatient Physical Therapy Site:  Site: [place]  pre and post-op appts scheduled? No, will route      Medication Management: self  Preferred Pharmacy for Post-op Medications: [facility] [address], [[id number]] (Meds to Beds)  Blood Management Vitamin Regimen:  Discussed preop vitamin scripts sent to pharmacy 9/2024, valid for 1 year. Pt states he takes vit C. Instructed to  remaining vitamins and begin taking asap. Pt verbalizes " understanding.  Pt has NN direct c/b number for any questions or issues.   Post-op anticoagulant: prescribed preoperatively - patient educated to  at pharmacy and have at home prior to surgery, ready for after surgery use only. Discussed Lovenox script sent to pharmacy 9/2024, but educated it is for postop use only.   Has Bactroban for 5 days preop: No -- Pt made aware that bactroban script sent to pharmacy 12/10/24 and instructions for use will be sent via My Chart.   Educated on Preoperative Bathing Instructions, and use of Soap for 5 days before surgery. -- updated SSI instructions will be sent via My Chart.      DC Plan: Pt plans to be discharged home    Barriers to DC identified preoperatively: none identified    BMI: 28.41    Patient Education:  Pt educated on post-op pain, early mobilization (POD0), LOS goals, OP PT goals, and preoperative bathing. Patient educated that our goal is to appropriately discharge patient based off their post-op function while striving to maintain maximal independence. The goal is to discharge patient to home and for them to attend outpatient physical therapy.    Assigned to care team? Yes

## 2024-12-19 ENCOUNTER — TELEPHONE (OUTPATIENT)
Dept: CARDIOLOGY CLINIC | Facility: CLINIC | Age: 54
End: 2024-12-19

## 2024-12-19 ENCOUNTER — APPOINTMENT (OUTPATIENT)
Dept: LAB | Facility: CLINIC | Age: 54
End: 2024-12-19

## 2024-12-19 DIAGNOSIS — Z01.812 PRE-OPERATIVE LABORATORY EXAMINATION: ICD-10-CM

## 2024-12-19 DIAGNOSIS — Z01.818 PRE-OP EXAM: ICD-10-CM

## 2024-12-19 DIAGNOSIS — M16.12 PRIMARY OSTEOARTHRITIS OF LEFT HIP: ICD-10-CM

## 2024-12-19 LAB
ALBUMIN SERPL BCG-MCNC: 4.9 G/DL (ref 3.5–5)
ALP SERPL-CCNC: 103 U/L (ref 34–104)
ALT SERPL W P-5'-P-CCNC: 39 U/L (ref 7–52)
ANION GAP SERPL CALCULATED.3IONS-SCNC: 9 MMOL/L (ref 4–13)
APTT PPP: 31 SECONDS (ref 23–34)
AST SERPL W P-5'-P-CCNC: 29 U/L (ref 13–39)
BASOPHILS # BLD AUTO: 0.06 THOUSANDS/ΜL (ref 0–0.1)
BASOPHILS NFR BLD AUTO: 1 % (ref 0–1)
BILIRUB SERPL-MCNC: 1.12 MG/DL (ref 0.2–1)
BUN SERPL-MCNC: 10 MG/DL (ref 5–25)
CALCIUM SERPL-MCNC: 9.8 MG/DL (ref 8.4–10.2)
CHLORIDE SERPL-SCNC: 101 MMOL/L (ref 96–108)
CO2 SERPL-SCNC: 28 MMOL/L (ref 21–32)
CREAT SERPL-MCNC: 0.66 MG/DL (ref 0.6–1.3)
EOSINOPHIL # BLD AUTO: 0.13 THOUSAND/ΜL (ref 0–0.61)
EOSINOPHIL NFR BLD AUTO: 2 % (ref 0–6)
ERYTHROCYTE [DISTWIDTH] IN BLOOD BY AUTOMATED COUNT: 11.9 % (ref 11.6–15.1)
EST. AVERAGE GLUCOSE BLD GHB EST-MCNC: 120 MG/DL
GFR SERPL CREATININE-BSD FRML MDRD: 109 ML/MIN/1.73SQ M
GLUCOSE P FAST SERPL-MCNC: 96 MG/DL (ref 65–99)
HBA1C MFR BLD: 5.8 %
HCT VFR BLD AUTO: 44.1 % (ref 36.5–49.3)
HGB BLD-MCNC: 15.2 G/DL (ref 12–17)
IMM GRANULOCYTES # BLD AUTO: 0.01 THOUSAND/UL (ref 0–0.2)
IMM GRANULOCYTES NFR BLD AUTO: 0 % (ref 0–2)
INR PPP: 1.02 (ref 0.85–1.19)
LYMPHOCYTES # BLD AUTO: 2.1 THOUSANDS/ΜL (ref 0.6–4.47)
LYMPHOCYTES NFR BLD AUTO: 33 % (ref 14–44)
MCH RBC QN AUTO: 31.9 PG (ref 26.8–34.3)
MCHC RBC AUTO-ENTMCNC: 34.5 G/DL (ref 31.4–37.4)
MCV RBC AUTO: 93 FL (ref 82–98)
MONOCYTES # BLD AUTO: 0.52 THOUSAND/ΜL (ref 0.17–1.22)
MONOCYTES NFR BLD AUTO: 8 % (ref 4–12)
NEUTROPHILS # BLD AUTO: 3.63 THOUSANDS/ΜL (ref 1.85–7.62)
NEUTS SEG NFR BLD AUTO: 56 % (ref 43–75)
NRBC BLD AUTO-RTO: 0 /100 WBCS
PLATELET # BLD AUTO: 308 THOUSANDS/UL (ref 149–390)
PMV BLD AUTO: 10.2 FL (ref 8.9–12.7)
POTASSIUM SERPL-SCNC: 3.6 MMOL/L (ref 3.5–5.3)
PROT SERPL-MCNC: 7.9 G/DL (ref 6.4–8.4)
PROTHROMBIN TIME: 13.7 SECONDS (ref 12.3–15)
RBC # BLD AUTO: 4.77 MILLION/UL (ref 3.88–5.62)
SODIUM SERPL-SCNC: 138 MMOL/L (ref 135–147)
WBC # BLD AUTO: 6.45 THOUSAND/UL (ref 4.31–10.16)

## 2024-12-19 PROCEDURE — 85730 THROMBOPLASTIN TIME PARTIAL: CPT

## 2024-12-19 PROCEDURE — 86850 RBC ANTIBODY SCREEN: CPT | Performed by: ORTHOPAEDIC SURGERY

## 2024-12-19 PROCEDURE — 85610 PROTHROMBIN TIME: CPT

## 2024-12-19 PROCEDURE — 86900 BLOOD TYPING SEROLOGIC ABO: CPT | Performed by: ORTHOPAEDIC SURGERY

## 2024-12-19 PROCEDURE — 85025 COMPLETE CBC W/AUTO DIFF WBC: CPT

## 2024-12-19 PROCEDURE — 83036 HEMOGLOBIN GLYCOSYLATED A1C: CPT

## 2024-12-19 PROCEDURE — 87081 CULTURE SCREEN ONLY: CPT

## 2024-12-19 PROCEDURE — 86901 BLOOD TYPING SEROLOGIC RH(D): CPT | Performed by: ORTHOPAEDIC SURGERY

## 2024-12-19 PROCEDURE — 36415 COLL VENOUS BLD VENIPUNCTURE: CPT

## 2024-12-19 PROCEDURE — 80053 COMPREHEN METABOLIC PANEL: CPT

## 2024-12-19 NOTE — TELEPHONE ENCOUNTER
----- Message from Mary Reynoso MD sent at 12/16/2024  1:08 PM EST -----  Regarding: EKG results  Please let the patient know that the EKG done today does not show any major changes compared to his previous EKGs.

## 2024-12-20 ENCOUNTER — LAB REQUISITION (OUTPATIENT)
Dept: LAB | Facility: HOSPITAL | Age: 54
End: 2024-12-20
Payer: COMMERCIAL

## 2024-12-20 DIAGNOSIS — Z01.818 ENCOUNTER FOR OTHER PREPROCEDURAL EXAMINATION: ICD-10-CM

## 2024-12-20 LAB
ABO GROUP BLD: NORMAL
BLD GP AB SCN SERPL QL: NEGATIVE
DME PARACHUTE DELIVERY DATE ACTUAL: NORMAL
DME PARACHUTE DELIVERY DATE EXPECTED: NORMAL
DME PARACHUTE DELIVERY DATE REQUESTED: NORMAL
DME PARACHUTE ITEM DESCRIPTION: NORMAL
DME PARACHUTE ITEM DESCRIPTION: NORMAL
DME PARACHUTE ORDER STATUS: NORMAL
DME PARACHUTE SUPPLIER NAME: NORMAL
DME PARACHUTE SUPPLIER PHONE: NORMAL
MRSA NOSE QL CULT: NORMAL
RH BLD: POSITIVE
SPECIMEN EXPIRATION DATE: NORMAL

## 2024-12-23 ENCOUNTER — OFFICE VISIT (OUTPATIENT)
Age: 54
End: 2024-12-23
Payer: COMMERCIAL

## 2024-12-23 VITALS
BODY MASS INDEX: 29.09 KG/M2 | DIASTOLIC BLOOD PRESSURE: 80 MMHG | OXYGEN SATURATION: 97 % | HEART RATE: 71 BPM | HEIGHT: 70 IN | SYSTOLIC BLOOD PRESSURE: 146 MMHG | WEIGHT: 203.2 LBS

## 2024-12-23 DIAGNOSIS — M16.12 PRIMARY OSTEOARTHRITIS OF LEFT HIP: ICD-10-CM

## 2024-12-23 DIAGNOSIS — Z01.818 PREOPERATIVE CLEARANCE: Primary | ICD-10-CM

## 2024-12-23 DIAGNOSIS — I10 ESSENTIAL HYPERTENSION: ICD-10-CM

## 2024-12-23 DIAGNOSIS — G89.29 CHRONIC LEFT HIP PAIN: ICD-10-CM

## 2024-12-23 DIAGNOSIS — Z95.1 S/P CABG (CORONARY ARTERY BYPASS GRAFT): ICD-10-CM

## 2024-12-23 DIAGNOSIS — M25.552 CHRONIC LEFT HIP PAIN: ICD-10-CM

## 2024-12-23 PROCEDURE — 99214 OFFICE O/P EST MOD 30 MIN: CPT | Performed by: NURSE PRACTITIONER

## 2024-12-24 NOTE — PRE-PROCEDURE INSTRUCTIONS
"Pre-Surgery Instructions:   Medication Instructions    amLODIPine (NORVASC) 2.5 mg tablet Take day of surgery.    ascorbic acid (VITAMIN C) 500 MG tablet Hold day of surgery.    aspirin 81 mg chewable tablet Msg' sx ofc    atorvastatin (LIPITOR) 40 mg tablet Take night before surgery    Boswellia-Glucosamine-Vit D (OSTEO BI-FLEX ONE PER DAY PO) Stop taking 7 days prior to surgery.    ferrous sulfate 324 (65 Fe) mg Hold day of surgery.    folic acid (FOLVITE) 1 mg tablet Hold day of surgery.    LYSINE HCL PO Stop taking 7 days prior to surgery.    methylcellulose oral powder Hold day of surgery.    metoprolol tartrate (LOPRESSOR) 25 mg tablet Take day of surgery.    Multiple Vitamin (multivitamin) tablet Hold day of surgery.    mupirocin (BACTROBAN) 2 % ointment Take day of surgery.      Reviewed with patient, in detail, instructions from \"My Surgical Experience\". [facility] with patient [facility]. Advised to call ortho office/surgery coordinator with any questions and/or concerns.   Confirmed that patient has hibiclens soap and CHG wipes. Incentive spirometer received.   Patient verbalized understanding of current visitor restrictions due to Covid and will clarify with nurse DOS. Instructed to avoid all ASA and OTC Vit/Supp 1 week prior to surgery and to avoid NSAIDs 7 days prior to surgery per anesthesia guidelines.Tylenol ok to take prn.   No alcohol 24 hours prior to surgery. Patient aware Lovenox or other Blood Thinner prescribed is for POST OP ONLY. Instructed to call surgeon's office in meantime with any new illness.  Patient verbalized an understanding of all instructions reviewed and offers no concerns at this time.    "

## 2024-12-30 ENCOUNTER — ANESTHESIA EVENT (OUTPATIENT)
Age: 54
End: 2024-12-30
Payer: COMMERCIAL

## 2025-01-03 DIAGNOSIS — E78.2 HYPERLIPIDEMIA, MIXED: ICD-10-CM

## 2025-01-03 DIAGNOSIS — I10 ESSENTIAL HYPERTENSION: ICD-10-CM

## 2025-01-03 DIAGNOSIS — I24.9 ACS (ACUTE CORONARY SYNDROME) (HCC): ICD-10-CM

## 2025-01-03 DIAGNOSIS — Z95.1 S/P CABG (CORONARY ARTERY BYPASS GRAFT): ICD-10-CM

## 2025-01-03 DIAGNOSIS — I25.10 CAD IN NATIVE ARTERY: ICD-10-CM

## 2025-01-03 RX ORDER — AMLODIPINE BESYLATE 2.5 MG/1
2.5 TABLET ORAL DAILY
Qty: 30 TABLET | Refills: 5 | Status: SHIPPED | OUTPATIENT
Start: 2025-01-03

## 2025-01-06 ENCOUNTER — EVALUATION (OUTPATIENT)
Dept: PHYSICAL THERAPY | Facility: CLINIC | Age: 55
End: 2025-01-06
Payer: COMMERCIAL

## 2025-01-06 DIAGNOSIS — M16.12 PRIMARY OSTEOARTHRITIS OF LEFT HIP: ICD-10-CM

## 2025-01-06 DIAGNOSIS — G89.29 CHRONIC LEFT HIP PAIN: ICD-10-CM

## 2025-01-06 DIAGNOSIS — Z47.1 AFTERCARE FOLLOWING LEFT HIP JOINT REPLACEMENT SURGERY: Primary | ICD-10-CM

## 2025-01-06 DIAGNOSIS — Z96.642 AFTERCARE FOLLOWING LEFT HIP JOINT REPLACEMENT SURGERY: Primary | ICD-10-CM

## 2025-01-06 DIAGNOSIS — M25.552 CHRONIC LEFT HIP PAIN: ICD-10-CM

## 2025-01-06 PROCEDURE — 97161 PT EVAL LOW COMPLEX 20 MIN: CPT

## 2025-01-06 PROCEDURE — 97110 THERAPEUTIC EXERCISES: CPT

## 2025-01-06 PROCEDURE — 97112 NEUROMUSCULAR REEDUCATION: CPT

## 2025-01-06 NOTE — PROGRESS NOTES
PT Evaluation     Today's date: 25  Patient name: Suleman Zhang  : 1970  MRN: [id number]  Referring provider: Lyndon Ruelas,*  Dx:   Encounter Diagnosis     ICD-10-CM    1. Aftercare following left hip joint replacement surgery  Z47.1 Ambulatory referral to Physical Therapy    Z96.642       2. Chronic left hip pain  M25.552 Ambulatory referral to Physical Therapy    G89.29       3. Primary osteoarthritis of left hip  M16.12 Ambulatory referral to Physical Therapy          Start Time: 1200  Stop Time: 1300  Total time in clinic (min): 60 minutes     Assessment  Impairments: abnormal coordination, abnormal gait, abnormal muscle firing, abnormal or restricted ROM, abnormal movement, activity intolerance, impaired balance, impaired physical strength, lacks appropriate home exercise program, pain with function, weight-bearing intolerance and poor body mechanics    Assessment details: Suleman Zhang is a pleasant 54 y.o. male who presents today for pre-operative evaluation for their L CRISTINA scheduled on . The patient reports pain, decreased strength, decreased ROM, decreased joint mobility, and ambulatory dysfunction. Suleman complains of aching, sharp, and tight pain in the left hip ranging from 0/10 to 8/10. Pain is exacerbated by activity, standing, or stairs and made better by ice, medication, or rest.     The patient demonstrates moderately decreased strength during resisted muscle testing. Pt ROM is moderately decreased with pain at end ranges.     The patient has difficulty with ambulation, transfers, leisure, athletics, and work. Patient will benefit from skilled physical therapy, including therapeutic exercise, stretching, balance training, manual therapy, and modalities prn to improve their level of function, to increase overall quality of life, and to address his impairments.    Dispensed home exercise program and educated patient on proper technique, frequency, and the possibility  of DOMS for the next 24 to 48 hours. Discussed Home checklist with patient and addressed any questions or concerns the patient had. Patient demonstrated understanding and was advised to call us if he has any further questions.      Goals  ST. Independent with HEP in 2 weeks.   2. Pt will have verbal report of improvement in symptoms by >/=25% in 2 weeks.   3. Decrease pain to 5/10 at it's worst.   4. Increase strength by 1/2 grade in all deficient planes.     To be achieved by D/C   LT. Pt will improve FOTO score by >/= goal points in 6 weeks.   2. Pt will improve FOTO score to >/= goal score by visit # 12.   3. Pt will be able to stand for an hour with little to no difficulty.   4. Pt will be able to walk a mile with little to no difficulty.   5. Pt will be able to perform his usual activities including work and exercise with little to no difficulty.   6. Pt will improve five times sit to stand time from TBD to 12 seconds or less  7. Pt will improve TUG time from TBD to 13.5 seconds or less. (MDC 3 - 5 seconds)    Plan    Frequency: 2x week  Duration in weeks: 8  Plan of Care beginning date: 2025  Plan of Care expiration date: 3/31/2025  Treatment plan discussed with: patient        Subjective Evaluation    History of Present Illness  Mechanism of injury: Suleman presents today for pre-operative evaluation for L CRISTINA scheduled on . The patient reports continued pain in his L hip. The patient is currently ambulating with pain, but no AD. They report functional difficulty with stairs, getting up out of a chair, and bending.    He notes that he has been unable to exercise due to pain in the hip. He also notes pain down his LLE which at times down to the ankle. He notes that he cannot sit for more than 15 minutes.    The patient also reports diminished strength, decreased ROM, decreased balance, disrupted sleep, and difficulty with function.    Suleman  has a past medical history of Disseminated herpes  zoster (10/16/2019), HLD (hyperlipidemia), Hypertension, Known health problems: none, and Vitamin D deficiency. The patient also  has a past surgical history that includes Almyra tooth extraction; Cardiac catheterization (N/A, 2023); Cardiac catheterization (N/A, 2023); pr coronary artery byp w/vein & artery graft 4 vein (N/A, 2023); Coronary artery bypass graft; and Colonoscopy.  Patient Goals  Patient goals for therapy: decreased edema, decreased pain, improved balance, return to work, return to sport/leisure activities, independence with ADLs/IADLs, increased strength and increased motion  Patient goal: Golf, hiking  Pain  Current pain ratin  At best pain ratin  At worst pain ratin  Quality: dull ache and sharp  Relieving factors: medications  Aggravating factors: standing, walking, stair climbing and running  Progression: worsening    Social Support  Steps to enter house: yes (3)  Stairs in house: yes   Lives with: spouse    Employment status: working (, [employer])        Objective     Passive Range of Motion   Left Hip   Flexion: 95 degrees   Abduction: 15 degrees   External rotation (90/90): 20 degrees   Internal rotation (90/90): 0 degrees     Right Hip   Normal passive range of motion    Strength/Myotome Testing     Left Hip   Planes of Motion   Flexion: 4-  Abduction: 4+  External rotation: 4-  Internal rotation: 4-    Right Hip   Normal muscle strength    Functional Assessment        Comments  2025  5xSTS - 10s no UE  TUG - 10s no AD          Precautions: L Posterior CRISTINA , HTN    POC expires Unit limit Auth  expiration date PT/OT + Visit Limit?   3/31/24 BOMN 25 BOMN                 Visit/Unit Tracking  AUTH Status:  Date               Approved Used 1               Remaining                  Access Code: [email or web address]  URL: [email or web address]  Date: 2025  Prepared by: Loi Andrews    Exercises  - Seated Ankle Pumps  - 2  x daily - 7 x weekly - 3 sets - 10 reps  - Supine Gluteal Sets  - 2 x daily - 7 x weekly - 3 sets - 10 reps - 5 hold  - Supine Quadricep Sets  - 2 x daily - 7 x weekly - 3 sets - 10 reps - 5 hold  - Supine Heel Slide  - 2 x daily - 7 x weekly - 3 sets - 10 reps - 5 hold  - Supine Hip Abduction  - 2 x daily - 7 x weekly - 2 sets - 10 reps  - Seated Long Arc Quad  - 2 x daily - 7 x weekly - 3 sets - 10 reps    Manuals 1/6            PROM Hip                                       Re-eval             Neuro Re-Ed             Rockerboard             SL balance             StS walk             Heel toe walk             Alexys walking             Bosu balance                                       WYNN, 5xSTS, TUG TS            Ther Ex             Nustep for ROM and cardio             Pt education - precautions, home prep checklist, HEP TS             Quad Set  HEP             Glute Set HEP             Ankle Pumps HEP              LAQ/SAQ HEP             SLR             Supine march             Heel slides             HL clamshells             HL adduction             Bridges             Step Stretch             Standing HS st.                                       Matrix flexion             Matrix ext.             Ther Activity             TG Squats             FSU             FSD             LSU             LSD             StS w/ TB             Gait Training                                       Modalities             CP

## 2025-01-08 ENCOUNTER — APPOINTMENT (OUTPATIENT)
Dept: PHYSICAL THERAPY | Facility: CLINIC | Age: 55
End: 2025-01-08
Payer: COMMERCIAL

## 2025-01-10 ENCOUNTER — TELEPHONE (OUTPATIENT)
Age: 55
End: 2025-01-10

## 2025-01-10 NOTE — DISCHARGE INSTR - AVS FIRST PAGE
Discharge Instructions: Hip replacement with Dr. Ruelas    Weight Bearing Status:                                           Weight Bearing as tolerated to left lower extremity with assistive devices.     Be sure to maintain posterior hip precautions: no bending at waist, no crossing legs, on internally rotating surgical leg    Pain Management/Medications  You may resume your usual medications.  You may stop pre-operative vitamins (Folic acid, Ferrous sulfate, and Vitamin C).   Please take the following medications:  Anti-coagulation (blood clot prevention) - Complete Lovenox injections for 28 days. Continue home Aspirin 81 mg daily.   Pain medication:  Oxycodone 5 m tablet every 6 hours as needed for severe pain  Robaxin (Muscle relaxer) 500 m tablet up to 3 times a day as needed for mild pain and muscle discomfort  Tylenol 1000 mg: up to three times daily as needed for mild to moderate pain. Do not exceed 3000mg daily   Continue applying ice to your hip on and off for about 20 minutes as needed.  Continue to elevate your operative leg, with ankle above the level of your heart when possible.  If you have questions or pain concerns, please contact the office.   If you need refills of your medications prior to your next office visit, please contact the office.      Showering/Dressing Instructions:   Do not shower until first follow up appointment.  Leave bandage covering surgical incision on until seen in office.   No baths, swimming, or submerging your leg until cleared to do so.      Driving Instructions:  No driving until cleared by Orthopaedic Surgery.    PT/OT:  Continue PT/OT on outpatient basis as directed    Appt Instructions:   Follow up as scheduled on 2025 in [place].   If you need to change or cancel your appointment for any reason, please call the clinic at [phone]    Please contact the office if you experience any of the following:  Excessive bleeding (bleeding through your  dressing)  Fever greater than 101 degrees F after 48 hours (low grade fevers the day or two after surgery are normal)  Persistent nausea or vomiting  Decreased sensation or discoloration of the operative limb  Pain or swelling that is getting worse and not better with medication    Miscellaneous:  Advise use of abduction pillow (pink pillow) for 6 weeks after surgery when sleeping.    Advise against any dental cleanings or procedures for 3 months after surgery.   - If there is a dental emergency, please contact the office for further instructions.

## 2025-01-10 NOTE — TELEPHONE ENCOUNTER
Patient is scheduled for surgery on Monday and is asking what time he should arrive. I informed the patient that he will receive a call between the hours 2:00pm and 7:00pm. No further questions.

## 2025-01-13 ENCOUNTER — HOSPITAL ENCOUNTER (OUTPATIENT)
Age: 55
Setting detail: OUTPATIENT SURGERY
Discharge: HOME | End: 2025-01-14
Attending: ORTHOPAEDIC SURGERY | Admitting: ORTHOPAEDIC SURGERY
Payer: COMMERCIAL

## 2025-01-13 ENCOUNTER — ANESTHESIA (OUTPATIENT)
Age: 55
End: 2025-01-13
Payer: COMMERCIAL

## 2025-01-13 DIAGNOSIS — M16.12 PRIMARY OSTEOARTHRITIS OF LEFT HIP: Primary | ICD-10-CM

## 2025-01-13 DIAGNOSIS — M25.552 CHRONIC LEFT HIP PAIN: ICD-10-CM

## 2025-01-13 DIAGNOSIS — G89.29 CHRONIC LEFT HIP PAIN: ICD-10-CM

## 2025-01-13 PROBLEM — M16.11 PRIMARY OSTEOARTHRITIS OF RIGHT HIP: Status: ACTIVE | Noted: 2025-01-13

## 2025-01-13 PROBLEM — M16.11 PRIMARY OSTEOARTHRITIS OF RIGHT HIP: Status: RESOLVED | Noted: 2025-01-13 | Resolved: 2025-01-13

## 2025-01-13 PROCEDURE — A6250 SKIN SEAL PROTECT MOISTURIZR: HCPCS | Performed by: ORTHOPAEDIC SURGERY

## 2025-01-13 PROCEDURE — 99222 1ST HOSP IP/OBS MODERATE 55: CPT | Performed by: INTERNAL MEDICINE

## 2025-01-13 PROCEDURE — 27310 EXPLORATION OF KNEE JOINT: CPT

## 2025-01-13 PROCEDURE — 97116 GAIT TRAINING THERAPY: CPT | Performed by: PHYSICAL THERAPIST

## 2025-01-13 PROCEDURE — C1776 JOINT DEVICE (IMPLANTABLE): HCPCS | Performed by: ORTHOPAEDIC SURGERY

## 2025-01-13 PROCEDURE — 97167 OT EVAL HIGH COMPLEX 60 MIN: CPT

## 2025-01-13 PROCEDURE — C1713 ANCHOR/SCREW BN/BN,TIS/BN: HCPCS | Performed by: ORTHOPAEDIC SURGERY

## 2025-01-13 PROCEDURE — 97163 PT EVAL HIGH COMPLEX 45 MIN: CPT | Performed by: PHYSICAL THERAPIST

## 2025-01-13 PROCEDURE — NC001 PR NO CHARGE: Performed by: ORTHOPAEDIC SURGERY

## 2025-01-13 PROCEDURE — 27130 TOTAL HIP ARTHROPLASTY: CPT | Performed by: ORTHOPAEDIC SURGERY

## 2025-01-13 DEVICE — PINNACLE CANCELLOUS BONE SCREW 6.5MM X 25MM
Type: IMPLANTABLE DEVICE | Site: HIP | Status: FUNCTIONAL
Brand: PINNACLE

## 2025-01-13 DEVICE — M-SPEC METAL FEMORAL HEAD 12/14 TAPER DIAMETER 36MM +1.5: Type: IMPLANTABLE DEVICE | Site: HIP | Status: FUNCTIONAL

## 2025-01-13 DEVICE — PINNACLE HIP SOLUTIONS ALTRX POLYETHYLENE ACETABULAR LINER +4 10 DEGREE 36MM ID 52MM OD
Type: IMPLANTABLE DEVICE | Site: HIP | Status: FUNCTIONAL
Brand: PINNACLE ALTRX

## 2025-01-13 DEVICE — PINNACLE POROCOAT ACETABULAR SHELL SECTOR II 52MM OD
Type: IMPLANTABLE DEVICE | Site: HIP | Status: FUNCTIONAL
Brand: PINNACLE POROCOAT

## 2025-01-13 DEVICE — CORAIL HIP SYSTEM CEMENTLESS FEMORAL STEM 12/14 AMT 135 DEGREES KHO SIZE 10 HA COATED HIGH OFFSET NO COLLAR
Type: IMPLANTABLE DEVICE | Site: HIP | Status: FUNCTIONAL
Brand: CORAIL

## 2025-01-13 RX ORDER — PROPOFOL 10 MG/ML
INJECTION, EMULSION INTRAVENOUS CONTINUOUS PRN
Status: DISCONTINUED | OUTPATIENT
Start: 2025-01-13 | End: 2025-01-13

## 2025-01-13 RX ORDER — ONDANSETRON 2 MG/ML
4 INJECTION INTRAMUSCULAR; INTRAVENOUS ONCE AS NEEDED
Status: DISCONTINUED | OUTPATIENT
Start: 2025-01-13 | End: 2025-01-13 | Stop reason: HOSPADM

## 2025-01-13 RX ORDER — DOCUSATE SODIUM 100 MG/1
100 CAPSULE, LIQUID FILLED ORAL 2 TIMES DAILY
Status: DISCONTINUED | OUTPATIENT
Start: 2025-01-13 | End: 2025-01-14 | Stop reason: HOSPADM

## 2025-01-13 RX ORDER — MIDAZOLAM HYDROCHLORIDE 2 MG/2ML
INJECTION, SOLUTION INTRAMUSCULAR; INTRAVENOUS AS NEEDED
Status: DISCONTINUED | OUTPATIENT
Start: 2025-01-13 | End: 2025-01-13

## 2025-01-13 RX ORDER — ONDANSETRON 2 MG/ML
INJECTION INTRAMUSCULAR; INTRAVENOUS AS NEEDED
Status: DISCONTINUED | OUTPATIENT
Start: 2025-01-13 | End: 2025-01-13

## 2025-01-13 RX ORDER — AMLODIPINE BESYLATE 2.5 MG/1
2.5 TABLET ORAL DAILY
Status: DISCONTINUED | OUTPATIENT
Start: 2025-01-14 | End: 2025-01-14 | Stop reason: HOSPADM

## 2025-01-13 RX ORDER — FENTANYL CITRATE 50 UG/ML
INJECTION, SOLUTION INTRAMUSCULAR; INTRAVENOUS AS NEEDED
Status: DISCONTINUED | OUTPATIENT
Start: 2025-01-13 | End: 2025-01-13

## 2025-01-13 RX ORDER — OXYCODONE HYDROCHLORIDE 5 MG/1
5 TABLET ORAL EVERY 6 HOURS PRN
Qty: 30 TABLET | Refills: 0 | Status: SHIPPED | OUTPATIENT
Start: 2025-01-13 | End: 2025-01-23

## 2025-01-13 RX ORDER — SODIUM CHLORIDE, SODIUM LACTATE, POTASSIUM CHLORIDE, CALCIUM CHLORIDE 600; 310; 30; 20 MG/100ML; MG/100ML; MG/100ML; MG/100ML
125 INJECTION, SOLUTION INTRAVENOUS CONTINUOUS
Status: DISCONTINUED | OUTPATIENT
Start: 2025-01-13 | End: 2025-01-14 | Stop reason: HOSPADM

## 2025-01-13 RX ORDER — HYDROMORPHONE HCL IN WATER/PF 6 MG/30 ML
0.2 PATIENT CONTROLLED ANALGESIA SYRINGE INTRAVENOUS
Status: DISCONTINUED | OUTPATIENT
Start: 2025-01-13 | End: 2025-01-13 | Stop reason: HOSPADM

## 2025-01-13 RX ORDER — EPHEDRINE SULFATE 50 MG/ML
INJECTION INTRAVENOUS AS NEEDED
Status: DISCONTINUED | OUTPATIENT
Start: 2025-01-13 | End: 2025-01-13

## 2025-01-13 RX ORDER — GABAPENTIN 100 MG/1
100 CAPSULE ORAL EVERY 8 HOURS SCHEDULED
Status: DISCONTINUED | OUTPATIENT
Start: 2025-01-13 | End: 2025-01-14 | Stop reason: HOSPADM

## 2025-01-13 RX ORDER — CEFAZOLIN SODIUM 1 G/50ML
1000 SOLUTION INTRAVENOUS EVERY 8 HOURS
Status: COMPLETED | OUTPATIENT
Start: 2025-01-13 | End: 2025-01-14

## 2025-01-13 RX ORDER — HYDROMORPHONE HCL/PF 1 MG/ML
0.5 SYRINGE (ML) INJECTION EVERY 2 HOUR PRN
Status: DISCONTINUED | OUTPATIENT
Start: 2025-01-13 | End: 2025-01-14 | Stop reason: HOSPADM

## 2025-01-13 RX ORDER — SODIUM CHLORIDE, SODIUM LACTATE, POTASSIUM CHLORIDE, CALCIUM CHLORIDE 600; 310; 30; 20 MG/100ML; MG/100ML; MG/100ML; MG/100ML
125 INJECTION, SOLUTION INTRAVENOUS CONTINUOUS
Status: DISCONTINUED | OUTPATIENT
Start: 2025-01-13 | End: 2025-01-13 | Stop reason: SDUPTHER

## 2025-01-13 RX ORDER — CEFAZOLIN SODIUM 2 G/50ML
2000 SOLUTION INTRAVENOUS ONCE
Status: COMPLETED | OUTPATIENT
Start: 2025-01-13 | End: 2025-01-13

## 2025-01-13 RX ORDER — METHOCARBAMOL 500 MG/1
500 TABLET, FILM COATED ORAL 3 TIMES DAILY PRN
Qty: 60 TABLET | Refills: 0 | Status: SHIPPED | OUTPATIENT
Start: 2025-01-13

## 2025-01-13 RX ORDER — ONDANSETRON 2 MG/ML
4 INJECTION INTRAMUSCULAR; INTRAVENOUS EVERY 6 HOURS PRN
Status: DISCONTINUED | OUTPATIENT
Start: 2025-01-13 | End: 2025-01-14 | Stop reason: HOSPADM

## 2025-01-13 RX ORDER — METOPROLOL TARTRATE 25 MG/1
12.5 TABLET, FILM COATED ORAL EVERY 12 HOURS
Status: DISCONTINUED | OUTPATIENT
Start: 2025-01-13 | End: 2025-01-14 | Stop reason: HOSPADM

## 2025-01-13 RX ORDER — TRANEXAMIC ACID 10 MG/ML
1000 INJECTION, SOLUTION INTRAVENOUS ONCE
Status: COMPLETED | OUTPATIENT
Start: 2025-01-13 | End: 2025-01-13

## 2025-01-13 RX ORDER — CHLORHEXIDINE GLUCONATE ORAL RINSE 1.2 MG/ML
15 SOLUTION DENTAL ONCE
Status: COMPLETED | OUTPATIENT
Start: 2025-01-13 | End: 2025-01-13

## 2025-01-13 RX ORDER — CALCIUM CARBONATE 500 MG/1
1000 TABLET, CHEWABLE ORAL DAILY PRN
Status: DISCONTINUED | OUTPATIENT
Start: 2025-01-13 | End: 2025-01-14 | Stop reason: HOSPADM

## 2025-01-13 RX ORDER — ENOXAPARIN SODIUM 100 MG/ML
40 INJECTION SUBCUTANEOUS DAILY
Status: DISCONTINUED | OUTPATIENT
Start: 2025-01-13 | End: 2025-01-14 | Stop reason: HOSPADM

## 2025-01-13 RX ORDER — OXYCODONE HYDROCHLORIDE 10 MG/1
10 TABLET ORAL EVERY 4 HOURS PRN
Status: DISCONTINUED | OUTPATIENT
Start: 2025-01-13 | End: 2025-01-14 | Stop reason: HOSPADM

## 2025-01-13 RX ORDER — DEXAMETHASONE SODIUM PHOSPHATE 10 MG/ML
INJECTION, SOLUTION INTRAMUSCULAR; INTRAVENOUS AS NEEDED
Status: DISCONTINUED | OUTPATIENT
Start: 2025-01-13 | End: 2025-01-13

## 2025-01-13 RX ORDER — OXYCODONE HYDROCHLORIDE 5 MG/1
5 TABLET ORAL EVERY 4 HOURS PRN
Status: DISCONTINUED | OUTPATIENT
Start: 2025-01-13 | End: 2025-01-14 | Stop reason: HOSPADM

## 2025-01-13 RX ORDER — ATORVASTATIN CALCIUM 20 MG/1
40 TABLET, FILM COATED ORAL
Status: DISCONTINUED | OUTPATIENT
Start: 2025-01-13 | End: 2025-01-14 | Stop reason: HOSPADM

## 2025-01-13 RX ORDER — MAGNESIUM HYDROXIDE 1200 MG/15ML
LIQUID ORAL AS NEEDED
Status: DISCONTINUED | OUTPATIENT
Start: 2025-01-13 | End: 2025-01-13 | Stop reason: HOSPADM

## 2025-01-13 RX ORDER — FENTANYL CITRATE/PF 50 MCG/ML
25 SYRINGE (ML) INJECTION
Status: DISCONTINUED | OUTPATIENT
Start: 2025-01-13 | End: 2025-01-13 | Stop reason: HOSPADM

## 2025-01-13 RX ORDER — ACETAMINOPHEN 325 MG/1
975 TABLET ORAL EVERY 6 HOURS PRN
Status: DISCONTINUED | OUTPATIENT
Start: 2025-01-13 | End: 2025-01-14 | Stop reason: HOSPADM

## 2025-01-13 RX ORDER — METHOCARBAMOL 500 MG/1
500 TABLET, FILM COATED ORAL EVERY 6 HOURS SCHEDULED
Status: DISCONTINUED | OUTPATIENT
Start: 2025-01-13 | End: 2025-01-14 | Stop reason: HOSPADM

## 2025-01-13 RX ADMIN — OXYCODONE HYDROCHLORIDE 10 MG: 10 TABLET ORAL at 19:16

## 2025-01-13 RX ADMIN — CHLORHEXIDINE GLUCONATE 15 ML: 1.2 SOLUTION ORAL at 07:05

## 2025-01-13 RX ADMIN — ATORVASTATIN CALCIUM 40 MG: 20 TABLET, FILM COATED ORAL at 17:20

## 2025-01-13 RX ADMIN — OXYCODONE 5 MG: 5 TABLET ORAL at 14:17

## 2025-01-13 RX ADMIN — TRANEXAMIC ACID 1000 MG: 10 INJECTION, SOLUTION INTRAVENOUS at 08:39

## 2025-01-13 RX ADMIN — ONDANSETRON 4 MG: 2 INJECTION INTRAMUSCULAR; INTRAVENOUS at 08:37

## 2025-01-13 RX ADMIN — DOCUSATE SODIUM 100 MG: 100 CAPSULE, LIQUID FILLED ORAL at 17:20

## 2025-01-13 RX ADMIN — FENTANYL CITRATE 50 MCG: 50 INJECTION INTRAMUSCULAR; INTRAVENOUS at 08:27

## 2025-01-13 RX ADMIN — GABAPENTIN 100 MG: 100 CAPSULE ORAL at 22:25

## 2025-01-13 RX ADMIN — MIDAZOLAM 2 MG: 1 INJECTION INTRAMUSCULAR; INTRAVENOUS at 08:27

## 2025-01-13 RX ADMIN — PHENYLEPHRINE HYDROCHLORIDE 20 MCG/MIN: 10 INJECTION INTRAVENOUS at 08:57

## 2025-01-13 RX ADMIN — FENTANYL CITRATE 50 MCG: 50 INJECTION INTRAMUSCULAR; INTRAVENOUS at 08:33

## 2025-01-13 RX ADMIN — DOCUSATE SODIUM 100 MG: 100 CAPSULE, LIQUID FILLED ORAL at 11:03

## 2025-01-13 RX ADMIN — METHOCARBAMOL TABLETS 500 MG: 500 TABLET, COATED ORAL at 11:03

## 2025-01-13 RX ADMIN — DEXAMETHASONE SODIUM PHOSPHATE 10 MG: 10 INJECTION INTRAMUSCULAR; INTRAVENOUS at 08:37

## 2025-01-13 RX ADMIN — ENOXAPARIN SODIUM 40 MG: 40 INJECTION SUBCUTANEOUS at 22:20

## 2025-01-13 RX ADMIN — EPHEDRINE SULFATE 5 MG: 50 INJECTION, SOLUTION INTRAVENOUS at 09:02

## 2025-01-13 RX ADMIN — METHOCARBAMOL TABLETS 500 MG: 500 TABLET, COATED ORAL at 17:20

## 2025-01-13 RX ADMIN — SODIUM CHLORIDE, SODIUM LACTATE, POTASSIUM CHLORIDE, AND CALCIUM CHLORIDE 125 ML/HR: .6; .31; .03; .02 INJECTION, SOLUTION INTRAVENOUS at 07:07

## 2025-01-13 RX ADMIN — SODIUM CHLORIDE, SODIUM LACTATE, POTASSIUM CHLORIDE, AND CALCIUM CHLORIDE 125 ML/HR: .6; .31; .03; .02 INJECTION, SOLUTION INTRAVENOUS at 11:03

## 2025-01-13 RX ADMIN — MEPIVACAINE HYDROCHLORIDE 3.5 ML: 15 INJECTION, SOLUTION EPIDURAL; INFILTRATION at 08:35

## 2025-01-13 RX ADMIN — ACETAMINOPHEN 325MG 975 MG: 325 TABLET ORAL at 11:03

## 2025-01-13 RX ADMIN — GABAPENTIN 100 MG: 100 CAPSULE ORAL at 14:18

## 2025-01-13 RX ADMIN — CEFAZOLIN SODIUM 2000 MG: 2 SOLUTION INTRAVENOUS at 08:30

## 2025-01-13 RX ADMIN — PROPOFOL 80 MCG/KG/MIN: 10 INJECTION, EMULSION INTRAVENOUS at 08:37

## 2025-01-13 RX ADMIN — CEFAZOLIN SODIUM 1000 MG: 1 SOLUTION INTRAVENOUS at 17:20

## 2025-01-13 NOTE — CONSULTS
Consultation - Internal Medicine   Name: Suleman Zhang 54 y.o. male I MRN: [id number]  Unit/Bed#: WE 2 N -01 I Date of Admission: 1/13/2025   Date of Service: 1/13/2025 I Hospital Day: 0   Inpatient consult to Internal Medicine  Consult performed by: Sterling Carmona PA-C  Consult ordered by: Dea Adam PA-C        Physician Requesting Evaluation: Lyndon Ruelas MD   Reason for Evaluation / Principal Problem: Left TKA    Assessment & Plan  Primary osteoarthritis of left hip  53 yo male POD 0 s/p left CRISTINA.     Plan:   - Observe at [facility] tonight   - WBAT on LLE   - Posterior hip precautions   - Abductor pillow, no crossing legs  - Monitor incision site   - DVT ppx  - IS use   - OOB/ambulate  - Pain and nausea control PRN  - PT/OT  - D/C tomorrow pending PT/OT eval  - Continue frequent neurovascular checks  Please contact the SecureChat role for the Internal Medicine service with any questions/concerns.    Code Status: Level 1 - Full Code  Admission Status: OBSERVATION  Disposition: Patient requires Med Surg    History of Present Illness   53 yo male with primary OA of right hip POD 0 s/p right CRISTINA. Patient is doing well and sitting in chair. He is comfortable and able to tolerate pain. He describes pain is worse with movement and prior to getting medications, he rated it a 4 out of 10 without any radiation. He denies numbness or tingling when ambulating. Patient also denies any N/V, fevers or chills. He denies any urinary symptoms as well.    Review of Systems   Constitutional:  Negative for activity change, diaphoresis, fatigue and fever.   Cardiovascular:  Negative for chest pain, palpitations and leg swelling.   Gastrointestinal:  Negative for abdominal distention.   Genitourinary:  Negative for difficulty urinating.   Neurological:  Negative for dizziness, tremors, light-headedness and numbness.     I have reviewed the patient's PMH, PSH, Social History, Family History, Meds, and  "Allergies    Objective :  Temp:  [97 °F (36.1 °C)-98.7 °F (37.1 °C)] 98.7 °F (37.1 °C)  HR:  [55-75] 66  BP: (102-163)/(59-98) 112/65  Resp:  [13-20] 17  SpO2:  [94 %-100 %] 98 %  O2 Device: None (Room air)    Intake & Output:  I/O         01/11 0701  01/12 0700 01/12 0701 01/13 0700 01/13 0701 01/14 0700    P.O.   760    I.V. (mL/kg)   987.5 (11.2)    Total Intake(mL/kg)   1747.5 (19.7)    Urine (mL/kg/hr)   625 (0.6)    Total Output   625    Net   +1122.5                 Weights:   IBW (Ideal Body Weight): 73 kg    Body mass index is 27.98 kg/m².  Weight (last 2 days)       Date/Time Weight    01/13/25 0649 88.5 (195)          Physical Exam  Constitutional:       Appearance: Normal appearance. He is obese. He is not ill-appearing or diaphoretic.   Cardiovascular:      Rate and Rhythm: Normal rate and regular rhythm.      Pulses: Normal pulses.      Heart sounds: Normal heart sounds. No murmur heard.  Pulmonary:      Effort: Pulmonary effort is normal. No respiratory distress.      Breath sounds: Normal breath sounds.   Abdominal:      General: Abdomen is flat. There is no distension.      Palpations: Abdomen is soft.      Tenderness: There is no abdominal tenderness.   Neurological:      General: No focal deficit present.      Mental Status: He is alert and oriented to person, place, and time.     Left hip: Bandages are CDI. No surrounding erythema, swelling or drainage. Pulses 2+ in DP and PT in LLE. Sensation and motor function intact proximal and distal to incision site. No thigh or calf pain. Strength WNL. No HV drain in place, will continue to monitor bandage and ABLA        Lab Results: I have reviewed the following results:  No results for input(s): \"WBC\", \"HGB\", \"HCT\", \"PLT\", \"BANDSPCT\", \"SODIUM\", \"K\", \"CL\", \"CO2\", \"BUN\", \"CREATININE\", \"GLUC\", \"CAIONIZED\", \"MG\", \"PHOS\", \"AST\", \"ALT\", \"ALB\", \"TBILI\", \"DBILI\", \"ALKPHOS\", \"PTT\", \"INR\", \"HSTNI0\", \"HSTNI2\", \"BNP\", \"LACTICACID\" in the last 72 " "hours.  Micro:  No results found for: \"BLOODCX\", \"URINECX\", \"WOUNDCULT\", \"SPUTUMCULTUR\"      Currently Ordered Meds:   Current Facility-Administered Medications:     acetaminophen (TYLENOL) tablet 975 mg, Q6H PRN    [START ON 1/14/2025] amLODIPine (NORVASC) tablet 2.5 mg, Daily    atorvastatin (LIPITOR) tablet 40 mg, Daily With Dinner    calcium carbonate (TUMS) chewable tablet 1,000 mg, Daily PRN    ceFAZolin (ANCEF) IVPB (premix in dextrose) 1,000 mg 50 mL, Q8H, Last Rate: 1,000 mg (01/13/25 1720)    docusate sodium (COLACE) capsule 100 mg, BID    enoxaparin (LOVENOX) subcutaneous injection 40 mg, Daily    gabapentin (NEURONTIN) capsule 100 mg, Q8H SUZAN    HYDROmorphone (DILAUDID) injection 0.5 mg, Q2H PRN    lactated ringers bolus 1,000 mL, Once PRN **AND** lactated ringers bolus 1,000 mL, Once PRN    lactated ringers infusion, Continuous, Last Rate: 125 mL/hr (01/13/25 1103)    methocarbamol (ROBAXIN) tablet 500 mg, Q6H SUZAN    metoprolol tartrate (LOPRESSOR) tablet 12.5 mg, Q12H    ondansetron (ZOFRAN) injection 4 mg, Q6H PRN    oxyCODONE (ROXICODONE) immediate release tablet 10 mg, Q4H PRN    oxyCODONE (ROXICODONE) IR tablet 5 mg, Q4H PRN    sodium chloride 0.9 % bolus 1,000 mL, Once PRN **AND** sodium chloride 0.9 % bolus 1,000 mL, Once PRN  VTE Pharmacologic Prophylaxis: VTE covered by:  enoxaparin, Subcutaneous     VTE Mechanical Prophylaxis: sequential compression device    Administrative Statements   I spent 30 mins with patient discussing treatment, prognosis and reviewing with health care team.  Portions of the record may have been created with voice recognition software.  Occasional wrong word or \"sound a like\" substitutions may have occurred due to the inherent limitations of voice recognition software.  Read the chart carefully and recognize, using context, where substitutions have occurred.  ==  Sterling Carmona PA-C  [facility]  "

## 2025-01-13 NOTE — PLAN OF CARE
Problem: OCCUPATIONAL THERAPY ADULT  Goal: Performs self-care activities at highest level of function for planned discharge setting.  See evaluation for individualized goals.  Description: Treatment Interventions: ADL retraining, Functional transfer training, Endurance training, Patient/family training, Equipment evaluation/education, Compensatory technique education, Continued evaluation, Energy conservation, Activityengagement          See flowsheet documentation for full assessment, interventions and recommendations.   Note: Limitation: Decreased ADL status, Decreased endurance, Decreased self-care trans, Decreased high-level ADLs  Prognosis: Good  Assessment: Pt is a 54 y.o. male seen for OT evaluation s/p adm to [facility] on 1/13/2025 w/ primary osteoarthritis of left hip, chronic left hip pain. Comorbidities affecting pt’s functional performance include a significant PMH of HLD, HTN, s/p CABG, arthritis, varicose veins in BLE, hx of cardiac cath. Pt with active OT orders and activity orders for Activity beginning POD #0. WBAT LLE. Posterior hip precautions. Hip abduction pillow. Pt lives with spouse in a multilevel house with 3 DUNCAN. Pt has first floor setup with standard toilets and BSC. At baseline, pt was independent with all ADLs/IADLs. Pt completed supine to sit with S maintaining hip precautions. Sit to stand with S. Pt completed functional mobility functional functional household distance with S and RW. Pt completed voiding in standing with use of urinal and RW for stability. Pt completed stand to sit into chair with S and use of armrests. Pt able to maintain precautions during session with good carryover. Pt educated on LE management for independence at home. Upon evaluation, pt currently requires S for UB ADLs, S for LB ADLs, S for toileting, S for bed mobility, S for functional mobility, and S for transfers 2* the following deficits impacting occupational performance: weakness, decreased  strength , decreased balance, decreased activity tolerance, increased pain, and orthopedic restrictions. These impairments, as well at pt’s personal factors of: DUNCAN home environment, steps within home environment, difficulty performing ADLs, difficulty performing IADLs, difficulty performing transfers/mobility, WBS, fall risk , and new use of AD for functional transfers/mobility limit pt’s ability to safely engage in all baseline areas of occupation. Based on the aforementioned OT evaluation, functional performance deficits, and assessments, pt has been identified as a high complexity evaluation. Pt to continue to benefit from continued acute OT services during hospital stay to address defined deficits and to maximize level of functional independence in the following Occupational Performance areas: grooming, bathing/shower, toilet hygiene, dressing, health maintenance, functional mobility, community mobility, clothing management, cleaning, household maintenance, and job performance/volunteering. From OT standpoint, recommend No post-acute rehabilitation needs upon D/C. OT will continue to follow pt 3-5x/wk.     Rehab Resource Intensity Level, OT: No post-acute rehabilitation needs

## 2025-01-13 NOTE — OP NOTE
OPERATIVE REPORT  PATIENT NAME: Suleman Zhang  : 1970  MRN: [id number]  Pt Location:  [facility] OR ROOM 05    Surgery Date: 2025    Surgeons and Role:     * Lyndon Ruelas MD - Primary     * eDa Adam PA-C - Assisting     Preop Diagnosis:  Primary osteoarthritis of left hip [M16.12]  Chronic left hip pain [M25.552, G89.29]    Post-Op Diagnosis Codes:     * Primary osteoarthritis of left hip [M16.12]     * Chronic left hip pain [M25.552, G89.29]    Procedure(s):  Left - Left total hip arthroplasty    Specimens:  * No specimens in log *    Estimated Blood Loss:   Minimal    Drains:  Urethral Catheter Non-latex 16 Fr. (Active)   Site Assessment Clean 25 0908   Collection Container Standard drainage bag 25 0908   Number of days: 0       Anesthesia Type:   Choice     Operative Indications:  Primary osteoarthritis of left hip [M16.12]  Chronic left hip pain [M25.552, G89.29]    Operative Findings:  Depuy    Cup-52mm metal   Liner-10 degree lipped poly   Head/neck-36 +1.5mm metal   Femur-10 HO    Complications:   None      Hip Approach: Posterior        Procedure and Technique:  Following the induction of adequate level of spinal anesthesia, a Lao catheter was then sterilely introduced into this patient's bladder.  Antibiotics administered.  He was then placed in the right lateral decubitus, left side up position.  An axillary roll was placed underneath the right axilla.  The left hip and lateral thigh were then prepped and draped sterilely.  A posterolateral approach was created in order to gain access to the hip.  Full-thickness flaps were raised when I accessed the tensor fascia.  This was split, exposing the deep layer of the hip.  With the hip in internal rotation, the piriformis tendon was identified, transected, and retracted in a posterior fashion.  The remainder the short external rotators were sectioned as well.  A T-type capsulotomy was used to open up the hip.  The femoral head  was then delivered posteriorly.  Utilizing the femoral neck osteotomy guide, the proper femoral cut was then made.  A posterior capsulectomy, and anterior capsulotomy were then created in order to circumferentially expose the acetabulum.  Reaming started at 50 and extended 52 mm which point time a Hemisphere bleeding cancellous bone was encountered.  52 trial was inserted and noted to fit well, therefore the 52 mm insert was then hammered into place.  A single screw was then placed within the posterior superior quadrant for additional fixation.  The 10 degree lipped liner was then snapped into position.  The proximal femur was then prepared for insertion of press-fit component.  Box osteotome was used with proximal femur.  Patient's canal sequentially broached.  With a #10 high offset and a 36+1.5 femoral head and neck, the hip was located, taken the range of motion cuff adequate stable.  The trial components removed if that hip was carefully dislocated.  The insert components were assembled and introduced the patient's left hip in standard fashion.  The hip was once more located, taken through range of motion cuff adequate stable.  Satisfied with the extent of surgery, the wounds then flushed with saline and closed.  A Betadine soak initiated.  The piriformis tendons are approximated to the greater trochanter number Vicryl suture.  The tensor fascia was then closed with number Vicryl suture.  The subcu tissues were closed with a mixture of #1 for deep layer, 2-0 Vicryl for the subcutaneous tissues, and skin staples the skin.  Sterile dressings were applied.  He was then transferred to recovery room in stable condition with plans to include physical therapy for weightbearing to tolerance.  He will require DVT prophylaxis with Lovenox please note, there is no qualified orthopedic resident available to assist, the assistance of Dea Adam PA-C was instrumental in the safe execution of this patient surgery.  Started  patient position, patient prepped draped, IntraOp assistance, wound closure, dressing application, patient transfers, all of these were performed under my direct supervision   I was present for the entire procedure.    Patient Disposition:  PACU               SIGNATURE: Lyndon Ruelas MD  DATE: January 13, 2025  TIME: 9:40 AM

## 2025-01-13 NOTE — ANESTHESIA POSTPROCEDURE EVALUATION
Post-Op Assessment Note    CV Status:  Stable  Pain Score: 0    Pain management: adequate       Mental Status:  Alert and awake   Hydration Status:  Euvolemic   PONV Controlled:  Controlled   Airway Patency:  Patent     Post Op Vitals Reviewed: Yes    No anethesia notable event occurred.    Staff: Anesthesiologist, CRNA           Last Filed PACU Vitals:  Vitals Value Taken Time   Temp 98.5 °F (36.9 °C) 01/13/25 0953   Pulse 54 01/13/25 1017   /67 01/13/25 1015   Resp 10 01/13/25 1017   SpO2 97 % 01/13/25 1017   Vitals shown include unfiled device data.    Modified Neisha:     Vitals Value Taken Time   Activity 2 01/13/25 0953   Respiration 2 01/13/25 0953   Circulation 2 01/13/25 0953   Consciousness 1 01/13/25 0953   Oxygen Saturation 2 01/13/25 0953     Modified Neisha Score: 9

## 2025-01-13 NOTE — ANESTHESIA PREPROCEDURE EVALUATION
Procedure:  Left total hip arthroplasty (Left: Hip)    Relevant Problems   CARDIO   (+) Essential hypertension   (+) Hyperlipidemia, mixed   (+) Varicose veins of both lower extremities with pain      MUSCULOSKELETAL   (+) Arthritis of both hips   (+) Hamstring tightness of both lower extremities   (+) Primary osteoarthritis of left hip      NEURO/PSYCH   (+) Claudication of both lower extremities (HCC)      Surgery/Wound/Pain   (+) S/P CABG (coronary artery bypass graft)      Lab Results   Component Value Date    WBC 6.45 12/19/2024    HGB 15.2 12/19/2024    HCT 44.1 12/19/2024    MCV 93 12/19/2024     12/19/2024     Lab Results   Component Value Date    K 3.6 12/19/2024    CO2 28 12/19/2024     12/19/2024    BUN 10 12/19/2024    CREATININE 0.66 12/19/2024     Lab Results   Component Value Date    INR 1.02 12/19/2024    INR 1.44 (H) 03/08/2023    INR 0.93 03/01/2023    PROTIME 13.7 12/19/2024    PROTIME 17.8 (H) 03/08/2023    PROTIME 12.3 03/01/2023     Lab Results   Component Value Date    PTT 31 12/19/2024       Lab Results   Component Value Date    GLUCOSE 89 03/08/2023    GLUCOSE 135 03/08/2023    GLUCOSE 154 (H) 03/08/2023       Lab Results   Component Value Date    HGBA1C 5.8 (H) 12/19/2024       Type and Screen:  B      Physical Exam    Airway    Mallampati score: II  TM Distance: >3 FB  Neck ROM: full     Dental       Cardiovascular      Pulmonary      Other Findings        Anesthesia Plan  ASA Score- 3     Anesthesia Type- spinal with ASA Monitors.         Additional Monitors:     Airway Plan:            Plan Factors-Exercise tolerance (METS): >4 METS.    Chart reviewed.   Existing labs reviewed. Patient summary reviewed.                  Induction- intravenous.    Postoperative Plan-         Informed Consent- Anesthetic plan and risks discussed with patient.  I personally reviewed this patient with the CRNA. Discussed and agreed on the Anesthesia Plan with the CRNA..

## 2025-01-13 NOTE — PLAN OF CARE
Problem: PHYSICAL THERAPY ADULT  Goal: Performs mobility at highest level of function for planned discharge setting.  See evaluation for individualized goals.  Description: Treatment/Interventions: Functional transfer training, Elevations, LE strengthening/ROM, Therapeutic exercise, Endurance training, Patient/family training, Bed mobility, Gait training, Spoke to nursing, OT  Equipment Recommended: Walker (pt owns)       See flowsheet documentation for full assessment, interventions and recommendations.  Note: Prognosis: Good  Problem List: Decreased strength, Decreased range of motion, Decreased endurance, Impaired balance, Decreased mobility, Orthopedic restrictions, Pain  Assessment: Pt. 54 y.o.male presents for elective surgery. Past medical hx includes HLD, HTN, hx of CABG 2023. Pt admitted for Primary osteoarthritis of left hip (M16.12)  Chronic left hip pain (M25.552, G89.29). S/p L elective THR (posterior) POD #0. Pt referred to PT for functional mobility evaluation & D/C planning w/ orders of activity as tolerated. PTA, pt reports being I w/o AD. Personal factors affecting pt at time of IE include: bed/bath on 2nd floor, steps to enter, and two story home. During evaluation, deficits included dec mobility, balance, ambulation. Required S for supine to sit, sit<>stand, and ambulation. Pt able to ambulate 10' with RW in room. Antalgic step to gait with no gross LOB noted. See treatment assessment for further ambulation. Pt demonstrated dec endurance and tolerance to activity. Denies reports of dizziness or SOB t/o session. Pt was educated on fall precautions and reinforced w/ good understanding. Pt would benefit from continued PT to address deficits as defined above and maximize level of independence with functional mobility and safety. Based on pt presentation and impaired function, pt would benefit from level III, (minimum resource intensity) at D/C. The patient's AM-PAC Basic Mobility Inpatient Short  Form Raw Score is 22. A Raw score of greater than 16 suggests the patient may benefit from discharge to home. Please also refer to the recommendation of the Physical Therapist for safe discharge planning. Nsg staff to continue to mobilized pt (OOB in chair for all meals & ambulate in room/unit) as tolerated to prevent further decline in function. Nsg notified. Co-eval performed to complete this PT evaluation for the pts best interest given pts medical complexity and functional level.  Barriers to Discharge: Inaccessible home environment  Barriers to Discharge Comments: DUNCAN  Rehab Resource Intensity Level, PT: III (Minimum Resource Intensity)    See flowsheet documentation for full assessment.

## 2025-01-13 NOTE — PHYSICAL THERAPY NOTE
PT EVALUATION and TREATMENT    Pt. Name: Suleman Zhang  Pt. Age: 54 y.o.  MRN: [id number]  LENGTH OF STAY: 0    Patient Active Problem List   Diagnosis    Essential hypertension    Hyperlipidemia, mixed    Vitamin D deficiency    Low libido    Achilles tendinosis of right lower extremity    Cutaneous skin tags    Overweight (BMI 25.0-29.9)    Claudication of both lower extremities (HCC)    Varicose veins of both lower extremities with pain    Bilateral leg pain    Bilateral hand numbness    Excessive daytime sleepiness    Arthritis of both hips    Femoroacetabular impingement of left hip    Hamstring tightness of both lower extremities    Weakness of both hips    S/P CABG (coronary artery bypass graft)    Primary osteoarthritis of left hip       Admitting Diagnoses:   Primary osteoarthritis of left hip [M16.12]  Chronic left hip pain [M25.552, G89.29]    Past Medical History:   Diagnosis Date    Disseminated herpes zoster 10/16/2019    HLD (hyperlipidemia)     Hypertension     Known health problems: none     Vitamin D deficiency        Past Surgical History:   Procedure Laterality Date    CARDIAC CATHETERIZATION N/A 03/02/2023    Procedure: Cardiac catheterization;  Surgeon: Yodit Small MD;  Location: [facility];  Service: Cardiology    CARDIAC CATHETERIZATION N/A 03/02/2023    Procedure: Cardiac Coronary Angiogram;  Surgeon: Yodit Small MD;  Location: [facility];  Service: Cardiology    COLONOSCOPY      CORONARY ARTERY BYPASS GRAFT      [place] CORONARY ARTERY BYP W/VEIN & ARTERY GRAFT 4 VEIN N/A 03/08/2023    Procedure: CORONARY ARTERY BYPASS GRAFT (CABG) 4 VESSELS,  SVG to PDA  and OM2.                            r      LRA-->OM1 , LIMA to LAD, evh, hector;  Surgeon: Darrell Jiménez DO;  Location: [facility];  Service: Cardiac Surgery    WISDOM TOOTH EXTRACTION         Imaging Studies:  No orders to display        01/13/25 1580   PT Last Visit   PT Visit Date 01/13/25   Note Type    Note type Evaluation  (and treatment)   Pain Assessment   Pain Assessment Tool 0-10   Pain Score 10 - Worst Possible Pain   Pain Location/Orientation Orientation: Left;Location: Hip   Hospital Pain Intervention(s) Cold applied;Repositioned;Ambulation/increased activity;Elevated;Emotional support;Rest   Restrictions/Precautions   Weight Bearing Precautions Per Order Yes   LLE Weight Bearing Per Order WBAT   Braces or Orthoses   (hip abduction pillow)   Other Precautions Chair Alarm;Bed Alarm;WBS;THR;Multiple lines;Fall Risk;Pain  (posterior hip precautions)   Home Living   Type of Home House   Home Layout Multi-level;Performs ADLs on one level;Able to live on main level with bedroom/bathroom;Stairs to enter with rails;1/2 bath on main level;Bed/bath upstairs  (3 DUNCAN with bilateral hand rails but can only reach unilateral)   Bathroom Shower/Tub Tub/shower unit   Bathroom Toilet Standard   Bathroom Equipment Grab bars in shower;Commode   Home Equipment Walker;Cane   Additional Comments Pt plans to stay on main level with chair and 1/2 bath.   Prior Function   Level of Cook Independent with ADLs;Independent with functional mobility;Independent with IADLS  (w/o AD)   Lives With Spouse   Receives Help From Family   IADLs Independent with driving;Independent with meal prep;Independent with medication management   Falls in the last 6 months 0   Vocational Full time employment   General   Family/Caregiver Present Yes   Cognition   Overall Cognitive Status WFL   Arousal/Participation Alert   Orientation Level Oriented X4   Following Commands Follows all commands and directions without difficulty   Subjective   Subjective Pt agreeable to PT/OT evaluations.   RUE Assessment   RUE Assessment   (refer to OT)   LUE Assessment   LUE Assessment   (refer to OT)   RLE Assessment   RLE Assessment WFL  (5/5 grossly)   LLE Assessment   LLE Assessment X   Strength LLE   L Knee Flexion 3/5   L Knee Extension 3/5   L Ankle  Dorsiflexion 4+/5   L Ankle Plantar Flexion 4+/5   Light Touch   RLE Light Touch Grossly intact   LLE Light Touch Grossly intact   Bed Mobility   Supine to Sit 5  Supervision   Additional items HOB elevated;Bedrails;Increased time required;Verbal cues   Additional Comments Pt greeted in supine. /70. BP /61   Transfers   Sit to Stand 5  Supervision   Additional items Bedrails;Increased time required;Verbal cues  (w/ RW)   Stand to Sit 5  Supervision   Additional items Armrests;Increased time required;Verbal cues  (w/ RW)   Additional Comments Pt stood to void. Cues for hand placement   Ambulation/Elevation   Gait pattern Improper Weight shift;Antalgic;Decreased foot clearance;Decreased L stance;Short stride;Step to   Gait Assistance 5  Supervision   Additional items Verbal cues   Assistive Device Rolling walker   Distance 10'x1; 120'x1 (treatment)   Ambulation/Elevation Additional Comments cues for RW management and turning with posterior hip precautions   Balance   Static Sitting Normal   Dynamic Sitting Good   Static Standing Fair +  (w/ RW)   Dynamic Standing Fair  (w/ RW)   Ambulatory Fair  (w/ RW)   Activity Tolerance   Activity Tolerance Patient tolerated treatment well   Medical Staff Made Aware OT Sandra   Nurse Made Aware JERARDO Jasso   Assessment   Prognosis Good   Problem List Decreased strength;Decreased range of motion;Decreased endurance;Impaired balance;Decreased mobility;Orthopedic restrictions;Pain   Assessment Pt. 54 y.o.male presents for elective surgery. Past medical hx includes HLD, HTN, hx of CABG 2023. Pt admitted for Primary osteoarthritis of left hip (M16.12)  Chronic left hip pain (M25.552, G89.29). S/p L elective THR (posterior) POD #0. Pt referred to PT for functional mobility evaluation & D/C planning w/ orders of activity as tolerated. PTA, pt reports being I w/o AD. Personal factors affecting pt at time of IE include: bed/bath on 2nd floor, steps to enter, and two story home.  During evaluation, deficits included dec mobility, balance, ambulation. Required S for supine to sit, sit<>stand, and ambulation. Pt able to ambulate 10' with RW in room. Antalgic step to gait with no gross LOB noted. See treatment assessment for further ambulation. Pt demonstrated dec endurance and tolerance to activity. Denies reports of dizziness or SOB t/o session. Pt was educated on fall precautions and reinforced w/ good understanding. Pt would benefit from continued PT to address deficits as defined above and maximize level of independence with functional mobility and safety. Based on pt presentation and impaired function, pt would benefit from level III, (minimum resource intensity) at D/C. The patient's AM-PAC Basic Mobility Inpatient Short Form Raw Score is 22. A Raw score of greater than 16 suggests the patient may benefit from discharge to home. Please also refer to the recommendation of the Physical Therapist for safe discharge planning. Nsg staff to continue to mobilized pt (OOB in chair for all meals & ambulate in room/unit) as tolerated to prevent further decline in function. Nsg notified. Co-eval performed to complete this PT evaluation for the pts best interest given pts medical complexity and functional level.   Barriers to Discharge Inaccessible home environment   Barriers to Discharge Comments DUNCAN   Goals   Patient Goals to have less pain   STG Expiration Date 01/20/25   Short Term Goal #1 1) Inc overall LE strength by 1/2 MMT grade to improve functional mobility; 2) Pt will demonstrate improved bed mobility with mod I to dec caregiver burden; 3) Pt will demonstrate improved transfers w/ mod I for inc safety; 4) Pt will be able to amb w/ mod I >150' w/ RW for household distances to inc safety and dec caregiver burden; 5) Pt will be able to navigate stairs with mod I to dec caregiver burden and inc safety with functional mobility; 6) Improve general balance by 1 grade to inc safety; 7) PT for  ongoing patient and caregiver education   PT Treatment Day 0   Plan   Treatment/Interventions Functional transfer training;Elevations;LE strengthening/ROM;Therapeutic exercise;Endurance training;Patient/family training;Bed mobility;Gait training;Spoke to nursing;OT   PT Frequency Twice a day  (PRN)   Discharge Recommendation   Rehab Resource Intensity Level, PT III (Minimum Resource Intensity)   Equipment Recommended Walker  (pt owns)   AM-PAC Basic Mobility Inpatient   Turning in Flat Bed Without Bedrails 4   Lying on Back to Sitting on Edge of Flat Bed Without Bedrails 4   Moving Bed to Chair 4   Standing Up From Chair Using Arms 4   Walk in Room 3   Climb 3-5 Stairs With Railing 3   Basic Mobility Inpatient Raw Score 22   Basic Mobility Standardized Score 47.4   [facility] Highest Level Of Mobility   -HLM Goal 7: Walk 25 feet or more   -HLM Achieved 7: Walk 25 feet or more   Additional Treatment Session   Start Time 1310   End Time 1319   Treatment Assessment Following initial evaluation, pt able to tolerate further functional mobility. Pt able to ambulate 120' in unit with RW and S. Antalgic step to gait with inc gait speed with inc distance traveled. No gross LOB noted. Improved pain following mobility. Educated pt on heel slides in seated position to assist with hip stiffness with good understanding. Pt seated in chair at end of session with all needs in reach. RN notified.   Exercises   Heelslides Sitting;5 reps;AAROM;Left   End of Consult   Patient Position at End of Consult Bedside chair;Bed/Chair alarm activated;All needs within reach   End of Consult Comments BP seated in chair 118/68.       Hx/personal factors: co-morbidities, inaccessible home, mutliple lines, use of AD, pain, total hip precautions, and fall risk, coping styles, social background, past experience, behavior pattern, post op precautions  Examination: dec mobility, dec balance, dec endurance, dec amb, risk for falls, pain, assessed  body system, balance, endurance, amb, D/C disposition & fall risk, impairements in locomotion, musculoskeletal, balance, endurance, posture, coordination, assessed cognition, impairments in systems including multiple body structures involved; musculoskeletal (ROM, strength, posture, BMI), neuromuscular (balance,locomotion, gait, transfers, motor control and learning, sensation), joint integrity, integumentary (skin integrity, presence of scars or wounds), cardiopulmonary (vitals, edema); activity limitations (difficulties executing an action); participation restrictions (problems associated w involvement in life situations)  Clinical: unpredictable (ongoing medical status, risk for falls, POD #0, and pain mgt)  Complexity: high      Sana Robles, PT

## 2025-01-13 NOTE — H&P
H&P Exam - Orthopedics   Suleman Zhang 54 y.o. male MRN: [id number]      Assessment/Plan   Assessment:  left Hip Osteoarthritis in this adult male who continues to have both pain and dysfunction despite appropriate nonsurgical treatments    Plan:  left posterior Total Hip Arthroplasty.  Patient is [place] with risks, benefits, alternatives      TOTAL HIP REPLACEMENT INDICATIONS AND RISKS  We had a lengthy discussion with the patient regarding the potential options for treatment.  Based on current presentation, radiographic exam, and lack of sufficient response to nonsurgical management including activity modification, NSAIDs and therapeutic exercise, I would offer treatment in the form of total hip arthroplasty at this time.      The potential risks and benefits were discussed in detail.    While no guarantees can be made, total hip replacement has a very high success rate in terms of relieving a patient's hip pain and returning them to a more active, independent lifestyle for 10-15 years or more. All surgery carries some risk; for hip replacement, the complication rate is low but may include: death (very rare), infection, bleeding requiring transfusion, blood clots in legs traveling to lungs, nerve and/or blood vessel damage, bone fracture, leg length difference, prosthetic hip dislocation, persistent hip pain and/or stiffness, and repeat surgery(ies). The risk of a major complication is generally 1-2 per 1000 cases. Total hip replacement should only be done if conservative treatment has failed. The predicted revision rate is approximately 1% per year; in other words, 90% of hip replacements last 10 years or more, 80% last 20 years or more, and so on, assuming no injury. Additionally, we discussed anesthesia related complications which will be discussed in greater detail with the anesthesia team before surgery. The patient voiced their understanding of the surgical plan and potential complications and wishes to  proceed with surgery.    History of Present Illness   HPI:  Suleman Zhang is a 54 y.o. male who presents with pain in the left hip. Patient is no longer getting adequate relief from non-operative modalities. Patient is continuing to have debilitating pain from their hip, interfering with daily activities and sleep. Patient denies any concerns with infections, new neuropathies, or any acute injuries.     Historical Information  Review Of Systems:   Skin: Normal  Neuro: See HPI  Musculoskeletal: See HPI  14 point review of systems negative except as stated above     Past Medical History:   Past Medical History:   Diagnosis Date    Disseminated herpes zoster 10/16/2019    HLD (hyperlipidemia)     Hypertension     Known health problems: none     Vitamin D deficiency        Past Surgical History:   Past Surgical History:   Procedure Laterality Date    CARDIAC CATHETERIZATION N/A 03/02/2023    Procedure: Cardiac catheterization;  Surgeon: Yodit Small MD;  Location: [facility];  Service: Cardiology    CARDIAC CATHETERIZATION N/A 03/02/2023    Procedure: Cardiac Coronary Angiogram;  Surgeon: Yodit Small MD;  Location: [facility];  Service: Cardiology    COLONOSCOPY      CORONARY ARTERY BYPASS GRAFT      RI CORONARY ARTERY BYP W/VEIN & ARTERY GRAFT 4 VEIN N/A 03/08/2023    Procedure: CORONARY ARTERY BYPASS GRAFT (CABG) 4 VESSELS,  SVG to PDA  and OM2.                            r      LRA-->OM1 , LIMA to LAD, evh, hector;  Surgeon: Darerll Jiménez DO;  Location:  MAIN OR;  Service: Cardiac Surgery    WISDOM TOOTH EXTRACTION         Family History:  Family history reviewed and non-contributory  Family History   Problem Relation Age of Onset    Hypertension Father     Breast cancer Sister     Heart attack Maternal Grandfather     Heart attack Paternal Grandfather         s/p CABG    Coronary artery disease Paternal Uncle     Prostate cancer Paternal Uncle     Coronary artery disease Paternal Uncle          s/p cardiac stent    Prostate cancer Paternal Uncle        Social History:  Social History     Socioeconomic History    Marital status: /Civil Union     Spouse name: None    Number of children: None    Years of education: None    Highest education level: None   Occupational History    None   Tobacco Use    Smoking status: Former     Current packs/day: 0.00     Average packs/day: 0.3 packs/day for 6.0 years (1.5 ttl pk-yrs)     Types: Cigarettes, Cigars     Start date:      Quit date:      Years since quittin.0    Smokeless tobacco: Former     Types: Snuff, Chew   Vaping Use    Vaping status: Never Used   Substance and Sexual Activity    Alcohol use: Not Currently     Alcohol/week: 16.0 standard drinks of alcohol     Types: 4 Glasses of wine, 12 Cans of beer per week     Comment: Social    Drug use: Not Currently     Types: Marijuana     Comment: rare marijuana use    Sexual activity: Yes     Partners: Female     Birth control/protection: None   Other Topics Concern    None   Social History Narrative    None     Social Drivers of Health     Financial Resource Strain: Not on file   Food Insecurity: No Food Insecurity (3/5/2023)    Hunger Vital Sign     Worried About Running Out of Food in the Last Year: Never true     Ran Out of Food in the Last Year: Never true   Transportation Needs: No Transportation Needs (3/5/2023)    PRAPARE - Transportation     Lack of Transportation (Medical): No     Lack of Transportation (Non-Medical): No   Physical Activity: Not on file   Stress: Not on file   Social Connections: Unknown (2024)    Received from [facility]    Social Connections     How often do you feel lonely or isolated from those around you? (Adult - for ages 18 years and over): Not on file   Intimate Partner Violence: Not on file   Housing Stability: Low Risk  (3/5/2023)    Housing Stability Vital Sign     Unable to Pay for Housing in the Last Year: No     Number of Places Lived in the Last  "Year: 1     Unstable Housing in the Last Year: No       Allergies:   Allergies   Allergen Reactions    Codeine Drowsiness     incapacitates           Labs:  0   Lab Value Date/Time    HCT 44.1 12/19/2024 1036    HCT 35.7 (L) 03/23/2023 1049    HCT 28.7 (L) 03/10/2023 0528    HGB 15.2 12/19/2024 1036    HGB 11.4 (L) 03/23/2023 1049    HGB 9.9 (L) 03/10/2023 0528    INR 1.02 12/19/2024 1036    WBC 6.45 12/19/2024 1036    WBC 6.06 03/23/2023 1049    WBC 10.99 (H) 03/10/2023 0528       Meds:    Current Facility-Administered Medications:     ceFAZolin (ANCEF) IVPB (premix in dextrose) 2,000 mg 50 mL, 2,000 mg, Intravenous, Once, Lyndon Ruelas MD    chlorhexidine (PERIDEX) 0.12 % oral rinse 15 mL, 15 mL, Swish & Spit, Once, Lyndon Ruelas MD    tranexamic acid (CYKLOKAPRON) 1000-0.7 MG/100ML-% injection 1,000 mg, 1,000 mg, Intravenous, Once, Lyndon Ruelas MD    Blood Culture:   No results found for: \"BLOODCX\"    Wound Culture:   No results found for: \"WOUNDCULT\"    Ins and Outs:  No intake/output data recorded.          Physical Exam  /98   Pulse 60   Temp 98.3 °F (36.8 °C) (Temporal)   Resp 18   Ht 5' 10\" (1.778 m)   Wt 88.5 kg (195 lb)   SpO2 100%   BMI 27.98 kg/m²   /98   Pulse 60   Temp 98.3 °F (36.8 °C) (Temporal)   Resp 18   Ht 5' 10\" (1.778 m)   Wt 88.5 kg (195 lb)   SpO2 100%   BMI 27.98 kg/m²   Gen: No acute distress, resting comfortably in bed  HEENT: Eyes clear, moist mucus membranes, hearing intact  Respiratory: No audible wheezing or stridor  Cardiovascular: Well Perfused peripherally, 2+ distal pulse  Abdomen: nondistended, no peritoneal signs  Ortho Exam: limited hip ROM due to pain and mechanical blocking  Neuro Exam: intact    Lab Results: Reviewed  Imaging: Reviewed   "

## 2025-01-13 NOTE — OCCUPATIONAL THERAPY NOTE
Occupational Therapy Evaluation     Patient Name: Suleman Zhang  Today's Date: 1/13/2025  Problem List  Active Problems:  There are no active Hospital Problems.    Past Medical History  Past Medical History:   Diagnosis Date    Disseminated herpes zoster 10/16/2019    HLD (hyperlipidemia)     Hypertension     Known health problems: none     Vitamin D deficiency      Past Surgical History  Past Surgical History:   Procedure Laterality Date    CARDIAC CATHETERIZATION N/A 03/02/2023    Procedure: Cardiac catheterization;  Surgeon: Yodit Small MD;  Location: [facility];  Service: Cardiology    CARDIAC CATHETERIZATION N/A 03/02/2023    Procedure: Cardiac Coronary Angiogram;  Surgeon: Yodit Small MD;  [facility]: [facility];  Service: Cardiology    COLONOSCOPY      CORONARY ARTERY BYPASS GRAFT      [place] CORONARY ARTERY BYP W/VEIN & ARTERY GRAFT 4 VEIN N/A 03/08/2023    Procedure: CORONARY ARTERY BYPASS GRAFT (CABG) 4 VESSELS,  SVG to PDA  and OM2.                            r      LRA-->OM1 , LIMA to LAD, evh, hector;  Surgeon: Darrell Jiménez DO;  Location: [facility];  Service: Cardiac Surgery    WISDOM TOOTH EXTRACTION          01/13/25 1250   OT Last Visit   OT Visit Date 01/13/25   Note Type   Note type Evaluation   Additional Comments pt greeted in supine, agreeable to OT evaluation   Pain Assessment   Pain Assessment Tool 0-10   Pain Score 10 - Worst Possible Pain   Pain Location/Orientation Orientation: Left;Location: Hip   Hospital Pain Intervention(s) Repositioned;Ambulation/increased activity;Emotional support;Rest   Restrictions/Precautions   Weight Bearing Precautions Per Order Yes   LLE Weight Bearing Per Order WBAT   Other Precautions WBS;THR;Multiple lines;Fall Risk;Pain;Bed Alarm;Chair Alarm  (posterior hip precautions, hip abduction pillow)   Home Living   Type of Home House   Home Layout Multi-level;Performs ADLs on one level;Able to live on main level with  bedroom/bathroom;Stairs to enter with rails;1/2 bath on main level;Bed/bath upstairs  (3 DUNCAN bilateral but can only reach 1 at a time)   Bathroom Shower/Tub Tub/shower unit   Bathroom Toilet Standard   Bathroom Equipment Grab bars in shower;Commode   Bathroom Accessibility Accessible   Home Equipment Walker;Reacher   Additional Comments Pt plans to stay on main level with chair and 1/2 bath.   Prior Function   Level of [place] Independent with ADLs;Independent with functional mobility;Independent with IADLS   Lives With Spouse   Receives Help From Family   IADLs Independent with driving;Independent with meal prep;Independent with medication management   Falls in the last 6 months 0   Vocational Full time employment  (, accounting)   Comments (+) , no AD use at baseline   Lifestyle   Autonomy Independent with all ADLs/IADLs, no AD use at baseline   Reciprocal Relationships Spouse   Service to Others FTE   Intrinsic Gratification golfing, [employer]   General   Family/Caregiver Present Yes   ADL   Where Assessed Edge of bed   Eating Assistance 5  Supervision/Setup   Grooming Assistance 5  Supervision/Setup   UB Bathing Assistance 5  Supervision/Setup   LB Bathing Assistance 4  Minimal Assistance   UB Dressing Assistance 5  Supervision/Setup   LB Dressing Assistance 4  Minimal Assistance   Toileting Assistance  5  Supervision/Setup   Bed Mobility   Supine to Sit 5  Supervision   Additional items Increased time required;Verbal cues;HOB elevated   Sit to Supine Unable to assess   Additional Comments Pt greeted in supine, ended session in recliner   Transfers   Sit to Stand 5  Supervision   Additional items Increased time required;Verbal cues  (RW)   Stand to Sit 5  Supervision   Additional items Increased time required;Verbal cues;Armrests  (RW)   Additional Comments verbal cues for hand placement, Pt completed voiding in standing with use of urinal and RW over toilet.   Functional Mobility    Functional Mobility 5  Supervision   Additional Comments Pt completed functional mobility functional household distance with use of RW.   Additional items Rolling walker   Balance   Static Sitting Good   Dynamic Sitting Fair +   Static Standing Fair   Dynamic Standing Fair -   Ambulatory Fair -   Activity Tolerance   Activity Tolerance Patient tolerated treatment well;Patient limited by pain   Medical Staff Made Aware PT Sana, Pt seen for co-evaluation/treatment with skilled Physical Therapy due to pt's medical complexity, decreased endurance, overall functional level, overall safety, and post surgical day #0.   Nurse Made Aware JERARDO BATES Assessment   RUE Assessment WFL   LUE Assessment   LUE Assessment WFL   Hand Function   Gross Motor Coordination Functional   Fine Motor Coordination Functional   Cognition   Overall Cognitive Status WFL   Arousal/Participation Alert;Cooperative   Attention Within functional limits   Orientation Level Oriented X4   Memory Within functional limits   Following Commands Follows all commands and directions without difficulty   Comments Cooperative and pleasant   Assessment   Limitation Decreased ADL status;Decreased endurance;Decreased self-care trans;Decreased high-level ADLs   Prognosis Good   Assessment Pt is a 54 y.o. male seen for OT evaluation s/p adm [facility] on 1/13/2025 w/ primary osteoarthritis of left hip, chronic left hip pain. Comorbidities affecting pt’s functional performance include a significant PMH of HLD, HTN, s/p CABG, arthritis, varicose veins in BLE, hx of cardiac cath. Pt with active OT orders and activity orders for Activity beginning POD #0. WBAT LLE. Posterior hip precautions. Hip abduction pillow. Pt lives with spouse in a multilevel house with 3 DUNCAN. Pt has first floor setup with standard toilets and BSC. At baseline, pt was independent with all ADLs/IADLs. Pt completed supine to sit with S maintaining hip precautions. Sit to stand with S.  Pt completed functional mobility functional functional household distance with S and RW. Pt completed voiding in standing with use of urinal and RW for stability. Pt completed stand to sit into chair with S and use of armrests. Pt able to maintain precautions during session with good carryover. Pt educated on LE management for independence at home. Upon evaluation, pt currently requires S for UB ADLs, S for LB ADLs, S for toileting, S for bed mobility, S for functional mobility, and S for transfers 2* the following deficits impacting occupational performance: weakness, decreased strength , decreased balance, decreased activity tolerance, increased pain, and orthopedic restrictions. These impairments, as well at pt’s personal factors of: DUNCAN home environment, steps within home environment, difficulty performing ADLs, difficulty performing IADLs, difficulty performing transfers/mobility, WBS, fall risk , and new use of AD for functional transfers/mobility limit pt’s ability to safely engage in all baseline areas of occupation. Based on the aforementioned OT evaluation, functional performance deficits, and assessments, pt has been identified as a high complexity evaluation. Pt to continue to benefit from continued acute OT services during hospital stay to address defined deficits and to maximize level of functional independence in the following Occupational Performance areas: grooming, bathing/shower, toilet hygiene, dressing, health maintenance, functional mobility, community mobility, clothing management, cleaning, household maintenance, and job performance/volunteering. From OT standpoint, recommend No post-acute rehabilitation needs upon D/C. OT will continue to follow pt 3-5x/wk.   Goals   Patient Goals to feel better   STG Time Frame 1-3   Short Term Goal #1 Pt will improve activity tolerance to G for min 30 min treatment sessions for increase engagement in functional tasks   Short Term Goal #2 Pt will complete  bed mobility at a mod I level w/ G balance/safety demonstrated to decrease caregiver assistance required   Short Term Goal  Pt will complete LB dressing/self care w/ mod I using adaptive device and DME as needed   LTG Time Frame 3-7   Long Term Goal #1 Pt will demonstrate 100% adherence to 3/3 posterolateral hip precautions during all functional activities w/o cues from therapist   Long Term Goal #2 Pt will be able to state 3/3 posterolateral hip precautions w/o cues from therapist 100% of the time each session to increase safety at home and reduce risk of injury   Long Term Goal Pt will improve functional transfers to mod I on/off all surfaces using DME as needed w/ G balance/safety   Plan   Treatment Interventions ADL retraining;Functional transfer training;Endurance training;Patient/family training;Equipment evaluation/education;Compensatory technique education;Continued evaluation;Energy conservation;Activityengagement   Goal Expiration Date 01/20/25   OT Treatment Day 0   OT Frequency 3-5x/wk   Discharge Recommendation   Rehab Resource Intensity Level, OT No post-acute rehabilitation needs   Additional Comments  The patient's raw score on the AM-PAC Daily Activity Inpatient Short Form is 21 . A raw score of greater than or equal to 19 suggests the patient may benefit from discharge to home. Please refer to the recommendation of the Occupational Therapist for safe discharge planning.   AM-PAC Daily Activity Inpatient   Lower Body Dressing 3   Bathing 3   Toileting 3   Upper Body Dressing 4   Grooming 4   Eating 4   Daily Activity Raw Score 21   Daily Activity Standardized Score (Calc for Raw Score >=11) 44.27   AM-PAC Applied Cognition Inpatient   Following a Speech/Presentation 4   Understanding Ordinary Conversation 4   Taking Medications 4   Remembering Where Things Are Placed or Put Away 4   Remembering List of 4-5 Errands 4   Taking Care of Complicated Tasks 4   Applied Cognition Raw Score 24   Applied  Cognition Standardized Score 62.21   End of Consult   Education Provided Yes;Family or social support of family present for education by provider   Patient Position at End of Consult Bedside chair;Bed/Chair alarm activated;All needs within reach   Nurse Communication Nurse aware of consult   End of Consult Comments Pt seated OOB in chair with chair alarm activated at end of session. Call bell and phone within reach. All needs met and pt reports no further questions for OT at this time.   Sandra Gutierrez, OT

## 2025-01-13 NOTE — ASSESSMENT & PLAN NOTE
55 yo male POD 0 s/p left CRISTINA.     Plan:   - Observe at [facility] tonight   - WBAT on LLE   - Posterior hip precautions   - Abductor pillow, no crossing legs  - Monitor incision site   - DVT ppx  - IS use   - OOB/ambulate  - Pain and nausea control PRN  - PT/OT  - D/C tomorrow pending PT/OT eval  - Continue frequent neurovascular checks

## 2025-01-13 NOTE — ANESTHESIA PROCEDURE NOTES
Spinal Block    Patient location during procedure: OR  Start time: 1/13/2025 8:35 AM  Reason for block: procedure for pain and at surgeon's request  Staffing  Performed by: Mauricio Mccracken CRNA  Authorized by: Riley Pérez MD    Preanesthetic Checklist  Completed: patient identified, IV checked, site marked, risks and benefits discussed, surgical consent, monitors and equipment checked, pre-op evaluation and timeout performed  Spinal Block  Patient position: sitting  Prep: ChloraPrep and site prepped and draped  Patient monitoring: frequent blood pressure checks, continuous pulse ox and heart rate  Approach: midline  Location: L3-4  Needle  Needle type: Pencan   Needle gauge: 24 G  Needle length: 4 in  Assessment  Sensory level: T4  Injection Assessment:  negative aspiration for heme, no paresthesia on injection and positive aspiration for clear CSF.  Post-procedure:  site cleaned

## 2025-01-14 VITALS
HEART RATE: 68 BPM | SYSTOLIC BLOOD PRESSURE: 118 MMHG | WEIGHT: 195 LBS | RESPIRATION RATE: 18 BRPM | OXYGEN SATURATION: 99 % | BODY MASS INDEX: 27.92 KG/M2 | HEIGHT: 70 IN | DIASTOLIC BLOOD PRESSURE: 69 MMHG | TEMPERATURE: 97.9 F

## 2025-01-14 LAB
ANION GAP SERPL CALCULATED.3IONS-SCNC: 6 MMOL/L (ref 4–13)
BUN SERPL-MCNC: 10 MG/DL (ref 5–25)
CALCIUM SERPL-MCNC: 9.1 MG/DL (ref 8.4–10.2)
CHLORIDE SERPL-SCNC: 103 MMOL/L (ref 96–108)
CO2 SERPL-SCNC: 30 MMOL/L (ref 21–32)
CREAT SERPL-MCNC: 0.61 MG/DL (ref 0.6–1.3)
ERYTHROCYTE [DISTWIDTH] IN BLOOD BY AUTOMATED COUNT: 12.1 % (ref 11.6–15.1)
GFR SERPL CREATININE-BSD FRML MDRD: 113 ML/MIN/1.73SQ M
GLUCOSE SERPL-MCNC: 139 MG/DL (ref 65–140)
HCT VFR BLD AUTO: 34.2 % (ref 36.5–49.3)
HGB BLD-MCNC: 11.9 G/DL (ref 12–17)
MCH RBC QN AUTO: 31.7 PG (ref 26.8–34.3)
MCHC RBC AUTO-ENTMCNC: 34.8 G/DL (ref 31.4–37.4)
MCV RBC AUTO: 91 FL (ref 82–98)
PLATELET # BLD AUTO: 252 THOUSANDS/UL (ref 149–390)
PMV BLD AUTO: 9.7 FL (ref 8.9–12.7)
POTASSIUM SERPL-SCNC: 4.1 MMOL/L (ref 3.5–5.3)
RBC # BLD AUTO: 3.75 MILLION/UL (ref 3.88–5.62)
SODIUM SERPL-SCNC: 139 MMOL/L (ref 135–147)
WBC # BLD AUTO: 11.59 THOUSAND/UL (ref 4.31–10.16)

## 2025-01-14 PROCEDURE — 97530 THERAPEUTIC ACTIVITIES: CPT | Performed by: PHYSICAL THERAPIST

## 2025-01-14 PROCEDURE — 85027 COMPLETE CBC AUTOMATED: CPT

## 2025-01-14 PROCEDURE — 80048 BASIC METABOLIC PNL TOTAL CA: CPT

## 2025-01-14 PROCEDURE — 99232 SBSQ HOSP IP/OBS MODERATE 35: CPT | Performed by: INTERNAL MEDICINE

## 2025-01-14 PROCEDURE — 97535 SELF CARE MNGMENT TRAINING: CPT

## 2025-01-14 PROCEDURE — 99024 POSTOP FOLLOW-UP VISIT: CPT | Performed by: PHYSICIAN ASSISTANT

## 2025-01-14 PROCEDURE — 97116 GAIT TRAINING THERAPY: CPT | Performed by: PHYSICAL THERAPIST

## 2025-01-14 RX ADMIN — OXYCODONE 5 MG: 5 TABLET ORAL at 07:56

## 2025-01-14 RX ADMIN — ENOXAPARIN SODIUM 40 MG: 40 INJECTION SUBCUTANEOUS at 09:23

## 2025-01-14 RX ADMIN — METOPROLOL TARTRATE 12.5 MG: 25 TABLET, FILM COATED ORAL at 09:23

## 2025-01-14 RX ADMIN — OXYCODONE 5 MG: 5 TABLET ORAL at 03:31

## 2025-01-14 RX ADMIN — ACETAMINOPHEN 325MG 975 MG: 325 TABLET ORAL at 01:41

## 2025-01-14 RX ADMIN — DOCUSATE SODIUM 100 MG: 100 CAPSULE, LIQUID FILLED ORAL at 09:23

## 2025-01-14 RX ADMIN — GABAPENTIN 100 MG: 100 CAPSULE ORAL at 06:10

## 2025-01-14 RX ADMIN — CEFAZOLIN SODIUM 1000 MG: 1 SOLUTION INTRAVENOUS at 01:36

## 2025-01-14 RX ADMIN — AMLODIPINE BESYLATE 2.5 MG: 2.5 TABLET ORAL at 09:23

## 2025-01-14 NOTE — PLAN OF CARE
Problem: OCCUPATIONAL THERAPY ADULT  Goal: Performs self-care activities at highest level of function for planned discharge setting.  See evaluation for individualized goals.  Description: Treatment Interventions: ADL retraining, Functional transfer training, Endurance training, Patient/family training, Equipment evaluation/education, Compensatory technique education, Continued evaluation, Energy conservation, Activityengagement          See flowsheet documentation for full assessment, interventions and recommendations.   1/14/2025 1356 by Sandra Gutierrez OT  Outcome: Adequate for Discharge  Note: Limitation: Decreased ADL status, Decreased endurance, Decreased self-care trans, Decreased high-level ADLs  Prognosis: Good  Assessment: Pt seen for OT treatment session focusing on ADLs/IADLs, functional mobility, functional standing tolerance, functional transfers, patient education, continued evaluation. Pt greeted OOB in recliner at start of session. Pt alert and cooperative throughout session. Pt with normal sitting balance and good dynamic standing balance. Pt tolerated treatment well. Pt completed sit to stand from chair with supervision and use of RW and armrests. Pt completed functional mobility from chair to bathroom with supervision and use of RW. Pt completed toilet transfer with use of BSC over standard toilet and RW. Pt educated on proper technique and hand placement. Pt completed functional mobility back to chair with supervision and RW. Pt completed don of underwear and pants with use of reacher seated then standing to pull over waist. Pt able to maintain posterior hip precautions during dressing and during entirety of session. Pt completed don of shirt with mod I seated. Pt completed doff of socks with use of reacher and don of socks with use of sock aid. Recommended pt to purchase sock aid for independence at home. Pt educated on ADLs/IADLs, transfers, and LE management for independence and safety at home.  Pt able to maintain posterior hip precautions during session. Pt with no further questions at this time. Pt reports 2/10 pain and no dizziness. Pt's vitals include: stable.     Pt ended session seated OOB in recliner. Call bell and phone within reach. All needs met and pt reports no further questions at this time. Continue to recommend No post-acute rehabilitation needs when medically cleared. OT will continue to follow pt on caseload.     Rehab Resource Intensity Level, OT: No post-acute rehabilitation needs       1/14/2025 1356 by Sandra Gutierrez, OT  Reactivated

## 2025-01-14 NOTE — PLAN OF CARE
Problem: PAIN - ADULT  Goal: Verbalizes/displays adequate comfort level or baseline comfort level  Description: Interventions:  - Encourage patient to monitor pain and request assistance  - Assess pain using appropriate pain scale  - Administer analgesics based on type and severity of pain and evaluate response  - Implement non-pharmacological measures as appropriate and evaluate response  - Consider cultural and social influences on pain and pain management  - Notify physician/advanced practitioner if interventions unsuccessful or patient reports new pain  Outcome: Progressing     Problem: INFECTION - ADULT  Goal: Absence or prevention of progression during hospitalization  Description: INTERVENTIONS:  - Assess and monitor for signs and symptoms of infection  - Monitor lab/diagnostic results  - Monitor all insertion sites, i.e. indwelling lines, tubes, and drains  - Monitor endotracheal if appropriate and nasal secretions for changes in amount and color  - [place] appropriate cooling/warming therapies per order  - Administer medications as ordered  - Instruct and encourage patient and family to use good hand hygiene technique  - Identify and instruct in appropriate isolation precautions for identified infection/condition  Outcome: Progressing  Goal: Absence of fever/infection during neutropenic period  Description: INTERVENTIONS:  - Monitor WBC    Outcome: Progressing     Problem: SAFETY ADULT  Goal: Patient will remain free of falls  Description: INTERVENTIONS:  - Educate patient/family on patient safety including physical limitations  - Instruct patient to call for assistance with activity   - Consult OT/PT to assist with strengthening/mobility   - Keep Call bell within reach  - Keep bed low and locked with side rails adjusted as appropriate  - Keep care items and personal belongings within reach  - Initiate and maintain comfort rounds  - Make Fall Risk Sign visible to staff  - Offer Toileting every 2 Hours,  in advance of need  - Initiate/Maintain bed alarm  - Obtain necessary fall risk management equipment:   - Apply yellow socks and bracelet for high fall risk patients  - Consider moving patient to room near nurses station  Outcome: Progressing  Goal: Maintain or return to baseline ADL function  Description: INTERVENTIONS:  -  Assess patient's ability to carry out ADLs; assess patient's baseline for ADL function and identify physical deficits which impact ability to perform ADLs (bathing, care of mouth/teeth, toileting, grooming, dressing, etc.)  - Assess/evaluate cause of self-care deficits   - Assess range of motion  - Assess patient's mobility; develop plan if impaired  - Assess patient's need for assistive devices and provide as appropriate  - Encourage maximum independence but intervene and supervise when necessary  - Involve family in performance of ADLs  - Assess for home care needs following discharge   - Consider OT consult to assist with ADL evaluation and planning for discharge  - Provide patient education as appropriate  Outcome: Progressing  Goal: Maintains/Returns to pre admission functional level  Description: INTERVENTIONS:  - Perform AM-PAC 6 Click Basic Mobility/ Daily Activity assessment daily.  - Set and communicate daily mobility goal to care team and patient/family/caregiver.   - Collaborate with rehabilitation services on mobility goals if consulted  - Perform Range of Motion 3 times a day.  - Reposition patient every 3 hours.  - Dangle patient 3 times a day  - Stand patient 3 times a day  - Ambulate patient 3 times a day  - Out of bed to chair 3 times a day   - Out of bed for meals 3 times a day  - Out of bed for toileting  - Record patient progress and toleration of activity level   Outcome: Progressing     Problem: DISCHARGE PLANNING  Goal: Discharge to home or other facility with appropriate resources  Description: INTERVENTIONS:  - Identify barriers to discharge w/patient and caregiver  -  Arrange for needed discharge resources and transportation as appropriate  - Identify discharge learning needs (meds, wound care, etc.)  - Arrange for interpretive services to assist at discharge as needed  - Refer to Case Management Department for coordinating discharge planning if the patient needs post-hospital services based on physician/advanced practitioner order or complex needs related to functional status, cognitive ability, or social support system  Outcome: Progressing     Problem: Knowledge Deficit  Goal: Patient/family/caregiver demonstrates understanding of disease process, treatment plan, medications, and discharge instructions  Description: Complete learning assessment and assess knowledge base.  Interventions:  - Provide teaching at level of understanding  - Provide teaching via preferred learning methods  Outcome: Progressing

## 2025-01-14 NOTE — PROGRESS NOTES
"Progress Note - Orthopedics   Name: Suleman Zhang 54 y.o. male I MRN: [id number]  Unit/Bed#: WE 2 N -01 I Date of Admission: 1/13/2025   Date of Service: 1/14/2025 I Hospital Day: 0     Assessment & Plan  Primary osteoarthritis of left hip  -PT/OT as ordered.  -Ice and analgesics as needed for pain  -Lovenox for DVT prophylaxis 30 days total treatment.  -Maintain Mepilex dressing.  -Posterior hip precautions, abduction pillow while in bed.  -Weightbearing as tolerated left lower extremity.  -Plan outpatient follow-up as scheduled with Dr. Ruelas.    Ok for discharge from Orthopedics service perspective.    Subjective   54 y.o.male postop day 1 status post left total hip replacement.  No acute events, no new complaints. Pain well controlled at this time. Denies fevers, chills, CP, SOB, N/V, numbness or tingling. Patient reports no issues with urination or bowel movements. Patient states overall his pain is well-controlled.  He does have soreness in the operative hip area.  He feels that he has been doing well up to this point with physical therapy and tolerated being up and out of bed.    Objective :  Temp:  [97 °F (36.1 °C)-98.7 °F (37.1 °C)] 97.9 °F (36.6 °C)  HR:  [55-75] 68  BP: (102-133)/(59-82) 118/69  Resp:  [13-20] 18  SpO2:  [94 %-100 %] 99 %  O2 Device: None (Room air)    Physical Exam  Musculoskeletal: leftlower  Skin that is visible is without excoriation. No erythema or ecchymosis.  Dressing clean, dry and intact.  TTP nga-incisional area  Motor intact to +FHL/EHL, +ankle dorsi/plantar flexion  Sensation intact to saphenous, sural, tibial, superficial peroneal nerve, and deep peroneal  2+ DP pulse  No calf swelling or tenderness to palpation      Lab Results: I have reviewed the following results:  Recent Labs     01/14/25  0608   WBC 11.59*   HGB 11.9*   HCT 34.2*      BUN 10   CREATININE 0.61     Blood Culture:  No results found for: \"BLOODCX\"  Wound Culture: No results found for: " "\"WOUNDCULT\"  "

## 2025-01-14 NOTE — PLAN OF CARE
Problem: PAIN - ADULT  Goal: Verbalizes/displays adequate comfort level or baseline comfort level  Description: Interventions:  - Encourage patient to monitor pain and request assistance  - Assess pain using appropriate pain scale  - Administer analgesics based on type and severity of pain and evaluate response  - Implement non-pharmacological measures as appropriate and evaluate response  - Consider cultural and social influences on pain and pain management  - Notify physician/advanced practitioner if interventions unsuccessful or patient reports new pain  Outcome: Progressing     Problem: INFECTION - ADULT  Goal: Absence or prevention of progression during hospitalization  Description: INTERVENTIONS:  - Assess and monitor for signs and symptoms of infection  - Monitor lab/diagnostic results  - Monitor all insertion sites, i.e. indwelling lines, tubes, and drains  - Monitor endotracheal if appropriate and nasal secretions for changes in amount and color  - [place] appropriate cooling/warming therapies per order  - Administer medications as ordered  - Instruct and encourage patient and family to use good hand hygiene technique  - Identify and instruct in appropriate isolation precautions for identified infection/condition  Outcome: Progressing  Goal: Absence of fever/infection during neutropenic period  Description: INTERVENTIONS:  - Monitor WBC    Outcome: Progressing     Problem: SAFETY ADULT  Goal: Patient will remain free of falls  Description: INTERVENTIONS:  - Educate patient/family on patient safety including physical limitations  - Instruct patient to call for assistance with activity   - Consult OT/PT to assist with strengthening/mobility   - Keep Call bell within reach  - Keep bed low and locked with side rails adjusted as appropriate  - Keep care items and personal belongings within reach  - Initiate and maintain comfort rounds  - Make Fall Risk Sign visible to staff  - Offer Toileting every 2-4  Hours, in advance of need  - Initiate/Maintain bed and chair alarm  - Obtain necessary fall risk management equipment: nonskid socks   - Apply yellow socks and bracelet for high fall risk patients  - Consider moving patient to room near nurses station  Outcome: Progressing  Goal: Maintain or return to baseline ADL function  Description: INTERVENTIONS:  -  Assess patient's ability to carry out ADLs; assess patient's baseline for ADL function and identify physical deficits which impact ability to perform ADLs (bathing, care of mouth/teeth, toileting, grooming, dressing, etc.)  - Assess/evaluate cause of self-care deficits   - Assess range of motion  - Assess patient's mobility; develop plan if impaired  - Assess patient's need for assistive devices and provide as appropriate  - Encourage maximum independence but intervene and supervise when necessary  - Involve family in performance of ADLs  - Assess for home care needs following discharge   - Consider OT consult to assist with ADL evaluation and planning for discharge  - Provide patient education as appropriate  Outcome: Progressing  Goal: Maintains/Returns to pre admission functional level  Description: INTERVENTIONS:  - Perform AM-PAC 6 Click Basic Mobility/ Daily Activity assessment daily.  - Set and communicate daily mobility goal to care team and patient/family/caregiver.   - Collaborate with rehabilitation services on mobility goals if consulted  - Perform Range of Motion 3 times a day.  - Reposition patient every 3 hours.  - Dangle patient 3 times a day  - Stand patient 3 times a day  - Ambulate patient 3 times a day  - Out of bed to chair 3 times a day   - Out of bed for meals 3 times a day  - Out of bed for toileting  - Record patient progress and toleration of activity level   Outcome: Progressing     Problem: DISCHARGE PLANNING  Goal: Discharge to home or other facility with appropriate resources  Description: INTERVENTIONS:  - Identify barriers to  discharge w/patient and caregiver  - Arrange for needed discharge resources and transportation as appropriate  - Identify discharge learning needs (meds, wound care, etc.)  - Arrange for interpretive services to assist at discharge as needed  - Refer to Case Management Department for coordinating discharge planning if the patient needs post-hospital services based on physician/advanced practitioner order or complex needs related to functional status, cognitive ability, or social support system  Outcome: Progressing     Problem: Knowledge Deficit  Goal: Patient/family/caregiver demonstrates understanding of disease process, treatment plan, medications, and discharge instructions  Description: Complete learning assessment and assess knowledge base.  Interventions:  - Provide teaching at level of understanding  - Provide teaching via preferred learning methods  Outcome: Progressing

## 2025-01-14 NOTE — PLAN OF CARE
Problem: PHYSICAL THERAPY ADULT  Goal: Performs mobility at highest level of function for planned discharge setting.  See evaluation for individualized goals.  Description: Treatment/Interventions: Functional transfer training, Elevations, LE strengthening/ROM, Therapeutic exercise, Endurance training, Patient/family training, Bed mobility, Gait training, Spoke to nursing, OT  Equipment Recommended: Walker (pt owns)       See flowsheet documentation for full assessment, interventions and recommendations.  1/14/2025 1252 by Sana Robles PT  Outcome: Adequate for Discharge  Note: Prognosis: Good  Problem List: Decreased strength, Decreased range of motion, Decreased endurance, Impaired balance, Decreased mobility, Orthopedic restrictions, Pain  Assessment: Patient was seen today per POC. Overall, pt demonstrated improved mobility and inc tolerance to activity. Pain 2/10 in L hip. Required S for sit<>stand, ambulation, and stair navigation. Pt able to ambulate 110'x2 with RW and S in unit. Antalgic step through gait with no gross LOB noted. Pt able to perform nonreciprocal stair navigation with unilateral handrail to simulate home setup. Reviewed HEP and provided handout. All questions and concerns addressed at this time. No reports of dizziness t/o session. Will continue to see pt per POC as tolerated. From PT standpoint continued recommendation for level III, (minimum resource intensity) at D/C when medically cleared based current function. The patient's AM-PAC Basic Mobility Inpatient Short Form Raw Score is 24. A Raw score of greater than 16 suggests the patient may benefit from discharge to home. Please also refer to the recommendation of the Physical Therapist for safe discharge planning. Nsg staff to continue to mobilized pt (OOB in chair for all meals & ambulate in room/unit) as tolerated to prevent further decline in function. Nsg staff notified. From PT stand point, pt cleared functionally for D/C when  medically appropriate.  Barriers to Discharge: None  Barriers to Discharge Comments: From PT stand point, pt cleared functionally for D/C when medically appropriate.  Rehab Resource Intensity Level, PT: III (Minimum Resource Intensity)    See flowsheet documentation for full assessment.

## 2025-01-14 NOTE — PHYSICAL THERAPY NOTE
PT PROGRESS NOTE    Name: Sulemna Zhang  AGE: 54 y.o.  MRN: [id number]  LENGTH OF STAY: 0     01/14/25 1056   PT Last Visit   PT Visit Date 01/14/25   Note Type   Note Type Treatment   Pain Assessment   Pain Assessment Tool 0-10   Pain Score 2   Pain Location/Orientation Orientation: Left;Location: Hip   Hospital Pain Intervention(s) Cold applied;Repositioned;Ambulation/increased activity;Elevated;Emotional support;Rest   Restrictions/Precautions   Weight Bearing Precautions Per Order Yes   LLE Weight Bearing Per Order WBAT   Braces or Orthoses   (hip abduction pillow)   Other Precautions THR;Fall Risk;Pain  (posterior hip precautions)   General   Chart Reviewed Yes   Response to Previous Treatment Patient with no complaints from previous session.   Family/Caregiver Present Yes   Cognition   Overall Cognitive Status WFL   Arousal/Participation Alert;Cooperative   Attention Within functional limits   Orientation Level Oriented X4   Following Commands Follows all commands and directions without difficulty   Subjective   Subjective I am feeling good.   Bed Mobility   Additional Comments Pt greeted OOB in chair.   Transfers   Sit to Stand 5  Supervision   Additional items Armrests;Verbal cues  (w/ RW)   Stand to Sit 5  Supervision   Additional items Armrests;Verbal cues  (w/ RW)   Additional Comments cues for hand placement   Ambulation/Elevation   Gait pattern Improper Weight shift;Antalgic;Decreased foot clearance;Decreased L stance;Step through pattern   Gait Assistance 5  Supervision   Additional items Verbal cues   Assistive Device Rolling walker   Distance 110'x2   Stair Management Assistance 5  Supervision   Additional items Verbal cues   Stair Management Technique One rail L;Step to pattern;Foreward;Nonreciprocal   Number of Stairs 5  (x2 stair trainers)   Balance   Static Sitting Normal   Dynamic Sitting Good   Static Standing Fair +  (w/ RW)   Dynamic Standing Fair  (w/ RW)   Ambulatory Fair  (w/ RW)    Activity Tolerance   Activity Tolerance Patient tolerated treatment well   Nurse Made Aware RN Toyin   Exercises   THR   (Reviewed HEP and provided handout. All questions and concerns addressed at this time.)   Assessment   Prognosis Good   Problem List Decreased strength;Decreased range of motion;Decreased endurance;Impaired balance;Decreased mobility;Orthopedic restrictions;Pain   Assessment Patient was seen today per POC. Overall, pt demonstrated improved mobility and inc tolerance to activity. Pain 2/10 in L hip. Required S for sit<>stand, ambulation, and stair navigation. Pt able to ambulate 110'x2 with RW and S in unit. Antalgic step through gait with no gross LOB noted. Pt able to perform nonreciprocal stair navigation with unilateral handrail to simulate home setup. Reviewed HEP and provided handout. All questions and concerns addressed at this time. No reports of dizziness t/o session. Will continue to see pt per POC as tolerated. From PT standpoint continued recommendation for level III, (minimum resource intensity) at D/C when medically cleared based current function. The patient's AM-PAC Basic Mobility Inpatient Short Form Raw Score is 24. A Raw score of greater than 16 suggests the patient may benefit from discharge to home. Please also refer to the recommendation of the Physical Therapist for safe discharge planning. Nsg staff to continue to mobilized pt (OOB in chair for all meals & ambulate in room/unit) as tolerated to prevent further decline in function. Nsg staff notified. From PT stand point, pt cleared functionally for D/C when medically appropriate.   Barriers to Discharge None   Barriers to Discharge Comments From PT stand point, pt cleared functionally for D/C when medically appropriate.   Goals   Patient Goals to go home   STG Expiration Date 01/20/25   PT Treatment Day 1   Plan   Treatment/Interventions Functional transfer training;LE strengthening/ROM;Elevations;Therapeutic  exercise;Endurance training;Patient/family training;Bed mobility;Gait training;Spoke to nursing;OT   Progress Progressing toward goals   PT Frequency 5-7x/wk   Discharge Recommendation   Rehab Resource Intensity Level, PT III (Minimum Resource Intensity)   AM-PAC Basic Mobility Inpatient   Turning in Flat Bed Without Bedrails 4   Lying on Back to Sitting on Edge of Flat Bed Without Bedrails 4   Moving Bed to Chair 4   Standing Up From Chair Using Arms 4   Walk in Room 4   Climb 3-5 Stairs With Railing 4   Basic Mobility Inpatient Raw Score 24   Basic Mobility Standardized Score 57.68   [facility] Highest Level Of Mobility   -HLM Goal 8: Walk 250 feet or more   -HLM Achieved 7: Walk 25 feet or more   Education   Education Provided Mobility training;Home exercise program;Precautions for total hip arthroplasty ([place]);Assistive device   Patient Demonstrates acceptance/verbal understanding   End of Consult   Patient Position at End of Consult Bedside chair;All needs within reach     Sana Robles, PT

## 2025-01-14 NOTE — OCCUPATIONAL THERAPY NOTE
Occupational Therapy Progress Note     Patient Name: Suleman Zhang  Today's Date: 1/14/2025  Problem List  Active Problems:    Primary osteoarthritis of left hip       01/14/25 0959   OT Last Visit   OT Visit Date 01/14/25   Note Type   Note Type Treatment   Pain Assessment   Pain Assessment Tool 0-10   Pain Score 2   Pain Location/Orientation Orientation: Left;Location: Hip   Hospital Pain Intervention(s) Repositioned;Ambulation/increased activity;Emotional support;Rest   Restrictions/Precautions   Weight Bearing Precautions Per Order Yes   LLE Weight Bearing Per Order WBAT   Braces or Orthoses Other (Comment)  (hip abduction pillow)   Other Precautions THR;WBS;Fall Risk;Pain  (posterior hip precautions)   Lifestyle   Autonomy Independent with all ADLs/IADLs, no AD use at baseline   Reciprocal Relationships Spouse   Service to Others FTE   Intrinsic Gratification golfing, hiking   ADL   Where Assessed Chair   UB Dressing Assistance 6  Modified independent   UB Dressing Deficit Setup;Verbal cueing   LB Dressing Assistance 5  Supervision/Setup   LB Dressing Deficit Setup;Verbal cueing;Supervision/safety;Increased time to complete;Use of adaptive equipment;Requires assistive device for steadying   Functional Standing Tolerance   Time ~2-3 min   Activity standing for dressing tasks and functional mobility   Comments use of RW for stability   Bed Mobility   Supine to Sit Unable to assess   Sit to Supine Unable to assess   Additional Comments Pt greeted OOB in recliner   Transfers   Sit to Stand 5  Supervision   Additional items Armrests;Verbal cues  (RW)   Stand to Sit 5  Supervision   Additional items Armrests;Verbal cues  (RW)   Toilet transfer 5  Supervision   Additional items Verbal cues;Standard toilet;Commode;Other  (BSC  over standard toilet, RW)   Additional Comments verbal cues for hand placement   Functional Mobility   Functional Mobility 5  Supervision   Additional Comments Pt completed functional  mobility in room distance with use of RW and supervision   Additional items Rolling walker   Toilet Transfers   Toilet Transfer From Other (Comment)  (Chair)   Toilet Transfer Type To and from   Toilet Transfer to Standard toilet  (BSC over)   Toilet Transfer Technique Ambulating   Toilet Transfers Supervision   Toilet Transfers Comments use of RW   Cognition   Overall Cognitive Status WFL   Arousal/Participation Alert;Cooperative   Attention Within functional limits   Orientation Level Oriented X4   Memory Within functional limits   Following Commands Follows all commands and directions without difficulty   Comments Cooperative, pleasant, and motivated   Activity Tolerance   Activity Tolerance Patient tolerated treatment well   Medical Staff Made Aware JERARDO Deal   Assessment   Assessment Pt seen for OT treatment session focusing on ADLs/IADLs, functional mobility, functional standing tolerance, functional transfers, patient education, continued evaluation. Pt greeted OOB in recliner at start of session. Pt alert and cooperative throughout session. Pt with normal sitting balance and good dynamic standing balance. Pt tolerated treatment well. Pt completed sit to stand from chair with supervision and use of RW and armrests. Pt completed functional mobility from chair to bathroom with supervision and use of RW. Pt completed toilet transfer with use of BSC over standard toilet and RW. Pt educated on proper technique and hand placement. Pt completed functional mobility back to chair with supervision and RW. Pt completed don of underwear and pants with use of reacher seated then standing to pull over waist. Pt able to maintain posterior hip precautions during dressing and during entirety of session. Pt completed don of shirt with mod I seated. Pt completed doff of socks with use of reacher and don of socks with use of sock aid. Recommended pt to purchase sock aid for independence at home. Pt educated on ADLs/IADLs,  transfers, and LE management for independence and safety at home. Pt able to maintain posterior hip precautions during session. Pt with no further questions at this time. Pt reports 2/10 pain and no dizziness. Pt's vitals include: stable.     Pt ended session seated OOB in recliner. Call bell and phone within reach. All needs met and pt reports no further questions at this time. Continue to recommend No post-acute rehabilitation needs when medically cleared. OT will continue to follow pt on caseload.   Plan   Treatment Interventions ADL retraining;Functional transfer training;Endurance training;Patient/family training;Equipment evaluation/education;Compensatory technique education;Continued evaluation;Energy conservation;Activityengagement   Goal Expiration Date 01/20/25   OT Treatment Day 1   OT Frequency 3-5x/wk   Discharge Recommendation   Rehab Resource Intensity Level, OT No post-acute rehabilitation needs   Additional Comments  The patient's raw score on the AM-PAC Daily Activity Inpatient Short Form is 23. A raw score of greater than or equal to 19 suggests the patient may benefit from discharge to home. Please refer to the recommendation of the Occupational Therapist for safe discharge planning.   AM-PAC Daily Activity Inpatient   Lower Body Dressing 3   Bathing 4   Toileting 4   Upper Body Dressing 4   Grooming 4   Eating 4   Daily Activity Raw Score 23   Daily Activity Standardized Score (Calc for Raw Score >=11) 51.12   AM-PAC Applied Cognition Inpatient   Following a Speech/Presentation 4   Understanding Ordinary Conversation 4   Taking Medications 4   Remembering Where Things Are Placed or Put Away 4   Remembering List of 4-5 Errands 4   Taking Care of Complicated Tasks 4   Applied Cognition Raw Score 24   Applied Cognition Standardized Score 62.21   End of Consult   Education Provided Yes;Family or social support of family present for education by provider   Patient Position at End of Consult Bedside  chair;All needs within reach   Nurse Communication Nurse aware of consult   Sandra Gutierrez, OT

## 2025-01-14 NOTE — PROGRESS NOTES
"Progress Note - Internal Medicine   Name: Suleman Zhang 54 y.o. male I MRN: [id number]  Unit/Bed#: WE 2 N -01 I Date of Admission: 1/13/2025   Date of Service: 1/13/2025 I Hospital Day: 0     Assessment & Plan  Primary osteoarthritis of left hip  55 yo male POD 1 s/p left CRISTINA. Patient doing well and progressing  UOP: 1175cc    Plan:   - WBAT on LLE   - Posterior hip precautions   - Abductor pillow, no crossing legs  - Monitor incision site   - DVT ppx  - IS use   - OOB/ambulate  - Pain and nausea control PRN  - PT/OT  - D/C today pending PT/OT eval  - Continue frequent neurovascular checks    24 Hour Events : No acute overnight events     Subjective : Patient doing well and tolerating pain. He denies any N/V, fevers or chills. He currently rates his pain in LLE as minimal. Patient states he is able to ambulate to the bathroom with ease. Some increase in pain with movement, but otherwise able to tolerate. He denies any numbness or tingling within the LLE    Objective :  Visit Vitals  /78   Pulse 68   Temp 98.7 °F (37.1 °C)   Resp 18   Ht 5' 10\" (1.778 m)   Wt 88.5 kg (195 lb)   SpO2 99%   BMI 27.98 kg/m²   Smoking Status Former   BSA 2.07 m²        Physical Exam  Constitutional:       Appearance: Normal appearance. He is normal weight. He is not ill-appearing or diaphoretic.   Cardiovascular:      Rate and Rhythm: Normal rate and regular rhythm.      Pulses: Normal pulses.      Heart sounds: Normal heart sounds.   Pulmonary:      Effort: Pulmonary effort is normal.      Breath sounds: Normal breath sounds.   Abdominal:      General: Abdomen is flat. There is no distension.      Palpations: Abdomen is soft.      Tenderness: There is no abdominal tenderness.   Skin:     General: Skin is warm and dry.   Neurological:      General: No focal deficit present.      Mental Status: He is alert and oriented to person, place, and time.     LLE: dressings are C/D/I, no surrounding erythema, swelling, or drainage " surrounding the incision site or extending from bandage. Patient as 2+ pulses in b/l lower extremities. Sensation and motor function intact proximal and distal to incision site. Strength WNL. Toes warm and pink. Continue posterior hip precautions.      Recent Labs     01/14/25  0608   WBC 11.59*   HGB 11.9*             VTE Pharmacologic Prophylaxis: VTE covered by:  enoxaparin, Subcutaneous     VTE Mechanical Prophylaxis: sequential compression device

## 2025-01-14 NOTE — ASSESSMENT & PLAN NOTE
53 yo male POD 1 s/p left CRISTINA. Patient doing well and progressing  UOP: 1175cc    Plan:   - WBAT on LLE   - Posterior hip precautions   - Abductor pillow, no crossing legs  - Monitor incision site   - DVT ppx  - IS use   - OOB/ambulate  - Pain and nausea control PRN  - PT/OT  - D/C today pending PT/OT eval  - Continue frequent neurovascular checks

## 2025-01-14 NOTE — ASSESSMENT & PLAN NOTE
-PT/OT as ordered.  -Ice and analgesics as needed for pain  -Lovenox for DVT prophylaxis 30 days total treatment.  -Maintain Mepilex dressing.  -Posterior hip precautions, abduction pillow while in bed.  -Weightbearing as tolerated left lower extremity.  -Plan outpatient follow-up as scheduled with Dr. Ruelas.

## 2025-01-15 ENCOUNTER — TELEPHONE (OUTPATIENT)
Dept: OBGYN CLINIC | Facility: HOSPITAL | Age: 55
End: 2025-01-15

## 2025-01-15 NOTE — TELEPHONE ENCOUNTER
"Patient contacted for a postoperative follow up assessment. Patient reports doing \"good\" and reports \"some pain.\" Patient states current pain level of a  1/10  and reports \"feeling pretty good.\" Patient reports \"little bit\" swelling and dressing has small spot of drainage (no active drainage), dry and intact. Patient is icing the site and we discussed postoperative swelling, continuing to ICE areas of pain/swelling, especially after increased activity over the next few weeks.  He has OP PT tomorrow.     We reviewed patients AVS medication list. Patient is taking Tylenol PRN (taking 3000mg/24 hours),  Oxycodone 5mg every 6 hours, ASA 325mg BID, and using \"fiber powder\" as of today (uses daily normally). Patient has had BM since DC.     Patient denies nausea, vomiting, abdominal pain, chest pain, shortness of breath, fever, dizziness and calf pain. Patient does not have any other questions or concerns at this time. Pt was encouraged to call with any questions, concerns or issues.    "

## 2025-01-16 ENCOUNTER — OFFICE VISIT (OUTPATIENT)
Dept: PHYSICAL THERAPY | Facility: CLINIC | Age: 55
End: 2025-01-16
Payer: COMMERCIAL

## 2025-01-16 DIAGNOSIS — M25.552 CHRONIC LEFT HIP PAIN: ICD-10-CM

## 2025-01-16 DIAGNOSIS — G89.29 CHRONIC LEFT HIP PAIN: ICD-10-CM

## 2025-01-16 DIAGNOSIS — M16.12 PRIMARY OSTEOARTHRITIS OF LEFT HIP: ICD-10-CM

## 2025-01-16 DIAGNOSIS — Z96.642 AFTERCARE FOLLOWING LEFT HIP JOINT REPLACEMENT SURGERY: Primary | ICD-10-CM

## 2025-01-16 DIAGNOSIS — Z47.1 AFTERCARE FOLLOWING LEFT HIP JOINT REPLACEMENT SURGERY: Primary | ICD-10-CM

## 2025-01-16 PROCEDURE — 97110 THERAPEUTIC EXERCISES: CPT

## 2025-01-16 PROCEDURE — 97140 MANUAL THERAPY 1/> REGIONS: CPT

## 2025-01-16 PROCEDURE — 97161 PT EVAL LOW COMPLEX 20 MIN: CPT

## 2025-01-16 NOTE — PROGRESS NOTES
PT Evaluation     Today's date: 25  Patient name: Suleman Zhang  : 1970  MRN: [id number]  Referring provider: Lyndon Ruelas,*  Dx:   Encounter Diagnosis     ICD-10-CM    1. Aftercare following left hip joint replacement surgery  Z47.1     Z96.642       2. Chronic left hip pain  M25.552     G89.29       3. Primary osteoarthritis of left hip  M16.12           Start Time: 1530  Stop Time: 1630  Total time in clinic (min): 60 minutes     Assessment  Impairments: abnormal coordination, abnormal gait, abnormal muscle firing, abnormal or restricted ROM, abnormal movement, activity intolerance, impaired balance, impaired physical strength, lacks appropriate home exercise program, pain with function, weight-bearing intolerance and poor body mechanics    Assessment details: Suleman Zhang is a pleasant 54 y.o. male who presents today for post-operative evaluation for their L CRISTINA on . The patient reports pain, decreased strength, decreased ROM, decreased joint mobility, and ambulatory dysfunction. Suleman complains of aching, sharp, and tight pain in the left hip ranging from 0/10 to 5/10. Pain is exacerbated by activity, standing, or stairs and made better by ice, medication, or rest.     The patient has difficulty with ambulation, transfers, leisure, athletics, and work. Patient will benefit from skilled physical therapy, including therapeutic exercise, stretching, balance training, manual therapy, and modalities prn to improve their level of function, to increase overall quality of life, and to address his impairments.    Dispensed home exercise program and surgical precautions and educated patient on proper technique, frequency, and the possibility of DOMS for the next 24 to 48 hours. Patient demonstrated understanding and was advised to call us if he has any further questions.      Goals  ST. Independent with HEP in 2 weeks.   2. Pt will have verbal report of improvement in symptoms by  >/=25% in 2 weeks.   3. Decrease pain to 5/10 at it's worst.   4. Increase strength by 1/2 grade in all deficient planes.     To be achieved by D/C   LT. Pt will improve FOTO score by >/= goal points in 6 weeks.   2. Pt will improve FOTO score to >/= goal score by visit # 12.   3. Pt will be able to stand for an hour with little to no difficulty.   4. Pt will be able to walk a mile with little to no difficulty.   5. Pt will be able to perform his usual activities including work and exercise with little to no difficulty.   6. Pt will improve five times sit to stand time from 14s to 12 seconds or less  7. Pt will improve TUG time from 20s to 13.5 seconds or less. (MDC 3 - 5 seconds)    Plan    Frequency: 2x week  Duration in weeks: 8  Plan of Care beginning date: 2025  Plan of Care expiration date: 3/31/2025  Treatment plan discussed with: patient        Subjective Evaluation    History of Present Illness  Mechanism of injury: surgery  Mechanism of injury: Suleman presents today following L CRISTINA on . The patient reports adherence to HEP and is using pain medication as directed at this time. He notes he is following his surgical precautions. The patient is currently ambulating with RW. They report functional difficulty with standing, walking, and stairs.    The patient will follow up with surgeon on .    The patient also reports diminished strength, decreased ROM, decreased balance, decreased endurance, disrupted sleep, and difficulty with function.    Suleman  has a past medical history of Disseminated herpes zoster (10/16/2019), HLD (hyperlipidemia), Hypertension, Known health problems: none, and Vitamin D deficiency. The patient also  has a past surgical history that includes [place] tooth extraction; Cardiac catheterization (N/A, 2023); Cardiac catheterization (N/A, 2023); pr coronary artery byp w/vein & artery graft 4 vein (N/A, 2023); Coronary artery bypass graft; Colonoscopy;  and pr arthrp acetblr/prox fem prostc agrft/algrft (Left, 2025).  Patient Goals  Patient goals for therapy: decreased edema, decreased pain, improved balance, return to work, return to sport/leisure activities, independence with ADLs/IADLs, increased strength and increased motion  Patient goal: Golf, hiking  Pain  Current pain ratin  At best pain ratin  At worst pain ratin  Quality: dull ache  Relieving factors: medications  Aggravating factors: standing, walking, stair climbing and running  Progression: worsening    Social Support  Steps to enter house: yes (3)  Stairs in house: yes   Lives with: spouse    Employment status: working (, [employer])        Objective     Passive Range of Motion   Left Hip   Flexion: 80 degrees   Abduction: 25 degrees     Right Hip   Normal passive range of motion    Strength/Myotome Testing     Left Hip   Planes of Motion   Flexion: 4-  Abduction: 4+  External rotation: 4-  Internal rotation: 4-    Right Hip   Normal muscle strength    Additional Strength Details  Manual resistance not tested post operatively .    Functional Assessment        Comments  2025  5xSTS - 10s no UE  TUG - 10s no AD     2025  5xSTS - 14s w/ UE  TUG - 20s w/ RW         Precautions: L Posterior CRISTINA , HTN    POC expires Unit limit Auth  expiration date PT/OT + Visit Limit?   3/31/24 BOMN 25 BOMN                 Visit/Unit Tracking  AUTH Status:  Date              Approved Used 1 2              Remaining                  Access Code: [email or web address]  URL: [email or web address]  Date: 2025  Prepared by: Loi Andrews    Exercises  - Seated Ankle Pumps  - 2 x daily - 7 x weekly - 3 sets - 10 reps  - Supine Gluteal Sets  - 2 x daily - 7 x weekly - 3 sets - 10 reps - 5 hold  - Supine Quadricep Sets  - 2 x daily - 7 x weekly - 3 sets - 10 reps - 5 hold  - Supine Heel Slide  - 2 x daily - 7 x weekly - 3 sets - 10 reps - 5 hold  - Supine  Hip Abduction  - 2 x daily - 7 x weekly - 2 sets - 10 reps  - Seated Long Arc Quad  - 2 x daily - 7 x weekly - 3 sets - 10 reps    Manuals 1/6 1/16           PROM Hip  TS                                     Re-eval  TS           Neuro Re-Ed             Rockerboard             SL balance             StS walk             Heel toe walk             Alexys walking             Bosu balance                                       WYNN, 5xSTS, TUG TS TS           Ther Ex             Nustep for ROM and cardio  10' L1            Pt education - precautions, home prep checklist, HEP TS  TS           Quad Set  HEP  HEP            Glute Set HEP  HEP            Ankle Pumps HEP   HEP            LAQ/SAQ HEP  HEP            SLR             Supine march             Heel slides             HL clamshells             HL adduction             Bridges             Step Stretch             Standing HS st.                                       Matrix flexion             Matrix ext.             Ther Activity             TG Squats             FSU  Steps ed            FSD             LSU             LSD             StS w/ TB             Gait Training                                       Modalities             CP

## 2025-01-21 ENCOUNTER — HOSPITAL ENCOUNTER (OUTPATIENT)
Dept: RADIOLOGY | Facility: HOSPITAL | Age: 55
Discharge: HOME | End: 2025-01-21
Attending: ORTHOPAEDIC SURGERY
Payer: COMMERCIAL

## 2025-01-21 ENCOUNTER — OFFICE VISIT (OUTPATIENT)
Dept: OBGYN CLINIC | Facility: HOSPITAL | Age: 55
End: 2025-01-21

## 2025-01-21 VITALS — BODY MASS INDEX: 27.98 KG/M2 | HEIGHT: 70 IN

## 2025-01-21 DIAGNOSIS — Z96.642 AFTERCARE FOLLOWING LEFT HIP JOINT REPLACEMENT SURGERY: Primary | ICD-10-CM

## 2025-01-21 DIAGNOSIS — Z47.1 AFTERCARE FOLLOWING LEFT HIP JOINT REPLACEMENT SURGERY: ICD-10-CM

## 2025-01-21 DIAGNOSIS — Z96.642 AFTERCARE FOLLOWING LEFT HIP JOINT REPLACEMENT SURGERY: ICD-10-CM

## 2025-01-21 DIAGNOSIS — Z47.1 AFTERCARE FOLLOWING LEFT HIP JOINT REPLACEMENT SURGERY: Primary | ICD-10-CM

## 2025-01-21 PROCEDURE — 73502 X-RAY EXAM HIP UNI 2-3 VIEWS: CPT

## 2025-01-21 PROCEDURE — 99024 POSTOP FOLLOW-UP VISIT: CPT | Performed by: ORTHOPAEDIC SURGERY

## 2025-01-21 RX ORDER — OXYCODONE HYDROCHLORIDE 5 MG/1
5 TABLET ORAL EVERY 6 HOURS PRN
Qty: 30 TABLET | Refills: 0 | Status: SHIPPED | OUTPATIENT
Start: 2025-01-21

## 2025-01-21 NOTE — PROGRESS NOTES
Assessment:   Diagnosis ICD-10-CM Associated Orders   1. Aftercare following left hip joint replacement surgery  Z47.1 oxyCODONE (Roxicodone) 5 immediate release tablet    Z96.642           Plan:  54 y.o. male 8 days status post left total hip arthroplasty  Continue physical therapy   Continue weight bearing activities as tolerated   Continue pain medications as needed   Continue lovenox as prescribed    Able to shower, letting warm soapy water run over incision and only pat dry   Follow up in 1 week for 2 week post-op appointment and removal of staples      The above stated was discussed in layman's terms and the patient expressed understanding.  All questions were answered to the patient's satisfaction.     To do next visit:  Return in about 1 week (around 1/28/2025) for 2-week postop appointment and removal of staples.      Subjective:   Suleman Zhang is a 54 y.o. male who presents 8 days status post left total hip arthroplasty.  He is doing well.  He admits to minor pain of the left hip which she has been able to manage well with over-the-counter Tylenol as well as oxycodone as needed.  Oxycodone refilled today.  He admits to working with physical therapy, currently scheduled for twice a week and is making good progress.  Patient also admits to completing home exercises daily.  He is ambulating well with a walker today however admits to forgetting to use it at points.  He continues to take his Lovenox daily.    Review of systems negative unless otherwise specified in HPI    Past Medical History:   Diagnosis Date   • Disseminated herpes zoster 10/16/2019   • HLD (hyperlipidemia)    • Hypertension    • Known health problems: none    • Vitamin D deficiency        Past Surgical History:   Procedure Laterality Date   • CARDIAC CATHETERIZATION N/A 03/02/2023    Procedure: Cardiac catheterization;  Surgeon: Yodit Small MD;  Location: [facility];  Service: Cardiology   • CARDIAC CATHETERIZATION N/A  2023    Procedure: Cardiac Coronary Angiogram;  Surgeon: Yodit Small MD;  Location: [facility];  Service: Cardiology   • COLONOSCOPY     • CORONARY ARTERY BYPASS GRAFT     • [place] ARTHRP ACETBLR/PROX FEM PROSTC AGRFT/ALGRFT Left 2025    Procedure: Left total hip arthroplasty;  Surgeon: Lyndon Ruelas MD;  Location: [facility];  Service: Orthopedics   • [place] CORONARY ARTERY BYP W/VEIN & ARTERY GRAFT 4 VEIN N/A 2023    Procedure: CORONARY ARTERY BYPASS GRAFT (CABG) 4 VESSELS,  SVG to PDA  and OM2.                            r      LRA-->OM1 , LIMA to LAD, evh, hector;  Surgeon: Darrell Jiménez DO;  Location[facility];  Service: Cardiac Surgery   • WISDOM TOOTH EXTRACTION         Family History   Problem Relation Age of Onset   • Hypertension Father    • Breast cancer Sister    • Heart attack Maternal Grandfather    • Heart attack Paternal Grandfather         s/p CABG   • Coronary artery disease Paternal Uncle    • Prostate cancer Paternal Uncle    • Coronary artery disease Paternal Uncle         s/p cardiac stent   • Prostate cancer Paternal Uncle        Social History     Occupational History   • Not on file   Tobacco Use   • Smoking status: Former     Current packs/day: 0.00     Average packs/day: 0.3 packs/day for 6.0 years (1.5 ttl pk-yrs)     Types: Cigarettes, Cigars     Start date:      Quit date:      Years since quittin.0   • Smokeless tobacco: Former     Types: Snuff, Chew   Vaping Use   • Vaping status: Never Used   Substance and Sexual Activity   • Alcohol use: Not Currently     Alcohol/week: 16.0 standard drinks of alcohol     Types: 4 Glasses of wine, 12 Cans of beer per week     Comment: Social   • Drug use: Not Currently     Types: Marijuana     Comment: rare marijuana use   • Sexual activity: Yes     Partners: Female     Birth control/protection: None         Current Outpatient Medications:   •  oxyCODONE (Roxicodone) 5 immediate release tablet, Take 1  tablet (5 mg total) by mouth every 6 (six) hours as needed for moderate pain Max Daily Amount: 20 mg, Disp: 30 tablet, Rfl: 0  •  acetaminophen (TYLENOL) 500 mg tablet, Take 500 mg by mouth every 6 (six) hours as needed for mild pain, Disp: , Rfl:   •  amLODIPine (NORVASC) 2.5 mg tablet, TAKE ONE TABLET BY MOUTH EVERY DAY, Disp: 30 tablet, Rfl: 5  •  aspirin 81 mg chewable tablet, Chew 1 tablet (81 mg total) daily, Disp: , Rfl:   •  atorvastatin (LIPITOR) 40 mg tablet, Take 1 tablet (40 mg total) by mouth daily with dinner, Disp: 90 tablet, Rfl: 3  •  Boswellia-Glucosamine-Vit D (OSTEO BI-FLEX ONE PER DAY PO), Take 1 tablet by mouth in the morning, Disp: , Rfl:   •  enoxaparin (LOVENOX) 40 mg/0.4 mL, Inject 0.4 mL (40 mg total) under the skin daily for 28 days To start postoperatively, Disp: 11.2 mL, Rfl: 0  •  LYSINE HCL PO, Take 1 tablet by mouth in the morning, Disp: , Rfl:   •  methocarbamol (ROBAXIN) 500 mg tablet, Take 1 tablet (500 mg total) by mouth 3 (three) times a day as needed for muscle spasms, Disp: 60 tablet, Rfl: 0  •  methylcellulose oral powder, , Disp: , Rfl:   •  metoprolol tartrate (LOPRESSOR) 25 mg tablet, TAKE 1/2 TABLET BY MOUTH EVERY 12 HOURS, Disp: 60 tablet, Rfl: 3  •  Multiple Vitamin (multivitamin) tablet, Take 1 tablet by mouth daily, Disp: , Rfl:   •  oxyCODONE (Roxicodone) 5 immediate release tablet, Take 1 tablet (5 mg total) by mouth every 6 (six) hours as needed for moderate pain for up to 10 days Max Daily Amount: 20 mg, Disp: 30 tablet, Rfl: 0  •  Vitamin D, Cholecalciferol, 50 MCG (2000 UT) CAPS, Take 3 capsules by mouth if needed (3 TIMES A WEEK), Disp: , Rfl:     Allergies   Allergen Reactions   • Codeine Drowsiness     incapacitates          There were no vitals filed for this visit.    Objective:  Physical exam  General: Awake, Alert, Oriented  Eyes: Pupils equal, round and reactive to light  Heart: regular rate and rhythm  Lungs: No audible wheezing  Abdomen: soft            "         Ortho Exam  Left hip:  Posterior incision clean dry and intact  Surgical dressing removed in office today  Rashawn well approximated  moderate ecchymosis present  No erythema present   Appropriate warmth and swelling  Good arc of motion with no pain  Patient sits comfortably in chair with hip flexed at 90 degrees  Patient stands from seated position without assistance  Calf compartments soft and supple  Sensation intact  Toes are warm sensate and mobile     Diagnostics, reviewed and taken today if performed as documented:    Left hip xray:   - Well aligned prosthesis without evidence of acute fractures, dislocations, acute changes, or hardware failure   - Prosthesis appears properly sized and properly bonded to surrounding bone         Procedures, if performed today:    None performed      Portions of the record may have been created with voice recognition software.  Occasional wrong word or \"sound a like\" substitutions may have occurred due to the inherent limitations of voice recognition software.  Read the chart carefully and recognize, using context, where substitutions have occurred.      "

## 2025-01-22 ENCOUNTER — OFFICE VISIT (OUTPATIENT)
Dept: PHYSICAL THERAPY | Facility: CLINIC | Age: 55
End: 2025-01-22
Payer: COMMERCIAL

## 2025-01-22 DIAGNOSIS — M16.12 PRIMARY OSTEOARTHRITIS OF LEFT HIP: ICD-10-CM

## 2025-01-22 DIAGNOSIS — Z96.642 AFTERCARE FOLLOWING LEFT HIP JOINT REPLACEMENT SURGERY: Primary | ICD-10-CM

## 2025-01-22 DIAGNOSIS — Z47.1 AFTERCARE FOLLOWING LEFT HIP JOINT REPLACEMENT SURGERY: Primary | ICD-10-CM

## 2025-01-22 DIAGNOSIS — M25.552 CHRONIC LEFT HIP PAIN: ICD-10-CM

## 2025-01-22 DIAGNOSIS — G89.29 CHRONIC LEFT HIP PAIN: ICD-10-CM

## 2025-01-22 PROCEDURE — 97530 THERAPEUTIC ACTIVITIES: CPT

## 2025-01-22 PROCEDURE — 97110 THERAPEUTIC EXERCISES: CPT

## 2025-01-22 NOTE — PROGRESS NOTES
Daily Note     Today's date: 2025  Patient name: Suleman Zhang  : 1970  MRN: [id number]  Referring provider: Lyndon Ruelas,*  Dx:   Encounter Diagnosis     ICD-10-CM    1. Aftercare following left hip joint replacement surgery  Z47.1     Z96.642       2. Chronic left hip pain  M25.552     G89.29       3. Primary osteoarthritis of left hip  M16.12           Start Time: 900  Stop Time: 942  Total time in clinic (min): 42 minutes    Subjective: Pt noted that he does have some tenderness but noted that it has been come and go. He noted that he has been walking without the walker at home at times.     Next Tuesday sees ortho next.     Objective: See treatment diary below      Assessment:  progressions added this visit with no changes in pain status. Proper form demonstrated. Tolerated treatment well. Patient demonstrated fatigue post treatment, exhibited good technique with therapeutic exercises, and would benefit from continued PT    Advised to continue with precautions. DOMS  may be noted s/p treatment session.       Plan: Continue per plan of care.      Precautions: L Posterior CRISTINA , HTN    POC expires Unit limit Auth  expiration date PT/OT + Visit Limit?   3/31/24 BOMN 25.  BOMN                 Visit/Unit Tracking  AUTH Status:  Date             Approved Used 1 2 3             Remaining                Access Code: [email or web address]  URL: [email or web address]  Date: 2025  Prepared by: Loi Andrews    Exercises  - Seated Ankle Pumps  - 2 x daily - 7 x weekly - 3 sets - 10 reps  - Supine Gluteal Sets  - 2 x daily - 7 x weekly - 3 sets - 10 reps - 5 hold  - Supine Quadricep Sets  - 2 x daily - 7 x weekly - 3 sets - 10 reps - 5 hold  - Supine Heel Slide  - 2 x daily - 7 x weekly - 3 sets - 10 reps - 5 hold  - Supine Hip Abduction  - 2 x daily - 7 x weekly - 2 sets - 10 reps  - Seated Long Arc Quad  - 2 x daily - 7 x weekly - 3 sets - 10 reps    Manuals   "1/16 1/22          PROM Hip  TS                                     Re-eval  TS           Neuro Re-Ed             Rockerboard             SL balance             StS walk             Heel toe walk             Alexys walking             Bosu balance                                       WYNN, 5xSTS, TUG TS TS                                                  Ther Ex             Nustep for ROM and cardio  10' L1  10 min L4           Pt education - precautions, home prep checklist, HEP TS  TS           Quad Set  HEP  HEP            Glute Set HEP  HEP            Ankle Pumps HEP   HEP            LAQ/SAQ HEP  HEP            SLR   2x 5  moderate assistance from PTA          Supine march             Heel slides   3\" 2x 10           HL clamshells             HL adduction   5\" 2x 10           Bridges   3\" 2x 10           Step Stretch             Standing HS st.             Standing hip abd    2x 10 (L)          Standing hip march    2x 10  (L)                                    Matrix flexion    Hold Machine down           Matrix ext.   Hold   Machine down                                                  Ther Activity             TG Squats   TG L22 2x 10           FSU  Steps ed  6\" 2x 10           FSD             LSU   4\" 2x 10  6\" trial          LSD             StS w/ TB                                       Gait Training                                       Modalities             CP                                 "

## 2025-01-24 ENCOUNTER — OFFICE VISIT (OUTPATIENT)
Dept: PHYSICAL THERAPY | Facility: CLINIC | Age: 55
End: 2025-01-24
Payer: COMMERCIAL

## 2025-01-24 DIAGNOSIS — Z96.642 AFTERCARE FOLLOWING LEFT HIP JOINT REPLACEMENT SURGERY: Primary | ICD-10-CM

## 2025-01-24 DIAGNOSIS — Z47.1 AFTERCARE FOLLOWING LEFT HIP JOINT REPLACEMENT SURGERY: Primary | ICD-10-CM

## 2025-01-24 DIAGNOSIS — M16.12 PRIMARY OSTEOARTHRITIS OF LEFT HIP: ICD-10-CM

## 2025-01-24 DIAGNOSIS — M25.552 CHRONIC LEFT HIP PAIN: ICD-10-CM

## 2025-01-24 DIAGNOSIS — G89.29 CHRONIC LEFT HIP PAIN: ICD-10-CM

## 2025-01-24 PROCEDURE — 97530 THERAPEUTIC ACTIVITIES: CPT

## 2025-01-24 PROCEDURE — 97110 THERAPEUTIC EXERCISES: CPT

## 2025-01-24 NOTE — PROGRESS NOTES
Daily Note    Today's date: 25  Patient name: Suleman Zhang  : 1970  MRN: [id number]  Referring provider: Lyndon Ruelas,*  Dx:   Encounter Diagnosis     ICD-10-CM    1. Aftercare following left hip joint replacement surgery  Z47.1     Z96.642       2. Chronic left hip pain  M25.552     G89.29       3. Primary osteoarthritis of left hip  M16.12           Start Time: 1145  Stop Time: 1230  Total time in clinic (min): 45 minutes      Subjective: Suleman reports no c/o today. He is no longer using his RW at all times, just as needed. He is not using a SPC either.    Objective: See treatment diary below.    Assessment: Suleman tolerated treatment well with consistent cuing throughout. TE's were performed with increased reps and increased resistance. New TE's were demonstrated with proper technique, and tolerated well. Following treatment, the patient demonstrated fatigue and would benefit from continued physical therapy.    Plan: Continue per plan of care.  Progress treatment as tolerated.         Precautions: L Posterior CRISTINA , HTN    POC expires Unit limit Auth  expiration date PT/OT + Visit Limit?   3/31/24 BOMN 25.  BOMN                 Visit/Unit Tracking  AUTH Status:  Date            Approved Used 1 2 3 4                         Access Code: [email or web address]  URL: [email or web address]  Date: 2025  Prepared by: Loi Andrews    Exercises  - Seated Ankle Pumps  - 2 x daily - 7 x weekly - 3 sets - 10 reps  - Supine Gluteal Sets  - 2 x daily - 7 x weekly - 3 sets - 10 reps - 5 hold  - Supine Quadricep Sets  - 2 x daily - 7 x weekly - 3 sets - 10 reps - 5 hold  - Supine Heel Slide  - 2 x daily - 7 x weekly - 3 sets - 10 reps - 5 hold  - Supine Hip Abduction  - 2 x daily - 7 x weekly - 2 sets - 10 reps  - Seated Long Arc Quad  - 2 x daily - 7 x weekly - 3 sets - 10 reps    Manuals          PROM Hip  TS                    "                  Re-eval  TS           Neuro Re-Ed             Rockerboard    2'/2'         SL balance             StS walk             Heel toe walk             Alexys walking             Bosu balance                                       WYNN, 5xSTS, TUG TS TS                                                  Ther Ex             Nustep for ROM and cardio  10' L1  10 min L4  10' L6          Pt education - precautions, home prep checklist, HEP TS  TS           Quad Set  HEP  HEP            Glute Set HEP  HEP            Ankle Pumps HEP   HEP            LAQ/SAQ HEP  HEP            SLR   2x 5  moderate assistance from PTA          Supine march             Heel slides   3\" 2x 10           HL clamshells             HL adduction   5\" 2x 10           Bridges   3\" 2x 10           Step Stretch             Standing HS st.             Standing hip abd    2x 10 (L) 3x10          Standing hip march    2x 10  (L) 3x10          Standing hip extension    3x10                       Matrix flexion    Hold Machine down           Matrix ext.   Hold   Machine down                                                  Ther Activity             TG Squats   TG L22 2x 10  L22 3x15         FSU  Steps ed  6\" 2x 10  6\" 3x10          FSD             LSU   4\" 2x 10  6\" 3x10          LSD             StS w/ TB                                       Gait Training                                       Modalities             CP                                 Loi Andrews, PT  1/24/2025,12:27 PM  "

## 2025-01-28 ENCOUNTER — OFFICE VISIT (OUTPATIENT)
Dept: OBGYN CLINIC | Facility: HOSPITAL | Age: 55
End: 2025-01-28

## 2025-01-28 VITALS — HEIGHT: 70 IN | BODY MASS INDEX: 27.98 KG/M2

## 2025-01-28 DIAGNOSIS — Z47.1 AFTERCARE FOLLOWING LEFT HIP JOINT REPLACEMENT SURGERY: Primary | ICD-10-CM

## 2025-01-28 DIAGNOSIS — Z96.642 AFTERCARE FOLLOWING LEFT HIP JOINT REPLACEMENT SURGERY: Primary | ICD-10-CM

## 2025-01-28 PROCEDURE — 99024 POSTOP FOLLOW-UP VISIT: CPT | Performed by: ORTHOPAEDIC SURGERY

## 2025-01-28 NOTE — LETTER
January 28, 2025     Patient: Suleman Zhang  YOB: 1970  Date of Visit: 1/28/2025      To Whom it May Concern:    Suleman Zhang is under my professional care. Suleman was seen in my office on 1/28/2025. Suleman may return to work on 2/3/2025 .  He should remain on light duty either at home or in hospital with slow progression to return.     If you have any questions or concerns, please don't hesitate to call.         Sincerely,          Lyndon Ruelas MD        CC: No Recipients

## 2025-01-28 NOTE — PROGRESS NOTES
Assessment:   Diagnosis ICD-10-CM Associated Orders   1. Aftercare following left hip joint replacement surgery  Z47.1     Z96.642           Plan:  54 y.o. male 2 weeks status post left total hip arthroplasty  Continue physical therapy   Continue weight bearing activities as tolerated   Continue OTC pain medications as needed   Continue lovenox as prescribed    Staples removed in office today, incision cleaned, steri-strips applied   Return to work note created and placed in chart   Follow up in 4 weeks for 6 week post-op appointment      The above stated was discussed in layman's terms and the patient expressed understanding.  All questions were answered to the patient's satisfaction.     To do next visit:  Return in about 4 weeks (around 2/25/2025) for 6-week postop appointment.      Subjective:   Suleman Zhang is a 54 y.o. male who presents 2 weeks status post left total hip arthroplasty.  He is doing very well.  He admits to minimal pain of the left hip, admits to more of an uncomfortable feeling.  He has been managing his pain well with over-the-counter Tylenol and occasional oxycodone as needed.  He continues to attend physical therapy twice a week and admits to significant progress.  He presents today with a walker however admits to walking without ambulatory assistive devices at home.  He continues to take Lovenox daily.      Review of systems negative unless otherwise specified in HPI    Past Medical History:   Diagnosis Date   • Disseminated herpes zoster 10/16/2019   • HLD (hyperlipidemia)    • Hypertension    • Known health problems: none    • Vitamin D deficiency        Past Surgical History:   Procedure Laterality Date   • CARDIAC CATHETERIZATION N/A 03/02/2023    Procedure: Cardiac catheterization;  Surgeon: Yodit Small MD;  [facility];  Service: Cardiology   • CARDIAC CATHETERIZATION N/A 03/02/2023    Procedure: Cardiac Coronary Angiogram;  Surgeon: Yodit Small MD;   [facility];  Service: Cardiology   • COLONOSCOPY     • CORONARY ARTERY BYPASS GRAFT     • [place] ARTHRP ACETBLR/PROX FEM PROSTC AGRFT/ALGRFT Left 2025    Procedure: Left total hip arthroplasty;  Surgeon: Lyndon Ruelas MD;  Location:  [facility];  Service: Orthopedics   • [place] CORONARY ARTERY BYP W/VEIN & ARTERY GRAFT 4 VEIN N/A 2023    Procedure: CORONARY ARTERY BYPASS GRAFT (CABG) 4 VESSELS,  SVG to PDA  and OM2.                            r      LRA-->OM1 , LIMA to LAD, evh, hector;  Surgeon: Darrell Jiménez DO;  [facility];  Service: Cardiac Surgery   • WISDOM TOOTH EXTRACTION         Family History   Problem Relation Age of Onset   • Hypertension Father    • Breast cancer Sister    • Heart attack Maternal Grandfather    • Heart attack Paternal Grandfather         s/p CABG   • Coronary artery disease Paternal Uncle    • Prostate cancer Paternal Uncle    • Coronary artery disease Paternal Uncle         s/p cardiac stent   • Prostate cancer Paternal Uncle        Social History     Occupational History   • Not on file   Tobacco Use   • Smoking status: Former     Current packs/day: 0.00     Average packs/day: 0.3 packs/day for 6.0 years (1.5 ttl pk-yrs)     Types: Cigarettes, Cigars     Start date:      Quit date:      Years since quittin.0   • Smokeless tobacco: Former     Types: Snuff, Chew   Vaping Use   • Vaping status: Never Used   Substance and Sexual Activity   • Alcohol use: Not Currently     Alcohol/week: 16.0 standard drinks of alcohol     Types: 4 Glasses of wine, 12 Cans of beer per week     Comment: Social   • Drug use: Not Currently     Types: Marijuana     Comment: rare marijuana use   • Sexual activity: Yes     Partners: Female     Birth control/protection: None         Current Outpatient Medications:   •  acetaminophen (TYLENOL) 500 mg tablet, Take 500 mg by mouth every 6 (six) hours as needed for mild pain, Disp: , Rfl:   •  amLODIPine (NORVASC) 2.5  mg tablet, TAKE ONE TABLET BY MOUTH EVERY DAY, Disp: 30 tablet, Rfl: 5  •  aspirin 81 mg chewable tablet, Chew 1 tablet (81 mg total) daily, Disp: , Rfl:   •  atorvastatin (LIPITOR) 40 mg tablet, Take 1 tablet (40 mg total) by mouth daily with dinner, Disp: 90 tablet, Rfl: 3  •  Boswellia-Glucosamine-Vit D (OSTEO BI-FLEX ONE PER DAY PO), Take 1 tablet by mouth in the morning, Disp: , Rfl:   •  enoxaparin (LOVENOX) 40 mg/0.4 mL, Inject 0.4 mL (40 mg total) under the skin daily for 28 days To start postoperatively, Disp: 11.2 mL, Rfl: 0  •  LYSINE HCL PO, Take 1 tablet by mouth in the morning, Disp: , Rfl:   •  methocarbamol (ROBAXIN) 500 mg tablet, Take 1 tablet (500 mg total) by mouth 3 (three) times a day as needed for muscle spasms, Disp: 60 tablet, Rfl: 0  •  methylcellulose oral powder, , Disp: , Rfl:   •  metoprolol tartrate (LOPRESSOR) 25 mg tablet, TAKE 1/2 TABLET BY MOUTH EVERY 12 HOURS, Disp: 60 tablet, Rfl: 3  •  Multiple Vitamin (multivitamin) tablet, Take 1 tablet by mouth daily, Disp: , Rfl:   •  oxyCODONE (Roxicodone) 5 immediate release tablet, Take 1 tablet (5 mg total) by mouth every 6 (six) hours as needed for moderate pain Max Daily Amount: 20 mg, Disp: 30 tablet, Rfl: 0  •  Vitamin D, Cholecalciferol, 50 MCG (2000 UT) CAPS, Take 3 capsules by mouth if needed (3 TIMES A WEEK), Disp: , Rfl:     Allergies   Allergen Reactions   • Codeine Drowsiness     incapacitates          There were no vitals filed for this visit.    Objective:  Physical exam  General: Awake, Alert, Oriented  Eyes: Pupils equal, round and reactive to light  Heart: regular rate and rhythm  Lungs: No audible wheezing  Abdomen: soft                    Ortho Exam  Left hip:  Posterior incision clean dry and intact  Staples well approximated; removed in office today  Incision cleaned, steri-strips applied  mild ecchymosis present  No erythema present   Appropriate warmth and swelling  Good arc of motion with no pain  Patient sits  "comfortably in chair with hip flexed at 90 degrees  Patient stands from seated position without assistance  Calf compartments soft and supple  Sensation intact  Toes are warm sensate and mobile     Diagnostics, reviewed and taken today if performed as documented:    None performed        Procedures, if performed today:    None performed      Portions of the record may have been created with voice recognition software.  Occasional wrong word or \"sound a like\" substitutions may have occurred due to the inherent limitations of voice recognition software.  Read the chart carefully and recognize, using context, where substitutions have occurred.      "

## 2025-01-29 ENCOUNTER — OFFICE VISIT (OUTPATIENT)
Dept: PHYSICAL THERAPY | Facility: CLINIC | Age: 55
End: 2025-01-29
Payer: COMMERCIAL

## 2025-01-29 DIAGNOSIS — M25.552 CHRONIC LEFT HIP PAIN: ICD-10-CM

## 2025-01-29 DIAGNOSIS — M16.12 PRIMARY OSTEOARTHRITIS OF LEFT HIP: ICD-10-CM

## 2025-01-29 DIAGNOSIS — Z96.642 AFTERCARE FOLLOWING LEFT HIP JOINT REPLACEMENT SURGERY: Primary | ICD-10-CM

## 2025-01-29 DIAGNOSIS — Z47.1 AFTERCARE FOLLOWING LEFT HIP JOINT REPLACEMENT SURGERY: Primary | ICD-10-CM

## 2025-01-29 DIAGNOSIS — G89.29 CHRONIC LEFT HIP PAIN: ICD-10-CM

## 2025-01-29 PROCEDURE — 97110 THERAPEUTIC EXERCISES: CPT

## 2025-01-29 PROCEDURE — 97530 THERAPEUTIC ACTIVITIES: CPT

## 2025-01-29 NOTE — PROGRESS NOTES
Daily Note    Today's date: 25  Patient name: Suleman Zhang  : 1970  MRN: [id number]  Referring provider: Lyndon Ruelas,*  Dx:   Encounter Diagnosis     ICD-10-CM    1. Aftercare following left hip joint replacement surgery  Z47.1     Z96.642       2. Chronic left hip pain  M25.552     G89.29       3. Primary osteoarthritis of left hip  M16.12           Start Time: 1000  Stop Time: 1045  Total time in clinic (min): 45 minutes      Subjective: Suleman reports no c/o today. He notes adherence to HEP and is improving with straight leg raises.    Objective: See treatment diary below.    Assessment: Suleman tolerated treatment well with minimal cuing. TE's were performed with increased reps and increased resistance. New TE's were demonstrated with proper technique, and tolerated well. Following treatment, the patient demonstrated fatigue and would benefit from continued physical therapy.    Plan: Continue per plan of care.  Progress treatment as tolerated.         Precautions: L Posterior CRISTINA , HTN    POC expires Unit limit Auth  expiration date PT/OT + Visit Limit?   3/31/24 BOMN 25.  BOMN                 Visit/Unit Tracking  AUTH Status:  Date           Approved Used 1 2 3 4 5           Remaining  23 22 21 20 19            Access Code: [email or web address]  URL: [email or web address]  Date: 2025  Prepared by: Loi Andrews    Exercises  - Seated Ankle Pumps  - 2 x daily - 7 x weekly - 3 sets - 10 reps  - Supine Gluteal Sets  - 2 x daily - 7 x weekly - 3 sets - 10 reps - 5 hold  - Supine Quadricep Sets  - 2 x daily - 7 x weekly - 3 sets - 10 reps - 5 hold  - Supine Heel Slide  - 2 x daily - 7 x weekly - 3 sets - 10 reps - 5 hold  - Supine Hip Abduction  - 2 x daily - 7 x weekly - 2 sets - 10 reps  - Seated Long Arc Quad  - 2 x daily - 7 x weekly - 3 sets - 10 reps    Manuals         PROM Hip  TS                                    "  Re-eval  TS           Neuro Re-Ed             Rockerboard    2'/2' 2'/2'         SL balance             StS walk             Heel toe walk             Alexys walking             Bosu balance                                       WYNN, 5xSTS, TUG TS TS                                                  Ther Ex             Nustep for ROM and cardio  10' L1  10 min L4  10' L6  10' L6         Pt education - precautions, home prep checklist, HEP TS  TS           Quad Set  HEP  HEP            Glute Set HEP  HEP            Ankle Pumps HEP   HEP            LAQ/SAQ HEP  HEP            SLR   2x 5  moderate assistance from PTA          Supine march     3x15        Heel slides   3\" 2x 10           SL hip abduction     3x10         HL clamshells             HL adduction   5\" 2x 10           Bridges   3\" 2x 10           Step Stretch             Standing HS st.             Standing hip abd    2x 10 (L) 3x10          Standing hip march    2x 10  (L) 3x10          Standing hip extension    3x10                       Matrix flexion    Hold Machine down           Matrix ext.   Hold   Machine down                                                  Ther Activity             TG Squats   TG L22 2x 10  L22 3x15 L22 3x15        FSU  Steps ed  6\" 2x 10  6\" 3x10  6\" 3x10         FSD             LSU   4\" 2x 10  6\" 3x10  6\" 3x10         LSD             StS w/ TB     GTB 5 laps                                  Gait Training                                       Modalities             CP                                 Loi Andrews, PT  1/29/2025,10:20 AM  "

## 2025-01-31 ENCOUNTER — OFFICE VISIT (OUTPATIENT)
Dept: PHYSICAL THERAPY | Facility: CLINIC | Age: 55
End: 2025-01-31
Payer: COMMERCIAL

## 2025-01-31 DIAGNOSIS — M16.12 PRIMARY OSTEOARTHRITIS OF LEFT HIP: ICD-10-CM

## 2025-01-31 DIAGNOSIS — G89.29 CHRONIC LEFT HIP PAIN: ICD-10-CM

## 2025-01-31 DIAGNOSIS — M25.552 CHRONIC LEFT HIP PAIN: ICD-10-CM

## 2025-01-31 DIAGNOSIS — Z47.1 AFTERCARE FOLLOWING LEFT HIP JOINT REPLACEMENT SURGERY: Primary | ICD-10-CM

## 2025-01-31 DIAGNOSIS — Z96.642 AFTERCARE FOLLOWING LEFT HIP JOINT REPLACEMENT SURGERY: Primary | ICD-10-CM

## 2025-01-31 PROCEDURE — 97530 THERAPEUTIC ACTIVITIES: CPT | Performed by: PHYSICAL THERAPIST

## 2025-01-31 PROCEDURE — 97110 THERAPEUTIC EXERCISES: CPT | Performed by: PHYSICAL THERAPIST

## 2025-01-31 NOTE — PROGRESS NOTES
Daily Note     Today's date: 2025  Patient name: Suleman Zhang  : 1970  MRN: [id number]  Referring provider: Lyndon Ruelas,*  Dx:   Encounter Diagnosis     ICD-10-CM    1. Aftercare following left hip joint replacement surgery  Z47.1     Z96.642       2. Chronic left hip pain  M25.552     G89.29       3. Primary osteoarthritis of left hip  M16.12           Start Time: 1028  Stop Time: 1118  Total time in clinic (min): 50 minutes    Subjective: Patient reports that the night after therapy he has been a little sore, but overall he has been increasing his reps and feels better.       Objective: See treatment diary below      Assessment: Tolerated treatment well. Patient demonstrated fatigue post treatment and would benefit from continued PT. Patient overall continues to progress well per POC. Patient ambulating independently without an AD with near normal gait. Patient challenged with progressions to hip girdle strengthening.      Plan: Continue per plan of care.  Progress treatment as tolerated.       Precautions: L Posterior CRISTINA , HTN    POC expires Unit limit Auth  expiration date PT/OT + Visit Limit?   3/31/24 BOMN 25.  BOMN                 Visit/Unit Tracking  AUTH Status:  Date          Approved Used 1 2 3 4 5 6          Remaining  23 22 21 20 19 18           Access Code: [email or web address]  URL: [email or web address]  Date: 2025  Prepared by: Loi Andrews    Exercises  - Seated Ankle Pumps  - 2 x daily - 7 x weekly - 3 sets - 10 reps  - Supine Gluteal Sets  - 2 x daily - 7 x weekly - 3 sets - 10 reps - 5 hold  - Supine Quadricep Sets  - 2 x daily - 7 x weekly - 3 sets - 10 reps - 5 hold  - Supine Heel Slide  - 2 x daily - 7 x weekly - 3 sets - 10 reps - 5 hold  - Supine Hip Abduction  - 2 x daily - 7 x weekly - 2 sets - 10 reps  - Seated Long Arc Quad  - 2 x daily - 7 x weekly - 3 sets - 10 reps    Manuals       "  PROM Hip  TS                                     Re-eval  TS           Neuro Re-Ed             Rockerboard    2'/2' 2'/2'         SL balance             StS walk             Heel toe walk             Alexys walking             Bosu balance                                       WYNN, 5xSTS, TUG TS TS                                                  Ther Ex             Nustep for ROM and cardio  10' L1  10 min L4  10' L6  10' L6  10 min L6       Pt education - precautions, home prep checklist, HEP TS  TS           Quad Set  HEP  HEP            Glute Set HEP  HEP            Ankle Pumps HEP   HEP            LAQ/SAQ HEP  HEP            SLR   2x 5  moderate assistance from PTA          Supine march     3x15        Heel slides   3\" 2x 10           SL hip abduction     3x10  3x10       HL clamshells      GTB 3x10 5\"       HL adduction   5\" 2x 10           Bridges   3\" 2x 10    Reviewed       Step Stretch             Standing HS st.             Standing hip abd    2x 10 (L) 3x10   YTB 3x10 b/l       Standing hip march    2x 10  (L) 3x10          Standing hip extension    3x10   YTB 3x10 b/l                    Matrix flexion    Hold Machine down           Matrix ext.   Hold   Machine down                                                  Ther Activity             TG Squats   TG L22 2x 10  L22 3x15 L22 3x15 L22 3x15       FSU  Steps ed  6\" 2x 10  6\" 3x10  6\" 3x10         FSD      6\" 2x10       LSU   4\" 2x 10  6\" 3x10  6\" 3x10         LSD      4\" 2x10       StS w/ TB     GTB 5 laps                                  Gait Training                                       Modalities             CP                                   "

## 2025-02-05 ENCOUNTER — OFFICE VISIT (OUTPATIENT)
Dept: PHYSICAL THERAPY | Facility: CLINIC | Age: 55
End: 2025-02-05
Payer: COMMERCIAL

## 2025-02-05 DIAGNOSIS — Z47.1 AFTERCARE FOLLOWING LEFT HIP JOINT REPLACEMENT SURGERY: Primary | ICD-10-CM

## 2025-02-05 DIAGNOSIS — Z96.642 AFTERCARE FOLLOWING LEFT HIP JOINT REPLACEMENT SURGERY: Primary | ICD-10-CM

## 2025-02-05 DIAGNOSIS — M25.552 CHRONIC LEFT HIP PAIN: ICD-10-CM

## 2025-02-05 DIAGNOSIS — G89.29 CHRONIC LEFT HIP PAIN: ICD-10-CM

## 2025-02-05 DIAGNOSIS — M16.12 PRIMARY OSTEOARTHRITIS OF LEFT HIP: ICD-10-CM

## 2025-02-05 PROCEDURE — 97530 THERAPEUTIC ACTIVITIES: CPT

## 2025-02-05 PROCEDURE — 97110 THERAPEUTIC EXERCISES: CPT

## 2025-02-05 NOTE — PROGRESS NOTES
Daily Note    Today's date: 25  Patient name: Suleman Zhang  : 1970  MRN: [id number]  Referring provider: Lyndon Ruelas,*  Dx:   Encounter Diagnosis     ICD-10-CM    1. Aftercare following left hip joint replacement surgery  Z47.1     Z96.642       2. Chronic left hip pain  M25.552     G89.29       3. Primary osteoarthritis of left hip  M16.12           Start Time: 0845  Stop Time: 930  Total time in clinic (min): 45 minutes      Subjective: Suleman reports no c/o today.     Objective: See treatment diary below.    Assessment: Suleman tolerated treatment well with minimal cuing. TE's were performed with increased reps and increased resistance. No new TE's were performed today. Following treatment, the patient demonstrated fatigue and would benefit from continued physical therapy.    Plan: Continue per plan of care.  Progress treatment as tolerated.         Precautions: L Posterior CRISTINA , HTN    POC expires Unit limit Auth  expiration date PT/OT + Visit Limit?   3/31/24 BOMN 25.  BOMN                 Visit/Unit Tracking  AUTH Status:  Date  2/5        Approved Used 1 2 3 4 5 6 7         Remaining  23 22 21 20 19 18 17          Access Code: [email or web address]  URL: [email or web address]  Date: 2025  Prepared by: Loi Andrews    Exercises  - Seated Ankle Pumps  - 2 x daily - 7 x weekly - 3 sets - 10 reps  - Supine Gluteal Sets  - 2 x daily - 7 x weekly - 3 sets - 10 reps - 5 hold  - Supine Quadricep Sets  - 2 x daily - 7 x weekly - 3 sets - 10 reps - 5 hold  - Supine Heel Slide  - 2 x daily - 7 x weekly - 3 sets - 10 reps - 5 hold  - Supine Hip Abduction  - 2 x daily - 7 x weekly - 2 sets - 10 reps  - Seated Long Arc Quad  - 2 x daily - 7 x weekly - 3 sets - 10 reps    Manuals  2/5      PROM Hip  TS                                     Re-eval  TS           Neuro Re-Ed             Rockerboard    2'/2' 2'/2'         SL  "balance             StS walk             Heel toe walk             Alexys walking             Bosu balance                                       WYNN, 5xSTS, TUG TS TS                                                  Ther Ex             Nustep for ROM and cardio  10' L1  10 min L4  10' L6  10' L6  10 min L6 10' L7       Pt education - precautions, home prep checklist, HEP TS  TS           Quad Set  HEP  HEP            Glute Set HEP  HEP            Ankle Pumps HEP   HEP            LAQ/SAQ HEP  HEP            SLR   2x 5  moderate assistance from PTA          Supine march     3x15        Heel slides   3\" 2x 10           SL hip abduction     3x10  3x10       HL clamshells      GTB 3x10 5\"       HL adduction   5\" 2x 10           Bridges   3\" 2x 10    Reviewed       Step Stretch             Standing HS st.             Standing hip abd    2x 10 (L) 3x10   YTB 3x10 b/l RTB 3x10       Standing hip march    2x 10  (L) 3x10          Standing hip extension    3x10   YTB 3x10 b/l RTB 3x10                    Matrix flexion    Hold Machine down           Matrix ext.   Hold   Machine down                                                  Ther Activity             TG Squats   TG L22 2x 10  L22 3x15 L22 3x15 L22 3x15 L22 3x15      FSU  Steps ed  6\" 2x 10  6\" 3x10  6\" 3x10   8\" 20x      FSD      6\" 2x10       LSU   4\" 2x 10  6\" 3x10  6\" 3x10   8\" 20x      LSD      4\" 2x10       StS w/ TB     GTB 5 laps        Resisted walking 4 way       10x 20#                   Gait Training                                       Modalities             CP                             Loi Andrews, PT  2/5/2025,9:25 AM  "

## 2025-02-07 ENCOUNTER — OFFICE VISIT (OUTPATIENT)
Dept: PHYSICAL THERAPY | Facility: CLINIC | Age: 55
End: 2025-02-07
Payer: COMMERCIAL

## 2025-02-07 DIAGNOSIS — Z96.642 AFTERCARE FOLLOWING LEFT HIP JOINT REPLACEMENT SURGERY: Primary | ICD-10-CM

## 2025-02-07 DIAGNOSIS — M16.12 PRIMARY OSTEOARTHRITIS OF LEFT HIP: ICD-10-CM

## 2025-02-07 DIAGNOSIS — M25.552 CHRONIC LEFT HIP PAIN: ICD-10-CM

## 2025-02-07 DIAGNOSIS — G89.29 CHRONIC LEFT HIP PAIN: ICD-10-CM

## 2025-02-07 DIAGNOSIS — Z47.1 AFTERCARE FOLLOWING LEFT HIP JOINT REPLACEMENT SURGERY: Primary | ICD-10-CM

## 2025-02-07 PROCEDURE — 97530 THERAPEUTIC ACTIVITIES: CPT

## 2025-02-07 PROCEDURE — 97110 THERAPEUTIC EXERCISES: CPT

## 2025-02-07 NOTE — ANESTHESIA POSTPROCEDURE EVALUATION
Post-Op Assessment Note    CV Status:  Stable    Pain management: adequate       Mental Status:  Alert and awake   Hydration Status:  Euvolemic   PONV Controlled:  Controlled   Airway Patency:  Patent     Post Op Vitals Reviewed: Yes    No anethesia notable event occurred.    Staff: Anesthesiologist           Last Filed PACU Vitals:  Vitals Value Taken Time   Temp 97 °F (36.1 °C) 01/13/25 1030   Pulse 56 01/13/25 1030   /77 01/13/25 1030   Resp 16 01/13/25 1030   SpO2 99 % 01/13/25 1030       Modified Neisha:     Vitals Value Taken Time   Activity 2 01/13/25 1030   Respiration 2 01/13/25 1030   Circulation 2 01/13/25 1030   Consciousness 2 01/13/25 1030   Oxygen Saturation 2 01/13/25 1030     Modified Neisha Score: 10

## 2025-02-07 NOTE — PROGRESS NOTES
Daily Note    Today's date: 25  Patient name: Suleman Zhang  : 1970  MRN: [id number]  Referring provider: Lyndon Ruelas,*  Dx:   Encounter Diagnosis     ICD-10-CM    1. Aftercare following left hip joint replacement surgery  Z47.1     Z96.642       2. Chronic left hip pain  M25.552     G89.29       3. Primary osteoarthritis of left hip  M16.12           Start Time: 0945  Stop Time: 1030  Total time in clinic (min): 45 minutes      Subjective: Suleman reports no c/o today. He notes adherence to HEP and is feeling better.    Objective: See treatment diary below.    Assessment: Suleman tolerated treatment well with moderate cuing. TE's were performed with increased reps and increased resistance. New TE's were demonstrated with proper technique, and tolerated well. Following treatment, the patient demonstrated fatigue and would benefit from continued physical therapy.    Plan: Continue per plan of care.  Progress treatment as tolerated.         Precautions: L Posterior CRISTINA , HTN    POC expires Unit limit Auth  expiration date PT/OT + Visit Limit?   3/31/24 BOMN 25.  BOMN                 Visit/Unit Tracking  AUTH Status:  Date  2 27       Approved Used 1 2 3 4 5 6 7 8        Remaining  23 22 21 20 19 18 17 16         Access Code: [email or web address]  URL: [email or web address]  Date: 2025  Prepared by: Loi Andrews    Exercises  - Seated Ankle Pumps  - 2 x daily - 7 x weekly - 3 sets - 10 reps  - Supine Gluteal Sets  - 2 x daily - 7 x weekly - 3 sets - 10 reps - 5 hold  - Supine Quadricep Sets  - 2 x daily - 7 x weekly - 3 sets - 10 reps - 5 hold  - Supine Heel Slide  - 2 x daily - 7 x weekly - 3 sets - 10 reps - 5 hold  - Supine Hip Abduction  - 2 x daily - 7 x weekly - 2 sets - 10 reps  - Seated Long Arc Quad  - 2 x daily - 7 x weekly - 3 sets - 10 reps    Manuals  2/5 2/7     PROM Hip  TS                                 "     Re-eval  TS           Neuro Re-Ed             Rockerboard    2'/2' 2'/2'         SL balance             StS walk             Heel toe walk             Alexys walking             Bosu balance                                       WYNN, 5xSTS, TUG TS TS                                                  Ther Ex             Nustep for ROM and cardio  10' L1  10 min L4  10' L6  10' L6  10 min L6 10' L7  10' L1 rec     Pt education - precautions, home prep checklist, HEP TS  TS           Quad Set  HEP  HEP            Glute Set HEP  HEP            Ankle Pumps HEP   HEP            LAQ/SAQ HEP  HEP            SLR   2x 5  moderate assistance from PTA          Supine march     3x15        Heel slides   3\" 2x 10           SL hip abduction     3x10  3x10       HL clamshells      GTB 3x10 5\"       HL adduction   5\" 2x 10           Bridges   3\" 2x 10    Reviewed       Step Stretch             Standing HS st.             Standing hip abd    2x 10 (L) 3x10   YTB 3x10 b/l RTB 3x10       Standing hip march    2x 10  (L) 3x10          Standing hip extension    3x10   YTB 3x10 b/l RTB 3x10                    Matrix flexion    Hold Machine down      nv     Matrix ext.   Hold   Machine down      nv                                            Ther Activity             TG Squats   TG L22 2x 10  L22 3x15 L22 3x15 L22 3x15 L22 3x15 DC     Wall squat        3x10      FSU  Steps ed  6\" 2x 10  6\" 3x10  6\" 3x10   8\" 20x      FSD      6\" 2x10       LSU   4\" 2x 10  6\" 3x10  6\" 3x10   8\" 20x      LSD      4\" 2x10       StS w/ TB     GTB 5 laps        Resisted walking 4 way       10x 20#      Lateral walk        BTB 5 laps     Monster walk        BTB 5 laps     Lunge slider        3x6      BOSU step up hold        10x10\" ea     BOSU Squat hold        3x7 5\"      Gait Training                                       Modalities             CP                               Loi Andrews, PT  2/7/2025,9:47 AM  "

## 2025-02-12 ENCOUNTER — OFFICE VISIT (OUTPATIENT)
Dept: PHYSICAL THERAPY | Facility: CLINIC | Age: 55
End: 2025-02-12
Payer: COMMERCIAL

## 2025-02-12 DIAGNOSIS — M16.12 PRIMARY OSTEOARTHRITIS OF LEFT HIP: ICD-10-CM

## 2025-02-12 DIAGNOSIS — G89.29 CHRONIC LEFT HIP PAIN: ICD-10-CM

## 2025-02-12 DIAGNOSIS — Z47.1 AFTERCARE FOLLOWING LEFT HIP JOINT REPLACEMENT SURGERY: Primary | ICD-10-CM

## 2025-02-12 DIAGNOSIS — Z96.642 AFTERCARE FOLLOWING LEFT HIP JOINT REPLACEMENT SURGERY: Primary | ICD-10-CM

## 2025-02-12 DIAGNOSIS — M25.552 CHRONIC LEFT HIP PAIN: ICD-10-CM

## 2025-02-12 PROCEDURE — 97530 THERAPEUTIC ACTIVITIES: CPT

## 2025-02-12 PROCEDURE — 97110 THERAPEUTIC EXERCISES: CPT

## 2025-02-12 PROCEDURE — 97112 NEUROMUSCULAR REEDUCATION: CPT

## 2025-02-12 NOTE — PROGRESS NOTES
Daily Note     Today's date: 2025  Patient name: Suleman Zhang  : 1970  MRN: [id number]  Referring provider: Lyndon Ruelas,*  Dx:   Encounter Diagnosis     ICD-10-CM    1. Aftercare following left hip joint replacement surgery  Z47.1     Z96.642       2. Chronic left hip pain  M25.552     G89.29       3. Primary osteoarthritis of left hip  M16.12           Start Time: 0850  Stop Time: 935  Total time in clinic (min): 45 minutes    Subjective: Pt noted that he has been feeling good and having no pain.       Objective: See treatment diary below      Assessment:  Progressions made this visit with no changes in pain status and some challenge reported. Tolerated treatment well. Patient exhibited good technique with therapeutic exercises and would benefit from continued PT  Received GTB and BTB for HEP use.     Plan: Continue per plan of care.      Precautions: L Posterior CRISTINA , HTN    POC expires Unit limit Auth  expiration date PT/OT + Visit Limit?   3/31/24 BOMN 25.  BOMN                 Visit/Unit Tracking  AUTH Status:  Date       Approved Used 1 2 3 4 5 6 7 8 9        Remaining  23 22 21 20 19 18 17 16 15         Access Code: [email or web address]  URL: [email or web address]  Date: 2025  Prepared by: Loi Andrews    Exercises  - Seated Ankle Pumps  - 2 x daily - 7 x weekly - 3 sets - 10 reps  - Supine Gluteal Sets  - 2 x daily - 7 x weekly - 3 sets - 10 reps - 5 hold  - Supine Quadricep Sets  - 2 x daily - 7 x weekly - 3 sets - 10 reps - 5 hold  - Supine Heel Slide  - 2 x daily - 7 x weekly - 3 sets - 10 reps - 5 hold  - Supine Hip Abduction  - 2 x daily - 7 x weekly - 2 sets - 10 reps  - Seated Long Arc Quad  - 2 x daily - 7 x weekly - 3 sets - 10 reps    Manuals  2    PROM Hip  TS                                     Re-eval  TS           Neuro Re-Ed             Rockerboard    2'/2' 22'        "  SL balance                          Heel toe walk         Foam beam 5 laps     Alexys walking             Bosu balance                                       WYNN, 5xSTS, TUG TS TS                                                  Ther Ex             Nustep for ROM and cardio  10' L1  10 min L4  10' L6  10' L6  10 min L6 10' L7  10' L1 rec 10 min L7 nustep     Pt education - precautions, home prep checklist, HEP TS  TS           Quad Set  HEP  HEP            Glute Set HEP  HEP            Ankle Pumps HEP   HEP            LAQ/SAQ HEP  HEP            SLR   2x 5  moderate assistance from PTA          Supine march     3x15        Heel slides   3\" 2x 10           SL hip abduction     3x10  3x10       HL clamshells      GTB 3x10 5\"       HL adduction   5\" 2x 10           Bridges   3\" 2x 10    Reviewed   Reviewed for HEP     Step Stretch             Standing HS st.             Standing hip abd    2x 10 (L) 3x10   YTB 3x10 b/l RTB 3x10   Reviewed for HEP     Standing hip march    2x 10  (L) 3x10          Standing hip extension    3x10   YTB 3x10 b/l RTB 3x10    Reviewed for HEP                  Matrix flexion    Hold Machine down      nv 35# 2x 10     Matrix ext.   Hold   Machine down      nv 25# 2x 10                                            Ther Activity             TG Squats   TG L22 2x 10  L22 3x15 L22 3x15 L22 3x15 L22 3x15 DC     Wall squat        3x10  3x 10 with teal P ball     FSU  Steps ed  6\" 2x 10  6\" 3x10  6\" 3x10   8\" 20x      FSD      6\" 2x10       LSU   4\" 2x 10  6\" 3x10  6\" 3x10   8\" 20x      LSD      4\" 2x10       StS w/ TB     GTB 5 laps        Resisted walking 4 way       10x 20#      Lateral walk        BTB 5 laps BTB 5 laps no UE     Monster walk        BTB 5 laps BTB 5 laps no UE     Lunge slider        3x6  10x ea.     BOSU step up hold        10x10\" ea 3\" 2x 10 F and L     BOSU Squat hold on wall         3x7 5\"  2x 10     Gait Training                                       Modalities             CP "

## 2025-02-14 ENCOUNTER — EVALUATION (OUTPATIENT)
Dept: PHYSICAL THERAPY | Facility: CLINIC | Age: 55
End: 2025-02-14
Payer: COMMERCIAL

## 2025-02-14 DIAGNOSIS — M16.12 PRIMARY OSTEOARTHRITIS OF LEFT HIP: ICD-10-CM

## 2025-02-14 DIAGNOSIS — M25.552 CHRONIC LEFT HIP PAIN: ICD-10-CM

## 2025-02-14 DIAGNOSIS — G89.29 CHRONIC LEFT HIP PAIN: ICD-10-CM

## 2025-02-14 DIAGNOSIS — Z47.1 AFTERCARE FOLLOWING LEFT HIP JOINT REPLACEMENT SURGERY: Primary | ICD-10-CM

## 2025-02-14 DIAGNOSIS — Z96.642 AFTERCARE FOLLOWING LEFT HIP JOINT REPLACEMENT SURGERY: Primary | ICD-10-CM

## 2025-02-14 PROCEDURE — 97530 THERAPEUTIC ACTIVITIES: CPT

## 2025-02-14 PROCEDURE — 97140 MANUAL THERAPY 1/> REGIONS: CPT

## 2025-02-14 PROCEDURE — 97110 THERAPEUTIC EXERCISES: CPT

## 2025-02-14 NOTE — PROGRESS NOTES
PT Evaluation     Today's date: 25  Patient name: Suleman Zhang  : 1970  MRN: [id number]  Referring provider: Lyndon Ruelas,*  Dx:   Encounter Diagnosis     ICD-10-CM    1. Aftercare following left hip joint replacement surgery  Z47.1     Z96.642       2. Chronic left hip pain  M25.552     G89.29       3. Primary osteoarthritis of left hip  M16.12           Start Time: 0900  Stop Time: 945  Total time in clinic (min): 45 minutes     Assessment  Impairments: abnormal coordination, abnormal gait, abnormal muscle firing, abnormal or restricted ROM, abnormal movement, activity intolerance, impaired balance, impaired physical strength, lacks appropriate home exercise program, pain with function, weight-bearing intolerance and poor body mechanics    Assessment details: Suleman Zhang is a pleasant 54 y.o. male who presents today for post-operative evaluation for their L CRISTINA on . The patient reports pain, decreased strength, decreased ROM, decreased joint mobility, and ambulatory dysfunction. Suleman complains of aching, sharp, and tight pain in the left hip ranging from 0/10 to 5/10. Pain is exacerbated by activity, standing, or stairs and made better by ice, medication, or rest.     The patient has difficulty with ambulation, transfers, leisure, athletics, and work. Patient will benefit from skilled physical therapy, including therapeutic exercise, stretching, balance training, manual therapy, and modalities prn to improve their level of function, to increase overall quality of life, and to address his impairments.    Dispensed home exercise program and surgical precautions and educated patient on proper technique, frequency, and the possibility of DOMS for the next 24 to 48 hours. Patient demonstrated understanding and was advised to call us if he has any further questions.      Goals  ST. Independent with HEP in 2 weeks.   2. Pt will have verbal report of improvement in symptoms by  >/=25% in 2 weeks.   3. Decrease pain to 5/10 at it's worst.   4. Increase strength by 1/2 grade in all deficient planes.     To be achieved by D/C   LT. Pt will improve FOTO score by >/= goal points in 6 weeks.   2. Pt will improve FOTO score to >/= goal score by visit # 12.   3. Pt will be able to stand for an hour with little to no difficulty.   4. Pt will be able to walk a mile with little to no difficulty.   5. Pt will be able to perform his usual activities including work and exercise with little to no difficulty.   6. Pt will improve five times sit to stand time from 14s to 12 seconds or less  7. Pt will improve TUG time from 20s to 13.5 seconds or less. (MDC 3 - 5 seconds)    Plan    Frequency: 2x week  Duration in weeks: 8  Plan of Care beginning date: 2025  Plan of Care expiration date: 3/31/2025  Treatment plan discussed with: patient        Subjective Evaluation    History of Present Illness  Mechanism of injury: surgery  Mechanism of injury: Suleman reports that he is feeling BETTER since starting therapy A MONTH ago. He is no longer using an AD to ambulate. He is also driving independently. He notes that he is easing back into work and has not done any rigorous heavy activity.    he notes no difficulty with his usual tasks including stairs, but still has not done any bending or heavy lifting.     he notes no pain at this time.     he notes adherence to HEP and would like to continue with therapy.    Patient Goals  Patient goals for therapy: decreased edema, decreased pain, improved balance, return to work, return to sport/leisure activities, independence with ADLs/IADLs, increased strength and increased motion  Patient goal: Golf, hiking  Pain  No pain reported  Current pain ratin  At best pain ratin  At worst pain ratin  Quality: dull ache  Relieving factors: medications  Aggravating factors: standing, walking, stair climbing and running  Progression: worsening    Social  Support  Steps to enter house: yes (3)  Stairs in house: yes   Lives with: spouse    Working: , [employer].        Objective     Passive Range of Motion   Left Hip   Flexion: 90 degrees   Abduction: 30 degrees     Right Hip   Normal passive range of motion    Strength/Myotome Testing     Left Hip   Planes of Motion   Flexion: 4  Abduction: 4+  External rotation: 4  Internal rotation: 4    Right Hip   Normal muscle strength           Precautions: L Posterior CRISTINA 1/13, HTN    POC expires Unit limit Auth  expiration date PT/OT + Visit Limit?   3/31/24 BOMN 4/30/25.  BOMN                 Visit/Unit Tracking  AUTH Status:  Date 1/6 1/16 1/22 1/24 1/29 1/31 2/5 2/7 2/12      Approved Used 1 2 3 4 5 6 7 8 9        Remaining  23 22 21 20 19 18 17 16 15         Access Code: [email or web address]  URL: [email or web address]  Date: 01/06/2025  Prepared by: Loi Andrews    Exercises  - Seated Ankle Pumps  - 2 x daily - 7 x weekly - 3 sets - 10 reps  - Supine Gluteal Sets  - 2 x daily - 7 x weekly - 3 sets - 10 reps - 5 hold  - Supine Quadricep Sets  - 2 x daily - 7 x weekly - 3 sets - 10 reps - 5 hold  - Supine Heel Slide  - 2 x daily - 7 x weekly - 3 sets - 10 reps - 5 hold  - Supine Hip Abduction  - 2 x daily - 7 x weekly - 2 sets - 10 reps  - Seated Long Arc Quad  - 2 x daily - 7 x weekly - 3 sets - 10 reps    Manuals 1/6 1/16 1/22 1/24 1/29 1/31 2/5 2/7 2/12 2/14   PROM Hip  TS        TS                             Re-eval  TS        TS   Neuro Re-Ed             Rockerboard    2'/2' 2'/2'         SL balance                          Heel toe walk         Foam beam 5 laps     Alexys walking             Bosu balance                                       WYNN, 5xSTS, TUG TS TS                                                  Ther Ex             Nustep for ROM and cardio  10' L1  10 min L4  10' L6  10' L6  10 min L6 10' L7  10' L1 rec 10 min L7 nustep  10' L5 upright    Pt education - precautions, home  "prep checklist, HEP TS  TS        TS   Quad Set  HEP  HEP            Glute Set HEP  HEP            Ankle Pumps HEP   HEP            LAQ/SAQ HEP  HEP            SLR   2x 5  moderate assistance from PTA          Supine march     3x15        Heel slides   3\" 2x 10           SL hip abduction     3x10  3x10       HL clamshells      GTB 3x10 5\"       HL adduction   5\" 2x 10           Bridges   3\" 2x 10    Reviewed   Reviewed for HEP     Step Stretch             Standing HS st.             Standing hip abd    2x 10 (L) 3x10   YTB 3x10 b/l RTB 3x10   Reviewed for HEP     Standing hip march    2x 10  (L) 3x10          Standing hip extension    3x10   YTB 3x10 b/l RTB 3x10    Reviewed for HEP                  Matrix flexion    Hold Machine down      nv 35# 2x 10  3x10 50#   Matrix ext.   Hold   Machine down      nv 25# 2x 10  3x10 30#                                          Ther Activity             TG Squats   TG L22 2x 10  L22 3x15 L22 3x15 L22 3x15 L22 3x15 DC     Wall squat        3x10  3x 10 with teal P ball  3x10 PB   FSU  Steps ed  6\" 2x 10  6\" 3x10  6\" 3x10   8\" 20x      FSD      6\" 2x10       LSU   4\" 2x 10  6\" 3x10  6\" 3x10   8\" 20x      LSD      4\" 2x10       StS w/ TB     GTB 5 laps        Resisted walking 4 way       10x 20#      Lateral walk        BTB 5 laps BTB 5 laps no UE  HEP     Monster walk        BTB 5 laps BTB 5 laps no UE  HEP    Lunge slider        3x6  10x ea.     BOSU step up hold        10x10\" ea 3\" 2x 10 F and L     BOSU Squat hold on wall         3x7 5\"  2x 10     Gait Training                                       Modalities             CP                                     "

## 2025-02-19 ENCOUNTER — TELEPHONE (OUTPATIENT)
Dept: FAMILY MEDICINE CLINIC | Facility: CLINIC | Age: 55
End: 2025-02-19

## 2025-02-19 ENCOUNTER — OFFICE VISIT (OUTPATIENT)
Dept: PHYSICAL THERAPY | Facility: CLINIC | Age: 55
End: 2025-02-19
Payer: COMMERCIAL

## 2025-02-19 DIAGNOSIS — Z96.642 AFTERCARE FOLLOWING LEFT HIP JOINT REPLACEMENT SURGERY: Primary | ICD-10-CM

## 2025-02-19 DIAGNOSIS — G89.29 CHRONIC LEFT HIP PAIN: ICD-10-CM

## 2025-02-19 DIAGNOSIS — Z47.1 AFTERCARE FOLLOWING LEFT HIP JOINT REPLACEMENT SURGERY: Primary | ICD-10-CM

## 2025-02-19 DIAGNOSIS — M16.12 PRIMARY OSTEOARTHRITIS OF LEFT HIP: ICD-10-CM

## 2025-02-19 DIAGNOSIS — M25.552 CHRONIC LEFT HIP PAIN: ICD-10-CM

## 2025-02-19 PROCEDURE — 97112 NEUROMUSCULAR REEDUCATION: CPT

## 2025-02-19 PROCEDURE — 97110 THERAPEUTIC EXERCISES: CPT

## 2025-02-19 PROCEDURE — 97530 THERAPEUTIC ACTIVITIES: CPT

## 2025-02-19 NOTE — PROGRESS NOTES
Daily Note    Today's date: 25  Patient name: Suleman Zhang  : 1970  MRN: [id number]  Referring provider: Lyndon Ruelas,*  Dx:   Encounter Diagnosis     ICD-10-CM    1. Aftercare following left hip joint replacement surgery  Z47.1     Z96.642       2. Chronic left hip pain  M25.552     G89.29       3. Primary osteoarthritis of left hip  M16.12           Start Time: 0900  Stop Time: 0945  Total time in clinic (min): 45 minutes      Subjective: Suleman reports no c/o today.     Objective: See treatment diary below.    Assessment: Suleman tolerated treatment well with minimal cuing. TE's were performed with increased reps and increased resistance. New TE's were demonstrated with proper technique, and tolerated well. Following treatment, the patient demonstrated fatigue and would benefit from continued physical therapy.    Plan: Continue per plan of care.  Progress treatment as tolerated.         Precautions: L Posterior CRISTINA , HTN    POC expires Unit limit Auth  expiration date PT/OT + Visit Limit?   3/31/24 BOMN 25.  BOMN                 Visit/Unit Tracking  AUTH Status:  Date  2     Approved Used 1 2 3 4 5 6 7 8 9  10      Remaining  23 22 21 20 19 18 17 16 15  14       Access Code: [email or web address]  URL: [email or web address]  Date: 2025  Prepared by: Loi Andrews    Exercises  - Seated Ankle Pumps  - 2 x daily - 7 x weekly - 3 sets - 10 reps  - Supine Gluteal Sets  - 2 x daily - 7 x weekly - 3 sets - 10 reps - 5 hold  - Supine Quadricep Sets  - 2 x daily - 7 x weekly - 3 sets - 10 reps - 5 hold  - Supine Heel Slide  - 2 x daily - 7 x weekly - 3 sets - 10 reps - 5 hold  - Supine Hip Abduction  - 2 x daily - 7 x weekly - 2 sets - 10 reps  - Seated Long Arc Quad  - 2 x daily - 7 x weekly - 3 sets - 10 reps    Manuals  2/5    PROM Hip          TS                             Re-eval          TS  "  Neuro Re-Ed             Rockerboard u2   2'/2' 2'/2'         SL balance                          Heel toe walk         Foam beam 5 laps     Alexys walking             Bosu balance 10x10\"                                       WYNN, 5xSTS, TUG                                                    Ther Ex             Nustep for ROM and cardio 10' L6   10 min L4  10' L6  10' L6  10 min L6 10' L7  10' L1 rec 10 min L7 nustep  10' L5 upright    Pt education - precautions, home prep checklist, HEP          TS   Quad Set             Glute Set             Ankle Pumps             LAQ/SAQ             SLR   2x 5  moderate assistance from PTA          Supine march     3x15        Heel slides   3\" 2x 10           SL hip abduction     3x10  3x10       HL clamshells      GTB 3x10 5\"       HL adduction   5\" 2x 10           Bridges   3\" 2x 10    Reviewed   Reviewed for HEP     Step Stretch             Standing HS st.             Standing hip abd    2x 10 (L) 3x10   YTB 3x10 b/l RTB 3x10   Reviewed for HEP     Standing hip march    2x 10  (L) 3x10          Standing hip extension    3x10   YTB 3x10 b/l RTB 3x10    Reviewed for HEP                  Matrix flexion    Hold Machine down      nv 35# 2x 10  3x10 50#   Matrix ext.   Hold   Machine down      nv 25# 2x 10  3x10 30#                                          Ther Activity             TG Squats   TG L22 2x 10  L22 3x15 L22 3x15 L22 3x15 L22 3x15 DC     Wall squat        3x10  3x 10 with teal P ball  3x10 PB   FSU   6\" 2x 10  6\" 3x10  6\" 3x10   8\" 20x      FSD      6\" 2x10       LSU   4\" 2x 10  6\" 3x10  6\" 3x10   8\" 20x      LSD      4\" 2x10       StS w/ TB     GTB 5 laps        Resisted walking 4 way [place] 28# 10x ea      10x 20#      Lateral walk        BTB 5 laps BTB 5 laps no UE  HEP     Monster walk        BTB 5 laps BTB 5 laps no UE  HEP    Lunge slider BOSU 3x6       3x6  10x ea.     BOSU step up hold 3x5       10x10\" ea 3\" 2x 10 F and L     BOSU Squat hold  3x8 5\"       3x7 " "5\"  2x 10     Gait Training                                       Modalities             CP                                   Loi Andrews, PT  2/19/2025,9:03 PM  "

## 2025-02-19 NOTE — TELEPHONE ENCOUNTER
Patient has an upcoming appointment and wonders if he needs any labs done prior, did mention had surgery recently and labs were done prior to that.  Let patient know as to if he does or does not.    Thank you

## 2025-02-21 ENCOUNTER — APPOINTMENT (OUTPATIENT)
Dept: PHYSICAL THERAPY | Facility: CLINIC | Age: 55
End: 2025-02-21
Payer: COMMERCIAL

## 2025-02-22 NOTE — LETTER
HPI:      The patient presents for a strep test.    She began experiencing throat discomfort upon awakening yesterday, followed by fever onset around 2:30 PM. No recent exposure to sick individuals, but she attends school. No gastrointestinal symptoms. Mild cough last week, now resolved. Primary care by Dr. Mahan, pediatrician in the same building. Currently on Motrin for fever.    ALLERGIES  No known allergies.    MEDICATIONS  Motrin         ROS as noted per HPI      PAST MEDICAL HX:  There is no previous medical history on file.      PAST SURGICAL HX:  There is no previous surgical history on file.    SOCIAL HISTORY:Patient lives with family.     Vitals:    02/22/25 0855   BP: 111/74   Pulse: (!) 122   Resp: 28   Temp: 98 °F (36.7 °C)   TempSrc: Tympanic   SpO2: 99%   Weight: 22.5 kg (49 lb 9.7 oz)   PainSc: 4    PainLoc: Throat        Physical Exam  Constitutional:       General: She is not in acute distress.     Appearance: Normal appearance.   HENT:      Head: Normocephalic and atraumatic.      Right Ear: Tympanic membrane, ear canal and external ear normal.      Left Ear: Tympanic membrane, ear canal and external ear normal.      Nose: Nose normal.      Mouth/Throat:      Mouth: Mucous membranes are moist.      Pharynx: Posterior oropharyngeal erythema present. No oropharyngeal exudate.      Comments: Uvula midline, no exudate or abscess, no trismus  Eyes:      Conjunctiva/sclera: Conjunctivae normal.   Cardiovascular:      Rate and Rhythm: Normal rate.   Pulmonary:      Effort: Pulmonary effort is normal. No respiratory distress.   Musculoskeletal:         General: Normal range of motion.      Cervical back: Normal range of motion and neck supple.   Lymphadenopathy:      Cervical: No cervical adenopathy.   Skin:     General: Skin is warm and dry.   Neurological:      General: No focal deficit present.      Mental Status: She is alert.   Psychiatric:         Mood and Affect: Mood normal.            Results  November 10, 2023     Kathy Alfred, [address]    Patient: Kirt Baird   YOB: 1970   Date of Visit: 11/8/2023       Dear Dr. Karina Tinajero: Thank you for referring Kirt Baird to me for evaluation. Below are my notes for this consultation. If you have questions, please do not hesitate to call me. I look forward to following your patient along with you. Sincerely,        Adrianne Rosas DO        CC: No Recipients    Adrianne Rosas DO  11/8/2023  8:55 AM  Signed  [facility] - Outpatient Consultation  Kirt Baird 48 y.o. male MRN: [id number]  Encounter: [id number]          ASSESSMENT AND PLAN:      1. Screening for colon cancer  Schedule colonoscopy    ______________________________________________________________________    HPI: Susu Smith is a 80-year-old male who was referred for a screening colonoscopy. He has never had a colonoscopy performed in the past.  Earlier this year, he stated that he underwent open heart surgery with four-vessel coronary artery bypass grafting. Significant coronary artery disease was picked up on an abnormal treadmill which resulted in cardiac catheterization the following day and then referral down to [facility] for coronary artery bypass grafting. He denies any current problems at all with chest pain, shortness of breath, palpitations. His open heart surgery went uneventful. There is no family history for colon cancer for colon polyp. He denies any diarrhea, constipation, abdominal pain, bloating, gaseousness, nausea, vomiting, heartburn, dysphagia, odynophagia. He states that he had some rectal bleeding a couple months ago but this has not recurred since then. REVIEW OF SYSTEMS:    CONSTITUTIONAL: Denies any fever, chills, rigors, and weight loss. HEENT: No earache or tinnitus. Denies hearing loss or visual disturbances. CARDIOVASCULAR: No chest pain or palpitations.    RESPIRATORY: Denies any cough, hemoptysis, shortness of breath or dyspnea on exertion. GASTROINTESTINAL: As noted in the History of Present Illness. GENITOURINARY: No problems with urination. Denies any hematuria or dysuria. NEUROLOGIC: No dizziness or vertigo, denies headaches. MUSCULOSKELETAL: Denies any muscle or joint pain. SKIN: Denies skin rashes or itching. ENDOCRINE: Denies excessive thirst. Denies intolerance to heat or cold. PSYCHOSOCIAL: Denies depression or anxiety. Denies any recent memory loss. Historical Information  Past Medical History:   Diagnosis Date   • Disseminated herpes zoster 10/16/2019   • HLD (hyperlipidemia)    • Hypertension    • Known health problems: none    • Vitamin D deficiency      Past Surgical History:   Procedure Laterality Date   • CARDIAC CATHETERIZATION N/A 3/2/2023    Procedure: Cardiac catheterization;  Surgeon: Magy Bell MD;  Location: [address] [place] [address] CATH LAB; Service: Cardiology   • CARDIAC CATHETERIZATION N/A 3/2/2023    Procedure: Cardiac Coronary Angiogram;  Surgeon: Magy Bell MD;  Location: [address] [place] [address] CATH LAB;   Service: Cardiology   • NH CORONARY ARTERY BYP W/VEIN & ARTERY GRAFT 4 VEIN N/A 3/8/2023    Procedure: CORONARY ARTERY BYPASS GRAFT (CABG) 4 VESSELS,  SVG to PDA  and OM2.                            r      LRA-->OM1 , LIMA to LAD, evh, hector;  Surgeon: Barrett Da Silva DO;  Location: [facility];  Service: Cardiac Surgery   • WISDOM TOOTH EXTRACTION       Social History  Social History     Substance and Sexual Activity   Alcohol Use Yes    Comment: Social     Social History     Substance and Sexual Activity   Drug Use Not Currently   • Types: Marijuana    Comment: rare marijuana use     Social History     Tobacco Use   Smoking Status Former   • Packs/day: 0.25   • Years: 6.00   • Total pack years: 1.50   • Types: Cigarettes   • Quit date:    • Years since quittin.8   Smokeless Tobacco Never     Family History   Problem Relation Age of Onset for orders placed or performed in visit on 02/22/25   POCT Rapid strep A   Result Value Ref Range    GRP A STREP Positive (A) Negative    Internal Procedural Controls Acceptable Yes Yes    TEST LOT NUMBER [id number]     TEST LOT EXPIRATION DATE 4/30/26                 MDM:Based on my history, physical exam, and diagnostic evaluation, the patient appears to have symptoms consistent with acute pharyngitis. There is no obvious swelling of the peritonsillar area or abscess. The airway appears patent and respiratory pattern is normal. The pt has no trismus and is tolerating oral food and fluid.     Diagnostic Testing included: rapid strep +    They were instructed to go to the emergency department if difficulty breathing or not tolerating oral food/fluid. Otherwise, the patient is to follow up and have repeat exam with their primary care physician.    Differential Diagnosis:  acute otitis media, acute otitis externa, acute sinusitis, acute bronchitis, acute viral pharyngitis, acute strep pharyngitis  I personally reviewed the patients old records.  All POCT completed at today's visit were personally interpreted by myself.    Case Discussed with Dr. Lin.    ED Diagnoses       Diagnosis Comment Associated Orders       Final diagnoses    Sore throat -- POCT RAPID STREP A      Strep pharyngitis -- AMOXICILLIN 400 MG/5ML PO SUSR               New Prescriptions    AMOXICILLIN (AMOXIL) 400 MG/5ML SUSPENSION    Take 6.5 mLs by mouth in the morning and 6.5 mLs in the evening. Do all this for 10 days.          Disposition: Home   • Hypertension Father    • Breast cancer Sister    • Heart attack Maternal Grandfather    • Heart attack Paternal Grandfather         s/p CABG   • Coronary artery disease Paternal Uncle    • Prostate cancer Paternal Uncle    • Coronary artery disease Paternal Uncle         s/p cardiac stent   • Prostate cancer Paternal Uncle        Meds/Allergies      Current Outpatient Medications:   •  amLODIPine (NORVASC) 2.5 mg tablet  •  aspirin 325 mg tablet  •  atorvastatin (LIPITOR) 80 mg tablet  •  metoprolol tartrate (LOPRESSOR) 25 mg tablet    Allergies   Allergen Reactions   • Codeine Drowsiness     incapacitates           Objective    Blood pressure 153/98, pulse 62, height 5' 10" (1.778 m), weight 91.6 kg (202 lb), SpO2 98 %. Body mass index is 28.98 kg/m². PHYSICAL EXAM:      General Appearance:   Alert, cooperative, no distress   HEENT:   Normocephalic, atraumatic, anicteric. Neck:  Supple, symmetrical, trachea midline   Lungs:   Clear to auscultation bilaterally; no rales, rhonchi or wheezing; respirations unlabored    Heart[de-identified]   Regular rate and rhythm; no murmur, rub, or gallop. Abdomen:   Soft, non-tender, non-distended; normal bowel sounds; no masses, no organomegaly    Genitalia:   Deferred    Rectal:   Deferred    Extremities:  No cyanosis, clubbing or edema    Pulses:  2+ and symmetric    Skin:  No jaundice, rashes, or lesions    Lymph nodes:  No palpable cervical lymphadenopathy        Lab Results:   No visits with results within 1 Day(s) from this visit.    Latest known visit with results is:   Appointment on 07/10/2023   Component Date Value   • Sodium 07/10/2023 140    • Potassium 07/10/2023 4.0    • Chloride 07/10/2023 110 (H)    • CO2 07/10/2023 23    • ANION GAP 07/10/2023 7    • BUN 07/10/2023 6    • Creatinine 07/10/2023 0.72    • Glucose, Fasting 07/10/2023 108 (H)    • Calcium 07/10/2023 8.9    • AST 07/10/2023 35    • ALT 07/10/2023 57    • Alkaline Phosphatase 07/10/2023 139 (H)    • Total Protein 07/10/2023 7.7    • Albumin 07/10/2023 4.0    • Total Bilirubin 07/10/2023 0.50    • eGFR 07/10/2023 107    • Cholesterol 07/10/2023 121    • Triglycerides 07/10/2023 66    • HDL, Direct 07/10/2023 44    • LDL Calculated 07/10/2023 64          Radiology Results:   No results found.

## 2025-02-25 ENCOUNTER — OFFICE VISIT (OUTPATIENT)
Dept: FAMILY MEDICINE CLINIC | Facility: CLINIC | Age: 55
End: 2025-02-25
Payer: COMMERCIAL

## 2025-02-25 ENCOUNTER — OFFICE VISIT (OUTPATIENT)
Dept: OBGYN CLINIC | Facility: HOSPITAL | Age: 55
End: 2025-02-25

## 2025-02-25 VITALS
HEIGHT: 70 IN | WEIGHT: 197 LBS | SYSTOLIC BLOOD PRESSURE: 124 MMHG | DIASTOLIC BLOOD PRESSURE: 78 MMHG | BODY MASS INDEX: 28.2 KG/M2 | OXYGEN SATURATION: 99 % | TEMPERATURE: 97.4 F | HEART RATE: 65 BPM

## 2025-02-25 VITALS — WEIGHT: 197 LBS | BODY MASS INDEX: 28.2 KG/M2 | HEIGHT: 70 IN

## 2025-02-25 DIAGNOSIS — E66.3 OVERWEIGHT (BMI 25.0-29.9): ICD-10-CM

## 2025-02-25 DIAGNOSIS — Z95.1 S/P CABG (CORONARY ARTERY BYPASS GRAFT): ICD-10-CM

## 2025-02-25 DIAGNOSIS — E78.2 HYPERLIPIDEMIA, MIXED: ICD-10-CM

## 2025-02-25 DIAGNOSIS — Z47.1 AFTERCARE FOLLOWING LEFT HIP JOINT REPLACEMENT SURGERY: Primary | ICD-10-CM

## 2025-02-25 DIAGNOSIS — I10 ESSENTIAL HYPERTENSION: Primary | ICD-10-CM

## 2025-02-25 DIAGNOSIS — Z12.5 SCREENING FOR PROSTATE CANCER: ICD-10-CM

## 2025-02-25 DIAGNOSIS — Z96.642 AFTERCARE FOLLOWING LEFT HIP JOINT REPLACEMENT SURGERY: Primary | ICD-10-CM

## 2025-02-25 DIAGNOSIS — Z00.00 HEALTH MAINTENANCE EXAMINATION: ICD-10-CM

## 2025-02-25 PROCEDURE — 99396 PREV VISIT EST AGE 40-64: CPT | Performed by: PHYSICIAN ASSISTANT

## 2025-02-25 PROCEDURE — 99214 OFFICE O/P EST MOD 30 MIN: CPT | Performed by: PHYSICIAN ASSISTANT

## 2025-02-25 PROCEDURE — 99024 POSTOP FOLLOW-UP VISIT: CPT | Performed by: ORTHOPAEDIC SURGERY

## 2025-02-25 NOTE — PROGRESS NOTES
Assessment:  1. Aftercare following left hip joint replacement surgery            Plan:  6 weeks s/p left CRISTINA, 1/13/2025   He is doing well.    He should continue physical therapy.    He should follow up in 6 weeks      To do next visit:  Return in about 6 weeks (around 4/8/2025).    The above stated was discussed in layman's terms and the patient expressed understanding.  All questions were answered to the patient's satisfaction.       Scribe Attestation      I,:  Carrington Villaseñor MA am acting as a scribe while in the presence of the attending physician.:       I,:  Lyndon Ruelas MD personally performed the services described in this documentation    as scribed in my presence.:               Subjective:   Suleman Zhang is a 54 y.o. male who presents 6 weeks s/p left CRISTINA, 1/13/2025.  He is doing well.  Today he has no left hip pain complaints.  He continues physical therapy with benefit.  He no longer uses medications for his left hip.  He denies fever, chills or shortness of breath.        Review of systems negative unless otherwise specified in HPI    Past Medical History:   Diagnosis Date    Disseminated herpes zoster 10/16/2019    HLD (hyperlipidemia)     Hypertension     Known health problems: none     Vitamin D deficiency        Past Surgical History:   Procedure Laterality Date    CARDIAC CATHETERIZATION N/A 03/02/2023    Procedure: Cardiac catheterization;  Surgeon: Yodit Small MD;  Location: [facility];  Service: Cardiology    CARDIAC CATHETERIZATION N/A 03/02/2023    Procedure: Cardiac Coronary Angiogram;  Surgeon: Yodit Small MD;  Location: [facility];  Service: Cardiology    COLONOSCOPY      CORONARY ARTERY BYPASS GRAFT      HI ARTHRP ACETBLR/PROX FEM PROSTC AGRFT/ALGRFT Left 1/13/2025    Procedure: Left total hip arthroplasty;  Surgeon: Lyndon Ruelas MD;  Location:  MAIN OR;  Service: Orthopedics    HI CORONARY ARTERY BYP W/VEIN & ARTERY GRAFT 4 VEIN N/A  2023    Procedure: CORONARY ARTERY BYPASS GRAFT (CABG) 4 VESSELS,  SVG to PDA  and OM2.                            r      LRA-->OM1 , LIMA to LAD, evh, hector;  Surgeon: Darrell Jiménez DO;  Location: [facility];  Service: Cardiac Surgery    WISDOM TOOTH EXTRACTION         Family History   Problem Relation Age of Onset    Hypertension Father     Breast cancer Sister     Heart attack Maternal Grandfather     Heart attack Paternal Grandfather         s/p CABG    Coronary artery disease Paternal Uncle     Prostate cancer Paternal Uncle     Coronary artery disease Paternal Uncle         s/p cardiac stent    Prostate cancer Paternal Uncle        Social History     Occupational History    Not on file   Tobacco Use    Smoking status: Former     Current packs/day: 0.00     Average packs/day: 0.3 packs/day for 6.0 years (1.5 ttl pk-yrs)     Types: Cigarettes, Cigars     Start date:      Quit date:      Years since quittin.1    Smokeless tobacco: Former     Types: Snuff, Chew   Vaping Use    Vaping status: Never Used   Substance and Sexual Activity    Alcohol use: Not Currently     Alcohol/week: 16.0 standard drinks of alcohol     Types: 4 Glasses of wine, 12 Cans of beer per week     Comment: Social    Drug use: Not Currently     Types: Marijuana     Comment: rare marijuana use    Sexual activity: Yes     Partners: Female     Birth control/protection: None         Current Outpatient Medications:     acetaminophen (TYLENOL) 500 mg tablet, Take 500 mg by mouth every 6 (six) hours as needed for mild pain, Disp: , Rfl:     amLODIPine (NORVASC) 2.5 mg tablet, TAKE ONE TABLET BY MOUTH EVERY DAY, Disp: 30 tablet, Rfl: 5    aspirin 81 mg chewable tablet, Chew 1 tablet (81 mg total) daily, Disp: , Rfl:     atorvastatin (LIPITOR) 40 mg tablet, Take 1 tablet (40 mg total) by mouth daily with dinner, Disp: 90 tablet, Rfl: 3    Boswellia-Glucosamine-Vit D (OSTEO BI-FLEX ONE PER DAY PO), Take 1 tablet by mouth in the  "morning, Disp: , Rfl:     LYSINE HCL PO, Take 1 tablet by mouth in the morning, Disp: , Rfl:     methocarbamol (ROBAXIN) 500 mg tablet, Take 1 tablet (500 mg total) by mouth 3 (three) times a day as needed for muscle spasms, Disp: 60 tablet, Rfl: 0    methylcellulose oral powder, , Disp: , Rfl:     metoprolol tartrate (LOPRESSOR) 25 mg tablet, TAKE 1/2 TABLET BY MOUTH EVERY 12 HOURS, Disp: 60 tablet, Rfl: 3    Multiple Vitamin (multivitamin) tablet, Take 1 tablet by mouth daily, Disp: , Rfl:     oxyCODONE (Roxicodone) 5 immediate release tablet, Take 1 tablet (5 mg total) by mouth every 6 (six) hours as needed for moderate pain Max Daily Amount: 20 mg, Disp: 30 tablet, Rfl: 0    Vitamin D, Cholecalciferol, 50 MCG (2000 UT) CAPS, Take 3 capsules by mouth if needed (3 TIMES A WEEK), Disp: , Rfl:     Allergies   Allergen Reactions    Codeine Drowsiness     incapacitates          There were no vitals filed for this visit.    Objective:  Physical exam  General: Awake, Alert, Oriented  Eyes: Pupils equal, round and reactive to light  Heart: regular rate and rhythm  Lungs: No audible wheezing  Abdomen: soft                    Ortho Exam  Left hip:  Well healed posterior incision  Good arc of motion with no pain  Patient sits comfortably in chair with hip flexed at 90 degrees  Patient stands from seated position without assistance  Calf compartments soft and supple  Sensation intact  Toes are warm sensate and mobile      Diagnostics, reviewed and taken today if performed as documented:    None performed     Procedures, if performed today:    Procedures    None performed      Portions of the record may have been created with voice recognition software.  Occasional wrong word or \"sound a like\" substitutions may have occurred due to the inherent limitations of voice recognition software.  Read the chart carefully and recognize, using context, where substitutions have occurred.    "

## 2025-02-25 NOTE — ASSESSMENT & PLAN NOTE
BP controlled on amlodipine and lopressor, continue  Orders:  •  Comprehensive metabolic panel; Future

## 2025-02-25 NOTE — ASSESSMENT & PLAN NOTE
BMI Counseling: Body mass index is 28.27 kg/m². The BMI is above normal. Nutrition recommendations include reducing portion sizes, decreasing overall calorie intake, moderation in carbohydrate intake, increasing intake of lean protein, reducing intake of saturated fat and trans fat, and reducing intake of cholesterol. Exercise recommendations include moderate aerobic physical activity for 150 minutes/week.

## 2025-02-25 NOTE — PROGRESS NOTES
Name: Suleman Zhang      : 1970      MRN: [id number]  Encounter Provider: Lele Coughlin PA-C  Encounter Date: 2025   Encounter department: [facility]    Assessment & Plan  Essential hypertension  BP controlled on amlodipine and lopressor, continue  Orders:  •  Comprehensive metabolic panel; Future    S/P CABG (coronary artery bypass graft)  Continue asa, statin, BB, amlodipine  Continue with cardiology       Hyperlipidemia, mixed  Historically well controlled  Continue statin  Orders:  •  Lipid Panel with Direct LDL reflex; Future    Overweight (BMI 25.0-29.9)    BMI Counseling: Body mass index is 28.27 kg/m². The BMI is above normal. Nutrition recommendations include reducing portion sizes, decreasing overall calorie intake, moderation in carbohydrate intake, increasing intake of lean protein, reducing intake of saturated fat and trans fat, and reducing intake of cholesterol. Exercise recommendations include moderate aerobic physical activity for 150 minutes/week.         Screening for prostate cancer    Orders:  •  PSA, Total Screen; Future    Health maintenance examination  Hx reviewed and updated. Unremarkable exam. Update HM. Recent labs reviewed. 6 month follow up with repeat labs.            History of Present Illness     Pt presents for 6 month follow up, annual physical    Hx of HTN, CAD, HLD. S/p CABG two years ago. Remains on asa, statin, lopressor, amlodipine. No cardiac complaints. Lipids historically well controlled. /78.   He had L CRISTINA 6 weeks ago, routine post-op course  No other interval changes  FH changes include CAD in mother  Not smoking, previous smoker  No abuse/misuse of alcohol or illicit drugs  Meds/allergies reviewed  Utd with PSA and CRC screening       Review of Systems   Constitutional:  Negative for chills, fatigue and fever.   HENT:  Negative for congestion, ear pain, hearing loss, nosebleeds, postnasal drip, rhinorrhea, sinus pressure,  sinus pain, sneezing and sore throat.    Eyes:  Negative for pain, discharge, itching and visual disturbance.   Respiratory:  Negative for cough, chest tightness, shortness of breath and wheezing.    Cardiovascular:  Negative for chest pain, palpitations and leg swelling.   Gastrointestinal:  Negative for abdominal pain, blood in stool, constipation, diarrhea, nausea and vomiting.   Genitourinary:  Negative for frequency and urgency.   Neurological:  Negative for dizziness, light-headedness and numbness.     Past Medical History:   Diagnosis Date   • Disseminated herpes zoster 10/16/2019   • HLD (hyperlipidemia)    • Hypertension    • Known health problems: none    • Vitamin D deficiency      Past Surgical History:   Procedure Laterality Date   • CARDIAC CATHETERIZATION N/A 03/02/2023    Procedure: Cardiac catheterization;  Surgeon: Yodit Small MD;  [facility];  Service: Cardiology   • CARDIAC CATHETERIZATION N/A 03/02/2023    Procedure: Cardiac Coronary Angiogram;  Surgeon: Yodit Small MD;  [facility];  Service: Cardiology   • COLONOSCOPY     • CORONARY ARTERY BYPASS GRAFT     • MN ARTHRP ACETBLR/PROX FEM PROSTC AGRFT/ALGRFT Left 1/13/2025    Procedure: Left total hip arthroplasty;  Surgeon: Lyndon Ruelas MD;  Location:  MAIN OR;  Service: Orthopedics   • MN CORONARY ARTERY BYP W/VEIN & ARTERY GRAFT 4 VEIN N/A 03/08/2023    Procedure: CORONARY ARTERY BYPASS GRAFT (CABG) 4 VESSELS,  SVG to PDA  and OM2.                            r      LRA-->OM1 , LIMA to LAD, evh, hector;  Surgeon: Darrell Jiménez DO;  Location: [facility];  Service: Cardiac Surgery   • WISDOM TOOTH EXTRACTION       Family History   Problem Relation Age of Onset   • Hypertension Father    • Breast cancer Sister    • Heart attack Maternal Grandfather    • Heart attack Paternal Grandfather         s/p CABG   • Coronary artery disease Paternal Uncle    • Prostate cancer Paternal Uncle    •  Coronary artery disease Paternal Uncle         s/p cardiac stent   • Prostate cancer Paternal Uncle      Social History     Tobacco Use   • Smoking status: Former     Current packs/day: 0.00     Average packs/day: 0.3 packs/day for 6.0 years (1.5 ttl pk-yrs)     Types: Cigarettes, Cigars     Start date:      Quit date:      Years since quittin.1   • Smokeless tobacco: Former     Types: Snuff, Chew   Vaping Use   • Vaping status: Never Used   Substance and Sexual Activity   • Alcohol use: Not Currently     Alcohol/week: 16.0 standard drinks of alcohol     Types: 4 Glasses of wine, 12 Cans of beer per week     Comment: Social   • Drug use: Not Currently     Types: Marijuana     Comment: rare marijuana use   • Sexual activity: Yes     Partners: Female     Birth control/protection: None     Current Outpatient Medications on File Prior to Visit   Medication Sig   • acetaminophen (TYLENOL) 500 mg tablet Take 500 mg by mouth every 6 (six) hours as needed for mild pain   • amLODIPine (NORVASC) 2.5 mg tablet TAKE ONE TABLET BY MOUTH EVERY DAY   • aspirin 81 mg chewable tablet Chew 1 tablet (81 mg total) daily   • atorvastatin (LIPITOR) 40 mg tablet Take 1 tablet (40 mg total) by mouth daily with dinner   • Boswellia-Glucosamine-Vit D (OSTEO BI-FLEX ONE PER DAY PO) Take 1 tablet by mouth in the morning   • enoxaparin (LOVENOX) 40 mg/0.4 mL Inject 0.4 mL (40 mg total) under the skin daily for 28 days To start postoperatively   • LYSINE HCL PO Take 1 tablet by mouth in the morning   • methocarbamol (ROBAXIN) 500 mg tablet Take 1 tablet (500 mg total) by mouth 3 (three) times a day as needed for muscle spasms   • methylcellulose oral powder    • metoprolol tartrate (LOPRESSOR) 25 mg tablet TAKE 1/2 TABLET BY MOUTH EVERY 12 HOURS   • Multiple Vitamin (multivitamin) tablet Take 1 tablet by mouth daily   • oxyCODONE (Roxicodone) 5 immediate release tablet Take 1 tablet (5 mg total) by mouth every 6 (six) hours as  "needed for moderate pain Max Daily Amount: 20 mg   • Vitamin D, Cholecalciferol, 50 MCG (2000 UT) CAPS Take 3 capsules by mouth if needed (3 TIMES A WEEK)     Allergies   Allergen Reactions   • Codeine Drowsiness     incapacitates     Immunization History   Administered Date(s) Administered   • Td (adult), Unspecified 07/14/2015   • Tdap 07/14/2015     Objective   /78 (BP Location: Right arm, Patient Position: Sitting, Cuff Size: Large)   Pulse 65   Temp (!) 97.4 °F (36.3 °C)   Ht 5' 10\" (1.778 m)   Wt 89.4 kg (197 lb)   SpO2 99%   BMI 28.27 kg/m²     Physical Exam  Vitals and nursing note reviewed.   Constitutional:       General: He is not in acute distress.     Appearance: He is well-developed.   HENT:      Head: Normocephalic and atraumatic.      Right Ear: Tympanic membrane normal.      Left Ear: Tympanic membrane normal.      Nose: Nose normal.   Eyes:      Conjunctiva/sclera: Conjunctivae normal.   Neck:      Vascular: No carotid bruit.   Cardiovascular:      Rate and Rhythm: Normal rate and regular rhythm.      Heart sounds: No murmur heard.  Pulmonary:      Effort: Pulmonary effort is normal. No respiratory distress.      Breath sounds: Normal breath sounds. No wheezing, rhonchi or rales.   Abdominal:      Palpations: Abdomen is soft.      Tenderness: There is no abdominal tenderness.   Musculoskeletal:         General: No swelling.      Cervical back: Neck supple.   Skin:     General: Skin is warm and dry.      Capillary Refill: Capillary refill takes less than 2 seconds.   Neurological:      Mental Status: He is alert.   Psychiatric:         Mood and Affect: Mood normal.         "

## 2025-02-25 NOTE — ASSESSMENT & PLAN NOTE
Historically well controlled  Continue statin  Orders:  •  Lipid Panel with Direct LDL reflex; Future

## 2025-02-26 ENCOUNTER — OFFICE VISIT (OUTPATIENT)
Dept: PHYSICAL THERAPY | Facility: CLINIC | Age: 55
End: 2025-02-26
Payer: COMMERCIAL

## 2025-02-26 ENCOUNTER — APPOINTMENT (OUTPATIENT)
Dept: PHYSICAL THERAPY | Facility: CLINIC | Age: 55
End: 2025-02-26
Payer: COMMERCIAL

## 2025-02-26 DIAGNOSIS — M25.552 CHRONIC LEFT HIP PAIN: ICD-10-CM

## 2025-02-26 DIAGNOSIS — Z96.642 AFTERCARE FOLLOWING LEFT HIP JOINT REPLACEMENT SURGERY: Primary | ICD-10-CM

## 2025-02-26 DIAGNOSIS — G89.29 CHRONIC LEFT HIP PAIN: ICD-10-CM

## 2025-02-26 DIAGNOSIS — Z47.1 AFTERCARE FOLLOWING LEFT HIP JOINT REPLACEMENT SURGERY: Primary | ICD-10-CM

## 2025-02-26 DIAGNOSIS — M16.12 PRIMARY OSTEOARTHRITIS OF LEFT HIP: ICD-10-CM

## 2025-02-26 PROCEDURE — 97110 THERAPEUTIC EXERCISES: CPT

## 2025-02-26 PROCEDURE — 97530 THERAPEUTIC ACTIVITIES: CPT

## 2025-02-26 NOTE — PROGRESS NOTES
Daily Note    Today's date: 25  Patient name: Suleman Zhang  : 1970  MRN: [id number]  Referring provider: Lyndon Ruelas,*  Dx:   Encounter Diagnosis     ICD-10-CM    1. Aftercare following left hip joint replacement surgery  Z47.1     Z96.642       2. Chronic left hip pain  M25.552     G89.29       3. Primary osteoarthritis of left hip  M16.12           Start Time: 0805  Stop Time: 0850  Total time in clinic (min): 45 minutes      Subjective: Suleman reports no c/o today. He saw his surgeon and was advised to continue strengthening and is healing well.     Objective: See treatment diary below.    Assessment: Suleman tolerated treatment well with consistent cuing throughout. TE's were performed with increased reps and increased resistance. No new TE's were performed today. Following treatment, the patient demonstrated fatigue and would benefit from continued physical therapy.    Plan: Discharge today. Patient will be joining the fitness program.       Precautions: L Posterior CRISTINA , HTN    POC expires Unit limit Auth  expiration date PT/OT + Visit Limit?   3/31/24 BOMN 25.  BOMN                 Visit/Unit Tracking  AUTH Status:  Date     Approved Used 1 2 3 4 5 6 7 8 9  10 11     Remaining  23 22 21 20 19 18 17 16 15  14 13      Access Code: [email or web address]  URL: [email or web address]  Date: 2025  Prepared by: Loi Andrews    Exercises  - Seated Ankle Pumps  - 2 x daily - 7 x weekly - 3 sets - 10 reps  - Supine Gluteal Sets  - 2 x daily - 7 x weekly - 3 sets - 10 reps - 5 hold  - Supine Quadricep Sets  - 2 x daily - 7 x weekly - 3 sets - 10 reps - 5 hold  - Supine Heel Slide  - 2 x daily - 7 x weekly - 3 sets - 10 reps - 5 hold  - Supine Hip Abduction  - 2 x daily - 7 x weekly - 2 sets - 10 reps  - Seated Long Arc Quad  - 2 x daily - 7 x weekly - 3 sets - 10 reps    Manuals 2  "  PROM Hip          TS                             Re-eval          TS   Neuro Re-Ed             Rockerboard 2'/2'    2'/2' 2'/2'         SL balance                          Heel toe walk         Foam beam 5 laps     Alexys walking             Bosu balance 10x10\"                                       TIANNA, 5xSTS, TUG                                                    Ther Ex             Nustep for ROM and cardio 10' L6  10' L6 upright  10' L6  10' L6  10 min L6 10' L7  10' L1 rec 10 min L7 nustep  10' L5 upright    Pt education - precautions, home prep checklist, HEP          TS   Quad Set             Glute Set             Ankle Pumps             LAQ/SAQ             SLR             Supine march     3x15        Heel slides             SL hip abduction     3x10  3x10       HL clamshells      GTB 3x10 5\"       HL adduction             Bridges      Reviewed   Reviewed for HEP     Step Stretch             Standing HS st.             Standing hip abd     3x10   YTB 3x10 b/l RTB 3x10   Reviewed for HEP     Standing hip march     3x10          Standing hip extension    3x10   YTB 3x10 b/l RTB 3x10    Reviewed for HEP                  Matrix flexion  3x10 50#      nv 35# 2x 10  3x10 50#   Matrix ext.  3x10 30#      nv 25# 2x 10  3x10 30#                                          Ther Activity             TG Squats    L22 3x15 L22 3x15 L22 3x15 L22 3x15 DC     Wall squat        3x10  3x 10 with teal P ball  3x10 PB   FSU    6\" 3x10  6\" 3x10   8\" 20x      FSD      6\" 2x10       LSU    6\" 3x10  6\" 3x10   8\" 20x      LSD      4\" 2x10       StS w/ TB     GTB 5 laps        Resisted walking 4 way Cheney 28# 10x ea Cheney 25# 10x ea     10x 20#      Lateral walk        BTB 5 laps BTB 5 laps no UE  HEP     Monster walk        BTB 5 laps BTB 5 laps no UE  HEP    Lunge slider BOSU 3x6 BOSU 3x8      3x6  10x ea.     BOSU step up hold 3x5       10x10\" ea 3\" 2x 10 F and L     BOSU Squat hold  3x8 5\" 3x8 5\"      3x7 5\"  2x 10     Gait " Training                                       Modalities             PETTY Andrews, PT  2/26/2025,8:54 AM

## 2025-02-28 ENCOUNTER — APPOINTMENT (OUTPATIENT)
Dept: PHYSICAL THERAPY | Facility: CLINIC | Age: 55
End: 2025-02-28
Payer: COMMERCIAL

## 2025-03-31 DIAGNOSIS — Z96.642 AFTERCARE FOLLOWING LEFT HIP JOINT REPLACEMENT SURGERY: Primary | ICD-10-CM

## 2025-03-31 DIAGNOSIS — Z47.1 AFTERCARE FOLLOWING LEFT HIP JOINT REPLACEMENT SURGERY: Primary | ICD-10-CM

## 2025-04-08 ENCOUNTER — HOSPITAL ENCOUNTER (OUTPATIENT)
Dept: RADIOLOGY | Facility: HOSPITAL | Age: 55
Discharge: HOME | End: 2025-04-08
Attending: ORTHOPAEDIC SURGERY
Payer: COMMERCIAL

## 2025-04-08 ENCOUNTER — OFFICE VISIT (OUTPATIENT)
Dept: OBGYN CLINIC | Facility: HOSPITAL | Age: 55
End: 2025-04-08

## 2025-04-08 VITALS — BODY MASS INDEX: 28.49 KG/M2 | HEIGHT: 70 IN | WEIGHT: 199 LBS

## 2025-04-08 DIAGNOSIS — Z96.642 AFTERCARE FOLLOWING LEFT HIP JOINT REPLACEMENT SURGERY: ICD-10-CM

## 2025-04-08 DIAGNOSIS — Z47.1 AFTERCARE FOLLOWING LEFT HIP JOINT REPLACEMENT SURGERY: ICD-10-CM

## 2025-04-08 DIAGNOSIS — Z96.642 AFTERCARE FOLLOWING LEFT HIP JOINT REPLACEMENT SURGERY: Primary | ICD-10-CM

## 2025-04-08 DIAGNOSIS — Z47.1 AFTERCARE FOLLOWING LEFT HIP JOINT REPLACEMENT SURGERY: Primary | ICD-10-CM

## 2025-04-08 PROCEDURE — 99024 POSTOP FOLLOW-UP VISIT: CPT | Performed by: ORTHOPAEDIC SURGERY

## 2025-04-08 PROCEDURE — 73502 X-RAY EXAM HIP UNI 2-3 VIEWS: CPT

## 2025-04-08 RX ORDER — CEPHALEXIN 500 MG/1
CAPSULE ORAL
Qty: 20 CAPSULE | Refills: 1 | Status: SHIPPED | OUTPATIENT
Start: 2025-04-08 | End: 2027-02-01

## 2025-04-08 NOTE — PROGRESS NOTES
Name: Suleman Zhang      : 1970       MRN: [id number]   Encounter Provider: Lyndon Ruelas MD   Encounter Date: 25  Encounter department: [facility] [place]     ASSESSMENT & PLAN:  Assessment & Plan  Aftercare following left hip joint replacement surgery  DOS 2025         ___________________________________________________  X-rays taken and reviewed, physical exam performed.  Doing well 3 months status post left total hip arthroplasty.  Total hip precautions with antibiotics as discussed until the 2-year postop julita, prescription sent to his pharmacy of record.  Maintain home exercise program of stretching daily and regular strengthening exercises.  Recommend giving his left knee symptoms some time but will evaluate at his next office appointment in 3 months if still symptomatic.    To do next visit:  Return in about 3 months (around 2025) for re-check with x-rays upon arrival left hip and pelvis.  ____________________________________________________  CHIEF COMPLAINT:  Chief Complaint   Patient presents with    Left Hip - Post-op         SUBJECTIVE:  Suleman Zhang is a 54 y.o. male who presents today for repeat evaluation 3-month status post left total hip arthroplasty, date of surgery 2025.  Overall he is doing very well.  Since his last office visit he was discharged from formal therapy to a home exercise program.  He has no groin or thigh pain.  He notes occasional muscle soreness/stiffness but no significant mount of pain.  Over the past week or 2 and an insidious onset started with left knee pain.  He denies any buckling or giving way.  Denies any right hip issues          PAST MEDICAL HISTORY:  Past Medical History:   Diagnosis Date    Disseminated herpes zoster 10/16/2019    HLD (hyperlipidemia)     Hypertension     Known health problems: none     Vitamin D deficiency        PAST SURGICAL HISTORY:  Past Surgical History:   Procedure  Laterality Date    CARDIAC CATHETERIZATION N/A 2023    Procedure: Cardiac catheterization;  Surgeon: Yodit Small MD;  [facility]: [facility];  Service: Cardiology    CARDIAC CATHETERIZATION N/A 2023    Procedure: Cardiac Coronary Angiogram;  Surgeon: Yodit Small MD;  [facility]: [facility];  Service: Cardiology    COLONOSCOPY      CORONARY ARTERY BYPASS GRAFT      UT ARTHRP ACETBLR/PROX FEM PROSTC AGRFT/ALGRFT Left 2025    Procedure: Left total hip arthroplasty;  Surgeon: Lyndon Ruelas MD;  [facility]: [facility];  Service: Orthopedics    UT CORONARY ARTERY BYP W/VEIN & ARTERY GRAFT 4 VEIN N/A 2023    Procedure: CORONARY ARTERY BYPASS GRAFT (CABG) 4 VESSELS,  SVG to PDA  and OM2.                            r      LRA-->OM1 , LIMA to LAD, evh, hector;  Surgeon: Darrell Jiménez DO;  Location: [facility];  Service: Cardiac Surgery    WISDOM TOOTH EXTRACTION         FAMILY HISTORY:  Family History   Problem Relation Age of Onset    Hypertension Father     Breast cancer Sister     Heart attack Maternal Grandfather     Heart attack Paternal Grandfather         s/p CABG    Coronary artery disease Paternal Uncle     Prostate cancer Paternal Uncle     Coronary artery disease Paternal Uncle         s/p cardiac stent    Prostate cancer Paternal Uncle        SOCIAL HISTORY:  Social History     Tobacco Use    Smoking status: Former     Current packs/day: 0.00     Average packs/day: 0.3 packs/day for 6.0 years (1.5 ttl pk-yrs)     Types: Cigarettes, Cigars     Start date:      Quit date:      Years since quittin.2    Smokeless tobacco: Former     Types: Snuff, Chew   Vaping Use    Vaping status: Never Used   Substance Use Topics    Alcohol use: Not Currently     Alcohol/week: 16.0 standard drinks of alcohol     Types: 4 Glasses of wine, 12 Cans of beer per week     Comment: Social    Drug use: Not Currently     Types: Marijuana     Comment: rare marijuana use  "      MEDICATIONS:    Current Outpatient Medications:     acetaminophen (TYLENOL) 500 mg tablet, Take 500 mg by mouth every 6 (six) hours as needed for mild pain, Disp: , Rfl:     amLODIPine (NORVASC) 2.5 mg tablet, TAKE ONE TABLET BY MOUTH EVERY DAY, Disp: 30 tablet, Rfl: 5    aspirin 81 mg chewable tablet, Chew 1 tablet (81 mg total) daily, Disp: , Rfl:     atorvastatin (LIPITOR) 40 mg tablet, Take 1 tablet (40 mg total) by mouth daily with dinner, Disp: 90 tablet, Rfl: 3    Boswellia-Glucosamine-Vit D (OSTEO BI-FLEX ONE PER DAY PO), Take 1 tablet by mouth in the morning, Disp: , Rfl:     LYSINE HCL PO, Take 1 tablet by mouth in the morning, Disp: , Rfl:     methocarbamol (ROBAXIN) 500 mg tablet, Take 1 tablet (500 mg total) by mouth 3 (three) times a day as needed for muscle spasms, Disp: 60 tablet, Rfl: 0    methylcellulose oral powder, , Disp: , Rfl:     metoprolol tartrate (LOPRESSOR) 25 mg tablet, TAKE 1/2 TABLET BY MOUTH EVERY 12 HOURS, Disp: 60 tablet, Rfl: 3    Multiple Vitamin (multivitamin) tablet, Take 1 tablet by mouth daily, Disp: , Rfl:     oxyCODONE (Roxicodone) 5 immediate release tablet, Take 1 tablet (5 mg total) by mouth every 6 (six) hours as needed for moderate pain Max Daily Amount: 20 mg, Disp: 30 tablet, Rfl: 0    Vitamin D, Cholecalciferol, 50 MCG (2000 UT) CAPS, Take 3 capsules by mouth if needed (3 TIMES A WEEK), Disp: , Rfl:     ALLERGIES:  Allergies   Allergen Reactions    Codeine Drowsiness     incapacitates       LABS:  HgA1c:   Lab Results   Component Value Date    HGBA1C 5.8 (H) 12/19/2024     BMP:   Lab Results   Component Value Date    GLUCOSE 89 03/08/2023    CALCIUM 9.1 01/14/2025    K 4.1 01/14/2025    CO2 30 01/14/2025     01/14/2025    BUN 10 01/14/2025    CREATININE 0.61 01/14/2025     CBC: No components found for: \"CBC\"    _____________________________________________________  PHYSICAL EXAMINATION:  Vital signs: Ht 5' 10\" (1.778 m)   Wt 90.3 kg (199 lb)   BMI 28.55 " kg/m²   General: No acute distress, awake and alert  Psychiatric: Mood and affect appear appropriate  HEENT: Trachea Midline, No torticollis, no apparent facial trauma  Cardiovascular: No audible murmurs; Extremities appear perfused  Pulmonary: No audible wheezing or stridor  Skin: Intact, no open lesions; see further details (if any) below    MUSCULOSKELETAL EXAMINATION:    Left Hip Exam     Comments:    Well-healed posterior lateral incision.  Minimal swelling.  Minimal warmth.  No gross signs of infection  Painless arc of gentle passive range of motion all planes.  Fairly good hip muscle strength              _____________________________________________________  STUDIES REVIEWED:    The attending physician has personally reviewed the pertinent films in PACS and their interpretation is as follows:    Left hip and pelvis x-rays taken and reviewed in the office today show: Well aligned, position, bonded left total hip prosthesis without signs of loosening or stress shielding.  Moderate right femoral acetabular degenerative changes with subchondral and subcystic formation present otherwise unchanged      PROCEDURES PERFORMED:    Procedures    None Performed        Scribe Attestation      I,:  Dann Dumont am acting as a scribe while in the presence of the attending physician.:       I,:  Lyndon Ruelas MD personally performed the services described in this documentation    as scribed in my presence.:

## 2025-05-05 DIAGNOSIS — I25.10 CAD IN NATIVE ARTERY: ICD-10-CM

## 2025-05-05 DIAGNOSIS — Z95.1 S/P CABG (CORONARY ARTERY BYPASS GRAFT): ICD-10-CM

## 2025-05-05 DIAGNOSIS — I24.9 ACS (ACUTE CORONARY SYNDROME) (HCC): ICD-10-CM

## 2025-05-05 DIAGNOSIS — I10 ESSENTIAL HYPERTENSION: ICD-10-CM

## 2025-05-05 DIAGNOSIS — E78.2 HYPERLIPIDEMIA, MIXED: ICD-10-CM

## 2025-05-05 NOTE — TELEPHONE ENCOUNTER
Patient called    Patient requesting medication refill Metoprolol Tartrate 25 mg     Requested Prescriptions     Pending Prescriptions Disp Refills    metoprolol tartrate (LOPRESSOR) 25 mg tablet 60 tablet 3     Sig: Take 0.5 tablets (12.5 mg total) by mouth every 12 (twelve) hours       Patient is leaving for [place] tomorrow and will not have enough medication for his stay while at his [place] home.    Patient requesting to be sent to   [id number] - [place] [id number] [place] [id number] [phone]     Please advise, and notify patient please.    Luisito- [phone]

## 2025-05-06 RX ORDER — METOPROLOL TARTRATE 25 MG/1
12.5 TABLET, FILM COATED ORAL EVERY 12 HOURS
Qty: 60 TABLET | Refills: 3 | Status: SHIPPED | OUTPATIENT
Start: 2025-05-06

## 2025-05-12 DIAGNOSIS — I10 ESSENTIAL HYPERTENSION: ICD-10-CM

## 2025-05-12 DIAGNOSIS — I25.10 CAD IN NATIVE ARTERY: ICD-10-CM

## 2025-05-12 DIAGNOSIS — Z95.1 S/P CABG (CORONARY ARTERY BYPASS GRAFT): ICD-10-CM

## 2025-05-12 DIAGNOSIS — I24.9 ACS (ACUTE CORONARY SYNDROME) (HCC): ICD-10-CM

## 2025-05-12 DIAGNOSIS — E78.2 HYPERLIPIDEMIA, MIXED: ICD-10-CM

## 2025-05-12 RX ORDER — AMLODIPINE BESYLATE 2.5 MG/1
2.5 TABLET ORAL DAILY
Qty: 30 TABLET | Refills: 5 | Status: SHIPPED | OUTPATIENT
Start: 2025-05-12

## 2025-05-12 NOTE — TELEPHONE ENCOUNTER
Patient called to request a refill on    amLODIPine (NORVASC) 2.5 mg tablet          Please send script to: [facility] #[id number] - [place], [place] - [address] [facility] [address] [phone]

## 2025-06-04 DIAGNOSIS — I10 ESSENTIAL HYPERTENSION: ICD-10-CM

## 2025-06-04 DIAGNOSIS — I24.9 ACS (ACUTE CORONARY SYNDROME) (HCC): ICD-10-CM

## 2025-06-04 DIAGNOSIS — I25.10 CAD IN NATIVE ARTERY: ICD-10-CM

## 2025-06-04 DIAGNOSIS — E78.2 HYPERLIPIDEMIA, MIXED: ICD-10-CM

## 2025-06-04 DIAGNOSIS — Z95.1 S/P CABG (CORONARY ARTERY BYPASS GRAFT): ICD-10-CM

## 2025-06-05 RX ORDER — METOPROLOL TARTRATE 25 MG/1
12.5 TABLET, FILM COATED ORAL EVERY 12 HOURS
Qty: 60 TABLET | Refills: 2 | Status: SHIPPED | OUTPATIENT
Start: 2025-06-05

## 2025-06-30 DIAGNOSIS — Z47.1 AFTERCARE FOLLOWING LEFT HIP JOINT REPLACEMENT SURGERY: Primary | ICD-10-CM

## 2025-06-30 DIAGNOSIS — Z96.642 AFTERCARE FOLLOWING LEFT HIP JOINT REPLACEMENT SURGERY: Primary | ICD-10-CM

## 2025-07-08 ENCOUNTER — HOSPITAL ENCOUNTER (OUTPATIENT)
Dept: RADIOLOGY | Facility: HOSPITAL | Age: 55
Discharge: HOME | End: 2025-07-08
Attending: ORTHOPAEDIC SURGERY
Payer: COMMERCIAL

## 2025-07-08 VITALS — HEIGHT: 70 IN | BODY MASS INDEX: 29.06 KG/M2 | WEIGHT: 203 LBS

## 2025-07-08 DIAGNOSIS — Z96.642 AFTERCARE FOLLOWING LEFT HIP JOINT REPLACEMENT SURGERY: Primary | ICD-10-CM

## 2025-07-08 DIAGNOSIS — Z96.642 AFTERCARE FOLLOWING LEFT HIP JOINT REPLACEMENT SURGERY: ICD-10-CM

## 2025-07-08 DIAGNOSIS — Z47.1 AFTERCARE FOLLOWING LEFT HIP JOINT REPLACEMENT SURGERY: Primary | ICD-10-CM

## 2025-07-08 DIAGNOSIS — Z47.1 AFTERCARE FOLLOWING LEFT HIP JOINT REPLACEMENT SURGERY: ICD-10-CM

## 2025-07-08 PROCEDURE — 99213 OFFICE O/P EST LOW 20 MIN: CPT | Performed by: ORTHOPAEDIC SURGERY

## 2025-07-08 PROCEDURE — 73502 X-RAY EXAM HIP UNI 2-3 VIEWS: CPT

## 2025-07-08 NOTE — ASSESSMENT & PLAN NOTE
Continue weight bearing activities as tolerated   Continue increasing physical activity  Continue OTC pain medications as needed   The patient was reminded about prophylactic antibiotic use prior to dental visits for the next 1.5 years   Follow up in 6 months for 1 year post-op appointment with repeat x-rays

## 2025-07-08 NOTE — PROGRESS NOTES
"Name: Suleman Zhang      : 1970       MRN: [id number]   Encounter Provider: Lyndon Ruelas MD   Encounter Date: 25  Encounter department: [facility] [place]     ASSESSMENT & PLAN:  Assessment & Plan  Aftercare following left hip joint replacement surgery    Continue weight bearing activities as tolerated   Continue increasing physical activity  Continue OTC pain medications as needed   The patient was reminded about prophylactic antibiotic use prior to dental visits for the next 1.5 years   Follow up in 6 months for 1 year post-op appointment with repeat x-rays         To do next visit:  Return in about 6 months (around 2026).    _____________________________________________________  CHIEF COMPLAINT:  Chief Complaint   Patient presents with    Left Hip - Follow-up         SUBJECTIVE:  Suleman Zhang is a 54 y.o. male who presents for 6 month follow up after left CRISTINA, DOS: 2025. He has been doing well. He is doing home therapy. He notes occasional discomfort when sleeping on his side but no other issues.         PAST MEDICAL HISTORY:  Past Medical History[1]    PAST SURGICAL HISTORY:  Past Surgical History[2]    FAMILY HISTORY:  Family History[3]    SOCIAL HISTORY:  Social History[4]    MEDICATIONS:  Current Medications[5]    ALLERGIES:  Allergies[6]    LABS:  HgA1c:   Lab Results   Component Value Date    HGBA1C 5.8 (H) 2024     BMP:   Lab Results   Component Value Date    GLUCOSE 89 2023    CALCIUM 9.1 2025    K 4.1 2025    CO2 30 2025     2025    BUN 10 2025    CREATININE 0.61 2025     CBC: No components found for: \"CBC\"    _____________________________________________________  PHYSICAL EXAMINATION:  Vital signs: Ht 5' 10\" (1.778 m)   Wt 92.1 kg (203 lb)   BMI 29.13 kg/m²   General: No acute distress, awake and alert  Psychiatric: Mood and affect appear appropriate  HEENT: Trachea Midline, No " torticollis, no apparent facial trauma  Cardiovascular: No audible murmurs; Extremities appear perfused  Pulmonary: No audible wheezing or stridor  Skin: No open lesions; see further details (if any) below    MUSCULOSKELETAL EXAMINATION:  Ortho Exam  Left hip  Well healed posterior incision  No erythema, edema, or signs of infection  Good arc of motion with no pain  Patient sits comfortably in chair with hip flexed at 90 degrees  Patient stands from seated position without assistance  Calf compartments soft and supple  Sensation intact  Toes are warm sensate and mobile   ___________________________________________________  STUDIES REVIEWED:  I personally reviewed the images obtained in office today and my independent interpretation is as follows:    Left hip xray:   Well aligned prosthesis without evidence of acute fractures, dislocations, acute changes, or hardware failure   Prosthesis appears properly sized and properly bonded to surrounding bone         PROCEDURES PERFORMED:    Procedures    None preformed       Pedro Inman MD         [1]   Past Medical History:  Diagnosis Date    Disseminated herpes zoster 10/16/2019    HLD (hyperlipidemia)     Hypertension     Known health problems: none     Vitamin D deficiency    [2]   Past Surgical History:  Procedure Laterality Date    CARDIAC CATHETERIZATION N/A 03/02/2023    Procedure: Cardiac catheterization;  Surgeon: Yodit Small MD;  [facility];  Service: Cardiology    CARDIAC CATHETERIZATION N/A 03/02/2023    Procedure: Cardiac Coronary Angiogram;  Surgeon: Yodit Small MD;  [facility];  Service: Cardiology    COLONOSCOPY      CORONARY ARTERY BYPASS GRAFT      MO ARTHRP ACETBLR/PROX FEM PROSTC AGRFT/ALGRFT Left 1/13/2025    Procedure: Left total hip arthroplasty;  Surgeon: Lyndon Ruelas MD;  Location:  [facility];  Service: Orthopedics    MO CORONARY ARTERY BYP W/VEIN & ARTERY GRAFT 4 VEIN N/A 03/08/2023     Procedure: CORONARY ARTERY BYPASS GRAFT (CABG) 4 VESSELS,  SVG to PDA  and OM2.                            r      LRA-->OM1 , LIMA to LAD, evh, hector;  Surgeon: Darrell Jiménez DO;  [facility];  Service: Cardiac Surgery    WISDOM TOOTH EXTRACTION     [3]   Family History  Problem Relation Name Age of Onset    Hypertension Father      Breast cancer Sister      Heart attack Maternal Grandfather      Heart attack Paternal Grandfather          s/p CABG    Coronary artery disease Paternal Uncle Hank     Prostate cancer Paternal Uncle Hank     Coronary artery disease Paternal Uncle David         s/p cardiac stent    Prostate cancer Paternal Uncle David    [4]   Social History  Tobacco Use    Smoking status: Former     Current packs/day: 0.00     Average packs/day: 0.3 packs/day for 6.0 years (1.5 ttl pk-yrs)     Types: Cigarettes, Cigars     Start date:      Quit date:      Years since quittin.5    Smokeless tobacco: Former     Types: Snuff, Chew   Vaping Use    Vaping status: Never Used   Substance Use Topics    Alcohol use: Not Currently     Alcohol/week: 16.0 standard drinks of alcohol     Types: 4 Glasses of wine, 12 Cans of beer per week     Comment: Social    Drug use: Not Currently     Types: Marijuana     Comment: rare marijuana use   [5]   Current Outpatient Medications:     acetaminophen (TYLENOL) 500 mg tablet, Take 500 mg by mouth every 6 (six) hours as needed for mild pain, Disp: , Rfl:     amLODIPine (NORVASC) 2.5 mg tablet, Take 1 tablet (2.5 mg total) by mouth daily, Disp: 30 tablet, Rfl: 5    aspirin 81 mg chewable tablet, Chew 1 tablet (81 mg total) daily, Disp: , Rfl:     atorvastatin (LIPITOR) 40 mg tablet, Take 1 tablet (40 mg total) by mouth daily with dinner, Disp: 90 tablet, Rfl: 3    Boswellia-Glucosamine-Vit D (OSTEO BI-FLEX ONE PER DAY PO), Take 1 tablet by mouth in the morning, Disp: , Rfl:     cephalexin (KEFLEX) 500 mg capsule, Take 4 tablets p.o. 1 hr prior to dental  appointment as instructed, Disp: 20 capsule, Rfl: 1    LYSINE HCL PO, Take 1 tablet by mouth in the morning, Disp: , Rfl:     methylcellulose oral powder, , Disp: , Rfl:     metoprolol tartrate (LOPRESSOR) 25 mg tablet, TAKE 1/2 TABLET BY MOUTH EVERY 12 HOURS, Disp: 60 tablet, Rfl: 2    Multiple Vitamin (multivitamin) tablet, Take 1 tablet by mouth in the morning., Disp: , Rfl:     Vitamin D, Cholecalciferol, 50 MCG (2000 UT) CAPS, Take 3 capsules by mouth if needed (3 TIMES A WEEK), Disp: , Rfl:     methocarbamol (ROBAXIN) 500 mg tablet, Take 1 tablet (500 mg total) by mouth 3 (three) times a day as needed for muscle spasms (Patient not taking: Reported on 7/8/2025), Disp: 60 tablet, Rfl: 0    oxyCODONE (Roxicodone) 5 immediate release tablet, Take 1 tablet (5 mg total) by mouth every 6 (six) hours as needed for moderate pain Max Daily Amount: 20 mg (Patient not taking: Reported on 7/8/2025), Disp: 30 tablet, Rfl: 0  [6]   Allergies  Allergen Reactions    Codeine Drowsiness     incapacitates

## 2025-08-20 ENCOUNTER — OFFICE VISIT (OUTPATIENT)
Dept: FAMILY MEDICINE CLINIC | Facility: CLINIC | Age: 55
End: 2025-08-20
Payer: COMMERCIAL

## 2025-08-20 VITALS
DIASTOLIC BLOOD PRESSURE: 86 MMHG | WEIGHT: 205 LBS | HEART RATE: 68 BPM | BODY MASS INDEX: 29.35 KG/M2 | HEIGHT: 70 IN | OXYGEN SATURATION: 98 % | SYSTOLIC BLOOD PRESSURE: 138 MMHG | TEMPERATURE: 98.4 F

## 2025-08-20 DIAGNOSIS — I10 ESSENTIAL HYPERTENSION: Primary | ICD-10-CM

## 2025-08-20 DIAGNOSIS — Z95.1 S/P CABG (CORONARY ARTERY BYPASS GRAFT): ICD-10-CM

## 2025-08-20 DIAGNOSIS — I25.10 CAD IN NATIVE ARTERY: ICD-10-CM

## 2025-08-20 DIAGNOSIS — E78.2 HYPERLIPIDEMIA, MIXED: ICD-10-CM

## 2025-08-20 PROCEDURE — 99214 OFFICE O/P EST MOD 30 MIN: CPT | Performed by: PHYSICIAN ASSISTANT

## (undated) DEVICE — HOOD WITH PEEL AWAY FACE SHIELD: Brand: T7PLUS

## (undated) DEVICE — GLOVE SRG BIOGEL ECLIPSE 8

## (undated) DEVICE — SPONGE CHERRY 1/2IN

## (undated) DEVICE — GLOVE SRG BIOGEL 7.5

## (undated) DEVICE — 3M™ TEGADERM™ TRANSPARENT FILM DRESSING FRAME STYLE, 1626W, 4 IN X 4-3/4 IN (10 CM X 12 CM), 50/CT 4CT/CASE: Brand: 3M™ TEGADERM™

## (undated) DEVICE — KERLIX BANDAGE ROLL: Brand: KERLIX

## (undated) DEVICE — FILTER SMOKE EVAC VIROSAFE

## (undated) DEVICE — 32 FR RIGHT ANGLE – SOFT PVC CATHETER: Brand: PVC THORACIC CATHETERS

## (undated) DEVICE — SUT PROLENE 6-0 C-1/C-1 30 IN 8307H

## (undated) DEVICE — INTENDED FOR TISSUE SEPARATION, AND OTHER PROCEDURES THAT REQUIRE A SHARP SURGICAL BLADE TO PUNCTURE OR CUT.: Brand: BARD-PARKER SAFETY BLADES SIZE 15, STERILE

## (undated) DEVICE — GLOVE INDICATOR PI UNDERGLOVE SZ 7.5 BLUE

## (undated) DEVICE — SILVER-COATED ANTIBACTERIAL BARRIER DRESSING: Brand: ACTICOAT SURGIC 10X25CM 5PK US

## (undated) DEVICE — GAUZE SPONGES,16 PLY: Brand: CURITY

## (undated) DEVICE — 32 FR STRAIGHT – SOFT PVC CATHETER: Brand: PVC THORACIC CATHETERS

## (undated) DEVICE — 40601 PROLONGED POSITIONING SYSTEM: Brand: 40601 PROLONGED POSITIONING SYSTEM

## (undated) DEVICE — BETHLEHEM TOTAL HIP, KIT: Brand: CARDINAL HEALTH

## (undated) DEVICE — JP CHANNEL DRAIN, 24FR HUBLESS: Brand: CARDINAL HEALTH

## (undated) DEVICE — DGW .035 FC J3MM 260CM TEF: Brand: EMERALD

## (undated) DEVICE — GLOVE INDICATOR PI UNDERGLOVE SZ 8 BLUE

## (undated) DEVICE — CAPIT HIP MOP -POLY CEMEMTED

## (undated) DEVICE — TUBING SUCTION 5MM X 12 FT

## (undated) DEVICE — AORTIC PUNCH 5.2 MM DISP

## (undated) DEVICE — MEDI-VAC TUBING CONNECTOR 6-IN-1 STRAIGHT: Brand: CARDINAL HEALTH

## (undated) DEVICE — SILVER-COATED ANTIBACTERIAL BARRIER DRESSING: Brand: ACTICOAT SURGIC 10X12CM 5PK US

## (undated) DEVICE — BLADE INTREX LRG BONE OSCILLATING

## (undated) DEVICE — GLOVE SRG BIOGEL 6.5

## (undated) DEVICE — 1/2 FORCE SURGICAL SPRING CLIP: Brand: STEALTH® SPRING CLIP

## (undated) DEVICE — SUT PDS PLUS 1 CTB 36 IN PDPB359T

## (undated) DEVICE — OASIS DRAIN, SINGLE, INLINE & ATS COMPATIBLE: Brand: OASIS

## (undated) DEVICE — SYRINGE 50ML LL

## (undated) DEVICE — PLUMEPEN PRO 10FT

## (undated) DEVICE — SUT SILK 0 CT-1 30 IN 424H

## (undated) DEVICE — RADIFOCUS OPTITORQUE ANGIOGRAPHIC CATHETER: Brand: OPTITORQUE

## (undated) DEVICE — ACE WRAP 6 IN UNSTERILE

## (undated) DEVICE — STERNAL WIRE

## (undated) DEVICE — ADHESIVE SKIN HIGH VISCOSITY EXOFIN 1ML

## (undated) DEVICE — TUBING INSUFFLATION SET ISO CONNECTOR

## (undated) DEVICE — WET SKIN PREP TRAY: Brand: MEDLINE INDUSTRIES, INC.

## (undated) DEVICE — DRESSING ALLEVYN LIFE HEEL 25 X 25.2CM

## (undated) DEVICE — INTENDED FOR TISSUE SEPARATION, AND OTHER PROCEDURES THAT REQUIRE A SHARP SURGICAL BLADE TO PUNCTURE OR CUT.: Brand: BARD-PARKER ® CARBON RIB-BACK BLADES

## (undated) DEVICE — HOOD: Brand: T7PLUS

## (undated) DEVICE — 3000CC GUARDIAN II: Brand: GUARDIAN

## (undated) DEVICE — ABDUCTION PILLOW FOAM POSITIONER: Brand: CARDINAL HEALTH

## (undated) DEVICE — PUMP TUBING FUSION PACK

## (undated) DEVICE — SUT VICRYL PLUS 2-0 CTB-1 27 IN VCPB259H

## (undated) DEVICE — GLIDESHEATH SLENDER STAINLESS STEEL KIT: Brand: GLIDESHEATH SLENDER

## (undated) DEVICE — ANTIBACTERIAL UNDYED BRAIDED (POLYGLACTIN 910), SYNTHETIC ABSORBABLE SUTURE: Brand: COATED VICRYL

## (undated) DEVICE — PLEDGET CARDIO PTFE 9.5 X 4.8 SOFT LF (6EA/PK)

## (undated) DEVICE — TRAY FOLEY 16FR URIMETER SURESTEP

## (undated) DEVICE — THERMOFLECT BLANKET, L, 25EA                               TS THERMOFLECT BLANKET, 48" X 84", SILVER, 5/BG, 5 BG/CS NW: Brand: THERMOFLECT

## (undated) DEVICE — SUCTION CATH 18 FR

## (undated) DEVICE — SUT SILK 3-0 SH CR/8 18 IN C013D

## (undated) DEVICE — EVERGRIP INSERT SET 86MM: Brand: FOGARTY EVERGRIP

## (undated) DEVICE — BONE WAX WHITE: Brand: BONE WAX WHITE

## (undated) DEVICE — PENCIL ELECTROSURG E-Z CLEAN -0035H

## (undated) DEVICE — ACE WRAP 4 IN UNSTERILE

## (undated) DEVICE — CATH DIAG 5FR IMPULSE 100CM FR4

## (undated) DEVICE — RECIP.STERNUM SAW BLADE 34/7.5/0.7MM: Brand: AESCULAP

## (undated) DEVICE — SUT MONOCRYL PLUS 4-0 PS-2 18 IN MCP496G

## (undated) DEVICE — SUT PROLENE 4-0 RB-1/RB-1 36 IN 8557H

## (undated) DEVICE — ELECTRODE BLADE E-Z CLEAN 4IN -0014A

## (undated) DEVICE — STOCKINETTE REGULAR

## (undated) DEVICE — DRESSING MEPILEX AG BORDER POST-OP 4 X 12 IN

## (undated) DEVICE — VASOVIEW HEMOPRO 2: Brand: VASOVIEW HEMOPRO 2

## (undated) DEVICE — SUT VICRYL PLUS 1 CTB-1 36 IN VCPB947H

## (undated) DEVICE — STIRRUP STRAP ADULT DISP

## (undated) DEVICE — SYRINGE 10ML LL

## (undated) DEVICE — HANDPIECE SET WITH RETRACTABLE COAXIAL FAN SPRAY TIP AND SUCTION TUBE: Brand: INTERPULSE

## (undated) DEVICE — ASTOUND STANDARD SURGICAL GOWN, XL: Brand: CONVERTORS

## (undated) DEVICE — BLANKET HYPOTHERMIA ADULT GAYMAR

## (undated) DEVICE — PROXIMATE PLUS MD MULTI-DIRECTIONAL RELEASE SKIN STAPLERS CONTAINS 35 STAINLESS STEEL STAPLES APPROXIMATE CLOSED DIMENSIONS: 6.9MM X 3.9MM WIDE: Brand: PROXIMATE

## (undated) DEVICE — ALCON OPHTHALMIC KNIFE 15 °: Brand: ALCON

## (undated) DEVICE — SUT SILK 2 60 IN SA8H

## (undated) DEVICE — GLOVE INDICATOR PI UNDERGLOVE SZ 7 BLUE

## (undated) DEVICE — TRAY FOLEY 16FR SURESTEP TEMP SENS URIMETER STAT LOK

## (undated) DEVICE — LIGACLIP MCA MULTIPLE CLIP APPLIERS, 20 SMALL CLIPS: Brand: LIGACLIP

## (undated) DEVICE — RED RUBBER URETHRAL CATHETER: Brand: DOVER

## (undated) DEVICE — SUT PROLENE 7-0 BV175-8/BV175-8 24 IN EPM8747

## (undated) DEVICE — SUT ETHIBOND 2-0 SH-1/SH-1 30 IN X763H

## (undated) DEVICE — VESSEL LOOPS X-RAY DETECTABLE: Brand: DEROYAL

## (undated) DEVICE — GLOVE SRG BIOGEL 8

## (undated) DEVICE — GLOVE INDICATOR PI UNDERGLOVE SZ 8.5 BLUE

## (undated) DEVICE — LIGHT HANDLE COVER SLEEVE DISP BLUE STELLAR

## (undated) DEVICE — SUT MONOCRYL PLUS 3-0 PS-2 27 IN MCP427H

## (undated) DEVICE — GAUZE SPONGES,USP TYPE VII GAUZE, 12 PLY: Brand: CURITY

## (undated) DEVICE — DRAPE EQUIPMENT RF WAND

## (undated) DEVICE — SUT PROLENE 5-0 C-1/C-1 36 IN 8321H

## (undated) DEVICE — SUT PROLENE 4-0 BB 36 IN 8581H

## (undated) DEVICE — PACK CABG PBDS

## (undated) DEVICE — BLADE BEAVER MINI SZ 69